# Patient Record
Sex: MALE
[De-identification: names, ages, dates, MRNs, and addresses within clinical notes are randomized per-mention and may not be internally consistent; named-entity substitution may affect disease eponyms.]

---

## 2018-10-17 ENCOUNTER — HOSPITAL ENCOUNTER (INPATIENT)
Dept: HOSPITAL 31 - C.ER | Age: 63
LOS: 13 days | Discharge: HOME | DRG: 314 | End: 2018-10-30
Attending: FAMILY MEDICINE | Admitting: FAMILY MEDICINE
Payer: MEDICARE

## 2018-10-17 VITALS — BODY MASS INDEX: 32.5 KG/M2

## 2018-10-17 VITALS — HEART RATE: 112 BPM

## 2018-10-17 DIAGNOSIS — I48.91: ICD-10-CM

## 2018-10-17 DIAGNOSIS — L97.929: ICD-10-CM

## 2018-10-17 DIAGNOSIS — Z85.72: ICD-10-CM

## 2018-10-17 DIAGNOSIS — I95.9: ICD-10-CM

## 2018-10-17 DIAGNOSIS — E11.622: ICD-10-CM

## 2018-10-17 DIAGNOSIS — I27.20: ICD-10-CM

## 2018-10-17 DIAGNOSIS — Z92.21: ICD-10-CM

## 2018-10-17 DIAGNOSIS — F17.290: ICD-10-CM

## 2018-10-17 DIAGNOSIS — C95.91: ICD-10-CM

## 2018-10-17 DIAGNOSIS — E11.51: ICD-10-CM

## 2018-10-17 DIAGNOSIS — L97.919: ICD-10-CM

## 2018-10-17 DIAGNOSIS — I42.8: ICD-10-CM

## 2018-10-17 DIAGNOSIS — E11.22: ICD-10-CM

## 2018-10-17 DIAGNOSIS — Z79.4: ICD-10-CM

## 2018-10-17 DIAGNOSIS — I13.2: ICD-10-CM

## 2018-10-17 DIAGNOSIS — Z79.01: ICD-10-CM

## 2018-10-17 DIAGNOSIS — T80.212A: Primary | ICD-10-CM

## 2018-10-17 DIAGNOSIS — D64.9: ICD-10-CM

## 2018-10-17 DIAGNOSIS — I50.22: ICD-10-CM

## 2018-10-17 DIAGNOSIS — N18.6: ICD-10-CM

## 2018-10-17 DIAGNOSIS — E11.42: ICD-10-CM

## 2018-10-17 DIAGNOSIS — Z95.810: ICD-10-CM

## 2018-10-17 DIAGNOSIS — L02.213: ICD-10-CM

## 2018-10-17 DIAGNOSIS — G47.33: ICD-10-CM

## 2018-10-17 DIAGNOSIS — M81.0: ICD-10-CM

## 2018-10-17 DIAGNOSIS — Z82.49: ICD-10-CM

## 2018-10-17 DIAGNOSIS — B95.61: ICD-10-CM

## 2018-10-17 DIAGNOSIS — J44.9: ICD-10-CM

## 2018-10-17 DIAGNOSIS — I48.2: ICD-10-CM

## 2018-10-17 DIAGNOSIS — Z99.2: ICD-10-CM

## 2018-10-17 DIAGNOSIS — Z16.11: ICD-10-CM
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
## 2018-10-17 DIAGNOSIS — Z53.29: ICD-10-CM

## 2018-10-17 DIAGNOSIS — Z83.3: ICD-10-CM

## 2018-10-17 DIAGNOSIS — I25.2: ICD-10-CM

## 2018-10-17 LAB
ALBUMIN SERPL-MCNC: 3.6 [, G/DL] (ref 3.5–5)
ALBUMIN/GLOB SERPL: 1 [,] (ref 1–2.1)
ALT SERPL-CCNC: 17 [, U/L] (ref 21–72)
AST SERPL-CCNC: 16 [, U/L] (ref 17–59)
BASOPHILS # BLD AUTO: 0 [, K/UL] (ref 0–0.2)
BASOPHILS NFR BLD: 0.5 [, %] (ref 0–2)
BUN SERPL-MCNC: 45 [, MG/DL] (ref 9–20)
CALCIUM SERPL-MCNC: 8.5 [, MG/DL] (ref 8.6–10.4)
EOSINOPHIL # BLD AUTO: 0.1 [, K/UL] (ref 0–0.7)
EOSINOPHIL NFR BLD: 0.5 [, %] (ref 0–4)
ERYTHROCYTE [DISTWIDTH] IN BLOOD BY AUTOMATED COUNT: 19.6 [, %] (ref 11.5–14.5)
GFR NON-AFRICAN AMERICAN: 17 [,]
HGB BLD-MCNC: 10.9 [, G/DL] (ref 12–18)
HYPOCHROMIC: SLIGHT [,]
LYMPHOCYTE: 5 [, %] (ref 20–40)
LYMPHOCYTES # BLD AUTO: 0.4 [, K/UL] (ref 1–4.3)
LYMPHOCYTES NFR BLD AUTO: 4.2 [, %] (ref 20–40)
MCH RBC QN AUTO: 29.1 [, PG] (ref 27–31)
MCHC RBC AUTO-ENTMCNC: 33 [, G/DL] (ref 33–37)
MCV RBC AUTO: 88.4 [, FL] (ref 80–94)
MICROCYTES BLD QL SMEAR: SLIGHT [,]
MONOCYTE: 4 [, %] (ref 0–10)
MONOCYTES # BLD: 0.8 [, K/UL] (ref 0–0.8)
MONOCYTES NFR BLD: 8.1 [, %] (ref 0–10)
NEUTROPHILS # BLD: 8.3 [, K/UL] (ref 1.8–7)
NEUTROPHILS NFR BLD AUTO: 86.7 [, %] (ref 50–75)
NEUTROPHILS NFR BLD AUTO: 88 [, %] (ref 50–75)
NEUTS BAND NFR BLD: 3 [, %] (ref 0–2)
NRBC BLD AUTO-RTO: 0.1 [, %] (ref 0–2)
OVALOCYTES BLD QL SMEAR: SLIGHT [,]
PLATELET # BLD EST: NORMAL [,]
PLATELET # BLD: 190 [, K/UL] (ref 130–400)
PMV BLD AUTO: 7.4 [, FL] (ref 7.2–11.7)
RBC # BLD AUTO: 3.73 [, MIL/UL] (ref 4.4–5.9)
TOTAL CELLS COUNTED BLD: 100 [,]
WBC # BLD AUTO: 9.5 [, K/UL] (ref 4.8–10.8)

## 2018-10-17 NOTE — C.PDOC
History Of Present Illness


63 year old male with a Hx of renal failure on dialysis presents to the ER for 

an infected port-a-cath. Patient has had multiple hospitalization in the past 

where they had a hard time getting blood drawn from him so he had a permanent 

port-a-cath put into his right upper chest wall; however, the port has not been 

used since 08/2018. Tonight his wife noticed he had purulent drainage from a 

pinpoint area on the port which prompted visit, on arrival patient was found 

with a temperature of 102. Denies cough, abdominal pain, or URI symptoms.


Time Seen by Provider: 10/17/18 22:12


Chief Complaint (Nursing): Abnormal Skin Integrity


History Per: Patient


History/Exam Limitations: no limitations


Onset/Duration Of Symptoms: Hrs


Current Symptoms Are (Timing): Still Present


Location Of Injury: Right: Chest


Quality Of Symptoms: Draining


Recent travel outside of the [place]: No





Past Medical History


Reviewed: Historical Data, Nursing Documentation, Vital Signs


Vital Signs: 





                                Last Vital Signs











Temp  102 F H  10/17/18 21:43


 


Pulse  121 H  10/17/18 21:24


 


Resp  17   10/17/18 21:24


 


BP      


 


Pulse Ox  100   10/17/18 21:24














- Medical History


PMH: Anxiety, Arthritis, Asthma, Atrial Fibrillation, Cardia Arrhythmia (A FIB),

CHF, COPD (uses CPAP for sleep apnea), Depression, Diabetes, Fractures, HTN, 

Malignancy (AML LEUKEMIA), Osteoporosis, Peripheral Edema, End Stage Renal 

Disease, Chronic Kidney Disease, Sleep Apnea (C-PAP)


Surgical History: Back Surgery, Pacemaker





- [facility] Procedures











 (08/16/18)


BONE GRAFT NEC (06/10/14)


BONE MARROW OPS NEC (06/10/14)


CENTRAL VENOUS CATHETER PLACEMENT WITH GUIDANCE (06/10/14)


CHEST CAGE BONE BIOPSY (06/10/14)


DIALYSIS ARTERIOVENOSTOM (09/25/14)


DORSAL & DORSOLUMBAR FUSION OF POSTERIOR COL/TECHNIQUE (06/10/14)


DRAINAGE OF RIGHT LOWER LEG SKIN, EXTERNAL APPROACH, DIAGN (04/17/18)


DX ULTRASOUND-HEART (06/10/14)


EXCISE BONE FOR GFT NEC (06/10/14)


EXCISION OF L FOOT SUBCU/FASCIA, OPEN APPROACH (07/03/17)


EXCISION OF LEFT FOOT SKIN, EXTERNAL APPROACH (04/17/18)


EXCISION OF LEFT TARSAL, OPEN APPROACH, DIAGNOSTIC (10/13/17)


EXCISION OF RIGHT FOOT SKIN, EXTERNAL APPROACH (10/13/17)


EXCISION OF RIGHT LOWER LEG SKIN, EXTERNAL APPROACH (04/17/18)


EXTRACTION OF LEFT FOOT SKIN, EXTERNAL APPROACH (07/03/17)


FUSION/REFUS OF 2-3 VERTEBRAE (06/10/14)


HEAD SOFT TISS X-RAY NEC (09/25/14)


HEMODIALYSIS (08/30/14)


INSERTION OF INFUSION DEV INTO SUP VENA CAVA, PERC APPROACH (10/13/17)


NON-INVASIVE MECHANICAL VENTILATION (08/30/14)


OCCUPATIONAL THERAPY (07/15/14)


PACKED CELL TRANSFUSION (06/10/14)


PERFORMANCE OF URINARY FILTRATION, MULTIPLE (04/17/17)


PHYSICAL THERAPY NEC (07/15/14)


RECREATIONAL THERAPY (07/15/14)


REPOSITION RIGHT BASILIC VEIN, OPEN APPROACH (04/17/17)


SPINAL CANAL EXPLOR NEC (06/10/14)


SUPPLEMENT LEFT FOOT WITH NONAUT SUB, OPEN APPROACH (04/17/18)


THORACENTESIS (08/30/14)


TRANSFUSE NONAUT RED BLOOD CELLS IN PERIPH VEIN, PERC (08/16/18)


VACCINATION NEC (08/30/14)


VENOUS CATHETERIZATION FOR RENAL DIALYSIS (09/25/14)








Family History: States: Unknown Family Hx





- Social History


Hx Tobacco Use: No


Hx Alcohol Use: No


Hx Substance Use: No





- Immunization History


Hx Tetanus Toxoid Vaccination: Yes


Hx Influenza Vaccination: Yes


Hx Pneumococcal Vaccination: Yes





Review Of Systems


Constitutional: Positive for: Fever


Cardiovascular: Negative for: Chest Pain, Palpitations


Respiratory: Negative for: Cough, Shortness of Breath


Gastrointestinal: Negative for: Nausea, Vomiting


Skin: Positive for: Other (Drainage from right chest port)





Physical Exam





- Physical Exam


Appears: Non-toxic


Skin: Warm, Dry


Head: Atraumatic, Normacephalic


Eye(s): bilateral: Normal Inspection


Oral Mucosa: Moist


Neck: Normal, Supple


Chest: Other (Right port-a-cath with tenderness, erythema, swelling, and 

purulent drainage)


Cardiovascular: Rhythm Regular


Respiratory: Normal Breath Sounds, No Rales, No Rhonchi, No Wheezing


Gastrointestinal/Abdominal: Soft, No Tenderness


Neurological/Psych: Oriented x3, Normal Speech





ED Course And Treatment





- Laboratory Results


Result Diagrams: 


                                 10/17/18 22:57





                                 10/17/18 22:57


Lab Interpretation: Abnormal (BUN 45, Cr 3.7,)


O2 Sat by Pulse Oximetry: 100 (Room air)


Pulse Ox Interpretation: Normal


Progress Note: Blood work ordered. Tylenol administered.





- Physician Consult Information


Time Consulting Physician Contacted: 23:59


Physician Contacted: Cora Trinh


Outcome Of Conversation: Patient to be admitted for infected port. Dr Lopez 

to be consulted for removal.





Disposition





- Disposition


Disposition: HOSPITALIZED


Disposition Time: 00:00


Condition: STABLE





- POA


Present On Arrival: None





- Clinical Impression


Clinical Impression: 


 Fever, Infection due to Port-A-Cath








- Scribe Statement


The provider has reviewed the documentation as recorded by the Scribe





Noah Dudley





All medical record entries made by the Scribe were at my direction and 

personally dictated by me. I have reviewed the chart and agree that the record 

accurately reflects my personal performance of the history, physical exam, 

medical decision making, and the department course for this patient. I have also

personally directed, reviewed, and agree with the discharge instructions and 

disposition.

## 2018-10-18 LAB
ALBUMIN SERPL-MCNC: 3.3 [, G/DL] (ref 3.5–5)
ALBUMIN/GLOB SERPL: 1.1 [,] (ref 1–2.1)
ALT SERPL-CCNC: 20 [, U/L] (ref 21–72)
ANISOCYTOSIS BLD QL SMEAR: SLIGHT [,]
APTT BLD: 39 [, SECONDS] (ref 21–34)
AST SERPL-CCNC: 15 [, U/L] (ref 17–59)
BASOPHILS # BLD AUTO: 0 [, K/UL] (ref 0–0.2)
BASOPHILS NFR BLD: 0.6 [, %] (ref 0–2)
BUN SERPL-MCNC: 50 [, MG/DL] (ref 9–20)
CALCIUM SERPL-MCNC: 8 [, MG/DL] (ref 8.6–10.4)
EOSINOPHIL # BLD AUTO: 0.1 [, K/UL] (ref 0–0.7)
EOSINOPHIL NFR BLD: 0.9 [, %] (ref 0–4)
ERYTHROCYTE [DISTWIDTH] IN BLOOD BY AUTOMATED COUNT: 19.5 [, %] (ref 11.5–14.5)
GFR NON-AFRICAN AMERICAN: 15 [,]
HGB BLD-MCNC: 10 [, G/DL] (ref 12–18)
HYPOCHROMIC: SLIGHT [,]
INR PPP: 2 [,]
LYMPHOCYTE: 3 [, %] (ref 20–40)
LYMPHOCYTES # BLD AUTO: 0.6 [, K/UL] (ref 1–4.3)
LYMPHOCYTES NFR BLD AUTO: 7 [, %] (ref 20–40)
MCH RBC QN AUTO: 28.5 [, PG] (ref 27–31)
MCHC RBC AUTO-ENTMCNC: 32.3 [, G/DL] (ref 33–37)
MCV RBC AUTO: 88.4 [, FL] (ref 80–94)
MONOCYTE: 3 [, %] (ref 0–10)
MONOCYTES # BLD: 0.7 [, K/UL] (ref 0–0.8)
MONOCYTES NFR BLD: 8.9 [, %] (ref 0–10)
NEUTROPHILS # BLD: 6.9 [, K/UL] (ref 1.8–7)
NEUTROPHILS NFR BLD AUTO: 82.6 [, %] (ref 50–75)
NEUTROPHILS NFR BLD AUTO: 94 [, %] (ref 50–75)
NRBC BLD AUTO-RTO: 0 [, %] (ref 0–2)
OVALOCYTES BLD QL SMEAR: SLIGHT [,]
PLATELET # BLD EST: NORMAL [,]
PLATELET # BLD: 182 [, K/UL] (ref 130–400)
PMV BLD AUTO: 6.9 [, FL] (ref 7.2–11.7)
POLYCHROMIC: SLIGHT [,]
PROTHROMBIN TIME: 22.1 [, SECONDS] (ref 9.7–12.2)
RBC # BLD AUTO: 3.52 [, MIL/UL] (ref 4.4–5.9)
TOTAL CELLS COUNTED BLD: 100 [,]
WBC # BLD AUTO: 8.3 [, K/UL] (ref 4.8–10.8)

## 2018-10-18 PROCEDURE — 0JPT0WZ REMOVAL OF TOTALLY IMPLANTABLE VASCULAR ACCESS DEVICE FROM TRUNK SUBCUTANEOUS TISSUE AND FASCIA, OPEN APPROACH: ICD-10-PCS | Performed by: SURGERY

## 2018-10-18 RX ADMIN — HUMAN INSULIN SCH UNIT: 100 INJECTION, SOLUTION SUBCUTANEOUS at 18:01

## 2018-10-18 RX ADMIN — HUMAN INSULIN SCH: 100 INJECTION, SOLUTION SUBCUTANEOUS at 12:24

## 2018-10-18 RX ADMIN — HUMAN INSULIN SCH: 100 INJECTION, SOLUTION SUBCUTANEOUS at 22:09

## 2018-10-18 RX ADMIN — HUMAN INSULIN SCH: 100 INJECTION, SOLUTION SUBCUTANEOUS at 07:30

## 2018-10-18 NOTE — OP
PROCEDURE DATE:  10/18/2018



PREOPERATIVE DIAGNOSIS:  Possible port infection.



PROCEDURE CARRIED OUT:  Removal of Port-A-Cath, right chest wall.



SURGEON:  Alex Lopez MD



ASSISTANTS:  None.



ANESTHESIOLOGIST:  _____.



TYPE OF ANESTHESIA:  Local with sedation.



INDICATIONS:  The patient is a male with multiple medical problems, coming

in with a port that has an opening in it and the port is exposed beneath.



OPERATIVE FINDINGS:  There was a small amount of pus around it, this was

cultured, sent for bacteriology and the port inside was removed. 

Hemostasis was obtained after blood loss of 20 to 30 mL.  The wound was

partially closed, partially left opened.



OPERATION CARRIED OUT:  Removal of Port-A-Cath, right chest wall.







__________________________________________

Alex Lopez Jr., MD



DD:  10/18/2018 13:24:15

DT:  10/18/2018 14:51:32

Job # [id number]

## 2018-10-18 NOTE — RAD
Date of service: 



10/18/2018



HISTORY:

 preop 



COMPARISON:

Portable chest 04/17/2018. 



FINDINGS:



LUNGS:

No active pulmonary disease.  Diminished pulmonary volumes identified.



PLEURA:

No significant pleural effusion identified, no pneumothorax apparent.



CARDIOVASCULAR:

Cardiomegaly is reiterated with borderline pulmonary vascular 

congestion.  Vascular markings may be accentuated by limited 

pulmonary volume related overlap with bronchial markings however.  

Clinically correlate. 



Permanent pacemaker/AICD reiterated as well as right-sided MediPort 

placed by right internal jugular approach. 



OSSEOUS STRUCTURES:

Posterior spinal fusion hardware is again seen in the thoracolumbar 

spine.



VISUALIZED UPPER ABDOMEN:

Normal.



OTHER FINDINGS:

None.



IMPRESSION:

There is pulmonary volumes however no infiltrate is identified 

bilaterally.  Borderline pulmonary vascular congestion appears stable 

cardiomegaly.

## 2018-10-18 NOTE — CP.PCM.HP
<Domi Littlejohn - Last Filed: 10/18/18 01:12>





History of Present Illness





- History of Present Illness


History of Present Illness: 





Domi Littlejohn PG1 H&P for Dr. Dennis





Pt is a 63M with PMH ESRD, DM, CHF, A FIB W/RVR, AML, HTN, COPD, sleep apnea 

presents to ED with port a cath infection s/p HD. He reports feeling a burning 

sensation two days ago and that today after HD his wife noticed some pus coming 

from the site. Pt reports nausea and dry heaving. He denies any fever, abdominal

pain, shortness of breath, chest pain, diarrhea, constipation. 





In the ED, pt was given tylenol for Tmax 102F and BCx and wound Cx were 

obtained.





SxH: back surgery, pacemaker


FamH: mom heart disease, DM, dad CAD


SocH: denies tobacco, etoh, recreational drug use


Allergies: NKDA


Meds: coumadin 1 daily, starlix 60 TID, procrit, metoprolol succinate 25 daily





PMD: Dr. Jackson








Present on Admission





- Present on Admission


Any Indicators Present on Admission: No





Review of Systems





- Review of Systems


Review of Systems: 





as per HPI








Past Patient History





- Infectious Disease


Hx of Infectious Diseases: None





- Tetanus Immunizations


Tetanus Immunization: Unknown





- Past Medical History & Family History


Past Medical History?: Yes





- Past Social History


Smoking Status: Never Smoked





- CARDIAC


Hx Atrial Fibrillation: Yes


Hx Cardia Arrhythmia: Yes (A FIB)


Hx Congestive Heart Failure: Yes


Hx Hypertension: Yes


Hx Pacemaker: Yes


Hx Peripheral Edema: Yes





- PULMONARY


Hx Asthma: Yes


Hx Chronic Obstructive Pulmonary Disease (COPD): Yes (uses CPAP for sleep apnea)


Hx Sleep Apnea: Yes (C-PAP)





- NEUROLOGICAL


Hx Neurological Disorder: Yes (PERIPHERAL NEUROPATHY)


Hx Dizziness: Yes





- HEENT


Hx HEENT Problems: No





- RENAL


Hx Chronic Kidney Disease: Yes





- ENDOCRINE/METABOLIC


Hx Diabetes Mellitus Type 2: Yes





- HEMATOLOGICAL/ONCOLOGICAL


Hx Blood Disorders: Yes


Hx Blood Transfusions: Yes


Hx Cancer: Yes


Hx Chemotherapy: Yes


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





- INTEGUMENTARY


Hx Dermatological Problems: Yes


Hx Cellulitis: Yes





- MUSCULOSKELETAL/RHEUMATOLOGICAL


Hx Arthritis: Yes


Hx Fractures: Yes


Hx Osteoporosis: Yes





- GASTROINTESTINAL


Hx Gastrointestinal Disorders: Yes


Hx Constipation: Yes





- GENITOURINARY/GYNECOLOGICAL


Hx Genitourinary Disorders: Yes


Other/Comment: dialysis  TTS





- PSYCHIATRIC


Hx Anxiety: Yes


Hx Depression: Yes


Hx Substance Use: No





- SURGICAL HISTORY


Hx Surgeries: Yes


Hx Angiogram: Yes


Hx Arteriovenous Shunt: Yes


Hx Cardiac Catheterization: Yes


Hx Orthopedic Surgery: Yes (KNEE)


Hx Vascular Access Device: Yes (RIGHT EMERSON CATH)


Other/Comment: hx back surgery T5.  defibrillator/pacemaker placement





- ANESTHESIA


Hx Anesthesia: Yes


Hx Anesthesia Reactions: No


Hx Malignant Hyperthermia: No





Meds


Allergies/Adverse Reactions: 


                                    Allergies











Allergy/AdvReac Type Severity Reaction Status Date / Time


 


No Known Allergies Allergy   Verified 08/16/18 04:19














Physical Exam





- Constitutional


Appears: Well, No Acute Distress





- Head Exam


Head Exam: ATRAUMATIC, NORMOCEPHALIC





- Eye Exam


Eye Exam: EOMI, PERRL


Pupil Exam: NORMAL ACCOMODATION





- ENT Exam


ENT Exam: Mucous Membranes Moist





- Neck Exam


Neck exam: Positive for: Normal Inspection





- Respiratory Exam


Respiratory Exam: Clear to Auscultation Bilateral, NORMAL BREATHING PATTERN.  

absent: Rales, Rhonchi, Wheezes





- Cardiovascular Exam


Cardiovascular Exam: +S1, +S2.  absent: Systolic Murmur





- GI/Abdominal Exam


GI & Abdominal Exam: Distended, Normal Bowel Sounds.  absent: Soft, Tenderness





- Extremities Exam


Additional comments: 





R chest: 2x2cm open wound, draining milky white fluid. surrounding erythema. 

indurated


lower extremities: xerosis, hyperpigmentation, stasis derm changes





- Neurological Exam


Neurological exam: Alert, Oriented x3





- Psychiatric Exam


Psychiatric exam: Normal Affect, Normal Mood





- Skin


Skin Exam: Dry





Results





- Vital Signs


Recent Vital Signs: 





                                Last Vital Signs











Temp  102 F H  10/17/18 21:43


 


Pulse  121 H  10/17/18 21:24


 


Resp  17   10/17/18 21:24


 


BP      


 


Pulse Ox  100   10/18/18 00:01














- Labs


Result Diagrams: 


                                 10/17/18 22:57





                                 10/17/18 22:57


Labs: 





                         Laboratory Results - last 24 hr











  10/17/18 10/17/18





  22:57 22:57


 


WBC  9.5  D 


 


RBC  3.73 L 


 


Hgb  10.9 L D 


 


Hct  33.0 L 


 


MCV  88.4 


 


MCH  29.1 


 


MCHC  33.0 


 


RDW  19.6 H 


 


Plt Count  190 


 


MPV  7.4 


 


Neut % (Auto)  86.7 H 


 


Lymph % (Auto)  4.2 L 


 


Mono % (Auto)  8.1 


 


Eos % (Auto)  0.5 


 


Baso % (Auto)  0.5 


 


Neut # (Auto)  8.3 H 


 


Lymph # (Auto)  0.4 L 


 


Mono # (Auto)  0.8 


 


Eos # (Auto)  0.1 


 


Baso # (Auto)  0.0 


 


Neutrophils % (Manual)  88 H 


 


Band Neutrophils %  3 H 


 


Lymphocytes % (Manual)  5 L 


 


Monocytes % (Manual)  4 


 


Platelet Estimate  Normal 


 


Hypochromasia (manual)  Slight 


 


Microcytosis (manual)  Slight 


 


Ovalocytes  Slight 


 


Sodium   137


 


Potassium   3.6


 


Chloride   93 L


 


Carbon Dioxide   29


 


Anion Gap   19


 


BUN   45 H


 


Creatinine   3.7 H


 


Est GFR ( Amer)   20


 


Est GFR (Non-Af Amer)   17


 


Random Glucose   126 H


 


Calcium   8.5 L


 


Total Bilirubin   1.6 H


 


AST   16 L D


 


ALT   17 L


 


Alkaline Phosphatase   342 H D


 


Total Protein   7.2


 


Albumin   3.6


 


Globulin   3.5


 


Albumin/Globulin Ratio   1.0














Assessment & Plan





- Assessment and Plan (Free Text)


Assessment: 





Pt is a 63M with PMH ESRD, DM, CHF, A FIB W/RVR, AML, HTN, COPD, sleep apnea 

presents to ED with port a cath infection s/p HD. Pt admitted for further 

treatment of abscess. 





Plan: 





Abscess


- pt with port a cath, draining white purulent fluid on R chest


- Tmax 1002F


- WBC 9.5


- start vanco 1.5 IV daily


- start rocephin 1g IV daily


- f/u BCx, Wound Cx


- hold coumadin


- f/u coags


- vascular surgery consulted, Dr. Lopez - f/u recs


- ID consulted, Dr. Hernández - f/u recs





ESRD


- s/p HD


- BUN 45


- Cr 3.7


- Nephro consulted, Dr. Cortes - f/u recs





DM


- accuchecks q6h


- low dose sliding scale


- hypoglycemia protocol





HTN


- BP 97/52


- hold home metoprolol





JEB


- pt uses CPAP at home


- not ammenable to using bipap


- wife to bring home CPAP








PPX


heparin 5000 SC q8h


NPO except meds





Pt seen with and case discussed with Dr. Dennis








<Ulisses Dennis P - Last Filed: 10/18/18 06:53>





Results





- Vital Signs


Recent Vital Signs: 





                                Last Vital Signs











Temp  98.6 F   10/18/18 02:22


 


Pulse  104 H  10/18/18 02:22


 


Resp  20   10/18/18 02:22


 


BP  98/65 L  10/18/18 02:22


 


Pulse Ox  99   10/18/18 02:22














- Labs


Result Diagrams: 


                                 10/17/18 22:57





                                 10/17/18 22:57


Labs: 





                         Laboratory Results - last 24 hr











  10/17/18 10/17/18 10/18/18





  22:57 22:57 06:07


 


WBC  9.5  D  


 


RBC  3.73 L  


 


Hgb  10.9 L D  


 


Hct  33.0 L  


 


MCV  88.4  


 


MCH  29.1  


 


MCHC  33.0  


 


RDW  19.6 H  


 


Plt Count  190  


 


MPV  7.4  


 


Neut % (Auto)  86.7 H  


 


Lymph % (Auto)  4.2 L  


 


Mono % (Auto)  8.1  


 


Eos % (Auto)  0.5  


 


Baso % (Auto)  0.5  


 


Neut # (Auto)  8.3 H  


 


Lymph # (Auto)  0.4 L  


 


Mono # (Auto)  0.8  


 


Eos # (Auto)  0.1  


 


Baso # (Auto)  0.0  


 


Neutrophils % (Manual)  88 H  


 


Band Neutrophils %  3 H  


 


Lymphocytes % (Manual)  5 L  


 


Monocytes % (Manual)  4  


 


Platelet Estimate  Normal  


 


Hypochromasia (manual)  Slight  


 


Microcytosis (manual)  Slight  


 


Ovalocytes  Slight  


 


Sodium   137 


 


Potassium   3.6 


 


Chloride   93 L 


 


Carbon Dioxide   29 


 


Anion Gap   19 


 


BUN   45 H 


 


Creatinine   3.7 H 


 


Est GFR ( Amer)   20 


 


Est GFR (Non-Af Amer)   17 


 


POC Glucose (mg/dL)    96


 


Random Glucose   126 H 


 


Calcium   8.5 L 


 


Total Bilirubin   1.6 H 


 


AST   16 L D 


 


ALT   17 L 


 


Alkaline Phosphatase   342 H D 


 


Total Protein   7.2 


 


Albumin   3.6 


 


Globulin   3.5 


 


Albumin/Globulin Ratio   1.0 














Attending/Attestation





- Attestation


I have personally seen and examined this patient.: Yes


I have fully participated in the care of the patient.: Yes


I have reviewed all pertinent clinical information: Yes


Notes (Text): 





10/18/18 06:47


All 12 system reviewed and were negative except as mentioned in HPI.


Pt is a 63M with PMH ESRD left arm av graft, DM, CHF, A FIB W/RVR s/p AICD and 

warfarin, HTN, COPD, sleep apnea on CPAP presents to ED with port a cath 

infection, 


About 15 ml of puss expressed out from the site of the port, with surrounding 

erythema and warmth, 


Patient is mostly bed bound due to his b/l healing ulcers in the leg,


DM Neuropathy in hands


Chronic anasarca


Abd distension suspect ascitis





Plan


Puss sent for culture


Vancomycin and rocephin


HD in hospital


Surgery consult for port removal


Hold on warfarin


DVT prophylaxis mean while


Will check prior record if need w/u for ascitis


Patient is of Dr. Jackson, who will resume care in about 2 days.


See orders for detail

## 2018-10-18 NOTE — CP.PCM.CON
History of Present Illness





- History of Present Illness


History of Present Illness: 





INFECTIOUS DISEASE CONSULTATION


RAHEEM CANADA MD, FACP


5T 551-A


10/18/2018





CHART REVIEWED


PT EXAMINED


CASE DISCUSSED





Pt is a 63M with PMH ESRD, DM, CHF, A FIB W/RVR, AML, HTN, COPD, sleep apnea 

presents to ED for evaluation of portacath site. He reports feeling a burning 

sensation two days ago and that today after HD his wife noticed some pus coming 

from the site. Pt reports nausea and dry heaving. He denies any fever, abdominal

pain, shortness of breath, chest pain, diarrhea, constipation. 


In the ED, pt was given tylenol for Tmax 102F and BCx and wound Cx were 

obtained.


Last HD yesterday. 


On anticoagulation for a fib


Pt had an indwelling portacath for iv antibiotics and lab draws, difficult stick





SxH: back surgery, pacemaker


FamH: mom heart disease, DM, dad CAD


SocH: denies tobacco, etoh, recreational drug use


Allergies: NKDA


Meds: coumadin 1 daily, starlix 60 TID, procrit, metoprolol succinate 25 daily





PLEASE SEE EXTENSIVE ID HX FROM PREVIOUS CHARTS


LEUKEMIA


NEUROPATHY FROM CHEMIO THERAPY


RENAL INSUFFICIENCY-FAILURE


HX OF MULTIPLE WOUND INFECTIONS


CARDIAC ISSUES





PUS REPORTED FROM PORTACATH SITE


TEMPS 102


R/O LINE SEPSIS


COVER FOR MRSA AND GRAM NEGATIVES


RENAL DOSES FOR VANCOMYCIN AND CEFEPIME DAILY








RAHEEM CANADA MD. FACP

















Past Patient History





- Infectious Disease


Hx of Infectious Diseases: None





- Tetanus Immunizations


Tetanus Immunization: Unknown





- Past Medical History & Family History


Past Medical History?: Yes





- Past Social History


Smoking Status: Never Smoked





- CARDIAC


Hx Cardiac Disorders: Yes


Hx Atrial Fibrillation: Yes


Hx Cardia Arrhythmia: Yes (A FIB)


Hx Congestive Heart Failure: Yes


Hx Hypertension: Yes


Hx Pacemaker: Yes


Hx Peripheral Edema: Yes





- PULMONARY


Hx Respiratory Disorders: Yes


Hx Asthma: Yes


Hx Chronic Obstructive Pulmonary Disease (COPD): Yes (uses CPAP for sleep apnea)


Hx Sleep Apnea: Yes (C-PAP)





- NEUROLOGICAL


Hx Neurological Disorder: Yes (PERIPHERAL NEUROPATHY)


Hx Dizziness: Yes





- HEENT


Hx HEENT Problems: No





- RENAL


Hx Chronic Kidney Disease: Yes





- ENDOCRINE/METABOLIC


Hx Endocrine Disorders: Yes


Hx Diabetes Mellitus Type 2: Yes





- HEMATOLOGICAL/ONCOLOGICAL


Hx Blood Disorders: Yes


Hx Blood Transfusions: Yes


Hx Cancer: Yes


Hx Chemotherapy: Yes


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





- INTEGUMENTARY


Hx Dermatological Problems: Yes


Hx Cellulitis: Yes





- MUSCULOSKELETAL/RHEUMATOLOGICAL


Hx Musculoskeletal Disorders: Yes


Hx Arthritis: Yes


Hx Falls: Yes


Hx Fractures: Yes


Hx Osteoporosis: Yes





- GASTROINTESTINAL


Hx Gastrointestinal Disorders: Yes


Hx Constipation: Yes





- GENITOURINARY/GYNECOLOGICAL


Hx Genitourinary Disorders: Yes


Other/Comment: dialysis  TTS





- PSYCHIATRIC


Hx Psychophysiologic Disorder: Yes


Hx Anxiety: Yes


Hx Depression: Yes


Hx Substance Use: No





- SURGICAL HISTORY


Hx Surgeries: Yes


Hx Angiogram: Yes


Hx Arteriovenous Shunt: Yes


Hx Cardiac Catheterization: Yes


Hx Orthopedic Surgery: Yes (KNEE)


Hx Vascular Access Device: Yes (RIGHT EMERSON CATH)


Other/Comment: hx back surgery T5.  defibrillator/pacemaker placement





- ANESTHESIA


Hx Anesthesia: Yes


Hx Anesthesia Reactions: No


Hx Malignant Hyperthermia: No





Meds


Allergies/Adverse Reactions: 


                                    Allergies











Allergy/AdvReac Type Severity Reaction Status Date / Time


 


No Known Allergies Allergy   Verified 08/16/18 04:19














- Medications


Medications: 


                               Current Medications





Acetaminophen (Tylenol 325mg Tab)  650 mg PO Q6 PRN


   PRN Reason: Fever >100.4 F


Calcium Acetate (Phoslo)  667 mg PO TIDCC [facility]


Dextrose (Dextrose 50% Inj)  0 ml IV STAT PRN; Protocol


   PRN Reason: Hypoglycemia Protocol


Dextrose (Glutose 15)  0 gm PO ONCE PRN; Protocol


   PRN Reason: Hypoglycemia Protocol


Glucagon (Glucagen Diagnostic Kit)  0 mg IM STAT PRN; Protocol


   PRN Reason: Hypoglycemia Protocol


Dextrose (Dextrose 5% In Water 1000 Ml)  1,000 mls @ 0 mls/hr IV .Q0M PRN; 

Protocol


   PRN Reason: Hypoglycemia Protocol


Cefepime HCl 0.5 gm/ Dextrose  50 mls @ 100 mls/hr IVPB DAILY [facility]; Protocol


Insulin Human Regular (Novolin R)  0 unit SC ACHS [facility]; Protocol


   Last Admin: 10/18/18 18:01 Dose:  1 unit


Ondansetron HCl (Zofran Inj)  4 mg IVP Q6 PRN


   PRN Reason: Nausea/Vomiting











Results





- Vital Signs


Recent Vital Signs: 


                                Last Vital Signs











Temp  97.8 F   10/18/18 15:00


 


Pulse  96 H  10/18/18 15:00


 


Resp  20   10/18/18 15:00


 


BP  111/77   10/18/18 15:00


 


Pulse Ox  99   10/18/18 15:00














- Labs


Result Diagrams: 


                                 10/20/18 07:42





                                 10/20/18 07:42


Labs: 


                         Laboratory Results - last 24 hr











  10/17/18 10/17/18 10/18/18





  22:57 22:57 06:07


 


WBC  9.5  D  


 


RBC  3.73 L  


 


Hgb  10.9 L D  


 


Hct  33.0 L  


 


MCV  88.4  


 


MCH  29.1  


 


MCHC  33.0  


 


RDW  19.6 H  


 


Plt Count  190  


 


MPV  7.4  


 


Neut % (Auto)  86.7 H  


 


Lymph % (Auto)  4.2 L  


 


Mono % (Auto)  8.1  


 


Eos % (Auto)  0.5  


 


Baso % (Auto)  0.5  


 


Neut # (Auto)  8.3 H  


 


Lymph # (Auto)  0.4 L  


 


Mono # (Auto)  0.8  


 


Eos # (Auto)  0.1  


 


Baso # (Auto)  0.0  


 


Neutrophils % (Manual)  88 H  


 


Band Neutrophils %  3 H  


 


Lymphocytes % (Manual)  5 L  


 


Monocytes % (Manual)  4  


 


Platelet Estimate  Normal  


 


Polychromasia   


 


Hypochromasia (manual)  Slight  


 


Anisocytosis (manual)   


 


Microcytosis (manual)  Slight  


 


Ovalocytes  Slight  


 


PT   


 


INR   


 


APTT   


 


Sodium   137 


 


Potassium   3.6 


 


Chloride   93 L 


 


Carbon Dioxide   29 


 


Anion Gap   19 


 


BUN   45 H 


 


Creatinine   3.7 H 


 


Est GFR ( Amer)   20 


 


Est GFR (Non-Af Amer)   17 


 


POC Glucose (mg/dL)    96


 


Random Glucose   126 H 


 


Lactic Acid   


 


Calcium   8.5 L 


 


Total Bilirubin   1.6 H 


 


AST   16 L D 


 


ALT   17 L 


 


Alkaline Phosphatase   342 H D 


 


Total Protein   7.2 


 


Albumin   3.6 


 


Globulin   3.5 


 


Albumin/Globulin Ratio   1.0 














  10/18/18 10/18/18 10/18/18





  08:23 08:23 08:23


 


WBC  8.3  


 


RBC  3.52 L  


 


Hgb  10.0 L  


 


Hct  31.1 L  


 


MCV  88.4  


 


MCH  28.5  


 


MCHC  32.3 L  


 


RDW  19.5 H  


 


Plt Count  182  


 


MPV  6.9 L  


 


Neut % (Auto)  82.6 H  


 


Lymph % (Auto)  7.0 L  


 


Mono % (Auto)  8.9  


 


Eos % (Auto)  0.9  


 


Baso % (Auto)  0.6  


 


Neut # (Auto)  6.9  


 


Lymph # (Auto)  0.6 L  


 


Mono # (Auto)  0.7  


 


Eos # (Auto)  0.1  


 


Baso # (Auto)  0.0  


 


Neutrophils % (Manual)  94 H  


 


Band Neutrophils %   


 


Lymphocytes % (Manual)  3 L  


 


Monocytes % (Manual)  3  


 


Platelet Estimate  Normal  


 


Polychromasia  Slight  


 


Hypochromasia (manual)  Slight  


 


Anisocytosis (manual)  Slight  


 


Microcytosis (manual)   


 


Ovalocytes  Slight  


 


PT   22.1 H 


 


INR   2.0 


 


APTT   39 H 


 


Sodium    135


 


Potassium    3.5 L


 


Chloride    95 L


 


Carbon Dioxide    27


 


Anion Gap    16


 


BUN    50 H


 


Creatinine    4.1 H


 


Est GFR ( Amer)    18


 


Est GFR (Non-Af Amer)    15


 


POC Glucose (mg/dL)   


 


Random Glucose    88


 


Lactic Acid   


 


Calcium    8.0 L


 


Total Bilirubin    1.6 H


 


AST    15 L


 


ALT    20 L


 


Alkaline Phosphatase    288 H


 


Total Protein    6.5


 


Albumin    3.3 L


 


Globulin    3.1


 


Albumin/Globulin Ratio    1.1














  10/18/18 10/18/18





  11:18 17:38


 


WBC  


 


RBC  


 


Hgb  


 


Hct  


 


MCV  


 


MCH  


 


MCHC  


 


RDW  


 


Plt Count  


 


MPV  


 


Neut % (Auto)  


 


Lymph % (Auto)  


 


Mono % (Auto)  


 


Eos % (Auto)  


 


Baso % (Auto)  


 


Neut # (Auto)  


 


Lymph # (Auto)  


 


Mono # (Auto)  


 


Eos # (Auto)  


 


Baso # (Auto)  


 


Neutrophils % (Manual)  


 


Band Neutrophils %  


 


Lymphocytes % (Manual)  


 


Monocytes % (Manual)  


 


Platelet Estimate  


 


Polychromasia  


 


Hypochromasia (manual)  


 


Anisocytosis (manual)  


 


Microcytosis (manual)  


 


Ovalocytes  


 


PT  


 


INR  


 


APTT  


 


Sodium  


 


Potassium  


 


Chloride  


 


Carbon Dioxide  


 


Anion Gap  


 


BUN  


 


Creatinine  


 


Est GFR ( Amer)  


 


Est GFR (Non-Af Amer)  


 


POC Glucose (mg/dL)   174 H


 


Random Glucose  


 


Lactic Acid  0.9 


 


Calcium  


 


Total Bilirubin  


 


AST  


 


ALT  


 


Alkaline Phosphatase  


 


Total Protein  


 


Albumin  


 


Globulin  


 


Albumin/Globulin Ratio

## 2018-10-18 NOTE — CP.PCM.CON
History of Present Illness





- History of Present Illness


History of Present Illness: 


General surgery consult for Dr. Jessica WONG. Mr. Giron is a 63M with a pmh of ESRD, COPD, DM, pacemaker/defibrillator,

leukemia, CHF, MI and afib who presented to the ED yesterday because of purulent

discharge arising fro his port-a-cath located on the right side of his neck. He 

reports mild pain at infection site and subjective fevers at home. It all began 

a few days ago when he felt a pinching/burning sensation to the area. Then 

yesterday afternoon he notice purulent fluid oozing from the site. Denies 

trauma, bug bites, dizziness, chest pain, SOB, palpations, or N/V/D/C. At ED, 

[id number]. The port-a-cath was inserted several months ago for osteomyelitis abx 

treatment but is currently not is use. Patients last dialysis was yesterday 

where they removed 3.5L. He is non-ambulatory and currently has at home PT. 





Pmh: CKD (on dialysis), DM, Pacemaker/defibrillator, MI (11y ago), Afib, AML 

(remission since )


Surg: Back surgery (metal plates), ulcer debridment in feet b/l, port-a-cath 

placement, AV fistula


Family hx: Stated mother "has all his issues and not sure", father  from an 

MI at elderly age. 


Allergies: none


Social hx: smokes a cigar occasionally, no illicit drug use or alcohol. 


 





Review of Systems





- Review of Systems


Review of Systems: 





As per HPI





Past Patient History





- Infectious Disease


Hx of Infectious Diseases: None





- Tetanus Immunizations


Tetanus Immunization: Unknown





- Past Medical History & Family History


Past Medical History?: Yes





- Past Social History


Smoking Status: Never Smoked





- CARDIAC


Hx Cardiac Disorders: Yes


Hx Atrial Fibrillation: Yes


Hx Cardia Arrhythmia: Yes (A FIB)


Hx Congestive Heart Failure: Yes


Hx Hypertension: Yes


Hx Pacemaker: Yes


Hx Peripheral Edema: Yes





- PULMONARY


Hx Respiratory Disorders: Yes


Hx Asthma: Yes


Hx Chronic Obstructive Pulmonary Disease (COPD): Yes (uses CPAP for sleep apnea)


Hx Sleep Apnea: Yes (C-PAP)





- NEUROLOGICAL


Hx Neurological Disorder: Yes (PERIPHERAL NEUROPATHY)


Hx Dizziness: Yes





- HEENT


Hx HEENT Problems: No





- RENAL


Hx Chronic Kidney Disease: Yes





- ENDOCRINE/METABOLIC


Hx Endocrine Disorders: Yes


Hx Diabetes Mellitus Type 2: Yes





- HEMATOLOGICAL/ONCOLOGICAL


Hx Blood Disorders: Yes


Hx Blood Transfusions: Yes


Hx Cancer: Yes


Hx Chemotherapy: Yes


Hx Leukemia: Yes (ACUTE MYELO LEUKEMIA 2008)





- INTEGUMENTARY


Hx Dermatological Problems: Yes


Hx Cellulitis: Yes





- MUSCULOSKELETAL/RHEUMATOLOGICAL


Hx Musculoskeletal Disorders: Yes


Hx Arthritis: Yes


Hx Falls: Yes


Hx Fractures: Yes


Hx Osteoporosis: Yes





- GASTROINTESTINAL


Hx Gastrointestinal Disorders: Yes


Hx Constipation: Yes





- GENITOURINARY/GYNECOLOGICAL


Hx Genitourinary Disorders: Yes


Other/Comment: dialysis  TTS





- PSYCHIATRIC


Hx Psychophysiologic Disorder: Yes


Hx Anxiety: Yes


Hx Depression: Yes


Hx Substance Use: No





- SURGICAL HISTORY


Hx Surgeries: Yes


Hx Angiogram: Yes


Hx Arteriovenous Shunt: Yes


Hx Cardiac Catheterization: Yes


Hx Orthopedic Surgery: Yes (KNEE)


Hx Vascular Access Device: Yes (RIGHT EMERSON CATH)


Other/Comment: hx back surgery T5.  defibrillator/pacemaker placement





- ANESTHESIA


Hx Anesthesia: Yes


Hx Anesthesia Reactions: No


Hx Malignant Hyperthermia: No





Meds


Allergies/Adverse Reactions: 


                                    Allergies











Allergy/AdvReac Type Severity Reaction Status Date / Time


 


No Known Allergies Allergy   Verified 18 04:19














- Medications


Medications: 


                               Current Medications





Acetaminophen (Tylenol 325mg Tab)  650 mg PO Q6 PRN


   PRN Reason: Fever >100.4 F


Dextrose (Dextrose 50% Inj)  0 ml IV STAT PRN; Protocol


   PRN Reason: Hypoglycemia Protocol


Dextrose (Glutose 15)  0 gm PO ONCE PRN; Protocol


   PRN Reason: Hypoglycemia Protocol


Glucagon (Glucagen Diagnostic Kit)  0 mg IM STAT PRN; Protocol


   PRN Reason: Hypoglycemia Protocol


Heparin Sodium (Porcine) (Heparin)  5,000 units SC Q8 [facility]O


   Last Admin: 10/18/18 05:49 Dose:  5,000 units


Ceftriaxone Sodium 1 gm/ (Sodium Chloride)  100 mls @ 100 mls/hr IVPB DAILY [facility]ARO;

Protocol


Vancomycin HCl 1.5 gm/ Sodium (Chloride)  250 mls @ 166.7 mls/hr IVPB Q24H LA[facility]ARO; 

Protocol


   Last Admin: 10/18/18 02:56 Dose:  166.7 mls/hr


Dextrose (Dextrose 5% In Water 1000 Ml)  1,000 mls @ 0 mls/hr IV .Q0M PRN; 

Protocol


   PRN Reason: Hypoglycemia Protocol


Insulin Human Regular (Novolin R)  0 unit SC ACHS LAZARO; Protocol


Ondansetron HCl (Zofran Inj)  4 mg IVP Q6 PRN


   PRN Reason: Nausea/Vomiting











Physical Exam





- Constitutional


Appears: Non-toxic, Chronically Ill





- Head Exam


Head Exam: ATRAUMATIC, NORMOCEPHALIC





- Eye Exam


Eye Exam: Normal appearance.  absent: Conjunctival injection





- Neck Exam


Neck exam: Positive for: Full Rom


Additional comments: 





Tenderness to the infections site on the r mid clavicular line. 





- Respiratory Exam


Respiratory Exam: Clear to Auscultation Bilateral, NORMAL BREATHING PATTERN.  

absent: Rales, Rhonchi, Wheezes





- Cardiovascular Exam


Cardiovascular Exam: REGULAR RHYTHM





- GI/Abdominal Exam


GI & Abdominal Exam: absent: Tenderness





- Extremities Exam


Additional comments: 





AV fistula located on the left upper arm. 





- Skin


Additional comments: 





Area of infection at mid clavicular line at site of port-a-cath placement. Has 

mild swelling and erythema but without marked boarders. Expressed purulent fluid

, non-bloody. Non-maloderous, no crepitus.   





Results





- Vital Signs


Recent Vital Signs: 


                                Last Vital Signs











Temp  98.6 F   10/18/18 02:22


 


Pulse  104 H  10/18/18 02:22


 


Resp  20   10/18/18 02:22


 


BP  98/65 L  10/18/18 02:22


 


Pulse Ox  99   10/18/18 02:22














- Labs


Result Diagrams: 


                                 10/17/18 22:57





                                 10/17/18 22:57


Labs: 


                         Laboratory Results - last 24 hr











  10/17/18 10/17/18 10/18/18





  22:57 22:57 06:07


 


WBC  9.5  D  


 


RBC  3.73 L  


 


Hgb  10.9 L D  


 


Hct  33.0 L  


 


MCV  88.4  


 


MCH  29.1  


 


MCHC  33.0  


 


RDW  19.6 H  


 


Plt Count  190  


 


MPV  7.4  


 


Neut % (Auto)  86.7 H  


 


Lymph % (Auto)  4.2 L  


 


Mono % (Auto)  8.1  


 


Eos % (Auto)  0.5  


 


Baso % (Auto)  0.5  


 


Neut # (Auto)  8.3 H  


 


Lymph # (Auto)  0.4 L  


 


Mono # (Auto)  0.8  


 


Eos # (Auto)  0.1  


 


Baso # (Auto)  0.0  


 


Neutrophils % (Manual)  88 H  


 


Band Neutrophils %  3 H  


 


Lymphocytes % (Manual)  5 L  


 


Monocytes % (Manual)  4  


 


Platelet Estimate  Normal  


 


Hypochromasia (manual)  Slight  


 


Microcytosis (manual)  Slight  


 


Ovalocytes  Slight  


 


Sodium   137 


 


Potassium   3.6 


 


Chloride   93 L 


 


Carbon Dioxide   29 


 


Anion Gap   19 


 


BUN   45 H 


 


Creatinine   3.7 H 


 


Est GFR ( Amer)   20 


 


Est GFR (Non-Af Amer)   17 


 


POC Glucose (mg/dL)    96


 


Random Glucose   126 H 


 


Calcium   8.5 L 


 


Total Bilirubin   1.6 H 


 


AST   16 L D 


 


ALT   17 L 


 


Alkaline Phosphatase   342 H D 


 


Total Protein   7.2 


 


Albumin   3.6 


 


Globulin   3.5 


 


Albumin/Globulin Ratio   1.0 














Assessment & Plan





- Assessment and Plan (Free Text)


Assessment: 





Mr Giron is a 63M with an infected port-a-cath. 


Plan: 





- Will schedule OR today for removal of the port-a-cath and debridment of any 

appropriate tissues to the infected site. Consent obtained. 


- NPO


- f/u blood cultures


- f/u INR


- Pain and nausea control PRN


- Continue abx treatment per primary 


- Continue medical treatment per various specialities.  





- Date & Time


Date: 10/18/18


Time: 07:00

## 2018-10-18 NOTE — CP.PCM.PN
<OumouAlexandr - Last Filed: 10/18/18 17:24>





Subjective





- Date & Time of Evaluation


Date of Evaluation: 10/18/18


Time of Evaluation: 09:45





- Subjective


Subjective: 


Medicine Progress Note for Hospitalist Service





Pt seen and examined at bedside this am. Denies any acute complaints. NPO, to go

to OR today for removal of portacath. Denies fever, chills, pain at infection 

site, active bleeding, headache, dizziness, chest pain, sob, n/v/d/c, abd pain, 

urinary complaints, leg edema, or other symptoms. 





Objective





- Vital Signs/Intake and Output


Vital Signs (last 24 hours): 


                                        











Temp Pulse Resp BP Pulse Ox


 


 98.3 F   105 H  20   102/61   97 


 


 10/18/18 08:00  10/18/18 10:39  10/18/18 08:00  10/18/18 10:39  10/18/18 08:00











- Medications


Medications: 


                               Current Medications





Acetaminophen (Tylenol 325mg Tab)  650 mg PO Q6 PRN


   PRN Reason: Fever >100.4 F


Dextrose (Dextrose 50% Inj)  0 ml IV STAT PRN; Protocol


   PRN Reason: Hypoglycemia Protocol


Dextrose (Glutose 15)  0 gm PO ONCE PRN; Protocol


   PRN Reason: Hypoglycemia Protocol


Glucagon (Glucagen Diagnostic Kit)  0 mg IM STAT PRN; Protocol


   PRN Reason: Hypoglycemia Protocol


Heparin Sodium (Porcine) (Heparin)  5,000 units SC Q8 LAZARO


   Last Admin: 10/18/18 05:49 Dose:  5,000 units


Dextrose (Dextrose 5% In Water 1000 Ml)  1,000 mls @ 0 mls/hr IV .Q0M PRN; 

Protocol


   PRN Reason: Hypoglycemia Protocol


Cefepime HCl (Maxipime Iv 1 Gm Premix)  1 gm in 50 mls @ 100 mls/hr IVPB ONCE 

ONE; Protocol


   Stop: 10/18/18 11:59


Insulin Human Regular (Novolin R)  0 unit SC ACHS LAZARO; Protocol


   Last Admin: 10/18/18 07:30 Dose:  Not Given


Ondansetron HCl (Zofran Inj)  4 mg IVP Q6 PRN


   PRN Reason: Nausea/Vomiting











- Labs


Labs: 


                                        





                                 10/18/18 08:23 





                                 10/18/18 08:23 





                                        











PT  22.1 SECONDS (9.7-12.2)  H  10/18/18  08:23    


 


INR  2.0   10/18/18  08:23    


 


APTT  39 SECONDS (21-34)  H  10/18/18  08:23    














- Constitutional


Appears: Non-toxic, No Acute Distress





- Head Exam


Head Exam: ATRAUMATIC, NORMOCEPHALIC





- Eye Exam


Eye Exam: EOMI, Normal appearance, PERRL





- ENT Exam


ENT Exam: Mucous Membranes Moist





- Respiratory Exam


Respiratory Exam: Clear to Ausculation Bilateral, NORMAL BREATHING PATTERN.  

absent: Rales, Rhonchi, Wheezes





- Cardiovascular Exam


Cardiovascular Exam: REGULAR RHYTHM, +S1, +S2.  absent: Gallop, Rubs, Murmur





- GI/Abdominal Exam


GI & Abdominal Exam: Soft, Normal Bowel Sounds.  absent: Distended, Firm, 

Guarding, Rigid, Tenderness, Organomegaly, Rebound





- Extremities Exam


Extremities Exam: Full ROM, Normal Capillary Refill, Normal Inspection.  absent:

Calf Tenderness, Pedal Edema





- Neurological Exam


Neurological Exam: Alert, Awake, CN II-XII Intact, Oriented x3





- Psychiatric Exam


Psychiatric exam: Normal Affect, Normal Mood





- Skin


Skin Exam: Dry, Intact, Warm


Additional comments: 


Site of infected portacath covered with dressing, c/d/i, no active bleeding 

noted at site





Assessment and Plan





- Assessment and Plan (Free Text)


Assessment: 


63M with PMH ESRD, DM, CHF, A FIB W/RVR, AML, HTN, COPD, sleep apnea presents to

ED with port a cath infection s/p HD. Pt admitted for further treatment of 

abscess. 


Plan: 


Abscess


- Pt with port a cath, draining white purulent fluid on R chest on admission; 

s/p removal by Surgery today in OR, monitor post-op


- Tmax 1002F on admission, cont to monitor temps today, currently afebrile


- WBC 9.5 on admission; today 8.5


- C/w vanco 1.5 IV daily


- C/w rocephin 1g IV daily


- F/u BCx, Wound Cx


- Ok to restart coumadin as per surgery


- F/u coags


- Vascular surgery consulted, Dr. Lopez - f/u recs


- ID consulted, Dr. Hernández - f/u recs


- Lactate and procal ordered, f/u





ESRD


- S/p HD


- BUN/Cr today 50/4.1


- Nephro consulted, Dr. Cortes - f/u recs





DM


- accuchecks q6h


- low dose sliding scale


- hypoglycemia protocol





HTN


- BP 97/52 on admission, BP stable today, pt states his normal bp at home is 

90s/50s, currently asymptomatic


- Hold home metoprolol





JEB


- pt uses CPAP at home


- not amenable to using bipap


- wife to bring home CPAP today





B/l LE Ulcers


- On exam today noted in L heel superior aspect, R achilles tendon area


- Podiatry consulted (Dr. Thornton) for wound care, recs appreciated





Hx Pacemaker, CHF


- Pt's wife noted today that pt has not followed with cardiology outpatient in a

while


- Cardiology (Dr. Kaplan) consulted, recs appreciated


- Metoprolol held for low bps, continue to monitor





PPX


Heparin d/c'd; restart Warfarin 3 mg PO @1800 tonight


Renal diet





Pt seen, examined with, and plan d/w Dr. Fabian, attending physician.





Alexandr Coello DO PGY1, Family Medicine Resident


Pager #[phone]





<Weston Fabian - Last Filed: 10/20/18 18:58>





Objective





- Vital Signs/Intake and Output


Vital Signs (last 24 hours): 


                                        











Temp Pulse Resp BP Pulse Ox


 


 97.6 F   102 H  20   106/70   97 


 


 10/20/18 15:05  10/20/18 15:05  10/20/18 15:05  10/20/18 18:37  10/20/18 15:05








Intake and Output: 


                                        











 10/20/18 10/20/18





 06:59 18:59


 


Intake Total  650


 


Balance  650














- Medications


Medications: 


                               Current Medications





Acetaminophen (Tylenol 325mg Tab)  650 mg PO Q6 PRN


   PRN Reason: Fever >100.4 F


Calcium Acetate (Phoslo)  667 mg PO TIDCC [facility]


   Last Admin: 10/20/18 17:01 Dose:  667 mg


Dextrose (Dextrose 50% Inj)  0 ml IV STAT PRN; Protocol


   PRN Reason: Hypoglycemia Protocol


Dextrose (Glutose 15)  0 gm PO ONCE PRN; Protocol


   PRN Reason: Hypoglycemia Protocol


Glucagon (Glucagen Diagnostic Kit)  0 mg IM STAT PRN; Protocol


   PRN Reason: Hypoglycemia Protocol


Dextrose (Dextrose 5% In Water 1000 Ml)  1,000 mls @ 0 mls/hr IV .Q0M PRN; 

Protocol


   PRN Reason: Hypoglycemia Protocol


Cefepime HCl 0.5 gm/ Dextrose  50 mls @ 100 mls/hr IVPB DAILY [facility]; Protocol


   Last Admin: 10/20/18 12:24 Dose:  100 mls/hr


Insulin Human Regular (Novolin R)  0 unit SC ACHS [facility]; Protocol


   Last Admin: 10/20/18 16:30 Dose:  Not Given


Ondansetron HCl (Zofran Inj)  4 mg IVP Q6 PRN


   PRN Reason: Nausea/Vomiting











- Labs


Labs: 


                                        





                                 10/20/18 07:42 





                                 10/20/18 07:42 





                                        











PT  24.5 SECONDS (9.7-12.2)  H  10/20/18  07:42    


 


INR  2.2   10/20/18  07:42    


 


APTT  39 SECONDS (21-34)  H  10/20/18  07:42    














Attending/Attestation





- Attestation


I have personally seen and examined this patient.: Yes


I have fully participated in the care of the patient.: Yes


I have reviewed all pertinent clinical information, including history, physical 

exam and plan: Yes


Notes (Text): 





10/20/18 18:57


This is a late entry.





Care of this patient was gone over in detail with resident Dr. Oumou Fabian D.O.

## 2018-10-18 NOTE — CP.PCM.CON
History of Present Illness





- History of Present Illness


History of Present Illness: 





Pt is a 63M with PMH ESRD, DM, CHF, A FIB W/RVR, AML, HTN, COPD, sleep apnea 

presents to ED for evaluation of portacath site. He reports feeling a burning 

sensation two days ago and that today after HD his wife noticed some pus coming 

from the site. Pt reports nausea and dry heaving. He denies any fever, abdominal

pain, shortness of breath, chest pain, diarrhea, constipation. 


In the ED, pt was given tylenol for Tmax 102F and BCx and wound Cx were 

obtained.


Last HD yesterday. 


On anticoagulation for a fib


Pt had an indwelling portacath for iv antibiotics and lab draws, difficult stick





SxH: back surgery, pacemaker


FamH: mom heart disease, DM, dad CAD


SocH: denies tobacco, etoh, recreational drug use


Allergies: NKDA


Meds: coumadin 1 daily, starlix 60 TID, procrit, metoprolol succinate 25 daily





Review of Systems





- Review of Systems


All systems: reviewed and no additional remarkable complaints except (as per 

HPI)





Past Patient History





- Infectious Disease


Hx of Infectious Diseases: None





- Tetanus Immunizations


Tetanus Immunization: Unknown





- Past Medical History & Family History


Past Medical History?: Yes





- Past Social History


Smoking Status: Never Smoked





- CARDIAC


Hx Cardiac Disorders: Yes


Hx Atrial Fibrillation: Yes


Hx Cardia Arrhythmia: Yes (A FIB)


Hx Congestive Heart Failure: Yes


Hx Hypertension: Yes


Hx Pacemaker: Yes


Hx Peripheral Edema: Yes





- PULMONARY


Hx Respiratory Disorders: Yes


Hx Asthma: Yes


Hx Chronic Obstructive Pulmonary Disease (COPD): Yes (uses CPAP for sleep apnea)


Hx Sleep Apnea: Yes (C-PAP)





- NEUROLOGICAL


Hx Neurological Disorder: Yes (PERIPHERAL NEUROPATHY)


Hx Dizziness: Yes





- HEENT


Hx HEENT Problems: No





- RENAL


Hx Chronic Kidney Disease: Yes





- ENDOCRINE/METABOLIC


Hx Endocrine Disorders: Yes


Hx Diabetes Mellitus Type 2: Yes





- HEMATOLOGICAL/ONCOLOGICAL


Hx Blood Disorders: Yes


Hx Blood Transfusions: Yes


Hx Cancer: Yes


Hx Chemotherapy: Yes


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





- INTEGUMENTARY


Hx Dermatological Problems: Yes


Hx Cellulitis: Yes





- MUSCULOSKELETAL/RHEUMATOLOGICAL


Hx Musculoskeletal Disorders: Yes


Hx Arthritis: Yes


Hx Falls: Yes


Hx Fractures: Yes


Hx Osteoporosis: Yes





- GASTROINTESTINAL


Hx Gastrointestinal Disorders: Yes


Hx Constipation: Yes





- GENITOURINARY/GYNECOLOGICAL


Hx Genitourinary Disorders: Yes


Other/Comment: dialysis  TTS





- PSYCHIATRIC


Hx Psychophysiologic Disorder: Yes


Hx Anxiety: Yes


Hx Depression: Yes


Hx Substance Use: No





- SURGICAL HISTORY


Hx Surgeries: Yes


Hx Angiogram: Yes


Hx Arteriovenous Shunt: Yes


Hx Cardiac Catheterization: Yes


Hx Orthopedic Surgery: Yes (KNEE)


Hx Vascular Access Device: Yes (RIGHT EMERSON CATH)


Other/Comment: hx back surgery T5.  defibrillator/pacemaker placement





- ANESTHESIA


Hx Anesthesia: Yes


Hx Anesthesia Reactions: No


Hx Malignant Hyperthermia: No





Meds


Allergies/Adverse Reactions: 


                                    Allergies











Allergy/AdvReac Type Severity Reaction Status Date / Time


 


No Known Allergies Allergy   Verified 08/16/18 04:19














- Medications


Medications: 


                               Current Medications





Acetaminophen (Tylenol 325mg Tab)  650 mg PO Q6 PRN


   PRN Reason: Fever >100.4 F


Dextrose (Dextrose 50% Inj)  0 ml IV STAT PRN; Protocol


   PRN Reason: Hypoglycemia Protocol


Dextrose (Glutose 15)  0 gm PO ONCE PRN; Protocol


   PRN Reason: Hypoglycemia Protocol


Glucagon (Glucagen Diagnostic Kit)  0 mg IM STAT PRN; Protocol


   PRN Reason: Hypoglycemia Protocol


Heparin Sodium (Porcine) (Heparin)  5,000 units SC Q8 [facility]


   Last Admin: 10/18/18 05:49 Dose:  5,000 units


Dextrose (Dextrose 5% In Water 1000 Ml)  1,000 mls @ 0 mls/hr IV .Q0M PRN; 

Protocol


   PRN Reason: Hypoglycemia Protocol


Cefepime HCl (Maxipime Iv 1 Gm Premix)  1 gm in 50 mls @ 100 mls/hr IVPB ONCE 

ONE; Protocol


   Stop: 10/18/18 11:59


Insulin Human Regular (Novolin R)  0 unit SC ACHS [facility]; Protocol


   Last Admin: 10/18/18 07:30 Dose:  Not Given


Ondansetron HCl (Zofran Inj)  4 mg IVP Q6 PRN


   PRN Reason: Nausea/Vomiting











Physical Exam





- Constitutional


Appears: No Acute Distress, Chronically Ill (obese)





- Head Exam


Head Exam: NORMAL INSPECTION, NORMOCEPHALIC





- Eye Exam


Eye Exam: Normal appearance, PERRL





- ENT Exam


ENT Exam: Mucous Membranes Moist, Normal Exam





- Neck Exam


Neck exam: Positive for: Normal Inspection





- Respiratory Exam


Respiratory Exam: Clear to Auscultation Bilateral (decreased breath sounds 

bases. rt chest portacath), NORMAL BREATHING PATTERN





- Cardiovascular Exam


Cardiovascular Exam: Irregular Rhythm





- GI/Abdominal Exam


GI & Abdominal Exam: Distended, Normal Bowel Sounds, Soft





- Extremities Exam


Extremities exam: Positive for: pedal edema (b/l chronic venous stasis. )





- Neurological Exam


Neurological exam: Alert, Oriented x3





- Psychiatric Exam


Psychiatric exam: Normal Affect, Normal Mood





- Skin


Skin Exam: Dry, Intact





Results





- Vital Signs


Recent Vital Signs: 


                                Last Vital Signs











Temp  98.3 F   10/18/18 08:00


 


Pulse  105 H  10/18/18 10:39


 


Resp  20   10/18/18 08:00


 


BP  102/61   10/18/18 10:39


 


Pulse Ox  97   10/18/18 08:00














- Labs


Result Diagrams: 


                                 10/18/18 08:23





                                 10/18/18 08:23


Labs: 


                         Laboratory Results - last 24 hr











  10/17/18 10/17/18 10/18/18





  22:57 22:57 06:07


 


WBC  9.5  D  


 


RBC  3.73 L  


 


Hgb  10.9 L D  


 


Hct  33.0 L  


 


MCV  88.4  


 


MCH  29.1  


 


MCHC  33.0  


 


RDW  19.6 H  


 


Plt Count  190  


 


MPV  7.4  


 


Neut % (Auto)  86.7 H  


 


Lymph % (Auto)  4.2 L  


 


Mono % (Auto)  8.1  


 


Eos % (Auto)  0.5  


 


Baso % (Auto)  0.5  


 


Neut # (Auto)  8.3 H  


 


Lymph # (Auto)  0.4 L  


 


Mono # (Auto)  0.8  


 


Eos # (Auto)  0.1  


 


Baso # (Auto)  0.0  


 


Neutrophils % (Manual)  88 H  


 


Band Neutrophils %  3 H  


 


Lymphocytes % (Manual)  5 L  


 


Monocytes % (Manual)  4  


 


Platelet Estimate  Normal  


 


Polychromasia   


 


Hypochromasia (manual)  Slight  


 


Anisocytosis (manual)   


 


Microcytosis (manual)  Slight  


 


Ovalocytes  Slight  


 


PT   


 


INR   


 


APTT   


 


Sodium   137 


 


Potassium   3.6 


 


Chloride   93 L 


 


Carbon Dioxide   29 


 


Anion Gap   19 


 


BUN   45 H 


 


Creatinine   3.7 H 


 


Est GFR ( Amer)   20 


 


Est GFR (Non-Af Amer)   17 


 


POC Glucose (mg/dL)    96


 


Random Glucose   126 H 


 


Calcium   8.5 L 


 


Total Bilirubin   1.6 H 


 


AST   16 L D 


 


ALT   17 L 


 


Alkaline Phosphatase   342 H D 


 


Total Protein   7.2 


 


Albumin   3.6 


 


Globulin   3.5 


 


Albumin/Globulin Ratio   1.0 














  10/18/18 10/18/18 10/18/18





  08:23 08:23 08:23


 


WBC  8.3  


 


RBC  3.52 L  


 


Hgb  10.0 L  


 


Hct  31.1 L  


 


MCV  88.4  


 


MCH  28.5  


 


MCHC  32.3 L  


 


RDW  19.5 H  


 


Plt Count  182  


 


MPV  6.9 L  


 


Neut % (Auto)  82.6 H  


 


Lymph % (Auto)  7.0 L  


 


Mono % (Auto)  8.9  


 


Eos % (Auto)  0.9  


 


Baso % (Auto)  0.6  


 


Neut # (Auto)  6.9  


 


Lymph # (Auto)  0.6 L  


 


Mono # (Auto)  0.7  


 


Eos # (Auto)  0.1  


 


Baso # (Auto)  0.0  


 


Neutrophils % (Manual)  94 H  


 


Band Neutrophils %   


 


Lymphocytes % (Manual)  3 L  


 


Monocytes % (Manual)  3  


 


Platelet Estimate  Normal  


 


Polychromasia  Slight  


 


Hypochromasia (manual)  Slight  


 


Anisocytosis (manual)  Slight  


 


Microcytosis (manual)   


 


Ovalocytes  Slight  


 


PT   22.1 H 


 


INR   2.0 


 


APTT   39 H 


 


Sodium    135


 


Potassium    3.5 L


 


Chloride    95 L


 


Carbon Dioxide    27


 


Anion Gap    16


 


BUN    50 H


 


Creatinine    4.1 H


 


Est GFR ( Amer)    18


 


Est GFR (Non-Af Amer)    15


 


POC Glucose (mg/dL)   


 


Random Glucose    88


 


Calcium    8.0 L


 


Total Bilirubin    1.6 H


 


AST    15 L


 


ALT    20 L


 


Alkaline Phosphatase    288 H


 


Total Protein    6.5


 


Albumin    3.3 L


 


Globulin    3.1


 


Albumin/Globulin Ratio    1.1














Assessment & Plan


(1) Fever


Status: Acute   





(2) Infection due to Port-A-Cath


Status: Acute   





(3) ESRD (end stage renal disease)


Status: Acute   





(4) A-fib


Status: Chronic   





(5) Anemia


Status: Chronic   





- Assessment and Plan (Free Text)


Assessment: 





maintain hd mwf


follow blood cultures. ID evaluation. 


portacath removal


gianluca w/ hd

## 2018-10-18 NOTE — PCM.SURG1
Surgeon's Initial Post Op Note





- Surgeon's Notes


Surgeon: Dr. Remy Lopez


Assistant: Tiffany Caban, PGY2; Magali Joshi OMS3


Type of Anesthesia: Moderate Sedation{RN}, Local


Pre-Operative Diagnosis: Infected right portacath


Operative Findings: port and catheter removed intact, gray/yellow purulent 

drainage in the pocket of the port, adequate hemostasis obtained by the end of 

the case with suture ligature


Post-Operative Diagnosis: same


Operation Performed: removal of infected portacath


Specimen/Specimens Removed: portacath, fluid culture


Estimated Blood Loss: EBL {In ML}: 35


Date of Surgery/Procedure: 10/18/18


Time of Surgery/Procedure: 12:30

## 2018-10-19 LAB
ALBUMIN SERPL-MCNC: 3.1 [, G/DL] (ref 3.5–5)
ALBUMIN/GLOB SERPL: 1 [,] (ref 1–2.1)
ALT SERPL-CCNC: 20 [, U/L] (ref 21–72)
ANISOCYTOSIS BLD QL SMEAR: SLIGHT [,]
APTT BLD: 40 [, SECONDS] (ref 21–34)
AST SERPL-CCNC: 18 [, U/L] (ref 17–59)
BASOPHILS # BLD AUTO: 0.1 [, K/UL] (ref 0–0.2)
BASOPHILS NFR BLD: 1.1 [, %] (ref 0–2)
BUN SERPL-MCNC: 59 [, MG/DL] (ref 9–20)
CALCIUM SERPL-MCNC: 7.8 [, MG/DL] (ref 8.6–10.4)
EOSINOPHIL # BLD AUTO: 0.1 [, K/UL] (ref 0–0.7)
EOSINOPHIL NFR BLD: 1.8 [, %] (ref 0–4)
EOSINOPHIL NFR BLD: 2 [, %] (ref 0–4)
ERYTHROCYTE [DISTWIDTH] IN BLOOD BY AUTOMATED COUNT: 20.1 [, %] (ref 11.5–14.5)
GFR NON-AFRICAN AMERICAN: 11 [,]
HGB BLD-MCNC: 9.9 [, G/DL] (ref 12–18)
HYPOCHROMIC: SLIGHT [,]
INR PPP: 2.5 [,]
LYMPHOCYTE: 10 [, %] (ref 20–40)
LYMPHOCYTES # BLD AUTO: 0.6 [, K/UL] (ref 1–4.3)
LYMPHOCYTES NFR BLD AUTO: 9.4 [, %] (ref 20–40)
MCH RBC QN AUTO: 29.9 [, PG] (ref 27–31)
MCHC RBC AUTO-ENTMCNC: 33.9 [, G/DL] (ref 33–37)
MCV RBC AUTO: 88.3 [, FL] (ref 80–94)
MONOCYTE: 3 [, %] (ref 0–10)
MONOCYTES # BLD: 0.6 [, K/UL] (ref 0–0.8)
MONOCYTES NFR BLD: 9.5 [, %] (ref 0–10)
NEUTROPHILS # BLD: 5.1 [, K/UL] (ref 1.8–7)
NEUTROPHILS NFR BLD AUTO: 78.2 [, %] (ref 50–75)
NEUTROPHILS NFR BLD AUTO: 84 [, %] (ref 50–75)
NEUTS BAND NFR BLD: 1 [, %] (ref 0–2)
NRBC BLD AUTO-RTO: 0 [, %] (ref 0–2)
OVALOCYTES BLD QL SMEAR: SLIGHT [,]
PLATELET # BLD EST: NORMAL [,]
PLATELET # BLD: 186 [, K/UL] (ref 130–400)
PMV BLD AUTO: 7.1 [, FL] (ref 7.2–11.7)
POLYCHROMIC: SLIGHT [,]
PROTHROMBIN TIME: 27.5 [, SECONDS] (ref 9.7–12.2)
RBC # BLD AUTO: 3.31 [, MIL/UL] (ref 4.4–5.9)
TOTAL CELLS COUNTED BLD: 100 [,]
WBC # BLD AUTO: 6.5 [, K/UL] (ref 4.8–10.8)

## 2018-10-19 PROCEDURE — 5A1D70Z PERFORMANCE OF URINARY FILTRATION, INTERMITTENT, LESS THAN 6 HOURS PER DAY: ICD-10-PCS

## 2018-10-19 RX ADMIN — HUMAN INSULIN SCH: 100 INJECTION, SOLUTION SUBCUTANEOUS at 11:30

## 2018-10-19 RX ADMIN — HUMAN INSULIN SCH: 100 INJECTION, SOLUTION SUBCUTANEOUS at 21:35

## 2018-10-19 RX ADMIN — HUMAN INSULIN SCH: 100 INJECTION, SOLUTION SUBCUTANEOUS at 07:30

## 2018-10-19 RX ADMIN — HUMAN INSULIN SCH: 100 INJECTION, SOLUTION SUBCUTANEOUS at 17:53

## 2018-10-19 NOTE — CP.PCM.PN
<Alexandr Coello - Last Filed: 10/19/18 17:45>





Subjective





- Date & Time of Evaluation


Date of Evaluation: 10/19/18


Time of Evaluation: 09:15





- Subjective


Subjective: 


Medicine Progress Note for Hospitalist Service





Pt seen and examined at bedside this am. Denies any acute complaints, resting 

comfortably at bedside. No acute events reported overnight. S/p removal of 

portacath by Dr. Lopez POD#1. Pt denies any pain or fevers currently. 12-

point ROS obtained, otherwise negative as per pt. 





Objective





- Vital Signs/Intake and Output


Vital Signs (last 24 hours): 


                                        











Temp Pulse Resp BP Pulse Ox


 


 98.7 F   119 H  16   110/51 L  100 


 


 10/19/18 09:47  10/19/18 10:18  10/19/18 10:18  10/19/18 10:20  10/19/18 09:35








Intake and Output: 


                                        











 10/19/18 10/19/18





 06:59 18:59


 


Intake Total 100 


 


Output Total 0 


 


Balance 100 














- Medications


Medications: 


                               Current Medications





Acetaminophen (Tylenol 325mg Tab)  650 mg PO Q6 PRN


   PRN Reason: Fever >100.4 F


Calcium Acetate (Phoslo)  667 mg PO [facility]


   Last Admin: 10/19/18 08:07 Dose:  Not Given


Dextrose (Dextrose 50% Inj)  0 ml IV STAT PRN; Protocol


   PRN Reason: Hypoglycemia Protocol


Dextrose (Glutose 15)  0 gm PO ONCE PRN; Protocol


   PRN Reason: Hypoglycemia Protocol


Glucagon (Glucagen Diagnostic Kit)  0 mg IM STAT PRN; Protocol


   PRN Reason: Hypoglycemia Protocol


Dextrose (Dextrose 5% In Water 1000 Ml)  1,000 mls @ 0 mls/hr IV .Q0M PRN; 

Protocol


   PRN Reason: Hypoglycemia Protocol


Cefepime HCl 0.5 gm/ Dextrose  50 mls @ 100 mls/hr IVPB DAILY [facility]; Protocol


   Last Admin: 10/19/18 10:06 Dose:  Not Given


Insulin Human Regular (Novolin R)  0 unit SC ACHS [facility]; Protocol


   Last Admin: 10/19/18 07:30 Dose:  Not Given


Ondansetron HCl (Zofran Inj)  4 mg IVP Q6 PRN


   PRN Reason: Nausea/Vomiting











- Labs


Labs: 


                                        





                                 10/19/18 09:39 





                                 10/19/18 09:39 





                                        











PT  27.5 SECONDS (9.7-12.2)  H D 10/19/18  09:39    


 


INR  2.5  D 10/19/18  09:39    


 


APTT  40 SECONDS (21-34)  H  10/19/18  09:39    














- Constitutional


Appears: Non-toxic, No Acute Distress





- Head Exam


Head Exam: ATRAUMATIC, NORMOCEPHALIC





- Eye Exam


Eye Exam: EOMI, Normal appearance, PERRL





- ENT Exam


ENT Exam: Mucous Membranes Moist





- Respiratory Exam


Respiratory Exam: Clear to Ausculation Bilateral, NORMAL BREATHING PATTERN.  

absent: Rales, Rhonchi, Wheezes





- Cardiovascular Exam


Cardiovascular Exam: REGULAR RHYTHM, +S1, +S2.  absent: Gallop, Rubs, Murmur





- GI/Abdominal Exam


GI & Abdominal Exam: Soft, Normal Bowel Sounds.  absent: Distended, Firm, 

Guarding, Rigid, Tenderness, Organomegaly, Rebound





- Extremities Exam


Extremities Exam: Full ROM, Normal Capillary Refill.  absent: Calf Tenderness, 

Pedal Edema





- Neurological Exam


Neurological Exam: Alert, Awake, CN II-XII Intact, Oriented x3





- Psychiatric Exam


Psychiatric exam: Normal Affect, Normal Mood





- Skin


Skin Exam: Dry, Intact, Normal Color, Warm


Additional comments: 


Dressing c/d/i in area of former portacath placement





Assessment and Plan





- Assessment and Plan (Free Text)


Assessment: 


63M with PMH ESRD, DM, CHF, A FIB W/RVR, AML, HTN, COPD, sleep apnea presents to

ED with port a cath infection s/p HD. Pt admitted for further treatment of 

abscess.


Plan: 


Abscess


- Pt with port a cath, draining white purulent fluid on R chest on admission; 

s/p removal by Surgery POD#1, pt doing well post-op


- Tmax 1002F on admission, cont to monitor temps today, currently afebrile


- WBC 9.5 on admission; today 6.5


- Cefepime 0.5 g daily started today as per ID recs


- F/u BCx, Wound Cx growing S. aureus (f/u final sensitivities)


- INR 2.5 today, will hold coumadin today


- F/u coags


- Vascular surgery consulted, Dr. Lopez - f/u recs; no further acute surgical

management needed at this time


- ID consulted, Dr. Fink - f/u recs


- Lactate wnl; procal ordered, f/u


- 2D echo ordered to r/o endocarditis, f/u results





ESRD


- HD MWF


- BUN/Cr today 59/5.2


- Nephro consulted, Dr. Cortes - f/u recs





DM


- accuchecks q6h


- low dose sliding scale


- hypoglycemia protocol





HTN


- BP 97/52 on admission, BP stable today, pt states his normal bp at home is 

90s/50s, currently asymptomatic


- Hold home metoprolol





JEB


- pt uses CPAP at home


- not amenable to using bipap


- wife to bring home CPAP for use inpatient





B/l LE Ulcers


- On exam noted in L heel superior aspect, R achilles tendon area


- Podiatry consulted (Dr. Thornton) for wound care, recs appreciated





Hx Pacemaker, CHF


- Per Pt's wife pt has not followed with cardiology outpatient in a while


- Cardiology (Dr. Kaplan) consulted, recs appreciated


- Metoprolol held for low bps, continue to monitor





PPX


Warfarin held today due to INR 2.5


Renal diet





Pt seen, examined with, and plan d/w Dr. Fabian, attending physician.





Alexandr Coello DO PGY1, Family Medicine Resident


Pager #[phone]








<Weston Fabian - Last Filed: 10/20/18 18:57>





Objective





- Vital Signs/Intake and Output


Vital Signs (last 24 hours): 


                                        











Temp Pulse Resp BP Pulse Ox


 


 97.6 F   102 H  20   106/70   97 


 


 10/20/18 15:05  10/20/18 15:05  10/20/18 15:05  10/20/18 18:37  10/20/18 15:05








Intake and Output: 


                                        











 10/20/18 10/20/18





 06:59 18:59


 


Intake Total  650


 


Balance  650














- Medications


Medications: 


                               Current Medications





Acetaminophen (Tylenol 325mg Tab)  650 mg PO Q6 PRN


   PRN Reason: Fever >100.4 F


Calcium Acetate (Phoslo)  667 mg PO [facility]


   Last Admin: 10/20/18 17:01 Dose:  667 mg


Dextrose (Dextrose 50% Inj)  0 ml IV STAT PRN; Protocol


   PRN Reason: Hypoglycemia Protocol


Dextrose (Glutose 15)  0 gm PO ONCE PRN; Protocol


   PRN Reason: Hypoglycemia Protocol


Glucagon (Glucagen Diagnostic Kit)  0 mg IM STAT PRN; Protocol


   PRN Reason: Hypoglycemia Protocol


Dextrose (Dextrose 5% In Water 1000 Ml)  1,000 mls @ 0 mls/hr IV .Q0M PRN; 

Protocol


   PRN Reason: Hypoglycemia Protocol


Cefepime HCl 0.5 gm/ Dextrose  50 mls @ 100 mls/hr IVPB DAILY [facility]; Protocol


   Last Admin: 10/20/18 12:24 Dose:  100 mls/hr


Insulin Human Regular (Novolin R)  0 unit SC ACHS [facility]; Protocol


   Last Admin: 10/20/18 16:30 Dose:  Not Given


Ondansetron HCl (Zofran Inj)  4 mg IVP Q6 PRN


   PRN Reason: Nausea/Vomiting











- Labs


Labs: 


                                        





                                 10/20/18 07:42 





                                 10/20/18 07:42 





                                        











PT  24.5 SECONDS (9.7-12.2)  H  10/20/18  07:42    


 


INR  2.2   10/20/18  07:42    


 


APTT  39 SECONDS (21-34)  H  10/20/18  07:42    














Attending/Attestation





- Attestation


I have personally seen and examined this patient.: Yes


I have fully participated in the care of the patient.: Yes


I have reviewed all pertinent clinical information, including history, physical 

exam and plan: Yes


Notes (Text): 





10/20/18 18:56


This is a late entry.





Care of this patient was gone over in detail with resident Dr. Oumou Faiban D.O.

## 2018-10-19 NOTE — CP.PCM.PN
Subjective





- Date & Time of Evaluation


Date of Evaluation: 10/19/18


Time of Evaluation: 06:50





- Subjective


Subjective: 


Surgery progress note for Dr. Lopez





Pt seen and examined at bedside this AM. No acute events overnight. patient 

denies any pain, fevers, SOB, chest pain





Objective





- Vital Signs/Intake and Output


Vital Signs (last 24 hours): 


                                        











Temp Pulse Resp BP Pulse Ox


 


 100.3 F H  99 H  20   103/52 L  97 


 


 10/19/18 07:26  10/19/18 07:26  10/19/18 07:26  10/19/18 07:26  10/19/18 07:26








Intake and Output: 


                                        











 10/19/18 10/19/18





 06:59 18:59


 


Intake Total 100 


 


Output Total 0 


 


Balance 100 














- Medications


Medications: 


                               Current Medications





Acetaminophen (Tylenol 325mg Tab)  650 mg PO Q6 PRN


   PRN Reason: Fever >100.4 F


Calcium Acetate (Phoslo)  667 [facility] PO [facility]


Dextrose (Dextrose 50% Inj)  0 ml IV STAT PRN; Protocol


   PRN Reason: Hypoglycemia Protocol


Dextrose (Glutose 15)  0 gm PO ONCE PRN; Protocol


   PRN Reason: Hypoglycemia Protocol


Glucagon (Glucagen Diagnostic Kit)  0 mg IM STAT PRN; Protocol


   PRN Reason: Hypoglycemia Protocol


Dextrose (Dextrose 5% In Water 1000 Ml)  1,000 mls @ 0 mls/hr IV .Q0M PRN; 

Protocol


   PRN Reason: Hypoglycemia Protocol


Cefepime HCl 0.5 gm/ Dextrose  50 mls @ 100 mls/hr IVPB DAILY [facility]; Protocol


Insulin Human Regular (Novolin R)  0 unit SC ACHS [facility]; Protocol


   Last Admin: 10/18/18 22:09 Dose:  Not Given


Ondansetron HCl (Zofran Inj)  4 mg IVP Q6 PRN


   PRN Reason: Nausea/Vomiting











- Labs


Labs: 


                                        





                                 10/18/18 08:23 





                                 10/18/18 08:23 





                                        











PT  22.1 SECONDS (9.7-12.2)  H  10/18/18  08:23    


 


INR  2.0   10/18/18  08:23    


 


APTT  39 SECONDS (21-34)  H  10/18/18  08:23    














- Constitutional


Appears: Well, Non-toxic, No Acute Distress





- Head Exam


Head Exam: ATRAUMATIC, NORMOCEPHALIC





- Eye Exam


Eye Exam: Normal appearance.  absent: Conjunctival injection, Scleral icterus





- ENT Exam


ENT Exam: Mucous Membranes Moist, Normal Oropharynx





- Respiratory Exam


Respiratory Exam: NORMAL BREATHING PATTERN.  absent: Accessory Muscle Use, 

Respiratory Distress





- Cardiovascular Exam


Cardiovascular Exam: RRR





- GI/Abdominal Exam


GI & Abdominal Exam: Soft.  absent: Distended, Tenderness





- Extremities Exam


Extremities Exam: absent: Calf Tenderness, Pedal Edema, Tenderness





- Neurological Exam


Neurological Exam: Alert, Awake, Oriented x3





- Psychiatric Exam


Psychiatric exam: Normal Affect, Normal Mood





- Skin


Skin Exam: Dry, Normal Color, Warm


Additional comments: 





dressing over site of port removal with mild amount of sanguinous saturation





Assessment and Plan





- Assessment and Plan (Free Text)


Assessment: 





63M POD#1 s/p infected portacath removal 


Plan: 


Continue antibiotics per ID


F/U cultures


F/U AM labs


Medical management per primary


No further surgical intervention necessary at this time





Discussed with Dr. Jessica Caban, PGY2

## 2018-10-19 NOTE — CARD
--------------- APPROVED REPORT --------------





Date of service: 10/19/2018



EXAM: Two-dimensional and M-mode echocardiogram with Doppler and 

color Doppler.



Other Information 

Quality : TDSRhythm : 



INDICATION

Infection:Rule out subacute bacterial endocarditis ESRD



2D DIMENSIONS 

IVSd1.1   (0.7-1.1cm)LVDd4.6   (3.9-5.9cm)

PWd1.1   (0.7-1.1cm)LVDs3.4   (2.5-4.0cm)

FS (%) 26.5   %LVEF (%)51.9   (>50%)

LVEF (Ruiz's)50 %



M-Mode DIMENSIONS 

Left Atrium (MM)5.38   (2.5-4.0cm)IVSd1.18   (0.7-1.1cm)

Aortic Root3.51   (2.2-3.7cm)LVDd4.48   (4.0-5.6cm)

Aortic Cusp Exc.1.46   (1.5-2.0cm)PWd1.18   (0.7-1.1cm)

FS (%) 27   %LVDs3.28   (2.0-3.8cm)

LVEF (%)53   (>50%)



Mitral Valve

MV E Zdxitpix16.5cm/sMV A Xvahdtia31.0cm/sE/A ratio3.1



TDI

Lateral E' Peak V11.34cm/sMedial E' Peak V6.42cm/sE/Lateral E'7.4

E/Medial E'13.0



Tricuspid Valve

TR Peak [id number]/sTR Peak [id number]



 LEFT VENTRICLE 

The left ventricle is normal size.

There is mildly increasedleft ventricular wall thickness. 

The left ventricular function is mils to modderately reduced, with 

anteroseptal hypokinesis, and septal flattening c/w elevated RV 

pressure.

The left ventricular ejection fraction is about 40-45%

The left ventricular diastolic function is indeterminate

No left ventricle thrombus noted on this study.

There is no ventricular septal defect visualized.

There is no left ventricular aneurysm. 

There is no mass noted in the left ventricle.



 RIGHT VENTRICLE 

The right ventricle is severely dilated.

Systolic function is severely reduced.

A ppm wire is noted. 



 ATRIA 

The left atrium size is normal.

The right atrium size is normal.

The interatrial septum is intact with no evidence for an atrial 

septal defect.



 AORTIC VALVE 

The aortic valve is calcified, and demonstrates mildly to moderately 

reduced opening.  Peak gradient was 12 mm Hg,  valve area was not 

performed.

No aortic regurgitation is present. 

There is no aortic valvular vegetation.



 MITRAL VALVE 

The mitral valve is normal in structure and function.

There is no evidence of mitral valve prolapse.

There is no mitral valve stenosis. 

There is no mitral valve regurgitation noted.



 TRICUSPID VALVE 

The tricuspid valve is normal in structure and function.

There is mild  tricuspid valve regurgitation noted. Gradient is 30 mm 

H. 

There is no tricuspid valve prolapse or vegetation.

There is no tricuspid valve stenosis. 



 PULMONIC VALVE 

The pulmonary valve is normal in structure and function.

There is no pulmonic valvular regurgitation. 

There is no pulmonic valvular stenosis.



 GREAT VESSELS 

The aortic root is normal in size.

The ascending aorta is normal in size.

The pulmonary artery is normal.

The IVC is dilated n size and collapses <50% with inspiration.



 PERICARDIAL EFFUSION 

The pericardium appears normal.

There is no pleural effusion.



<Conclusion>

The left ventricular function is mild to moderately reduced, with 

anteroseptal hypokinesis, and septal flattening c/w elevated RV 

pressure.

The aortic valve is calcified, and demonstrates mildly to moderately 

reduced opening.  Peak gradient was 12 mm Hg,  valve area was not 

performed.

There is mildly increasedleft ventricular wall thickness. 

The right ventricle is severely dilated.

Systolic function is severely reduced.

There is likely severe pulmonary HTN, underestimated by coppler.

## 2018-10-19 NOTE — CP.PCM.CON
History of Present Illness





- History of Present Illness


History of Present Illness: 





This is a 63 years old male who is hospitalized for a fever, and appearance of 

pus at the site of the emerson-cath for the past 3 days. He is known to have a 

chronic atrial fibrillation, a cardiomyopathy, a COPD, an JEB, a leukemia in re

mission, a ventricular with ICD insertion. Blood culture revealed Gram positive 

bacteria. An echocardiogram done today shows a slightly hypertrophic LV with a 

hypokinetic septum, and a slightly reduced overall LVEF, a severe pulmonary 

hypertension, with dilated RV,RA, IVC. The aortic valve is sclerotic with mild 

stenosis, no obvious vegetation,  no insufficiency. There is also mild MR, TR 

and LA dilatation. PT: INR: 2.5





Review of Systems





- Constitutional


Constitutional: Fatigue, Fever





- Musculoskeletal


Additional comments: 





Edema of both lower legs.





Past Patient History





- Infectious Disease


Hx of Infectious Diseases: None





- Tetanus Immunizations


Tetanus Immunization: Unknown





- Past Medical History & Family History


Past Medical History?: Yes





- Past Social History


Smoking Status: Never Smoked


Alcohol: None


Drugs: Denies


Home Situation {Lives}: With Family


Domestic Violence: Negative





- CARDIAC


Hx Cardiac Disorders: Yes


Hx Atrial Fibrillation: Yes


Hx Cardia Arrhythmia: Yes (A FIB)


Hx Congestive Heart Failure: Yes


Hx Hypertension: Yes


Hx Internal Defibrillator: Yes


Hx Pacemaker: Yes


Hx Peripheral Edema: Yes





- PULMONARY


Hx Respiratory Disorders: Yes


Hx Asthma: Yes


Hx Chronic Obstructive Pulmonary Disease (COPD): Yes (uses CPAP for sleep apnea)


Hx Sleep Apnea: Yes (C-PAP)





- NEUROLOGICAL


Hx Neurological Disorder: Yes (PERIPHERAL NEUROPATHY)


Hx Dizziness: Yes





- HEENT


Hx HEENT Problems: No





- RENAL


Hx Chronic Kidney Disease: Yes





- ENDOCRINE/METABOLIC


Hx Endocrine Disorders: Yes


Hx Diabetes Mellitus Type 2: Yes





- HEMATOLOGICAL/ONCOLOGICAL


Hx Blood Disorders: Yes


Hx Blood Transfusions: Yes


Hx Cancer: Yes


Hx Chemotherapy: Yes


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





- INTEGUMENTARY


Hx Dermatological Problems: Yes


Hx Cellulitis: Yes





- MUSCULOSKELETAL/RHEUMATOLOGICAL


Hx Musculoskeletal Disorders: Yes


Hx Arthritis: Yes


Hx Falls: Yes


Hx Fractures: Yes


Hx Osteoporosis: Yes





- GASTROINTESTINAL


Hx Gastrointestinal Disorders: Yes


Hx Constipation: Yes





- GENITOURINARY/GYNECOLOGICAL


Hx Genitourinary Disorders: Yes


Other/Comment: dialysis  TTS





- PSYCHIATRIC


Hx Psychophysiologic Disorder: Yes


Hx Anxiety: Yes


Hx Depression: Yes


Hx Substance Use: No





- SURGICAL HISTORY


Hx Surgeries: Yes


Hx Angiogram: Yes


Hx Arteriovenous Shunt: Yes


Hx Cardiac Catheterization: Yes


Hx Musculoskeletal Surgery: Yes (spinal surgery for an abscess of the yhoracic 

spines.)


Hx Orthopedic Surgery: Yes (KNEE)


Hx Vascular Access Device: Yes (RIGHT EMERSON CATH)


Other/Comment: hx back surgery T5.  defibrillator/pacemaker placement





- ANESTHESIA


Hx Anesthesia: Yes


Hx Anesthesia Reactions: No


Hx Malignant Hyperthermia: No





Meds


Allergies/Adverse Reactions: 


                                    Allergies











Allergy/AdvReac Type Severity Reaction Status Date / Time


 


No Known Allergies Allergy   Verified 08/16/18 04:19














- Medications


Medications: 


                               Current Medications





Acetaminophen (Tylenol 325mg Tab)  650 mg PO Q6 PRN


   PRN Reason: Fever >100.4 F


Calcium Acetate (Phoslo)  667 mg PO TIDCC [facility]


   Last Admin: 10/19/18 17:53 Dose:  667 mg


Dextrose (Dextrose 50% Inj)  0 ml IV STAT PRN; Protocol


   PRN Reason: Hypoglycemia Protocol


Dextrose (Glutose 15)  0 gm PO ONCE PRN; Protocol


   PRN Reason: Hypoglycemia Protocol


Glucagon (Glucagen Diagnostic Kit)  0 mg IM STAT PRN; Protocol


   PRN Reason: Hypoglycemia Protocol


Dextrose (Dextrose 5% In Water 1000 Ml)  1,000 mls @ 0 mls/hr IV .Q0M PRN; 

Protocol


   PRN Reason: Hypoglycemia Protocol


Cefepime HCl 0.5 gm/ Dextrose  50 mls @ 100 mls/hr IVPB DAILY [facility]; Protocol


   Last Admin: 10/19/18 14:05 Dose:  100 mls/hr


Insulin Human Regular (Novolin R)  0 unit SC ACHS [facility]; Protocol


   Last Admin: 10/19/18 21:35 Dose:  Not Given


Ondansetron HCl (Zofran Inj)  4 mg IVP Q6 PRN


   PRN Reason: Nausea/Vomiting











Physical Exam





- Constitutional


Appears: Toxic, No Acute Distress, Chronically Ill





- Head Exam


Head Exam: NORMAL INSPECTION





- Eye Exam


Eye Exam: Normal appearance


Pupil Exam: NORMAL ACCOMODATION





- Neck Exam


Neck exam: Positive for: Normal Inspection





- Respiratory Exam


Respiratory Exam: Clear to Auscultation Bilateral





- Cardiovascular Exam


Cardiovascular Exam: Tachycardia, Irregular Rhythm, Systolic Murmur





- GI/Abdominal Exam


GI & Abdominal Exam: Normal Bowel Sounds





- Rectal Exam


Rectal Exam: Deferred





- Extremities Exam


Extremities exam: Positive for: pedal edema





- Back Exam


Back exam: NORMAL INSPECTION





- Neurological Exam


Neurological exam: Alert, Oriented x3





- Psychiatric Exam


Psychiatric exam: Anxious





- Skin


Skin Exam: Dry, Intact, Warm





Results





- Vital Signs


Recent Vital Signs: 


                                Last Vital Signs











Temp  98.1 F   10/19/18 15:00


 


Pulse  119 H  10/19/18 15:00


 


Resp  20   10/19/18 15:00


 


BP  106/74   10/19/18 15:00


 


Pulse Ox  98   10/19/18 15:00














- Labs


Result Diagrams: 


                                 10/19/18 09:39





                                 10/19/18 09:39


Labs: 


                         Laboratory Results - last 24 hr











  10/19/18 10/19/18 10/19/18





  06:14 09:39 09:39


 


WBC    6.5


 


RBC    3.31 L


 


Hgb    9.9 L


 


Hct    29.3 L


 


MCV    88.3


 


MCH    29.9


 


MCHC    33.9


 


RDW    20.1 H


 


Plt Count    186


 


MPV    7.1 L


 


Neut % (Auto)    78.2 H


 


Lymph % (Auto)    9.4 L


 


Mono % (Auto)    9.5


 


Eos % (Auto)    1.8


 


Baso % (Auto)    1.1


 


Neut # (Auto)    5.1


 


Lymph # (Auto)    0.6 L


 


Mono # (Auto)    0.6


 


Eos # (Auto)    0.1


 


Baso # (Auto)    0.1


 


Neutrophils % (Manual)    84 H


 


Band Neutrophils %    1


 


Lymphocytes % (Manual)    10 L


 


Monocytes % (Manual)    3


 


Eosinophils % (Manual)    2


 


Platelet Estimate    Normal


 


Polychromasia    Slight


 


Hypochromasia (manual)    Slight


 


Anisocytosis (manual)    Slight


 


Ovalocytes    Slight


 


PT   


 


INR   


 


APTT   


 


Sodium   


 


Potassium   


 


Chloride   


 


Carbon Dioxide   


 


Anion Gap   


 


BUN   


 


Creatinine   


 


Est GFR ( Amer)   


 


Est GFR (Non-Af Amer)   


 


POC Glucose (mg/dL)  100  


 


Random Glucose   


 


Calcium   


 


Total Bilirubin   


 


AST   


 


ALT   


 


Alkaline Phosphatase   


 


Total Protein   


 


Albumin   


 


Globulin   


 


Albumin/Globulin Ratio   


 


Procalcitonin   4.31 H 


 


Random Vancomycin   














  10/19/18 10/19/18 10/19/18





  09:39 09:39 09:39


 


WBC   


 


RBC   


 


Hgb   


 


Hct   


 


MCV   


 


MCH   


 


MCHC   


 


RDW   


 


Plt Count   


 


MPV   


 


Neut % (Auto)   


 


Lymph % (Auto)   


 


Mono % (Auto)   


 


Eos % (Auto)   


 


Baso % (Auto)   


 


Neut # (Auto)   


 


Lymph # (Auto)   


 


Mono # (Auto)   


 


Eos # (Auto)   


 


Baso # (Auto)   


 


Neutrophils % (Manual)   


 


Band Neutrophils %   


 


Lymphocytes % (Manual)   


 


Monocytes % (Manual)   


 


Eosinophils % (Manual)   


 


Platelet Estimate   


 


Polychromasia   


 


Hypochromasia (manual)   


 


Anisocytosis (manual)   


 


Ovalocytes   


 


PT  27.5 H D  


 


INR  2.5  D  


 


APTT  40 H  


 


Sodium   132 


 


Potassium   3.5 L 


 


Chloride   93 L 


 


Carbon Dioxide   24 


 


Anion Gap   19 


 


BUN   59 H 


 


Creatinine   5.2 H 


 


Est GFR ( Amer)   14 


 


Est GFR (Non-Af Amer)   11 


 


POC Glucose (mg/dL)   


 


Random Glucose   167 H 


 


Calcium   7.8 L 


 


Total Bilirubin   1.1 


 


AST   18 


 


ALT   20 L 


 


Alkaline Phosphatase   307 H 


 


Total Protein   6.3 


 


Albumin   3.1 L 


 


Globulin   3.1 


 


Albumin/Globulin Ratio   1.0 


 


Procalcitonin   


 


Random Vancomycin    7.4














  10/19/18 10/19/18 10/19/18





  11:10 16:17 21:07


 


WBC   


 


RBC   


 


Hgb   


 


Hct   


 


MCV   


 


MCH   


 


MCHC   


 


RDW   


 


Plt Count   


 


MPV   


 


Neut % (Auto)   


 


Lymph % (Auto)   


 


Mono % (Auto)   


 


Eos % (Auto)   


 


Baso % (Auto)   


 


Neut # (Auto)   


 


Lymph # (Auto)   


 


Mono # (Auto)   


 


Eos # (Auto)   


 


Baso # (Auto)   


 


Neutrophils % (Manual)   


 


Band Neutrophils %   


 


Lymphocytes % (Manual)   


 


Monocytes % (Manual)   


 


Eosinophils % (Manual)   


 


Platelet Estimate   


 


Polychromasia   


 


Hypochromasia (manual)   


 


Anisocytosis (manual)   


 


Ovalocytes   


 


PT   


 


INR   


 


APTT   


 


Sodium   


 


Potassium   


 


Chloride   


 


Carbon Dioxide   


 


Anion Gap   


 


BUN   


 


Creatinine   


 


Est GFR ( Amer)   


 


Est GFR (Non-Af Amer)   


 


POC Glucose (mg/dL)  132 H  223 H  160 H


 


Random Glucose   


 


Calcium   


 


Total Bilirubin   


 


AST   


 


ALT   


 


Alkaline Phosphatase   


 


Total Protein   


 


Albumin   


 


Globulin   


 


Albumin/Globulin Ratio   


 


Procalcitonin   


 


Random Vancomycin   














Assessment & Plan


(1) Sepsis associated with vascular access catheter


Assessment and Plan: 


Patient is on Cefepime by Dr Fink.


Status: Acute   





(2) Fever


Status: Acute   





(3) ESRD needing dialysis


Status: Acute   





(4) Atrial fibrillation


Assessment and Plan: 


Coumadin is on hold for possible emerson-cath insertion after sepsis is eradicate

d. To start IV heparin instead, if PT/INR start to go down.


Status: Acute

## 2018-10-19 NOTE — CP.PCM.CON
Past Patient History





- Infectious Disease


Hx of Infectious Diseases: None





- Tetanus Immunizations


Tetanus Immunization: Unknown





- Past Medical History & Family History


Past Medical History?: Yes





- Past Social History


Smoking Status: Never Smoked





- CARDIAC


Hx Cardiac Disorders: Yes


Hx Atrial Fibrillation: Yes


Hx Cardia Arrhythmia: Yes (A FIB)


Hx Congestive Heart Failure: Yes


Hx Hypertension: Yes


Hx Pacemaker: Yes


Hx Peripheral Edema: Yes





- PULMONARY


Hx Respiratory Disorders: Yes


Hx Asthma: Yes


Hx Chronic Obstructive Pulmonary Disease (COPD): Yes (uses CPAP for sleep apnea)


Hx Sleep Apnea: Yes (C-PAP)





- NEUROLOGICAL


Hx Neurological Disorder: Yes (PERIPHERAL NEUROPATHY)


Hx Dizziness: Yes





- HEENT


Hx HEENT Problems: No





- RENAL


Hx Chronic Kidney Disease: Yes





- ENDOCRINE/METABOLIC


Hx Endocrine Disorders: Yes


Hx Diabetes Mellitus Type 2: Yes





- HEMATOLOGICAL/ONCOLOGICAL


Hx Blood Disorders: Yes


Hx Blood Transfusions: Yes


Hx Cancer: Yes


Hx Chemotherapy: Yes


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





- INTEGUMENTARY


Hx Dermatological Problems: Yes


Hx Cellulitis: Yes





- MUSCULOSKELETAL/RHEUMATOLOGICAL


Hx Musculoskeletal Disorders: Yes


Hx Arthritis: Yes


Hx Falls: Yes


Hx Fractures: Yes


Hx Osteoporosis: Yes





- GASTROINTESTINAL


Hx Gastrointestinal Disorders: Yes


Hx Constipation: Yes





- GENITOURINARY/GYNECOLOGICAL


Hx Genitourinary Disorders: Yes


Other/Comment: dialysis  TTS





- PSYCHIATRIC


Hx Psychophysiologic Disorder: Yes


Hx Anxiety: Yes


Hx Depression: Yes


Hx Substance Use: No





- SURGICAL HISTORY


Hx Surgeries: Yes


Hx Angiogram: Yes


Hx Arteriovenous Shunt: Yes


Hx Cardiac Catheterization: Yes


Hx Orthopedic Surgery: Yes (KNEE)


Hx Vascular Access Device: Yes (RIGHT EMERSON CATH)


Other/Comment: hx back surgery T5.  defibrillator/pacemaker placement





- ANESTHESIA


Hx Anesthesia: Yes


Hx Anesthesia Reactions: No


Hx Malignant Hyperthermia: No





Meds


Allergies/Adverse Reactions: 


                                    Allergies











Allergy/AdvReac Type Severity Reaction Status Date / Time


 


No Known Allergies Allergy   Verified 08/16/18 04:19














- Medications


Medications: 


                               Current Medications





Acetaminophen (Tylenol 325mg Tab)  650 mg PO Q6 PRN


   PRN Reason: Fever >100.4 F


Calcium Acetate (Phoslo)  667 mg PO TIDCC [facility]


Dextrose (Dextrose 50% Inj)  0 ml IV STAT PRN; Protocol


   PRN Reason: Hypoglycemia Protocol


Dextrose (Glutose 15)  0 gm PO ONCE PRN; Protocol


   PRN Reason: Hypoglycemia Protocol


Glucagon (Glucagen Diagnostic Kit)  0 mg IM STAT PRN; Protocol


   PRN Reason: Hypoglycemia Protocol


Dextrose (Dextrose 5% In Water 1000 Ml)  1,000 mls @ 0 mls/hr IV .Q0M PRN; 

Protocol


   PRN Reason: Hypoglycemia Protocol


Cefepime HCl 0.5 gm/ Dextrose  50 mls @ 100 mls/hr IVPB DAILY [facility]; Protocol


Insulin Human Regular (Novolin R)  0 unit SC ACHS [facility]; Protocol


   Last Admin: 10/18/18 22:09 Dose:  Not Given


Ondansetron HCl (Zofran Inj)  4 mg IVP Q6 PRN


   PRN Reason: Nausea/Vomiting











Results





- Vital Signs


Recent Vital Signs: 


                                Last Vital Signs











Temp  99.3 F   10/19/18 08:20


 


Pulse  99 H  10/19/18 07:26


 


Resp  20   10/19/18 07:26


 


BP  103/52 L  10/19/18 07:26


 


Pulse Ox  97   10/19/18 07:26














- Labs


Result Diagrams: 


                                 10/18/18 08:23





                                 10/18/18 08:23


Labs: 


                         Laboratory Results - last 24 hr











  10/18/18 10/18/18 10/18/18





  08:23 11:18 17:38


 


Neutrophils % (Manual)  94 H  


 


Lymphocytes % (Manual)  3 L  


 


Monocytes % (Manual)  3  


 


Platelet Estimate  Normal  


 


Polychromasia  Slight  


 


Hypochromasia (manual)  Slight  


 


Anisocytosis (manual)  Slight  


 


Ovalocytes  Slight  


 


POC Glucose (mg/dL)    174 H


 


Lactic Acid   0.9 














  10/18/18 10/19/18





  21:17 06:14


 


Neutrophils % (Manual)  


 


Lymphocytes % (Manual)  


 


Monocytes % (Manual)  


 


Platelet Estimate  


 


Polychromasia  


 


Hypochromasia (manual)  


 


Anisocytosis (manual)  


 


Ovalocytes  


 


POC Glucose (mg/dL)  135 H  100


 


Lactic Acid

## 2018-10-19 NOTE — CP.PCM.PN
Subjective





- Date & Time of Evaluation


Date of Evaluation: 10/19/18


Time of Evaluation: 13:09





- Subjective


Subjective: 





seen and examiendin hd


s/p portacath removal


afebrile, bp chronically low





denies any pain sob cp dizziness palpitations f/c/n/v/d





Objective





- Vital Signs/Intake and Output


Vital Signs (last 24 hours): 


                                        











Temp Pulse Resp BP Pulse Ox


 


 98.7 F   119 H  16   86/32 L  100 


 


 10/19/18 09:47  10/19/18 10:18  10/19/18 10:18  10/19/18 11:35  10/19/18 09:35








Intake and Output: 


                                        











 10/19/18 10/19/18





 06:59 18:59


 


Intake Total 100 


 


Output Total 0 


 


Balance 100 














- Medications


Medications: 


                               Current Medications





Acetaminophen (Tylenol 325mg Tab)  650 mg PO Q6 PRN


   PRN Reason: Fever >100.4 F


Calcium Acetate (Phoslo)  667 mg PO TIDCC [facility]


   Last Admin: 10/19/18 08:07 Dose:  Not Given


Dextrose (Dextrose 50% Inj)  0 ml IV STAT PRN; Protocol


   PRN Reason: Hypoglycemia Protocol


Dextrose (Glutose 15)  0 gm PO ONCE PRN; Protocol


   PRN Reason: Hypoglycemia Protocol


Glucagon (Glucagen Diagnostic Kit)  0 mg IM STAT PRN; Protocol


   PRN Reason: Hypoglycemia Protocol


Dextrose (Dextrose 5% In Water 1000 Ml)  1,000 mls @ 0 mls/hr IV .Q0M PRN; 

Protocol


   PRN Reason: Hypoglycemia Protocol


Cefepime HCl 0.5 gm/ Dextrose  50 mls @ 100 mls/hr IVPB DAILY [facility]; Protocol


   Last Admin: 10/19/18 10:06 Dose:  Not Given


Insulin Human Regular (Novolin R)  0 unit SC ACHS [facility]; Protocol


   Last Admin: 10/19/18 07:30 Dose:  Not Given


Ondansetron HCl (Zofran Inj)  4 mg IVP Q6 PRN


   PRN Reason: Nausea/Vomiting











- Labs


Labs: 


                                        





                                 10/19/18 09:39 





                                 10/19/18 09:39 





                                        











PT  27.5 SECONDS (9.7-12.2)  H D 10/19/18  09:39    


 


INR  2.5  D 10/19/18  09:39    


 


APTT  40 SECONDS (21-34)  H  10/19/18  09:39    














- Constitutional


Appears: Non-toxic, No Acute Distress, Older Than Stated Age, Chronically Ill





- Head Exam


Head Exam: NORMAL INSPECTION, NORMOCEPHALIC





- Eye Exam


Eye Exam: Normal appearance, PERRL





- ENT Exam


ENT Exam: Mucous Membranes Moist, Normal Exam





- Neck Exam


Neck Exam: Full ROM, Normal Inspection





- Respiratory Exam


Respiratory Exam: Decreased Breath Sounds, NORMAL BREATHING PATTERN





- Cardiovascular Exam


Cardiovascular Exam: Irregular Rhythm





- GI/Abdominal Exam


GI & Abdominal Exam: Distended, Soft





- Extremities Exam


Extremities Exam: Normal Inspection, Pedal Edema (chronic stasis skin changes)





- Neurological Exam


Neurological Exam: Alert, Awake





- Psychiatric Exam


Psychiatric exam: Normal Affect, Normal Mood





- Skin


Skin Exam: Intact





Assessment and Plan


(1) Fever


Status: Acute   





(2) Infection due to Port-A-Cath


Status: Acute   





(3) ESRD (end stage renal disease)


Status: Acute   





(4) A-fib


Status: Chronic   





(5) Anemia


Status: Chronic   





- Assessment and Plan (Free Text)


Assessment: 





staph bacteremia, catheter associated


esrd


dm


hypotension





plan:


recommend echo - r/o endocarditis, antibiotics


hd mwf


gianluca w/ hd

## 2018-10-19 NOTE — CP.PCM.PN
Subjective





- Date & Time of Evaluation


Date of Evaluation: 10/19/18


Time of Evaluation: 19:40





- Subjective


Subjective: 


INFECTIOUS DISEASE PROGRESS NOTES


RAHEEM CANADA MD, FACP


10/19/2018


CHART REVIEWED


PT EXAMINED


CASE DISCUSSED


ADJUSTING VANCOMYCIN DOSING, KEEP TROUGH LEVELS BETWEEN 15-20.


W/U AS DISCUSSED, C/S STAPH AURUS-SENSITIVITES PENDING.








s/p portacath removal


afebrile, bp chronically low





denies any pain sob cp dizziness palpitations f/c/n/v/d





Objective





- Vital Signs/Intake and Output


Vital Signs (last 24 hours): 


                                        











Temp Pulse Resp BP Pulse Ox


 


 98.7 F   119 H  16   86/32 L  100 


 


 10/19/18 09:47  10/19/18 10:18  10/19/18 10:18  10/19/18 11:35  10/19/18 09:35








Intake and Output: 


                                        











 10/19/18 10/19/18





 06:59 18:59


 


Intake Total 100 


 


Output Total 0 


 


Balance 100 














- Medications


Medications: 


                               Current Medications





Acetaminophen (Tylenol 325mg Tab)  650 mg PO Q6 PRN


   PRN Reason: Fever >100.4 F


Calcium Acetate (Phoslo)  667 mg PO TIDCC [facility]


   Last Admin: 10/19/18 08:07 Dose:  Not Given


Dextrose (Dextrose 50% Inj)  0 ml IV STAT PRN; Protocol


   PRN Reason: Hypoglycemia Protocol


Dextrose (Glutose 15)  0 gm PO ONCE PRN; Protocol


   PRN Reason: Hypoglycemia Protocol


Glucagon (Glucagen Diagnostic Kit)  0 mg IM STAT PRN; Protocol


   PRN Reason: Hypoglycemia Protocol


Dextrose (Dextrose 5% In Water 1000 Ml)  1,000 mls @ 0 mls/hr IV .Q0M PRN; 

Protocol


   PRN Reason: Hypoglycemia Protocol


Cefepime HCl 0.5 gm/ Dextrose  50 mls @ 100 mls/hr IVPB DAILY [facility]; Protocol


   Last Admin: 10/19/18 10:06 Dose:  Not Given


Insulin Human Regular (Novolin R)  0 unit SC ACHS [facility]; Protocol


   Last Admin: 10/19/18 07:30 Dose:  Not Given


Ondansetron HCl (Zofran Inj)  4 mg IVP Q6 PRN


   PRN Reason: Nausea/Vomiting











- Labs


Labs: 


                                        





                                 10/19/18 09:39 





                                 10/19/18 09:39 





                                        











PT  27.5 SECONDS (9.7-12.2)  H D 10/19/18  09:39    


 


INR  2.5  D 10/19/18  09:39    


 


APTT  40 SECONDS (21-34)  H  10/19/18  09:39    














- Constitutional


Appears: Non-toxic, No Acute Distress, Older Than Stated Age, Chronically Ill





- Head Exam


Head Exam: NORMAL INSPECTION, NORMOCEPHALIC





- Eye Exam


Eye Exam: Normal appearance, PERRL





- ENT Exam


ENT Exam: Mucous Membranes Moist, Normal Exam





- Neck Exam


Neck Exam: Full ROM, Normal Inspection





- Respiratory Exam


Respiratory Exam: Decreased Breath Sounds, NORMAL BREATHING PATTERN





- Cardiovascular Exam


Cardiovascular Exam: Irregular Rhythm





- GI/Abdominal Exam


GI & Abdominal Exam: Distended, Soft





- Extremities Exam


Extremities Exam: Normal Inspection, Pedal Edema (chronic stasis skin changes)





- Neurological Exam


Neurological Exam: Alert, Awake





- Psychiatric Exam


Psychiatric exam: Normal Affect, Normal Mood





- Skin


Skin Exam: Intact





Assessment and Plan


(1) Fever


Status: Acute   





(2) Infection due to Port-A-Cath


Status: Acute   





(3) ESRD (end stage renal disease)


Status: Acute   





(4) A-fib


Status: Chronic   





(5) Anemia


Status: Chronic   





- Assessment and Plan (Free Text)


Assessment: 





staph bacteremia, catheter associated


esrd


dm


hypotension





plan:


recommend echo - r/o endocarditis, antibiotics


hd mwf


gianluca w/ hd

















Objective





- Vital Signs/Intake and Output


Vital Signs (last 24 hours): 


                                        











Temp Pulse Resp BP Pulse Ox


 


 98.1 F   119 H  20   106/74   98 


 


 10/19/18 15:00  10/19/18 15:00  10/19/18 15:00  10/19/18 15:00  10/19/18 15:00











- Medications


Medications: 


                               Current Medications





Acetaminophen (Tylenol 325mg Tab)  650 mg PO Q6 PRN


   PRN Reason: Fever >100.4 F


Calcium Acetate (Phoslo)  667 mg PO TIDCC [facility]


   Last Admin: 10/19/18 17:53 Dose:  667 mg


Dextrose (Dextrose 50% Inj)  0 ml IV STAT PRN; Protocol


   PRN Reason: Hypoglycemia Protocol


Dextrose (Glutose 15)  0 gm PO ONCE PRN; Protocol


   PRN Reason: Hypoglycemia Protocol


Glucagon (Glucagen Diagnostic Kit)  0 mg IM STAT PRN; Protocol


   PRN Reason: Hypoglycemia Protocol


Dextrose (Dextrose 5% In Water 1000 Ml)  1,000 mls @ 0 mls/hr IV .Q0M PRN; 

Protocol


   PRN Reason: Hypoglycemia Protocol


Cefepime HCl 0.5 gm/ Dextrose  50 mls @ 100 mls/hr IVPB DAILY [facility]; Protocol


   Last Admin: 10/19/18 14:05 Dose:  100 mls/hr


Insulin Human Regular (Novolin R)  0 unit SC ACHS [facility]; Protocol


   Last Admin: 10/19/18 17:53 Dose:  Not Given


Ondansetron HCl (Zofran Inj)  4 mg IVP Q6 PRN


   PRN Reason: Nausea/Vomiting











- Labs


Labs: 


                                        





                                 10/19/18 09:39 





                                 10/19/18 09:39 





                                        











PT  27.5 SECONDS (9.7-12.2)  H D 10/19/18  09:39    


 


INR  2.5  D 10/19/18  09:39    


 


APTT  40 SECONDS (21-34)  H  10/19/18  09:39

## 2018-10-20 LAB
ALBUMIN SERPL-MCNC: 3 [, G/DL] (ref 3.5–5)
ALBUMIN/GLOB SERPL: 1 [,] (ref 1–2.1)
ALT SERPL-CCNC: 21 [, U/L] (ref 21–72)
APTT BLD: 39 [, SECONDS] (ref 21–34)
AST SERPL-CCNC: 21 [, U/L] (ref 17–59)
BASOPHILS # BLD AUTO: 0 [, K/UL] (ref 0–0.2)
BASOPHILS NFR BLD: 0.7 [, %] (ref 0–2)
BUN SERPL-MCNC: 47 [, MG/DL] (ref 9–20)
CALCIUM SERPL-MCNC: 7.9 [, MG/DL] (ref 8.6–10.4)
EOSINOPHIL # BLD AUTO: 0.3 [, K/UL] (ref 0–0.7)
EOSINOPHIL NFR BLD: 4.4 [, %] (ref 0–4)
ERYTHROCYTE [DISTWIDTH] IN BLOOD BY AUTOMATED COUNT: 19.4 [, %] (ref 11.5–14.5)
GFR NON-AFRICAN AMERICAN: 15 [,]
HGB BLD-MCNC: 9.8 [, G/DL] (ref 12–18)
INR PPP: 2.2 [,]
LYMPHOCYTES # BLD AUTO: 0.8 [, K/UL] (ref 1–4.3)
LYMPHOCYTES NFR BLD AUTO: 12.9 [, %] (ref 20–40)
MCH RBC QN AUTO: 28.2 [, PG] (ref 27–31)
MCHC RBC AUTO-ENTMCNC: 31.8 [, G/DL] (ref 33–37)
MCV RBC AUTO: 88.8 [, FL] (ref 80–94)
MONOCYTES # BLD: 0.7 [, K/UL] (ref 0–0.8)
MONOCYTES NFR BLD: 12 [, %] (ref 0–10)
NEUTROPHILS # BLD: 4.1 [, K/UL] (ref 1.8–7)
NEUTROPHILS NFR BLD AUTO: 70 [, %] (ref 50–75)
NRBC BLD AUTO-RTO: 0.1 [, %] (ref 0–2)
PLATELET # BLD: 195 [, K/UL] (ref 130–400)
PMV BLD AUTO: 7.2 [, FL] (ref 7.2–11.7)
PROTHROMBIN TIME: 24.5 [, SECONDS] (ref 9.7–12.2)
RBC # BLD AUTO: 3.49 [, MIL/UL] (ref 4.4–5.9)
WBC # BLD AUTO: 5.9 [, K/UL] (ref 4.8–10.8)

## 2018-10-20 RX ADMIN — HUMAN INSULIN SCH: 100 INJECTION, SOLUTION SUBCUTANEOUS at 21:33

## 2018-10-20 RX ADMIN — HUMAN INSULIN SCH: 100 INJECTION, SOLUTION SUBCUTANEOUS at 12:04

## 2018-10-20 RX ADMIN — HUMAN INSULIN SCH: 100 INJECTION, SOLUTION SUBCUTANEOUS at 16:30

## 2018-10-20 RX ADMIN — HUMAN INSULIN SCH: 100 INJECTION, SOLUTION SUBCUTANEOUS at 07:37

## 2018-10-20 NOTE — CP.PCM.PN
Subjective





- Date & Time of Evaluation


Date of Evaluation: 10/20/18


Time of Evaluation: 07:10





- Subjective


Subjective: 


surgery progress note for Dr. Lopez





Pt seen and examined this AM. No adverse events overnight. Pt denies any chest 

pain, SOB, fevers or chills.





Objective





- Vital Signs/Intake and Output


Vital Signs (last 24 hours): 


                                        











Temp Pulse Resp BP Pulse Ox


 


 98.5 F   118 H  20   104/70   97 


 


 10/20/18 08:10  10/20/18 08:10  10/20/18 08:10  10/20/18 08:10  10/20/18 08:10











- Medications


Medications: 


                               Current Medications





Acetaminophen (Tylenol 325mg Tab)  650 mg PO Q6 PRN


   PRN Reason: Fever >100.4 F


Calcium Acetate (Phoslo)  667 mg PO TIDCC [facility]


   Last Admin: 10/19/18 17:53 Dose:  667 mg


Dextrose (Dextrose 50% Inj)  0 ml IV STAT PRN; Protocol


   PRN Reason: Hypoglycemia Protocol


Dextrose (Glutose 15)  0 gm PO ONCE PRN; Protocol


   PRN Reason: Hypoglycemia Protocol


Glucagon (Glucagen Diagnostic Kit)  0 mg IM STAT PRN; Protocol


   PRN Reason: Hypoglycemia Protocol


Dextrose (Dextrose 5% In Water 1000 Ml)  1,000 mls @ 0 mls/hr IV .Q0M PRN; 

Protocol


   PRN Reason: Hypoglycemia Protocol


Cefepime HCl 0.5 gm/ Dextrose  50 mls @ 100 mls/hr IVPB DAILY [facility]; Protocol


   Last Admin: 10/19/18 14:05 Dose:  100 mls/hr


Insulin Human Regular (Novolin R)  0 unit SC ACHS [facility]; Protocol


   Last Admin: 10/20/18 07:37 Dose:  Not Given


Ondansetron HCl (Zofran Inj)  4 mg IVP Q6 PRN


   PRN Reason: Nausea/Vomiting











- Labs


Labs: 


                                        





                                 10/20/18 07:42 





                                 10/20/18 07:42 





                                        











PT  24.5 SECONDS (9.7-12.2)  H  10/20/18  07:42    


 


INR  2.2   10/20/18  07:42    


 


APTT  39 SECONDS (21-34)  H  10/20/18  07:42    














- Constitutional


Appears: Well, Non-toxic, No Acute Distress





- Head Exam


Head Exam: ATRAUMATIC, NORMOCEPHALIC





- Eye Exam


Eye Exam: Normal appearance.  absent: Conjunctival injection, Scleral icterus





- ENT Exam


ENT Exam: Mucous Membranes Moist, Normal Oropharynx





- Respiratory Exam


Respiratory Exam: NORMAL BREATHING PATTERN.  absent: Accessory Muscle Use, 

Respiratory Distress





- Cardiovascular Exam


Cardiovascular Exam: Tachycardia, REGULAR RHYTHM





- GI/Abdominal Exam


GI & Abdominal Exam: Soft.  absent: Distended





- Neurological Exam


Neurological Exam: Alert, Awake, Oriented x3





- Psychiatric Exam


Psychiatric exam: Anxious, Normal Affect





- Skin


Skin Exam: Dry, Warm


Additional comments: 





incision site in the right upper chest with minimal erythema, no purulent 

drainage expressed, no bleeding





Assessment and Plan





- Assessment and Plan (Free Text)


Assessment: 





63M with infected portacath POD#2 s/p removal of portacath


Plan: 





Continue antibiotics per ID


F/U wound cultures from OR


PRN pain medication


Daily dressing changes


Medical management per primary





Discussed with Dr. Jessica Caban, PGY2

## 2018-10-20 NOTE — CP.PCM.CON
<Faraz Thornton - Last Filed: 10/20/18 09:41>





Past Patient History





- Infectious Disease


Hx of Infectious Diseases: None





- Tetanus Immunizations


Tetanus Immunization: Unknown





- Past Medical History & Family History


Past Medical History?: Yes





- Past Social History


Smoking Status: Never Smoked


Alcohol: None


Drugs: Denies


Home Situation {Lives}: With Family


Domestic Violence: Negative





- CARDIAC


Hx Cardiac Disorders: Yes


Hx Atrial Fibrillation: Yes


Hx Cardia Arrhythmia: Yes (A FIB)


Hx Congestive Heart Failure: Yes


Hx Hypertension: Yes


Hx Internal Defibrillator: Yes


Hx Pacemaker: Yes


Hx Peripheral Edema: Yes





- PULMONARY


Hx Respiratory Disorders: Yes


Hx Asthma: Yes


Hx Chronic Obstructive Pulmonary Disease (COPD): Yes (uses CPAP for sleep apnea)


Hx Sleep Apnea: Yes (C-PAP)





- NEUROLOGICAL


Hx Neurological Disorder: Yes (PERIPHERAL NEUROPATHY)


Hx Dizziness: Yes





- HEENT


Hx HEENT Problems: No





- RENAL


Hx Chronic Kidney Disease: Yes





- ENDOCRINE/METABOLIC


Hx Endocrine Disorders: Yes


Hx Diabetes Mellitus Type 2: Yes





- HEMATOLOGICAL/ONCOLOGICAL


Hx Blood Disorders: Yes


Hx Blood Transfusions: Yes


Hx Cancer: Yes


Hx Chemotherapy: Yes


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





- INTEGUMENTARY


Hx Dermatological Problems: Yes


Hx Cellulitis: Yes





- MUSCULOSKELETAL/RHEUMATOLOGICAL


Hx Musculoskeletal Disorders: Yes


Hx Arthritis: Yes


Hx Falls: Yes


Hx Fractures: Yes


Hx Osteoporosis: Yes





- GASTROINTESTINAL


Hx Gastrointestinal Disorders: Yes


Hx Constipation: Yes





- GENITOURINARY/GYNECOLOGICAL


Hx Genitourinary Disorders: Yes


Other/Comment: dialysis  TTS





- PSYCHIATRIC


Hx Psychophysiologic Disorder: Yes


Hx Anxiety: Yes


Hx Depression: Yes


Hx Substance Use: No





- SURGICAL HISTORY


Hx Surgeries: Yes


Hx Angiogram: Yes


Hx Arteriovenous Shunt: Yes


Hx Cardiac Catheterization: Yes


Hx Musculoskeletal Surgery: Yes (spinal surgery for an abscess of the yhoracic 

spines.)


Hx Orthopedic Surgery: Yes (KNEE)


Hx Vascular Access Device: Yes (RIGHT EMERSON CATH)


Other/Comment: hx back surgery T5.  defibrillator/pacemaker placement





- ANESTHESIA


Hx Anesthesia: Yes


Hx Anesthesia Reactions: No


Hx Malignant Hyperthermia: No





Meds


Allergies/Adverse Reactions: 


                                    Allergies











Allergy/AdvReac Type Severity Reaction Status Date / Time


 


No Known Allergies Allergy   Verified 08/16/18 04:19














- Medications


Medications: 


                               Current Medications





Acetaminophen (Tylenol 325mg Tab)  650 mg PO Q6 PRN


   PRN Reason: Fever >100.4 F


Calcium Acetate (Phoslo)  667 mg PO TIDCC [facility]


   Last Admin: 10/20/18 09:12 Dose:  Not Given


Dextrose (Dextrose 50% Inj)  0 ml IV STAT PRN; Protocol


   PRN Reason: Hypoglycemia Protocol


Dextrose (Glutose 15)  0 gm PO ONCE PRN; Protocol


   PRN Reason: Hypoglycemia Protocol


Glucagon (Glucagen Diagnostic Kit)  0 mg IM STAT PRN; Protocol


   PRN Reason: Hypoglycemia Protocol


Dextrose (Dextrose 5% In Water 1000 Ml)  1,000 mls @ 0 mls/hr IV .Q0M PRN; 

Protocol


   PRN Reason: Hypoglycemia Protocol


Cefepime HCl 0.5 gm/ Dextrose  50 mls @ 100 mls/hr IVPB DAILY [facility]; Protocol


   Last Admin: 10/19/18 14:05 Dose:  100 mls/hr


Insulin Human Regular (Novolin R)  0 unit SC ACHS [facility]; Protocol


   Last Admin: 10/20/18 07:37 Dose:  Not Given


Ondansetron HCl (Zofran Inj)  4 mg IVP Q6 PRN


   PRN Reason: Nausea/Vomiting











Results





- Vital Signs


Recent Vital Signs: 


                                Last Vital Signs











Temp  98.5 F   10/20/18 08:10


 


Pulse  118 H  10/20/18 08:10


 


Resp  20   10/20/18 08:10


 


BP  104/70   10/20/18 08:10


 


Pulse Ox  97   10/20/18 08:10














- Labs


Result Diagrams: 


                                 10/20/18 07:42





                                 10/20/18 07:42


Labs: 


                         Laboratory Results - last 24 hr











  10/19/18 10/19/18 10/19/18





  09:39 09:39 09:39


 


WBC   6.5 


 


RBC   3.31 L 


 


Hgb   9.9 L 


 


Hct   29.3 L 


 


MCV   88.3 


 


MCH   29.9 


 


MCHC   33.9 


 


RDW   20.1 H 


 


Plt Count   186 


 


MPV   7.1 L 


 


Neut % (Auto)   78.2 H 


 


Lymph % (Auto)   9.4 L 


 


Mono % (Auto)   9.5 


 


Eos % (Auto)   1.8 


 


Baso % (Auto)   1.1 


 


Neut # (Auto)   5.1 


 


Lymph # (Auto)   0.6 L 


 


Mono # (Auto)   0.6 


 


Eos # (Auto)   0.1 


 


Baso # (Auto)   0.1 


 


Neutrophils % (Manual)   84 H 


 


Band Neutrophils %   1 


 


Lymphocytes % (Manual)   10 L 


 


Monocytes % (Manual)   3 


 


Eosinophils % (Manual)   2 


 


Platelet Estimate   Normal 


 


Polychromasia   Slight 


 


Hypochromasia (manual)   Slight 


 


Anisocytosis (manual)   Slight 


 


Ovalocytes   Slight 


 


PT    27.5 H D


 


INR    2.5  D


 


APTT    40 H


 


Sodium   


 


Potassium   


 


Chloride   


 


Carbon Dioxide   


 


Anion Gap   


 


BUN   


 


Creatinine   


 


Est GFR ( Amer)   


 


Est GFR (Non-Af Amer)   


 


POC Glucose (mg/dL)   


 


Random Glucose   


 


Calcium   


 


Total Bilirubin   


 


AST   


 


ALT   


 


Alkaline Phosphatase   


 


Total Protein   


 


Albumin   


 


Globulin   


 


Albumin/Globulin Ratio   


 


Procalcitonin  4.31 H  


 


Random Vancomycin   














  10/19/18 10/19/18 10/19/18





  09:39 09:39 11:10


 


WBC   


 


RBC   


 


Hgb   


 


Hct   


 


MCV   


 


MCH   


 


MCHC   


 


RDW   


 


Plt Count   


 


MPV   


 


Neut % (Auto)   


 


Lymph % (Auto)   


 


Mono % (Auto)   


 


Eos % (Auto)   


 


Baso % (Auto)   


 


Neut # (Auto)   


 


Lymph # (Auto)   


 


Mono # (Auto)   


 


Eos # (Auto)   


 


Baso # (Auto)   


 


Neutrophils % (Manual)   


 


Band Neutrophils %   


 


Lymphocytes % (Manual)   


 


Monocytes % (Manual)   


 


Eosinophils % (Manual)   


 


Platelet Estimate   


 


Polychromasia   


 


Hypochromasia (manual)   


 


Anisocytosis (manual)   


 


Ovalocytes   


 


PT   


 


INR   


 


APTT   


 


Sodium  132  


 


Potassium  3.5 L  


 


Chloride  93 L  


 


Carbon Dioxide  24  


 


Anion Gap  19  


 


BUN  59 H  


 


Creatinine  5.2 H  


 


Est GFR ( Amer)  14  


 


Est GFR (Non-Af Amer)  11  


 


POC Glucose (mg/dL)    132 H


 


Random Glucose  167 H  


 


Calcium  7.8 L  


 


Total Bilirubin  1.1  


 


AST  18  


 


ALT  20 L  


 


Alkaline Phosphatase  307 H  


 


Total Protein  6.3  


 


Albumin  3.1 L  


 


Globulin  3.1  


 


Albumin/Globulin Ratio  1.0  


 


Procalcitonin   


 


Random Vancomycin   7.4 














  10/19/18 10/19/18 10/20/18





  16:17 21:07 06:24


 


WBC   


 


RBC   


 


Hgb   


 


Hct   


 


MCV   


 


MCH   


 


MCHC   


 


RDW   


 


Plt Count   


 


MPV   


 


Neut % (Auto)   


 


Lymph % (Auto)   


 


Mono % (Auto)   


 


Eos % (Auto)   


 


Baso % (Auto)   


 


Neut # (Auto)   


 


Lymph # (Auto)   


 


Mono # (Auto)   


 


Eos # (Auto)   


 


Baso # (Auto)   


 


Neutrophils % (Manual)   


 


Band Neutrophils %   


 


Lymphocytes % (Manual)   


 


Monocytes % (Manual)   


 


Eosinophils % (Manual)   


 


Platelet Estimate   


 


Polychromasia   


 


Hypochromasia (manual)   


 


Anisocytosis (manual)   


 


Ovalocytes   


 


PT   


 


INR   


 


APTT   


 


Sodium   


 


Potassium   


 


Chloride   


 


Carbon Dioxide   


 


Anion Gap   


 


BUN   


 


Creatinine   


 


Est GFR ( Amer)   


 


Est GFR (Non-Af Amer)   


 


POC Glucose (mg/dL)  223 H  160 H  137 H


 


Random Glucose   


 


Calcium   


 


Total Bilirubin   


 


AST   


 


ALT   


 


Alkaline Phosphatase   


 


Total Protein   


 


Albumin   


 


Globulin   


 


Albumin/Globulin Ratio   


 


Procalcitonin   


 


Random Vancomycin   














  10/20/18 10/20/18 10/20/18





  07:42 07:42 07:42


 


WBC  5.9  


 


RBC  3.49 L  


 


Hgb  9.8 L  


 


Hct  31.0 L  


 


MCV  88.8  


 


MCH  28.2  


 


MCHC  31.8 L  


 


RDW  19.4 H  


 


Plt Count  195  


 


MPV  7.2  


 


Neut % (Auto)  70.0  


 


Lymph % (Auto)  12.9 L  


 


Mono % (Auto)  12.0 H  


 


Eos % (Auto)  4.4 H  


 


Baso % (Auto)  0.7  


 


Neut # (Auto)  4.1  


 


Lymph # (Auto)  0.8 L  


 


Mono # (Auto)  0.7  


 


Eos # (Auto)  0.3  


 


Baso # (Auto)  0.0  


 


Neutrophils % (Manual)   


 


Band Neutrophils %   


 


Lymphocytes % (Manual)   


 


Monocytes % (Manual)   


 


Eosinophils % (Manual)   


 


Platelet Estimate   


 


Polychromasia   


 


Hypochromasia (manual)   


 


Anisocytosis (manual)   


 


Ovalocytes   


 


PT    24.5 H


 


INR    2.2


 


APTT    39 H


 


Sodium   135 


 


Potassium   3.9 


 


Chloride   96 L 


 


Carbon Dioxide   24 


 


Anion Gap   19 


 


BUN   47 H 


 


Creatinine   4.0 H 


 


Est GFR ( Amer)   19 


 


Est GFR (Non-Af Amer)   15 


 


POC Glucose (mg/dL)   


 


Random Glucose   119 H 


 


Calcium   7.9 L 


 


Total Bilirubin   0.9 


 


AST   21 


 


ALT   21 


 


Alkaline Phosphatase   281 H 


 


Total Protein   6.2 L 


 


Albumin   3.0 L 


 


Globulin   3.2 


 


Albumin/Globulin Ratio   1.0 


 


Procalcitonin   


 


Random Vancomycin   














<Yanira Mendiola - Last Filed: 10/20/18 12:38>





History of Present Illness





- History of Present Illness


History of Present Illness: 


Podiatry Consult Note for Dr. Thornton





63 yo male patient, seen and evaluated at bedside for B/L lower extremity 

ulcerations. Patient present to ED with port a cath infection s/p HD; patient 

admitted for further treatment of abscess.  Patient is AAOx3, in NAD, and well 

known to Dr. Thornton.  He reports getting his dressing changed daily by his wife 

at home. Denies any pain to b/l lower extremities. Denies N/V/F. 








Review of Systems





- Review of Systems


Review of Systems: 





As per HPI 





Meds





- Medications


Medications: 


                               Current Medications





Acetaminophen (Tylenol 325mg Tab)  650 mg PO Q6 PRN


   PRN Reason: Fever >100.4 F


Calcium Acetate (Phoslo)  667 mg PO TIDCC [facility]


   Last Admin: 10/20/18 12:24 Dose:  667 mg


Dextrose (Dextrose 50% Inj)  0 ml IV STAT PRN; Protocol


   PRN Reason: Hypoglycemia Protocol


Dextrose (Glutose 15)  0 gm PO ONCE PRN; Protocol


   PRN Reason: Hypoglycemia Protocol


Glucagon (Glucagen Diagnostic Kit)  0 mg IM STAT PRN; Protocol


   PRN Reason: Hypoglycemia Protocol


Dextrose (Dextrose 5% In Water 1000 Ml)  1,000 mls @ 0 mls/hr IV .Q0M PRN; 

Protocol


   PRN Reason: Hypoglycemia Protocol


Cefepime HCl 0.5 gm/ Dextrose  50 mls @ 100 mls/hr IVPB DAILY [facility]; Protocol


   Last Admin: 10/20/18 12:24 Dose:  100 mls/hr


Insulin Human Regular (Novolin R)  0 unit SC ACHS [facility]; Protocol


   Last Admin: 10/20/18 12:04 Dose:  Not Given


Ondansetron HCl (Zofran Inj)  4 mg IVP Q6 PRN


   PRN Reason: Nausea/Vomiting











Physical Exam





- Constitutional


Appears: Well, Non-toxic, No Acute Distress





- Head Exam


Head Exam: ATRAUMATIC, NORMOCEPHALIC





- Extremities Exam


Additional comments: 


Vasc: Nonpalpable pulses to the DP and PT, delayed CFT to the digits, 

temperature gradient WNL,  +1 pitting pedal edema


Derm: chronic skin changes with hyperpigmented skin to entire lower extremity, 

skin thickening and flaking bilateral lower legs.


Left: Left posterior heel ulceration with mixture granular and fibrotic wound 

base to the posterior distal lower extremity.


Right: Full thickness ulceration measuring approximately 3x2x.3, with mixture 

granular and fibrotic wound base. Serous drainage noted to the ulceration. No 

fluctuance noted. Minor marcos-wound erythem, non- streaking appreciated.


Neuro: Gross sensation absent bilaterally, protective sensation absent


Ortho: no pain appreciated with palpation to surrounding ulceration sites. No 

pain with calf palpation bilaterally.














Results





- Vital Signs


Recent Vital Signs: 


                                Last Vital Signs











Temp  98.5 F   10/20/18 08:10


 


Pulse  118 H  10/20/18 08:10


 


Resp  20   10/20/18 08:10


 


BP  104/70   10/20/18 08:10


 


Pulse Ox  97   10/20/18 08:10














- Labs


Result Diagrams: 


                                 10/20/18 07:42





                                 10/20/18 07:42


Labs: 


                         Laboratory Results - last 24 hr











  10/19/18 10/19/18 10/19/18





  09:39 11:10 16:17


 


WBC   


 


RBC   


 


Hgb   


 


Hct   


 


MCV   


 


MCH   


 


MCHC   


 


RDW   


 


Plt Count   


 


MPV   


 


Neut % (Auto)   


 


Lymph % (Auto)   


 


Mono % (Auto)   


 


Eos % (Auto)   


 


Baso % (Auto)   


 


Neut # (Auto)   


 


Lymph # (Auto)   


 


Mono # (Auto)   


 


Eos # (Auto)   


 


Baso # (Auto)   


 


PT   


 


INR   


 


APTT   


 


Sodium   


 


Potassium   


 


Chloride   


 


Carbon Dioxide   


 


Anion Gap   


 


BUN   


 


Creatinine   


 


Est GFR ( Amer)   


 


Est GFR (Non-Af Amer)   


 


POC Glucose (mg/dL)   132 H  223 H


 


Random Glucose   


 


Calcium   


 


Total Bilirubin   


 


AST   


 


ALT   


 


Alkaline Phosphatase   


 


Total Protein   


 


Albumin   


 


Globulin   


 


Albumin/Globulin Ratio   


 


Procalcitonin  4.31 H  














  10/19/18 10/20/18 10/20/18





  21:07 06:24 07:42


 


WBC    5.9


 


RBC    3.49 L


 


Hgb    9.8 L


 


Hct    31.0 L


 


MCV    88.8


 


MCH    28.2


 


MCHC    31.8 L


 


RDW    19.4 H


 


Plt Count    195


 


MPV    7.2


 


Neut % (Auto)    70.0


 


Lymph % (Auto)    12.9 L


 


Mono % (Auto)    12.0 H


 


Eos % (Auto)    4.4 H


 


Baso % (Auto)    0.7


 


Neut # (Auto)    4.1


 


Lymph # (Auto)    0.8 L


 


Mono # (Auto)    0.7


 


Eos # (Auto)    0.3


 


Baso # (Auto)    0.0


 


PT   


 


INR   


 


APTT   


 


Sodium   


 


Potassium   


 


Chloride   


 


Carbon Dioxide   


 


Anion Gap   


 


BUN   


 


Creatinine   


 


Est GFR ( Amer)   


 


Est GFR (Non-Af Amer)   


 


POC Glucose (mg/dL)  160 H  137 H 


 


Random Glucose   


 


Calcium   


 


Total Bilirubin   


 


AST   


 


ALT   


 


Alkaline Phosphatase   


 


Total Protein   


 


Albumin   


 


Globulin   


 


Albumin/Globulin Ratio   


 


Procalcitonin   














  10/20/18 10/20/18 10/20/18





  07:42 07:42 11:07


 


WBC   


 


RBC   


 


Hgb   


 


Hct   


 


MCV   


 


MCH   


 


MCHC   


 


RDW   


 


Plt Count   


 


MPV   


 


Neut % (Auto)   


 


Lymph % (Auto)   


 


Mono % (Auto)   


 


Eos % (Auto)   


 


Baso % (Auto)   


 


Neut # (Auto)   


 


Lymph # (Auto)   


 


Mono # (Auto)   


 


Eos # (Auto)   


 


Baso # (Auto)   


 


PT   24.5 H 


 


INR   2.2 


 


APTT   39 H 


 


Sodium  135  


 


Potassium  3.9  


 


Chloride  96 L  


 


Carbon Dioxide  24  


 


Anion Gap  19  


 


BUN  47 H  


 


Creatinine  4.0 H  


 


Est GFR ( Amer)  19  


 


Est GFR (Non-Af Amer)  15  


 


POC Glucose (mg/dL)    97


 


Random Glucose  119 H  


 


Calcium  7.9 L  


 


Total Bilirubin  0.9  


 


AST  21  


 


ALT  21  


 


Alkaline Phosphatase  281 H  


 


Total Protein  6.2 L  


 


Albumin  3.0 L  


 


Globulin  3.2  


 


Albumin/Globulin Ratio  1.0  


 


Procalcitonin   














Assessment & Plan





- Assessment and Plan (Free Text)


Assessment: 











63 yo male patient, seen and evaluated at bedside for B/L lower extremity 

ulcerations.





Plan: 


Patient seen and evaluated with Dr. Thornton


Patient afebrile, absent leukocytosis 


Evaluation of b/l lower exremitiy ulcerations and local wound care: wounds 

cleansed with saline and dressed with DSD 


- Wounds stable, no current signs of infection


No surgical intervention at this time


Patient to wear multipodus boots at all times while in bed 


Will continue to follow


Thank you for the consult 





- Date & Time


Date: 10/20/18


Time: 12:36

## 2018-10-20 NOTE — CP.PCM.PN
Subjective





- Date & Time of Evaluation


Date of Evaluation: 10/20/18


Time of Evaluation: 21:00





- Subjective


Subjective: 





INFECTIOUS DISEASE PROGRESS NOTE


RAHEEM CANADA MD, FACP


10/20/2018


5T 551-A


CHART REVIEWED


EXAMINE NOTED











Awakes on command


Sleepy, does not want to speak much


No distress


Tolerating diallysis treatments


10 point ROS negative when questioned





RECEIVING VANCOMYCIN WITH DIALYSIS AND CEFEPIME DAILY


CLINICALLY STABLE


OFFERS NO COMPLAINTS BUT APPEARS DEPRESSED


WILL DISCUSSED FURTHER RX ID OPTIONS


RECHECK VANCO RANDOM LEVEL MONDAY AFTER DISLYSIS





OBSERVE RESPONSE











Objective





- Vital Signs/Intake and Output


Vital Signs (last 24 hours): 


                                        











Temp Pulse Resp BP Pulse Ox


 


 97.6 F   102 H  20   106/70   97 


 


 10/20/18 15:05  10/20/18 15:05  10/20/18 15:05  10/20/18 18:37  10/20/18 15:05








Intake and Output: 


                                        











 10/20/18 10/21/18





 18:59 06:59


 


Intake Total 650 


 


Balance 650 














- Medications


Medications: 


                               Current Medications





Acetaminophen (Tylenol 325mg Tab)  650 mg PO Q6 PRN


   PRN Reason: Fever >100.4 F


Calcium Acetate (Phoslo)  667 mg PO TIDCC [facility]


   Last Admin: 10/20/18 17:01 Dose:  667 mg


Dextrose (Dextrose 50% Inj)  0 ml IV STAT PRN; Protocol


   PRN Reason: Hypoglycemia Protocol


Dextrose (Glutose 15)  0 gm PO ONCE PRN; Protocol


   PRN Reason: Hypoglycemia Protocol


Glucagon (Glucagen Diagnostic Kit)  0 mg IM STAT PRN; Protocol


   PRN Reason: Hypoglycemia Protocol


Dextrose (Dextrose 5% In Water 1000 Ml)  1,000 mls @ 0 mls/hr IV .Q0M PRN; 

Protocol


   PRN Reason: Hypoglycemia Protocol


Cefepime HCl 0.5 gm/ Dextrose  50 mls @ 100 mls/hr IVPB DAILY [facility]; Protocol


   Last Admin: 10/20/18 12:24 Dose:  100 mls/hr


Insulin Human Regular (Novolin R)  0 unit SC ACHS [facility]; Protocol


   Last Admin: 10/20/18 21:33 Dose:  Not Given


Ondansetron HCl (Zofran Inj)  4 mg IVP Q6 PRN


   PRN Reason: Nausea/Vomiting











- Labs


Labs: 


                                        





                                 10/20/18 07:42 





                                 10/20/18 07:42 





                                        











PT  24.5 SECONDS (9.7-12.2)  H  10/20/18  07:42    


 


INR  2.2   10/20/18  07:42    


 


APTT  39 SECONDS (21-34)  H  10/20/18  07:42

## 2018-10-20 NOTE — CP.PCM.PN
<Jamee Mcknight P - Last Filed: 10/20/18 17:20>





Subjective





- Date & Time of Evaluation


Date of Evaluation: 10/20/18


Time of Evaluation: 15:19





- Subjective


Subjective: 


PGY-1 progress note fro Dr. ANIBAL Fabian. 





Patient seen and examined at bedside. Patient is resting comfortably in bed in 

no acute distress. Denies fever, chills, chest pain, shortness of breath, 

nausea, vomiting, abdominal pain and extremity pain. 








Objective





- Vital Signs/Intake and Output


Vital Signs (last 24 hours): 


                                        











Temp Pulse Resp BP Pulse Ox


 


 98.5 F   118 H  20   104/70   97 


 


 10/20/18 08:10  10/20/18 08:10  10/20/18 08:10  10/20/18 08:10  10/20/18 08:10








Intake and Output: 


                                        











 10/20/18 10/20/18





 06:59 18:59


 


Intake Total  530


 


Balance  530














- Medications


Medications: 


                               Current Medications





Acetaminophen (Tylenol 325mg Tab)  650 mg PO Q6 PRN


   PRN Reason: Fever >100.4 F


Calcium Acetate (Phoslo)  667 mg PO TIDCC [facility]


   Last Admin: 10/20/18 12:24 Dose:  667 mg


Dextrose (Dextrose 50% Inj)  0 ml IV STAT PRN; Protocol


   PRN Reason: Hypoglycemia Protocol


Dextrose (Glutose 15)  0 gm PO ONCE PRN; Protocol


   PRN Reason: Hypoglycemia Protocol


Glucagon (Glucagen Diagnostic Kit)  0 mg IM STAT PRN; Protocol


   PRN Reason: Hypoglycemia Protocol


Dextrose (Dextrose 5% In Water 1000 Ml)  1,000 mls @ 0 mls/hr IV .Q0M PRN; 

Protocol


   PRN Reason: Hypoglycemia Protocol


Cefepime HCl 0.5 gm/ Dextrose  50 mls @ 100 mls/hr IVPB DAILY [facility]; Protocol


   Last Admin: 10/20/18 12:24 Dose:  100 mls/hr


Insulin Human Regular (Novolin R)  0 unit SC ACHS [facility]; Protocol


   Last Admin: 10/20/18 12:04 Dose:  Not Given


Ondansetron HCl (Zofran Inj)  4 mg IVP Q6 PRN


   PRN Reason: Nausea/Vomiting











- Labs


Labs: 


                                        





                                 10/20/18 07:42 





                                 10/20/18 07:42 





                                        











PT  24.5 SECONDS (9.7-12.2)  H  10/20/18  07:42    


 


INR  2.2   10/20/18  07:42    


 


APTT  39 SECONDS (21-34)  H  10/20/18  07:42    














- Constitutional


Appears: Non-toxic, No Acute Distress





- Head Exam


Head Exam: ATRAUMATIC, NORMOCEPHALIC





- Eye Exam


Eye Exam: EOMI, Normal appearance





- ENT Exam


ENT Exam: Mucous Membranes Moist





- Neck Exam


Neck Exam: Normal Inspection





- Respiratory Exam


Respiratory Exam: Clear to Ausculation Bilateral.  absent: Rales, Rhonchi, 

Wheezes





- Cardiovascular Exam


Cardiovascular Exam: Irregular Rhythm, +S1, +S2





- GI/Abdominal Exam


GI & Abdominal Exam: Soft, Normal Bowel Sounds.  absent: Guarding, Tenderness, 

Rebound





- Extremities Exam


Extremities Exam: Pedal Edema (1+ bilaterally).  absent: Normal Inspection 

(chronic venous stasis changes bilaterally), Tenderness


Additional comments: 


heel protecting boots in place








- Neurological Exam


Neurological Exam: Alert, Awake, Oriented x3





- Psychiatric Exam


Psychiatric exam: Normal Affect, Normal Mood





- Skin


Skin Exam: Dry, Normal Color (except for stated in extremities exam), Warm


Additional comments: 


dressing to previous adolfo cath site is clean, dry and intact.








Assessment and Plan





- Assessment and Plan (Free Text)


Plan: 


63M with PMH ESRD, DM, CHF, A FIB W/RVR, AML, HTN, COPD, sleep apnea presents to

ED with port a cath infection s/p HD. Pt admitted for further treatment of 

abscess.





Abscess


- Pt with port a cath, draining white purulent fluid on R chest on admission; 

s/p removal by Surgery POD#2, pt doing well post-op


- Tmax 100.2F on admission, cont to monitor temps today, currently afebrile


- WBC 9.5 on admission; today 5.9


- Cefepime 0.5 g daily started today as per ID recs


- BCx: NGTD 


- Wound Cx growing S. aureus 


- INR 2.5 10/19-> 2.2 10/20, will hold coumadin


- F/u repeat INR


- Vascular surgery consulted, Dr. Lopez - f/u recs; no further acute surgical

management needed at this time


- ID consulted, Dr. Fink - f/u recs


- Lactate wnl; procal: 4.31 elevated


- 2D echo ordered to r/o endocarditis: LVEF 53%, LV function is mild to 

moderately reduced with anteroseptal hypokinesis, and septal flattening c/w 

elevated RV pressure. The aortic root is calcified and demonstrates mildly to 

moderately reduced opening. Peak gradient was 12mmHg, valve area was not 

performed. There is mildly increased left ventricular wall thickness. The right 

ventricle is severely dilated. Systolic function is severely reduced. There is 

likely severe pulmonary HTN, underestimated by doppler. 





ESRD


- HD MWF


- BUN/Cr today 47/4.0


- Nephro consulted, Dr. Cortes - f/u recs





DM


- accuchecks q6h


- low dose sliding scale


- hypoglycemia protocol





HTN


- BP 97/52 on admission, BP stable today, pt states his normal bp at home is 

90s/50s, currently asymptomatic


- Hold home metoprolol





JEB


- pt uses CPAP at home


- not amenable to using bipap


- wife to bring home CPAP for use inpatient





B/l LE Ulcers


- On exam noted in L heel superior aspect, R achilles tendon area


- Podiatry consulted (Dr. Thornton) for wound care, recs appreciated





Hx Pacemaker, CHF


- Per Pt's wife pt has not followed with cardiology outpatient in a while


- Cardiology (Dr. Kaplan) consulted, recs appreciated


   Coumadin on hold for possible portacath insertion


- Metoprolol held for low bps, continue to monitor





PPX


Warfarin held today due to INR 2.2


Renal diet








<Weston Fabian - Last Filed: 10/20/18 18:56>





Objective





- Vital Signs/Intake and Output


Vital Signs (last 24 hours): 


                                        











Temp Pulse Resp BP Pulse Ox


 


 97.6 F   102 H  20   106/70   97 


 


 10/20/18 15:05  10/20/18 15:05  10/20/18 15:05  10/20/18 18:37  10/20/18 15:05








Intake and Output: 


                                        











 10/20/18 10/20/18





 06:59 18:59


 


Intake Total  650


 


Balance  650














- Medications


Medications: 


                               Current Medications





Acetaminophen (Tylenol 325mg Tab)  650 mg PO Q6 PRN


   PRN Reason: Fever >100.4 F


Calcium Acetate (Phoslo)  667 mg PO TIDCC [facility]


   Last Admin: 10/20/18 17:01 Dose:  667 mg


Dextrose (Dextrose 50% Inj)  0 ml IV STAT PRN; Protocol


   PRN Reason: Hypoglycemia Protocol


Dextrose (Glutose 15)  0 gm PO ONCE PRN; Protocol


   PRN Reason: Hypoglycemia Protocol


Glucagon (Glucagen Diagnostic Kit)  0 mg IM STAT PRN; Protocol


   PRN Reason: Hypoglycemia Protocol


Dextrose (Dextrose 5% In Water 1000 Ml)  1,000 mls @ 0 mls/hr IV .Q0M PRN; 

Protocol


   PRN Reason: Hypoglycemia Protocol


Cefepime HCl 0.5 gm/ Dextrose  50 mls @ 100 mls/hr IVPB DAILY [facility]; Protocol


   Last Admin: 10/20/18 12:24 Dose:  100 mls/hr


Insulin Human Regular (Novolin R)  0 unit SC ACHS [facility]; Protocol


   Last Admin: 10/20/18 16:30 Dose:  Not Given


Ondansetron HCl (Zofran Inj)  4 mg IVP Q6 PRN


   PRN Reason: Nausea/Vomiting











- Labs


Labs: 


                                        





                                 10/20/18 07:42 





                                 10/20/18 07:42 





                                        











PT  24.5 SECONDS (9.7-12.2)  H  10/20/18  07:42    


 


INR  2.2   10/20/18  07:42    


 


APTT  39 SECONDS (21-34)  H  10/20/18  07:42    














Attending/Attestation





- Attestation


I have personally seen and examined this patient.: Yes


I have fully participated in the care of the patient.: Yes


I have reviewed all pertinent clinical information, including history, physical 

exam and plan: Yes


Notes (Text): 





10/20/18 18:45


Patient was seen and examined at 4:30 PM 10/20/18 551 A





Care of this patient was gone over in detail with resident Dr. Mcknight.





Also on Exam:


At time of my exam, patient asked if I could bypass examination of his feet as 

the bandages were already changed by Podiatry Team earlier today, so they were 

not examined.


Cardio: irregularly irregular


Resp: CTA B/L, NO R/R/W


GI: Central Obesity, Soft, NT, Liver and Spleen could not be palpated, NO 

guarding/rebound tenderness


Ext: Right Arm AVF with (+) Thrill





[facility] and patient is on Cefepime as per 

Dr. Esteves. 


Blood Culture is negative to date.


Will need to follow up with Vascular Surgery Dr. Lopez as to when they plan 

to reinsert catheter after finalization of blood culture





Spoke with Cardiologist Dr. Kaplan on 10/19/18 and he recommended patient be 

Eliquis. I also spoke with patient's wife on 10/19/18 and she would prefer the 

patient not be on Eliquis as patient has been on Coumadin for some time and she 

does not want to change regimen.


Currently Coumadin on hold due to potential PortaCath reinsertion.


INR is 2.2 and this should be followed daily





Home Medications as confirmed with wife:


Metoprolol XR 25 mg PO 1x/day is being held due to low blood pressure


Sensipar 30 mg PO 1x/day is being held due low calcium


Netiglinide 60 mg PO 3x/day is being held due to not being carried by our 

pharmacy


Coumadin 3 mg PO 1x/day is being held due to reasons mentioned in previous 

paragraph


Calcium Acetate 667 mg PO 3x/day with meals is currently on board





Weston Fabian D.O.

## 2018-10-20 NOTE — CP.PCM.PN
Subjective





- Date & Time of Evaluation


Date of Evaluation: 10/20/18


Time of Evaluation: 11:03





- Subjective


Subjective: 





Notes reviewed


Awakes on command


Sleepy, does not want to speak much


No distress


Tolerating diallysis treatments


10 point ROS negative when questioned





Objective





- Vital Signs/Intake and Output


Vital Signs (last 24 hours): 


                                        











Temp Pulse Resp BP Pulse Ox


 


 98.5 F   118 H  20   104/70   97 


 


 10/20/18 08:10  10/20/18 08:10  10/20/18 08:10  10/20/18 08:10  10/20/18 08:10











- Medications


Medications: 


                               Current Medications





Acetaminophen (Tylenol 325mg Tab)  650 mg PO Q6 PRN


   PRN Reason: Fever >100.4 F


Calcium Acetate (Phoslo)  667 mg PO TIDCC [facility]


   Last Admin: 10/20/18 09:12 Dose:  Not Given


Dextrose (Dextrose 50% Inj)  0 ml IV STAT PRN; Protocol


   PRN Reason: Hypoglycemia Protocol


Dextrose (Glutose 15)  0 gm PO ONCE PRN; Protocol


   PRN Reason: Hypoglycemia Protocol


Glucagon (Glucagen Diagnostic Kit)  0 mg IM STAT PRN; Protocol


   PRN Reason: Hypoglycemia Protocol


Dextrose (Dextrose 5% In Water 1000 Ml)  1,000 mls @ 0 mls/hr IV .Q0M PRN; 

Protocol


   PRN Reason: Hypoglycemia Protocol


Cefepime HCl 0.5 gm/ Dextrose  50 mls @ 100 mls/hr IVPB DAILY [facility]; Protocol


   Last Admin: 10/19/18 14:05 Dose:  100 mls/hr


Insulin Human Regular (Novolin R)  0 unit SC ACHS [facility]; Protocol


   Last Admin: 10/20/18 07:37 Dose:  Not Given


Ondansetron HCl (Zofran Inj)  4 mg IVP Q6 PRN


   PRN Reason: Nausea/Vomiting











- Labs


Labs: 


                                        





                                 10/20/18 07:42 





                                 10/20/18 07:42 





                                        











PT  24.5 SECONDS (9.7-12.2)  H  10/20/18  07:42    


 


INR  2.2   10/20/18  07:42    


 


APTT  39 SECONDS (21-34)  H  10/20/18  07:42    














- Constitutional


Appears: Non-toxic, Chronically Ill





- Head Exam


Head Exam: ATRAUMATIC, NORMAL INSPECTION





- Eye Exam


Eye Exam: EOMI, Normal appearance





- ENT Exam


ENT Exam: Mucous Membranes Moist, Normal Oropharynx





- Neck Exam


Neck Exam: absent: Lymphadenopathy, Thyromegaly





- Respiratory Exam


Respiratory Exam: absent: Rales, Rhonchi





- Cardiovascular Exam


Cardiovascular Exam: +S1, +S2.  absent: Rubs





- GI/Abdominal Exam


GI & Abdominal Exam: Soft, Normal Bowel Sounds





- Extremities Exam


Extremities Exam: Pedal Edema.  absent: Joint Swelling





- Neurological Exam


Neurological Exam: Awake





- Psychiatric Exam


Psychiatric exam: Flat Affect





- Skin


Skin Exam: Dry, Intact





Assessment and Plan


(1) Atrial fibrillation


Status: Acute   





(2) Fever


Status: Acute   





(3) Infection due to Port-A-Cath


Status: Acute   





(4) Sepsis associated with vascular access catheter


Status: Acute   





(5) ESRD (end stage renal disease)


Status: Acute   





(6) ESRD needing dialysis


Status: Acute   





- Assessment and Plan (Free Text)


Assessment: 





Maintain dialysis schedule


Abx as ordered


Monitor bp


Labs with dialysis


PATRICIA with dialysis


Continue supportive  care

## 2018-10-20 NOTE — CP.PCM.PN
Subjective





- Date & Time of Evaluation


Date of Evaluation: 10/20/18


Time of Evaluation: 17:00





- Subjective


Subjective: 





Patient has no complaint, on IV Cefepime, receiving HD. Warfarin on hold. 

PT/INR: 2.2. Will start IV Heparin if New portacath is contemplated soon.





Objective





- Vital Signs/Intake and Output


Vital Signs (last 24 hours): 


                                        











Temp Pulse Resp BP Pulse Ox


 


 97.6 F   102 H  20   106/70   97 


 


 10/20/18 15:05  10/20/18 15:05  10/20/18 15:05  10/20/18 18:37  10/20/18 15:05








Intake and Output: 


                                        











 10/20/18 10/21/18





 18:59 06:59


 


Intake Total 650 


 


Balance 650 














- Medications


Medications: 


                               Current Medications





Acetaminophen (Tylenol 325mg Tab)  650 mg PO Q6 PRN


   PRN Reason: Fever >100.4 F


Calcium Acetate (Phoslo)  667 mg PO TIDCC [facility]


   Last Admin: 10/20/18 17:01 Dose:  667 mg


Dextrose (Dextrose 50% Inj)  0 ml IV STAT PRN; Protocol


   PRN Reason: Hypoglycemia Protocol


Dextrose (Glutose 15)  0 gm PO ONCE PRN; Protocol


   PRN Reason: Hypoglycemia Protocol


Glucagon (Glucagen Diagnostic Kit)  0 mg IM STAT PRN; Protocol


   PRN Reason: Hypoglycemia Protocol


Dextrose (Dextrose 5% In Water 1000 Ml)  1,000 mls @ 0 mls/hr IV .Q0M PRN; 

Protocol


   PRN Reason: Hypoglycemia Protocol


Cefepime HCl 0.5 gm/ Dextrose  50 mls @ 100 mls/hr IVPB DAILY [facility]; Protocol


   Last Admin: 10/20/18 12:24 Dose:  100 mls/hr


Insulin Human Regular ([facility]olin R)  [facility]


   Last Admin: 10/20/18 21:33 Dose:  Not Given


Ondansetron HCl (Zofran Inj)  4 mg IVP Q6 PRN


   PRN Reason: Nausea/Vomiting











- Labs


Labs: 


                                        





                                 10/20/18 07:42 





                                 10/20/18 07:42 





                                        











PT  24.5 SECONDS (9.7-12.2)  H  10/20/18  07:42    


 


INR  2.2   10/20/18  07:42    


 


APTT  39 SECONDS (21-34)  H  10/20/18  07:42    














- Constitutional


Appears: No Acute Distress, Chronically Ill





- Head Exam


Head Exam: NORMAL INSPECTION





- Eye Exam


Eye Exam: Normal appearance





- ENT Exam


ENT Exam: Normal Exam





- Neck Exam


Neck Exam: Normal Inspection





- Respiratory Exam


Respiratory Exam: Clear to Ausculation Bilateral





- Cardiovascular Exam


Cardiovascular Exam: Irregular Rhythm, Murmur





- GI/Abdominal Exam


GI & Abdominal Exam: Soft, Normal Bowel Sounds





- Rectal Exam


Rectal Exam: Deferred





- Extremities Exam


Additional comments: 





2-3 + pedal edema.





- Back Exam


Back Exam: NORMAL INSPECTION





- Neurological Exam


Neurological Exam: Awake, Oriented x3





- Psychiatric Exam


Psychiatric exam: Anxious





- Skin


Skin Exam: Dry, Intact, Warm





Assessment and Plan


(1) Sepsis associated with vascular access catheter


Status: Acute   





(2) Fever


Status: Acute   





(3) ESRD needing dialysis


Status: Acute   





(4) Atrial fibrillation


Status: Acute

## 2018-10-21 LAB
ALBUMIN SERPL-MCNC: 3.2 [, G/DL] (ref 3.5–5)
ALBUMIN/GLOB SERPL: 1 [,] (ref 1–2.1)
ALT SERPL-CCNC: 21 [, U/L] (ref 21–72)
APTT BLD: 40 [, SECONDS] (ref 21–34)
AST SERPL-CCNC: 19 [, U/L] (ref 17–59)
BASOPHILS # BLD AUTO: 0.1 [, K/UL] (ref 0–0.2)
BASOPHILS NFR BLD: 1.2 [, %] (ref 0–2)
BUN SERPL-MCNC: 59 [, MG/DL] (ref 9–20)
CALCIUM SERPL-MCNC: 8.3 [, MG/DL] (ref 8.6–10.4)
EOSINOPHIL # BLD AUTO: 0.3 [, K/UL] (ref 0–0.7)
EOSINOPHIL NFR BLD: 6.4 [, %] (ref 0–4)
ERYTHROCYTE [DISTWIDTH] IN BLOOD BY AUTOMATED COUNT: 19.1 [, %] (ref 11.5–14.5)
GFR NON-AFRICAN AMERICAN: 11 [,]
HGB BLD-MCNC: 10.3 [, G/DL] (ref 12–18)
INR PPP: 1.8 [,]
LYMPHOCYTES # BLD AUTO: 0.7 [, K/UL] (ref 1–4.3)
LYMPHOCYTES NFR BLD AUTO: 14.4 [, %] (ref 20–40)
MCH RBC QN AUTO: 28.7 [, PG] (ref 27–31)
MCHC RBC AUTO-ENTMCNC: 32.5 [, G/DL] (ref 33–37)
MCV RBC AUTO: 88.3 [, FL] (ref 80–94)
MONOCYTES # BLD: 0.6 [, K/UL] (ref 0–0.8)
MONOCYTES NFR BLD: 11 [, %] (ref 0–10)
NEUTROPHILS # BLD: 3.5 [, K/UL] (ref 1.8–7)
NEUTROPHILS NFR BLD AUTO: 67 [, %] (ref 50–75)
NRBC BLD AUTO-RTO: 0.1 [, %] (ref 0–2)
PLATELET # BLD: 197 [, K/UL] (ref 130–400)
PMV BLD AUTO: 7.1 [, FL] (ref 7.2–11.7)
PROTHROMBIN TIME: 19.2 [, SECONDS] (ref 9.7–12.2)
RBC # BLD AUTO: 3.59 [, MIL/UL] (ref 4.4–5.9)
WBC # BLD AUTO: 5.2 [, K/UL] (ref 4.8–10.8)

## 2018-10-21 RX ADMIN — HUMAN INSULIN SCH: 100 INJECTION, SOLUTION SUBCUTANEOUS at 16:56

## 2018-10-21 RX ADMIN — HUMAN INSULIN SCH: 100 INJECTION, SOLUTION SUBCUTANEOUS at 07:00

## 2018-10-21 RX ADMIN — HUMAN INSULIN SCH: 100 INJECTION, SOLUTION SUBCUTANEOUS at 12:23

## 2018-10-21 RX ADMIN — HUMAN INSULIN SCH: 100 INJECTION, SOLUTION SUBCUTANEOUS at 21:24

## 2018-10-21 NOTE — CP.PCM.PN
Subjective





- Date & Time of Evaluation


Date of Evaluation: 10/21/18


Time of Evaluation: 06:40





- Subjective


Subjective: 





Surgery progress note for Dr. Lopez





Pt seen and examined at bedside this AM. Denies any pain, SOB, chest pain, 

fevers





Objective





- Vital Signs/Intake and Output


Vital Signs (last 24 hours): 


                                        











Temp Pulse Resp BP Pulse Ox


 


 97.4 F L  92 H  20   99/49 L  98 


 


 10/21/18 07:36  10/21/18 07:36  10/21/18 07:36  10/21/18 07:36  10/21/18 07:36











- Medications


Medications: 


                               Current Medications





Acetaminophen (Tylenol 325mg Tab)  650 mg PO Q6 PRN


   PRN Reason: Fever >100.4 F


Calcium Acetate (Phoslo)  667 mg PO TIDCC [facility]


   Last Admin: 10/20/18 17:01 Dose:  667 mg


Dextrose (Dextrose 50% Inj)  0 ml IV STAT PRN; Protocol


   PRN Reason: Hypoglycemia Protocol


Dextrose (Glutose 15)  0 gm PO ONCE PRN; Protocol


   PRN Reason: Hypoglycemia Protocol


Glucagon (Glucagen Diagnostic Kit)  0 mg IM STAT PRN; Protocol


   PRN Reason: Hypoglycemia Protocol


Dextrose (Dextrose 5% In Water 1000 Ml)  1,000 mls @ 0 mls/hr IV .Q0M PRN; 

Protocol


   PRN Reason: Hypoglycemia Protocol


Cefepime HCl 0.5 gm/ Dextrose  50 mls @ 100 mls/hr IVPB DAILY [facility]; Protocol


   Last Admin: 10/20/18 12:24 Dose:  100 mls/hr


Insulin Human Regular (Novolin R)  0 unit SC ACHS [facility]; Protocol


   Last Admin: 10/21/18 07:00 Dose:  Not Given


Ondansetron HCl (Zofran Inj)  4 mg IVP Q6 PRN


   PRN Reason: Nausea/Vomiting











- Labs


Labs: 


                                        





                                 10/21/18 08:24 





                                 10/20/18 07:42 





                                        











PT  19.2 SECONDS (9.7-12.2)  H D 10/21/18  08:24    


 


INR  1.8   10/21/18  08:24    


 


APTT  40 SECONDS (21-34)  H  10/21/18  08:24    














- Constitutional


Appears: Non-toxic, No Acute Distress, Chronically Ill





- Head Exam


Head Exam: ATRAUMATIC, NORMOCEPHALIC





- Eye Exam


Eye Exam: Normal appearance.  absent: Conjunctival injection, Scleral icterus





- ENT Exam


ENT Exam: Mucous Membranes Moist, Normal Oropharynx





- Respiratory Exam


Respiratory Exam: NORMAL BREATHING PATTERN.  absent: Accessory Muscle Use, 

Respiratory Distress





- Cardiovascular Exam


Cardiovascular Exam: RRR





- GI/Abdominal Exam


GI & Abdominal Exam: Soft.  absent: Distended





- Neurological Exam


Neurological Exam: Alert, Awake, Oriented x3





- Psychiatric Exam


Psychiatric exam: Normal Affect, Normal Mood





- Skin


Skin Exam: Dry, Warm


Additional comments: 





surgical site with minimal erythema, no active drainage, no fluctuance or edema





Assessment and Plan





- Assessment and Plan (Free Text)


Assessment: 





63M POD#3 S/P removal of infected portacath


Plan: 





Patient improving, no sign of active infection and surgical site


Continue medical management per primary and ID


PRN pain medications


No further surgical intervention indicated at this time. Please re-contact the 

surgery team for any further questions or concerns





Discussed with Dr. Jessica Caban, PGY2

## 2018-10-21 NOTE — CP.PCM.PN
Subjective





- Date & Time of Evaluation


Date of Evaluation: 10/21/18


Time of Evaluation: 12:42





- Subjective


Subjective: 


Podiatry Progress Note for Dr. Thornton





61 yo male patient, seen and evaluated at bedside for B/L lower extremity 

ulcerations. Patient present to ED with port a cath infection s/p HD; patient 

admitted for further treatment of abscess.  Patient is AAOx3, in NAD, and well 

known to Dr. Thornton.  He reports getting his dressing changed daily by his wife 

at home. Denies any pain to b/l lower extremities. Denies N/V/F. 








Vasc: Nonpalpable pulses to the DP and PT, delayed CFT to the digits, 

temperature gradient WNL,  +1 pitting pedal edema


Derm: chronic skin changes with hyperpigmented skin to entire lower extremity, 

skin thickening and flaking bilateral lower legs.


Left: Left posterior heel ulceration with mixture granular and fibrotic wound 

base to the posterior distal lower extremity.


Right: Full thickness ulceration measuring approximately 3x2x.3, with mixture 

granular and fibrotic wound base. Serous drainage noted to the ulceration. No 

fluctuance noted. Minor marcos-wound erythem, non- streaking appreciated.


Neuro: Gross sensation absent bilaterally, protective sensation absent


Ortho: no pain appreciated with palpation to surrounding ulceration sites. No 

pain with calf palpation bilaterally.

















61 yo male patient, seen and evaluated at bedside for B/L lower extremity 

ulcerations.








Patient seen and evaluated with Dr. Thornton


Patient afebrile, absent leukocytosis 


Evaluation of b/l lower exremitiy ulcerations and local wound care: wounds 

cleansed with saline and dressed with DSD 


- Wounds stable, no current signs of infection


No surgical intervention at this time


Patient to wear multipodus boots at all times while in bed 


Will continue to follow


Thank you for the consult 











Objective





- Vital Signs/Intake and Output


Vital Signs (last 24 hours): 


                                        











Temp Pulse Resp BP Pulse Ox


 


 97.4 F L  92 H  20   99/49 L  98 


 


 10/21/18 07:36  10/21/18 07:36  10/21/18 07:36  10/21/18 07:36  10/21/18 07:36











- Medications


Medications: 


                               Current Medications





Acetaminophen (Tylenol 325mg Tab)  650 mg PO Q6 PRN


   PRN Reason: Fever >100.4 F


Calcium Acetate (Phoslo)  667 mg PO TIDCC [facility]


   Last Admin: 10/21/18 08:53 Dose:  667 mg


Dextrose (Dextrose 50% Inj)  0 ml IV STAT PRN; Protocol


   PRN Reason: Hypoglycemia Protocol


Dextrose (Glutose 15)  0 gm PO ONCE PRN; Protocol


   PRN Reason: Hypoglycemia Protocol


Glucagon (Glucagen Diagnostic Kit)  0 mg IM STAT PRN; Protocol


   PRN Reason: Hypoglycemia Protocol


Dextrose (Dextrose 5% In Water 1000 Ml)  1,000 mls @ 0 mls/hr IV .Q0M PRN; 

Protocol


   PRN Reason: Hypoglycemia Protocol


Cefepime HCl 0.5 gm/ Dextrose  50 mls @ 100 mls/hr IVPB DAILY [facility]; Protocol


   Last Admin: 10/21/18 11:32 Dose:  100 mls/hr


Vancomycin HCl 1 gm/ Sodium (Chloride)  250 mls @ 166.7 mls/hr IVPB MWF [facility]; 

Protocol


Insulin Human Regular (Novolin R)  0 unit SC ACHS [facility]; Protocol


   Last Admin: 10/21/18 12:23 Dose:  Not Given


Ondansetron HCl (Zofran Inj)  4 mg IVP Q6 PRN


   PRN Reason: Nausea/Vomiting


Warfarin Sodium (Coumadin)  3 mg PO 1800 [facility]


   Stop: 10/21/18 18:01











- Labs


Labs: 


                                        





                                 10/21/18 08:24 





                                 10/21/18 08:24 





                                        











PT  19.2 SECONDS (9.7-12.2)  H D 10/21/18  08:24    


 


INR  1.8   10/21/18  08:24    


 


APTT  40 SECONDS (21-34)  H  10/21/18  08:24

## 2018-10-21 NOTE — CP.PCM.PN
<Jamee Mcknight P - Last Filed: 10/21/18 13:40>





Subjective





- Date & Time of Evaluation


Date of Evaluation: 10/21/18


Time of Evaluation: 09:00





- Subjective


Subjective: 


PGY-1 progress note for Dr. Fabian.





Patient seen with Dr. Fabian. Patient laying in bed, in no acute distress. He 

states he does not want to have the portacath replaced. He denies chest pain, 

shortness of breath, dizziness, lightheadedness, weakness, nausea, vomiting, 

abdominal pain, change in vision, numbness, tingling, fevers and chills. Patient

complaining of having multiple providers examining him daily, it was explained 

to him that daily exams are a necessity, however he declined a full examination.










Objective





- Vital Signs/Intake and Output


Vital Signs (last 24 hours): 


                                        











Temp Pulse Resp BP Pulse Ox


 


 97.4 F L  92 H  20   99/49 L  98 


 


 10/21/18 07:36  10/21/18 07:36  10/21/18 07:36  10/21/18 07:36  10/21/18 07:36











- Medications


Medications: 


                               Current Medications





Acetaminophen (Tylenol 325mg Tab)  650 mg PO Q6 PRN


   PRN Reason: Fever >100.4 F


Calcium Acetate (Phoslo)  667 mg PO TIDCC [facility]


   Last Admin: 10/21/18 08:53 Dose:  667 mg


Dextrose (Dextrose 50% Inj)  0 ml IV STAT PRN; Protocol


   PRN Reason: Hypoglycemia Protocol


Dextrose (Glutose 15)  0 gm PO ONCE PRN; Protocol


   PRN Reason: Hypoglycemia Protocol


Glucagon (Glucagen Diagnostic Kit)  0 mg IM STAT PRN; Protocol


   PRN Reason: Hypoglycemia Protocol


Dextrose (Dextrose 5% In Water 1000 Ml)  1,000 mls @ 0 mls/hr IV .Q0M PRN; 

Protocol


   PRN Reason: Hypoglycemia Protocol


Cefepime HCl 0.5 gm/ Dextrose  50 mls @ 100 mls/hr IVPB DAILY [facility]; Protocol


   Last Admin: 10/21/18 11:32 Dose:  100 mls/hr


Vancomycin HCl 1 gm/ Sodium (Chloride)  250 mls @ 166.7 mls/hr IVPB MWF [facility]; 

Protocol


Insulin Human Regular (Novolin R)  0 unit SC ACHS [facility]; Protocol


   Last Admin: 10/21/18 07:00 Dose:  Not Given


Ondansetron HCl (Zofran Inj)  4 mg IVP Q6 PRN


   PRN Reason: Nausea/Vomiting


Warfarin Sodium (Coumadin)  3 mg PO 1800 [facility]


   Stop: 10/21/18 18:01











- Labs


Labs: 


                                        





                                 10/21/18 08:24 





                                 10/21/18 08:24 





                                        











PT  19.2 SECONDS (9.7-12.2)  H D 10/21/18  08:24    


 


INR  1.8   10/21/18  08:24    


 


APTT  40 SECONDS (21-34)  H  10/21/18  08:24    














- Constitutional


Appears: No Acute Distress





- Head Exam


Head Exam: ATRAUMATIC, NORMOCEPHALIC





- Respiratory Exam


Respiratory Exam: Clear to Ausculation Bilateral, NORMAL BREATHING PATTERN.  

absent: Rales, Rhonchi, Wheezes





- Cardiovascular Exam


Cardiovascular Exam: Irregular Rhythm, +S1, +S2


Additional comments: 


dressing to previous R chest adolfo cath site is clean, dry and intact.








- GI/Abdominal Exam


GI & Abdominal Exam: Soft.  absent: Guarding, Tenderness, Rebound





- Psychiatric Exam


Additional comments: 


uncooperative





- Additional Findings


Additional findings: 


Other than listed above, unable to fully assess as patient declined remainder of

exam.








Assessment and Plan





- Assessment and Plan (Free Text)


Plan: 


63M with PMH ESRD, DM, CHF, A FIB W/RVR, AML, HTN, COPD, sleep apnea presents to

ED with port a cath infection s/p HD. Pt admitted for further treatment of 

abscess.





Abscess


- Pt with port a cath, draining white purulent fluid on R chest on admission; 

s/p removal by Surgery POD#3, pt doing well post-op. No further surgical 

interventions needed at this time.


- Tmax 100.2F on admission, cont to monitor temps today, currently afebrile


- WBC 9.5 on admission; today 5.2


- Cefepime 0.5 g daily started today as per ID recs


- Added vanco 1gm MWF with HD as per ID recs


   f/u random vanco levels 18:00 on 10/22


- BCx: NG x3 days


- Wound Cx: S. aureus 


- INR 2.5 10/19-> 1.8 10/21, Will restart coumadin as no further surgical 

interventions planned.


- F/u repeat INR 


- Vascular surgery consulted, Dr. Lopez - f/u recs; no further acute surgical

management needed at this time


- ID consulted, Dr. Fink - f/u recs


- Lactate wnl; procal: 4.31 elevated


- 2D echo ordered to r/o endocarditis: LVEF 53%, LV function is mild to 

moderately reduced with anteroseptal hypokinesis, and septal flattening c/w 

elevated RV pressure. The aortic root is calcified and demonstrates mildly to 

moderately reduced opening. Peak gradient was 12mmHg, valve area was not 

performed. There is mildly increased left ventricular wall thickness. The right 

ventricle is severely dilated. Systolic function is severely reduced. There is 

likely severe pulmonary HTN, underestimated by doppler. 





ESRD


- HD MWF


- BUN/Cr today 59/5.3


- Nephro consulted, Dr. Cortes - f/u recs





DM


- accuchecks q6h


- low dose sliding scale


- hypoglycemia protocol





HTN


- BP 97/52 on admission, BP stable today, pt states his normal bp at home is 

90s/50s, currently asymptomatic


- Hold home metoprolol





JEB


- pt uses CPAP at home


- not amenable to using bipap


- wife to bring home CPAP for use inpatient





B/l LE Ulcers


- On exam noted in L heel superior aspect, R achilles tendon area


- Podiatry consulted (Dr. Thornton) for wound care, recs appreciated





Hx Pacemaker, CHF


- Per Pt's wife pt has not followed with cardiology outpatient in a while


- Cardiology (Dr. Kaplan) consulted, recs appreciated


- Metoprolol held for low bps, continue to monitor





PPX


Warfarin 3mg PO daily restarted today, as no further surgical interventions are 

planned.


Renal diet





Dispo: Blood cultures negative x3. Vancomycin 1g IV MWF added. Possible 

discharge after negative blood cultures x 5 days and clearance from ID.





Case discussed with attending, Dr. ANIBAL Fabian. 








<Weston Fabian - Last Filed: 10/21/18 18:44>





Objective





- Vital Signs/Intake and Output


Vital Signs (last 24 hours): 


                                        











Temp Pulse Resp BP Pulse Ox


 


 97.8 F   98 H  20   104/63   99 


 


 10/21/18 15:00  10/21/18 15:00  10/21/18 15:00  10/21/18 15:00  10/21/18 15:00








Intake and Output: 


                                        











 10/21/18 10/21/18





 06:59 18:59


 


Intake Total  730


 


Balance  730














- Medications


Medications: 


                               Current Medications





Acetaminophen (Tylenol 325mg Tab)  650 mg PO Q6 PRN


   PRN Reason: Fever >100.4 F


Calcium Acetate (Phoslo)  667 mg PO TIDCC [facility]


   Last Admin: 10/21/18 17:27 Dose:  667 mg


Dextrose (Dextrose 50% Inj)  0 ml IV STAT PRN; Protocol


   PRN Reason: Hypoglycemia Protocol


Dextrose (Glutose 15)  0 gm PO ONCE PRN; Protocol


   PRN Reason: Hypoglycemia Protocol


Glucagon (Glucagen Diagnostic Kit)  0 mg IM STAT PRN; Protocol


   PRN Reason: Hypoglycemia Protocol


Dextrose (Dextrose 5% In Water 1000 Ml)  1,000 mls @ 0 mls/hr IV .Q0M PRN; 

Protocol


   PRN Reason: Hypoglycemia Protocol


Cefepime HCl 0.5 gm/ Dextrose  50 mls @ 100 mls/hr IVPB DAILY [facility]; Protocol


   Last Admin: 10/21/18 11:32 Dose:  100 mls/hr


Vancomycin HCl 1 gm/ Sodium (Chloride)  250 mls @ 166.7 [facility]ls[facility]; 

Protocol


Insulin Human Regular (Novolin R)  0 unit SC ACHS [facility]; Protocol


   Last Admin: 10/21/18 16:56 Dose:  Not Given


Ondansetron HCl (Zofran Inj)  4 mg IVP Q6 PRN


   PRN Reason: Nausea/Vomiting











- Labs


Labs: 


                                        





                                 10/21/18 08:24 





                                 10/21/18 08:24 





                                        











PT  19.2 SECONDS (9.7-12.2)  H D 10/21/18  08:24    


 


INR  1.8   10/21/18  08:24    


 


APTT  40 SECONDS (21-34)  H  10/21/18  08:24    














Attending/Attestation





- Attestation


I have personally seen and examined this patient.: Yes


I have fully participated in the care of the patient.: Yes


I have reviewed all pertinent clinical information, including history, physical 

exam and plan: Yes


Notes (Text): 





10/21/18 18:37


Patient was seen and examined with resident Dr. Mcknight





Care of this patient was gone over in detail with resident Dr. Mcknight.





Please see note above for details





Wound Culture from [facility] shows S. aureus and patient is on Cefepime as per 

Dr. Esteves and Vancomycin has been added to be given at the time of HD


Blood Culture is negative to date.





Patient has expressed that he does NOT want PortACath reinserted. Spoke with his

Wife via phone after exam today and informed her and updated her.





Therefore we have restarted the Warfarin 3 mg PO 1x/day evening starting tonight

10/21/18





Spoke with Cardiologist Dr. Kaplan on 10/19/18 and he recommended patient be 

Eliquis. I also spoke with patient's wife on 10/19/18 and she would prefer the 

patient not be on Eliquis as patient has been on Coumadin for some time and she 

does not want to change regimen.





When wife is here, she performs the daily wound care to his chronic bilateral 

lower leg ulcers. Currently being performed by his outpatient Podiatrist Dr. Thornton.


Please be aware that the patient has not allowed examination of these areas by 

me.





Plan would be to make sure that the blood culture is finalized as negative, 

update ID Dr. Fink and get his recommendations for IV to PO switch of 

appropriate antibiotic (consider fluoroquinole with renal dosing?)





Please make sure that upon discharge, patient is instructed on proper use of 

over the counter probiotic.








Home Medications as confirmed with wife at the time of admission:


Metoprolol XR 25 mg PO 1x/day is being held due to low blood pressure


Sensipar 30 mg PO 1x/day is being held due low calcium


Netiglinide 60 mg PO 3x/day is being held due to not being carried by our 

pharmacy


Coumadin 3 mg PO 1x/day is being held due to reasons mentioned in previous 

paragraph


Calcium Acetate 667 mg PO 3x/day with meals is currently on board





Weston Fabian D.O.

## 2018-10-22 LAB
ALBUMIN SERPL-MCNC: 3.3 [, G/DL] (ref 3.5–5)
ALBUMIN/GLOB SERPL: 1.1 [,] (ref 1–2.1)
ALT SERPL-CCNC: 17 [, U/L] (ref 21–72)
APTT BLD: 38 [, SECONDS] (ref 21–34)
AST SERPL-CCNC: 17 [, U/L] (ref 17–59)
BASOPHILS # BLD AUTO: 0.1 [, K/UL] (ref 0–0.2)
BASOPHILS NFR BLD: 1 [, %] (ref 0–2)
BUN SERPL-MCNC: 71 [, MG/DL] (ref 9–20)
CALCIUM SERPL-MCNC: 8 [, MG/DL] (ref 8.6–10.4)
EOSINOPHIL # BLD AUTO: 0.3 [, K/UL] (ref 0–0.7)
EOSINOPHIL NFR BLD: 5.6 [, %] (ref 0–4)
ERYTHROCYTE [DISTWIDTH] IN BLOOD BY AUTOMATED COUNT: 19.2 [, %] (ref 11.5–14.5)
GFR NON-AFRICAN AMERICAN: 10 [,]
HGB BLD-MCNC: 9.5 [, G/DL] (ref 12–18)
INR PPP: 1.7 [,]
LYMPHOCYTES # BLD AUTO: 0.6 [, K/UL] (ref 1–4.3)
LYMPHOCYTES NFR BLD AUTO: 11.7 [, %] (ref 20–40)
MCH RBC QN AUTO: 28.9 [, PG] (ref 27–31)
MCHC RBC AUTO-ENTMCNC: 33.2 [, G/DL] (ref 33–37)
MCV RBC AUTO: 87.1 [, FL] (ref 80–94)
MONOCYTES # BLD: 0.6 [, K/UL] (ref 0–0.8)
MONOCYTES NFR BLD: 10.5 [, %] (ref 0–10)
NEUTROPHILS # BLD: 3.8 [, K/UL] (ref 1.8–7)
NEUTROPHILS NFR BLD AUTO: 71.2 [, %] (ref 50–75)
NRBC BLD AUTO-RTO: 0 [, %] (ref 0–2)
PLATELET # BLD: 196 [, K/UL] (ref 130–400)
PMV BLD AUTO: 7.4 [, FL] (ref 7.2–11.7)
PROTHROMBIN TIME: 18.4 [, SECONDS] (ref 9.7–12.2)
RBC # BLD AUTO: 3.29 [, MIL/UL] (ref 4.4–5.9)
WBC # BLD AUTO: 5.4 [, K/UL] (ref 4.8–10.8)

## 2018-10-22 PROCEDURE — 5A1D70Z PERFORMANCE OF URINARY FILTRATION, INTERMITTENT, LESS THAN 6 HOURS PER DAY: ICD-10-PCS

## 2018-10-22 RX ADMIN — HUMAN INSULIN SCH: 100 INJECTION, SOLUTION SUBCUTANEOUS at 22:50

## 2018-10-22 RX ADMIN — HUMAN INSULIN SCH: 100 INJECTION, SOLUTION SUBCUTANEOUS at 17:33

## 2018-10-22 RX ADMIN — HUMAN INSULIN SCH: 100 INJECTION, SOLUTION SUBCUTANEOUS at 11:30

## 2018-10-22 RX ADMIN — HUMAN INSULIN SCH: 100 INJECTION, SOLUTION SUBCUTANEOUS at 07:33

## 2018-10-22 NOTE — CP.PCM.PN
<Emanuel Dumas - Last Filed: 10/22/18 16:48>





Subjective





- Date & Time of Evaluation


Date of Evaluation: 10/22/18


Time of Evaluation: 11:03





- Subjective


Subjective: 


PGY-1 Progress note for Dr. Manning





Patient seen and examined in dialysis this morning.  Patient has no medical 

complaints at this time.  No acute events overnight.  Patient states he is tired

of being questioned and examined by health care providers.  Patient stating that

he does not want to have portacath replaced.  Patient denies any fevers, chills,

shortness of breath, headaches, nausea, vomiting, chest pain, back pain.





Objective





- Vital Signs/Intake and Output


Vital Signs (last 24 hours): 


                                        











Temp Pulse Resp BP Pulse Ox


 


 97.4 F L  90   20   98/49 L  100 


 


 10/21/18 23:00  10/21/18 23:00  10/21/18 23:00  10/21/18 23:00  10/21/18 23:00








Intake and Output: 


                                        











 10/22/18 10/22/18





 06:59 18:59


 


Output Total 0 


 


Balance 0 














- Medications


Medications: 


                               Current Medications





Acetaminophen (Tylenol 325mg Tab)  650 mg PO Q6 PRN


   PRN Reason: Fever >100.4 F


Calcium Acetate (Phoslo)  667 mg PO TIDCC [facility]


   Last Admin: 10/21/18 17:27 Dose:  667 mg


Dextrose (Dextrose 50% Inj)  0 ml IV STAT PRN; Protocol


   PRN Reason: Hypoglycemia Protocol


Dextrose (Glutose 15)  0 gm PO ONCE PRN; Protocol


   PRN Reason: Hypoglycemia Protocol


Glucagon (Glucagen Diagnostic Kit)  0 mg IM STAT PRN; Protocol


   PRN Reason: Hypoglycemia Protocol


Dextrose (Dextrose 5% In Water 1000 Ml)  1,000 mls @ 0 mls/hr IV .Q0M PRN; 

Protocol


   PRN Reason: Hypoglycemia Protocol


Cefepime HCl 0.5 gm/ Dextrose  50 mls @ 100 mls/hr IVPB DAILY [facility]; Protocol


   Last Admin: 10/21/18 11:32 Dose:  100 mls/hr


Vancomycin HCl 1 gm/ Sodium (Chloride)  250 mls @ 166.7 mls/hr IVPB MWF [facility]; 

Protocol


Insulin Human Regular (Novolin R)  0 unit SC ACHS [facility]; Protocol


   Last Admin: 10/21/18 21:24 Dose:  Not Given


Ondansetron HCl (Zofran Inj)  4 mg IVP Q6 PRN


   PRN Reason: Nausea/Vomiting











- Labs


Labs: 


                                        





                                 10/21/18 08:24 





                                 10/21/18 08:24 





                                        











PT  19.2 SECONDS (9.7-12.2)  H D 10/21/18  08:24    


 


INR  1.8   10/21/18  08:24    


 


APTT  40 SECONDS (21-34)  H  10/21/18  08:24    














- Constitutional


Appears: Well, Non-toxic





- Head Exam


Head Exam: ATRAUMATIC, NORMAL INSPECTION





- Eye Exam


Eye Exam: EOMI, Normal appearance





- ENT Exam


ENT Exam: Mucous Membranes Moist





- Neck Exam


Neck Exam: Full ROM, Normal Inspection.  absent: Tenderness





- Respiratory Exam


Additional comments: 





Patient declined respiratory exam





- Cardiovascular Exam


Cardiovascular Exam: REGULAR RHYTHM, +S1, +S2.  absent: Murmur


Additional comments: 





Dressing to previous R chest adolfo cath site is clean, dry and intact.  No 

erythema or exudates at former portacath site.





- GI/Abdominal Exam


GI & Abdominal Exam: Soft, Normal Bowel Sounds.  absent: Tenderness


Additional comments: 





Obese





- Extremities Exam


Extremities Exam: Pedal Edema (1+ Pedal edema b/l).  absent: Tenderness


Additional comments: 





chronic venous stasis changes b/l LE





- Neurological Exam


Neurological Exam: Alert, Awake, CN II-XII Intact, Oriented x3





- Psychiatric Exam


Psychiatric exam: Anxious, Normal Affect





- Skin


Skin Exam: Dry, Intact, Warm


Additional comments: 





Chronic venous stasis changes b/l LE





Assessment and Plan





- Assessment and Plan (Free Text)


Assessment: 





Plan: 


63M with PMH ESRD, DM, CHF, A FIB W/RVR, AML, HTN, COPD, sleep apnea presents to

ED with port a cath infection s/p HD. Pt admitted for further treatment of 

abscess.





Abscess/Infection of Portacath


- Pt with port a cath, draining white purulent fluid on R chest on admission; 

s/p removal by Surgery POD#4, pt doing well post-op. No further surgical int

erventions needed at this time.


- Tmax 100.2F on admission, cont to monitor temps today, currently afebrile


- WBC 9.5 on admission; today 5.4


- Continue Cefepime 0.5 g per ID recs


- Continue Vanco 1gm MWF with HD as per ID recs


   f/u random vanco levels 18:00 


- BCx: NG x4 days


- Wound Cx: S. aureus 


- INR 2.5 10/19-> 1.8 10/21, Will restart coumadin as no further surgical 

interventions planned.


- F/u repeat INR 


- Vascular surgery consulted, Dr. Lopez - f/u recs; no further acute surgical

management needed at this time


- ID consulted, Dr. Fink - f/u recs


- Lactate wnl; procal: 4.31 elevated


- 2D echo ordered to r/o endocarditis: LVEF 53%, LV function is mild to 

moderately reduced with anteroseptal hypokinesis, and septal flattening c/w 

elevated RV pressure. The aortic root is calcified and demonstrates mildly to 

moderately reduced opening. Peak gradient was 12mmHg, valve area was not 

performed. There is mildly increased left ventricular wall thickness. The right 

ventricle is severely dilated. Systolic function is severely reduced. There is 

likely severe pulmonary HTN, underestimated by doppler. 





ESRD


- HD MWF


- BUN/Cr today 59/5.3


- Nephro consulted, Dr. Cortes - f/u recs





DM


- accuchecks q6h


- low dose sliding scale


- hypoglycemia protocol





HTN


- BP 97/52 on admission, BP stable today, pt states his normal bp at home is 

90s/50s, currently asymptomatic


- Hold home metoprolol


- Avoid IV fluid boluses if possible





JEB


- pt uses CPAP at home


- not amenable to using bipap


- wife to bring home CPAP for use inpatient





B/l LE Ulcers


- On exam noted in L heel superior aspect, R achilles tendon area


- Podiatry consulted (Dr. Thornton) for wound care, recs appreciated





Hx Pacemaker, CHF


- Per Pt's wife pt has not followed with cardiology outpatient in a while


- Cardiology (Dr. Kaplan) consulted, recs appreciated


- Metoprolol held for low bps, continue to monitor





PPX


Warfarin 3mg PO daily restarted today, as no further surgical interventions are 

planned.


Renal diet





Dispo: Blood cultures negative x4. Vancomycin 1g IV MWF added. Possible 

discharge after negative blood cultures x 5 days and clearance from ID.





Case discussed with attending, Dr. Manning








<Arin Manning - Last Filed: 10/22/18 22:32>





Objective





- Vital Signs/Intake and Output


Vital Signs (last 24 hours): 


                                        











Temp Pulse Resp BP Pulse Ox


 


 97.6 F   86   20   105/53 L  97 


 


 10/22/18 16:25  10/22/18 16:25  10/22/18 16:25  10/22/18 18:16  10/22/18 16:25











- Medications


Medications: 


                               Current Medications





Acetaminophen (Tylenol 325mg Tab)  650 mg PO Q6 PRN


   PRN Reason: Fever >100.4 F


Calcium Acetate (Phoslo)  667 mg PO TIDCC [facility]


   Last Admin: 10/22/18 18:10 Dose:  667 mg


Dextrose (Dextrose 50% Inj)  0 ml IV STAT PRN; Protocol


   PRN Reason: Hypoglycemia Protocol


Dextrose (Glutose 15)  0 gm PO ONCE PRN; Protocol


   PRN Reason: Hypoglycemia Protocol


Glucagon (Glucagen Diagnostic Kit)  0 mg IM STAT PRN; Protocol


   PRN Reason: Hypoglycemia Protocol


Dextrose (Dextrose 5% In Water 1000 Ml)  1,000 mls @ 0 mls/hr IV .Q0M PRN; 

Protocol


   PRN Reason: Hypoglycemia Protocol


Cefepime HCl 0.5 gm/ Dextrose  50 mls @ 100 mls/hr IVPB DAILY [facility]; Protocol


   Last Admin: 10/22/18 13:49 Dose:  100 mls/hr


Vancomycin HCl 1 gm/ Sodium (Chloride)  250 mls @ 166.7 mls/hr IVPB MWF [facility]; 

Protocol


   Last Admin: 10/22/18 11:15 Dose:  166.7 mls/hr


Insulin Human Regular (Novolin R)  0 unit SC ACHS [facility]; Protocol


   Last Admin: 10/22/18 17:33 Dose:  Not Given


Ondansetron HCl (Zofran Inj)  4 mg IVP Q6 PRN


   PRN Reason: Nausea/Vomiting











- Labs


Labs: 


                                        





                                 10/22/18 09:39 





                                 10/22/18 09:39 





                                        











PT  18.4 SECONDS (9.7-12.2)  H  10/22/18  09:39    


 


INR  1.7   10/22/18  09:39    


 


APTT  38 SECONDS (21-34)  H  10/22/18  09:39    














Attending/Attestation





- Attestation


I have personally seen and examined this patient.: Yes


I have fully participated in the care of the patient.: Yes


I have reviewed all pertinent clinical information, including history, physical 

exam and plan: Yes


Notes (Text): 


Patient seen and case discussed with day-time resident.


Patient refused physical examination from me. He is quite anxious and reports he

is seeing too many doctors. I did indicate to him I work with Dr. Fabian who is 

my partner and we alternate weekly. Patient had finished dialysis this morning 

and had a bowel movement which he was eager to shoo us away to give him time to 

recuperate. Patient had received coumadin last night; INR: 1.7; we have 

reordered for Coumadin 3mg PO once for tonight. We have ordered for vancomycin 

level. I have spoken with Dr. Trinh given he does see the patient but there 

was an issue regarding the blue list which prevent from admitting the patient to

his service.


Surgery and podiatry have both signed off.


Patient seen by his cardiologist who is aware family refused Eliquis; only 

Coumadin.


Patient was seen by infectious disease


Wound Culture from [facility] shows S. aureus and patient is on Cefepime as per 

Dr. Esteves and Vancomycin has been added to be given at the time of HD


Blood Culture is negative to date. Infectious disease noting: MAY CONSIDER 

SWITCHING TO ANCEF 2GM DAILY WITH 1 GM GIVEN POST DIALYSIS, TO CHECK WITH 

PHARMACY. and on IV vanco for MSSA in wound, due to cath infection.


patient is very eager to go home as soon as possible.

## 2018-10-22 NOTE — CP.PCM.PN
Subjective





- Date & Time of Evaluation


Date of Evaluation: 10/22/18


Time of Evaluation: 12:05





- Subjective


Subjective: 





Seen at HD


Still very weak


On IV vanco for MSSA in wound, due to cath infection


UF 3400ml with HD





Objective





- Vital Signs/Intake and Output


Vital Signs (last 24 hours): 


                                        











Temp Pulse Resp BP Pulse Ox


 


 97.3 F L  99 H  16   100/45 L  100 


 


 10/22/18 09:10  10/22/18 09:10  10/22/18 09:10  10/22/18 10:10  10/22/18 09:10








Intake and Output: 


                                        











 10/22/18 10/22/18





 06:59 18:59


 


Output Total 0 


 


Balance 0 














- Medications


Medications: 


                               Current Medications





Acetaminophen (Tylenol 325mg Tab)  650 mg PO Q6 PRN


   PRN Reason: Fever >100.4 F


Calcium Acetate (Phoslo)  667 mg PO TIDCC [facility]


   Last Admin: 10/22/18 08:44 Dose:  Not Given


Dextrose (Dextrose 50% Inj)  0 ml IV STAT PRN; Protocol


   PRN Reason: Hypoglycemia Protocol


Dextrose (Glutose 15)  0 gm PO ONCE PRN; Protocol


   PRN Reason: Hypoglycemia Protocol


Glucagon (Glucagen Diagnostic Kit)  0 mg IM STAT PRN; Protocol


   PRN Reason: Hypoglycemia Protocol


Dextrose (Dextrose 5% In Water 1000 Ml)  1,000 mls @ 0 mls/hr IV .Q0M PRN; 

Protocol


   PRN Reason: Hypoglycemia Protocol


Cefepime HCl 0.5 gm/ Dextrose  50 mls @ 100 mls/hr IVPB DAILY [facility]; Protocol


   Last Admin: 10/21/18 11:32 Dose:  100 mls/hr


Vancomycin HCl 1 gm/ Sodium (Chloride)  250 mls @ 166.7 mls/hr IVPB MWF [facility]; 

Protocol


   Last Admin: 10/22/18 11:15 Dose:  166.7 mls/hr


Insulin Human Regular (Novolin R)  0 unit SC ACHS [facility]; Protocol


   Last Admin: 10/22/18 07:33 Dose:  Not Given


Ondansetron HCl (Zofran Inj)  4 mg IVP Q6 PRN


   PRN Reason: Nausea/Vomiting











- Labs


Labs: 


                                        





                                 10/22/18 09:39 





                                 10/22/18 09:39 





                                        











PT  18.4 SECONDS (9.7-12.2)  H  10/22/18  09:39    


 


INR  1.7   10/22/18  09:39    


 


APTT  38 SECONDS (21-34)  H  10/22/18  09:39    














- Constitutional


Appears: No Acute Distress, Chronically Ill





- Head Exam


Head Exam: ATRAUMATIC, NORMAL INSPECTION





- Eye Exam


Eye Exam: EOMI, Normal appearance





- Neck Exam


Neck Exam: Normal Inspection.  absent: Tenderness





- Respiratory Exam


Respiratory Exam: Clear to Ausculation Bilateral, NORMAL BREATHING PATTERN





- Cardiovascular Exam


Cardiovascular Exam: REGULAR RHYTHM, +S1





- GI/Abdominal Exam


GI & Abdominal Exam: Soft.  absent: Tenderness





- Extremities Exam


Extremities Exam: Normal Inspection.  absent: Tenderness





- Neurological Exam


Neurological Exam: Alert, CN II-XII Intact





- Skin


Skin Exam: Dry, Warm





Assessment and Plan


(1) Infection due to Port-A-Cath


Status: Acute   





(2) ESRD (end stage renal disease)


Status: Acute   





(3) Fluid overload


Status: Acute   





(4) History of non-Hodgkin's lymphoma


Status: Acute   





- Assessment and Plan (Free Text)


Plan: 





Same dialysis MWF


Adequate UF with HD


IV ABs as per ID

## 2018-10-22 NOTE — CP.PCM.PN
Subjective





- Date & Time of Evaluation


Date of Evaluation: 10/22/18


Time of Evaluation: 12:48





- Subjective


Subjective: 





INFECTIOUS DISEASE PROGRESS NOTES


RAHEEM CANADA MD, FACP


10/22/2108


CHART REVIEWED


PT EXAMINED


CASE DISCUSSED WITH HD AND DR COPPER,








Seen at HD, MAY CONSIDER SWITCHING TO ANCEF 2GM DAILY WITH 1 GM GIVEN POST 

DIALYSIS, TO CHECK WITH PHARMACY.


Still very weak


On IV vanco for MSSA in wound, due to cath infection


UF 3400ml with HD





Objective





- Vital Signs/Intake and Output


Vital Signs (last 24 hours): 


                                        











Temp Pulse Resp BP Pulse Ox


 


 97.3 F L  99 H  16   100/45 L  100 


 


 10/22/18 09:10  10/22/18 09:10  10/22/18 09:10  10/22/18 10:10  10/22/18 09:10








Intake and Output: 


                                        











 10/22/18 10/22/18





 06:59 18:59


 


Output Total 0 


 


Balance 0 














- Medications


Medications: 


                               Current Medications





Acetaminophen (Tylenol 325mg Tab)  650 mg PO Q6 PRN


   PRN Reason: Fever >100.4 F


Calcium Acetate (Phoslo)  667 mg PO [facility]


   Last Admin: 10/22/18 08:44 Dose:  Not Given


Dextrose (Dextrose 50% Inj)  0 ml IV STAT PRN; Protocol


   PRN Reason: Hypoglycemia Protocol


Dextrose (Glutose 15)  0 gm PO ONCE PRN; Protocol


   PRN Reason: Hypoglycemia Protocol


Glucagon (Glucagen Diagnostic Kit)  0 mg IM STAT PRN; Protocol


   PRN Reason: Hypoglycemia Protocol


Dextrose (Dextrose 5% In Water 1000 Ml)  1,000 mls @ 0 mls/hr IV .Q0M PRN; 

Protocol


   PRN Reason: Hypoglycemia Protocol


Cefepime HCl 0.5 gm/ Dextrose  50 mls @ 100 mls/hr IVPB DAILY [facility]; Protocol


   Last Admin: 10/21/18 11:32 Dose:  100 mls/hr


Vancomycin HCl 1 gm/ Sodium (Chloride)  250 mls @ 166.7 mls/hr IVPB MWF [facility]; 

Protocol


   Last Admin: 10/22/18 11:15 Dose:  166.7 mls/hr


Insulin Human Regular (Novolin R)  0 unit SC ACHS [facility]; Protocol


   Last Admin: 10/22/18 07:33 Dose:  Not Given


Ondansetron HCl (Zofran Inj)  4 mg IVP Q6 PRN


   PRN Reason: Nausea/Vomiting











- Labs


Labs: 


                                        





                                 10/22/18 09:39 





                                 10/22/18 09:39 





                                        











PT  18.4 SECONDS (9.7-12.2)  H  10/22/18  09:39    


 


INR  1.7   10/22/18  09:39    


 


APTT  38 SECONDS (21-34)  H  10/22/18  09:39    














- Constitutional


Appears: No Acute Distress, Chronically Ill





- Head Exam


Head Exam: ATRAUMATIC, NORMAL INSPECTION





- Eye Exam


Eye Exam: EOMI, Normal appearance





- Neck Exam


Neck Exam: Normal Inspection.  absent: Tenderness





- Respiratory Exam


Respiratory Exam: Clear to Ausculation Bilateral, NORMAL BREATHING PATTERN





- Cardiovascular Exam


Cardiovascular Exam: REGULAR RHYTHM, +S1





- GI/Abdominal Exam


GI & Abdominal Exam: Soft.  absent: Tenderness





- Extremities Exam


Extremities Exam: Normal Inspection.  absent: Tenderness





- Neurological Exam


Neurological Exam: Alert, CN II-XII Intact





- Skin


Skin Exam: Dry, Warm





Assessment and Plan


(1) Infection due to Port-A-Cath


Status: Acute   





(2) ESRD (end stage renal disease)


Status: Acute   





(3) Fluid overload


Status: Acute   





(4) History of non-Hodgkin's lymphoma


Status: Acute   





- Assessment and Plan (Free Text)


Plan: 





Same dialysis MWF


Adequate UF with HD


IV ABs as per ID








Objective





- Vital Signs/Intake and Output


Vital Signs (last 24 hours): 


                                        











Temp Pulse Resp BP Pulse Ox


 


 97.3 F L  99 H  16   83/75 L  100 


 


 10/22/18 09:10  10/22/18 09:10  10/22/18 09:10  10/22/18 11:40  10/22/18 09:10








Intake and Output: 


                                        











 10/22/18 10/22/18





 06:59 18:59


 


Output Total 0 


 


Balance 0 














- Medications


Medications: 


                               Current Medications





Acetaminophen (Tylenol 325mg Tab)  650 mg PO Q6 PRN


   PRN Reason: Fever >100.4 F


Calcium Acetate (Phoslo)  667 mg PO [facility]


   Last Admin: 10/22/18 08:44 Dose:  Not Given


Dextrose (Dextrose 50% Inj)  0 ml IV STAT PRN; Protocol


   PRN Reason: Hypoglycemia Protocol


Dextrose (Glutose 15)  0 gm PO ONCE PRN; Protocol


   PRN Reason: Hypoglycemia Protocol


Glucagon (Glucagen Diagnostic Kit)  0 mg IM STAT PRN; Protocol


   PRN Reason: Hypoglycemia Protocol


Dextrose (Dextrose 5% In Water 1000 Ml)  1,000 mls @ 0 mls/hr IV .Q0M PRN; 

Protocol


   PRN Reason: Hypoglycemia Protocol


Cefepime HCl 0.5 gm/ Dextrose  50 mls @ 100 mls/hr IVPB [facility]ILY [facility]; Protocol


   Last Admin: 10/21/18 11:32 Dose:  100 mls/hr


Vancomycin HCl 1 gm/ Sodium (Chloride)  250 mls @ 166.7 mls/hr IVPB [facility]; 

Protocol


   Last Admin: 10/22/18 11:15 Dose:  166.7 mls/hr


Insulin Human Regular (Novolin R)  0 unit SC [facility]; Protocol


   Last Admin: 10/22/18 07:33 Dose:  Not Given


Ondansetron HCl (Zofran Inj)  4 mg IVP Q6 PRN


   PRN Reason: Nausea/Vomiting











- Labs


Labs: 


                                        





                                 10/22/18 09:39 





                                 10/22/18 09:39 





                                        











PT  18.4 SECONDS (9.7-12.2)  H  10/22/18  09:39    


 


INR  1.7   10/22/18  09:39    


 


APTT  38 SECONDS (21-34)  H  10/22/18  09:39

## 2018-10-22 NOTE — CP.PCM.PN
Subjective





- Date & Time of Evaluation


Date of Evaluation: 10/22/18


Time of Evaluation: 13:30





- Subjective


Subjective: 





Patient still weak but afebrile, on Vancomycin IV, refuses a new Nadira-cath. 

Patient was started on Warfarin ( he refuses Eliquis ),





Objective





- Vital Signs/Intake and Output


Vital Signs (last 24 hours): 


                                        











Temp Pulse Resp BP Pulse Ox


 


 97.6 F   86   20   105/53 L  97 


 


 10/22/18 16:25  10/22/18 16:25  10/22/18 16:25  10/22/18 18:16  10/22/18 16:25











- Medications


Medications: 


                               Current Medications





Acetaminophen (Tylenol 325mg Tab)  650 mg PO Q6 PRN


   PRN Reason: Fever >100.4 F


Calcium Acetate (Phoslo)  667 mg PO [facility]


   Last Admin: 10/22/18 18:10 Dose:  667 mg


Dextrose (Dextrose 50% Inj)  0 ml IV STAT PRN; Protocol


   PRN Reason: Hypoglycemia Protocol


Dextrose (Glutose 15)  0 gm PO ONCE PRN; Protocol


   PRN Reason: Hypoglycemia Protocol


Glucagon (Glucagen Diagnostic Kit)  0 mg IM STAT PRN; Protocol


   PRN Reason: Hypoglycemia Protocol


Dextrose (Dextrose 5% In Water 1000 Ml)  1,000 mls @ 0 mls/hr IV .Q0M PRN; 

Protocol


   PRN Reason: Hypoglycemia Protocol


Cefepime HCl 0.5 gm/ Dextrose  50 mls @ 100 mls/hr IVPB DAILY [facility]; Protocol


   Last Admin: 10/22/18 13:49 Dose:  100 mls/hr


Vancomycin HCl 1 gm/ Sodium (Chloride)  250 mls @ 166.7 mls/hr IVPB MWF [facility]; 

Protocol


   Last Admin: 10/22/18 11:15 Dose:  166.7 mls/hr


Insulin Human Regular (Novolin R)  0 unit SC ACHS [facility]; Protocol


   Last Admin: 10/22/18 17:33 Dose:  Not Given


Ondansetron HCl (Zofran Inj)  4 mg IVP Q6 PRN


   PRN Reason: Nausea/Vomiting











- Labs


Labs: 


                                        





                                 10/22/18 09:39 





                                 10/22/18 09:39 





                                        











PT  18.4 SECONDS (9.7-12.2)  H  10/22/18  09:39    


 


INR  1.7   10/22/18  09:39    


 


APTT  38 SECONDS (21-34)  H  10/22/18  09:39    














- Constitutional


Appears: No Acute Distress, Chronically Ill





- Head Exam


Head Exam: NORMAL INSPECTION





- Eye Exam


Eye Exam: Normal appearance





- ENT Exam


ENT Exam: Normal Exam





- Neck Exam


Neck Exam: Normal Inspection





- Respiratory Exam


Respiratory Exam: Clear to Ausculation Bilateral, NORMAL BREATHING PATTERN





- Cardiovascular Exam


Cardiovascular Exam: Irregular Rhythm





- GI/Abdominal Exam


GI & Abdominal Exam: Soft, Normal Bowel Sounds





- Rectal Exam


Rectal Exam: Deferred





- Extremities Exam


Extremities Exam: Pedal Edema





- Back Exam


Back Exam: NORMAL INSPECTION





- Neurological Exam


Neurological Exam: Alert, Awake, Oriented x3





- Psychiatric Exam


Psychiatric exam: Anxious





- Skin


Skin Exam: Dry, Intact, Warm





Assessment and Plan


(1) Sepsis associated with vascular access catheter


Assessment & Plan: 


On IV Vancomycin.


Status: Resolved   





(2) Fever


Status: Resolved   





(3) ESRD needing dialysis


Status: Acute   





(4) Atrial fibrillation


Assessment & Plan: 


Back on Warfarin.


Status: Acute

## 2018-10-23 LAB
ALBUMIN SERPL-MCNC: 3.2 [, G/DL] (ref 3.5–5)
ALBUMIN/GLOB SERPL: 1 [,] (ref 1–2.1)
ALT SERPL-CCNC: 19 [, U/L] (ref 21–72)
APTT BLD: 37 [, SECONDS] (ref 21–34)
AST SERPL-CCNC: 26 [, U/L] (ref 17–59)
BASOPHILS # BLD AUTO: 0.1 [, K/UL] (ref 0–0.2)
BASOPHILS NFR BLD: 0.9 [, %] (ref 0–2)
BUN SERPL-MCNC: 59 [, MG/DL] (ref 9–20)
CALCIUM SERPL-MCNC: 8.1 [, MG/DL] (ref 8.6–10.4)
EOSINOPHIL # BLD AUTO: 0.2 [, K/UL] (ref 0–0.7)
EOSINOPHIL NFR BLD: 4.4 [, %] (ref 0–4)
ERYTHROCYTE [DISTWIDTH] IN BLOOD BY AUTOMATED COUNT: 19.2 [, %] (ref 11.5–14.5)
GFR NON-AFRICAN AMERICAN: 14 [,]
HGB BLD-MCNC: 9.9 [, G/DL] (ref 12–18)
INR PPP: 1.6 [,]
LYMPHOCYTES # BLD AUTO: 0.7 [, K/UL] (ref 1–4.3)
LYMPHOCYTES NFR BLD AUTO: 11.6 [, %] (ref 20–40)
MCH RBC QN AUTO: 28.9 [, PG] (ref 27–31)
MCHC RBC AUTO-ENTMCNC: 33.1 [, G/DL] (ref 33–37)
MCV RBC AUTO: 87.6 [, FL] (ref 80–94)
MONOCYTES # BLD: 0.5 [, K/UL] (ref 0–0.8)
MONOCYTES NFR BLD: 8.9 [, %] (ref 0–10)
NEUTROPHILS # BLD: 4.2 [, K/UL] (ref 1.8–7)
NEUTROPHILS NFR BLD AUTO: 74.2 [, %] (ref 50–75)
NRBC BLD AUTO-RTO: 0 [, %] (ref 0–2)
PLATELET # BLD: 207 [, K/UL] (ref 130–400)
PMV BLD AUTO: 7.3 [, FL] (ref 7.2–11.7)
PROTHROMBIN TIME: 17.9 [, SECONDS] (ref 9.7–12.2)
RBC # BLD AUTO: 3.4 [, MIL/UL] (ref 4.4–5.9)
WBC # BLD AUTO: 5.7 [, K/UL] (ref 4.8–10.8)

## 2018-10-23 RX ADMIN — HUMAN INSULIN SCH: 100 INJECTION, SOLUTION SUBCUTANEOUS at 12:00

## 2018-10-23 RX ADMIN — HUMAN INSULIN SCH: 100 INJECTION, SOLUTION SUBCUTANEOUS at 07:41

## 2018-10-23 RX ADMIN — HUMAN INSULIN SCH: 100 INJECTION, SOLUTION SUBCUTANEOUS at 16:51

## 2018-10-23 RX ADMIN — HUMAN INSULIN SCH: 100 INJECTION, SOLUTION SUBCUTANEOUS at 22:16

## 2018-10-23 NOTE — CP.PCM.PN
Subjective





- Date & Time of Evaluation


Date of Evaluation: 10/23/18


Time of Evaluation: 11:00





- Subjective


Subjective: 


seen and examined


afebrile


denies any pain n/v/d/f/c/dizziness/headache





Objective





- Vital Signs/Intake and Output


Vital Signs (last 24 hours): 


                                        











Temp Pulse Resp BP Pulse Ox


 


 98.3 F   112 H  20   97/61 L  98 


 


 10/23/18 07:00  10/23/18 07:00  10/23/18 07:00  10/23/18 07:00  10/23/18 07:00








Intake and Output: 


                                        











 10/23/18 10/23/18





 06:59 18:59


 


Intake Total 480 


 


Balance 480 














- Medications


Medications: 


                               Current Medications





Acetaminophen (Tylenol 325mg Tab)  650 mg PO Q6 PRN


   PRN Reason: Fever >100.4 F


Calcium Acetate (Phoslo)  667 mg PO [facility]


   Last Admin: 10/23/18 08:37 Dose:  667 mg


Dextrose (Dextrose 50% Inj)  0 ml IV STAT PRN; Protocol


   PRN Reason: Hypoglycemia Protocol


Dextrose (Glutose 15)  0 gm PO ONCE PRN; Protocol


   PRN Reason: Hypoglycemia Protocol


Glucagon (Glucagen Diagnostic Kit)  0 mg IM STAT PRN; Protocol


   PRN Reason: Hypoglycemia Protocol


Dextrose (Dextrose 5% In Water 1000 Ml)  1,000 mls @ 0 mls/hr IV .Q0M PRN; 

Protocol


   PRN Reason: Hypoglycemia Protocol


Cefepime HCl 0.5 gm/ Dextrose  50 mls @ 100 mls/hr IVPB DAILY [facility]; Protocol


   Last Admin: 10/22/18 13:49 Dose:  100 mls/hr


Vancomycin HCl 1 gm/ Sodium (Chloride)  250 mls @ 166.7 mls/hr IVPB MWF [facility]; Pro

tocol


   Last Admin: 10/22/18 11:15 Dose:  166.7 mls/hr


Insulin Human Regular (Novolin R)  0 unit SC ACHS [facility]; Protocol


   Last Admin: 10/23/18 07:41 Dose:  Not Given


Ondansetron HCl (Zofran Inj)  4 mg IVP Q6 PRN


   PRN Reason: Nausea/Vomiting











- Labs


Labs: 


                                        





                                 10/23/18 07:17 





                                 10/23/18 07:17 





                                        











PT  17.9 SECONDS (9.7-12.2)  H  10/23/18  07:17    


 


INR  1.6   10/23/18  07:17    


 


APTT  37 SECONDS (21-34)  H  10/23/18  07:17    














- Constitutional


Appears: Non-toxic, No Acute Distress, Chronically Ill





- Head Exam


Head Exam: NORMAL INSPECTION





- Eye Exam


Eye Exam: Normal appearance, PERRL





- ENT Exam


ENT Exam: Mucous Membranes Moist, Normal Exam





- Neck Exam


Neck Exam: Normal Inspection





- Respiratory Exam


Respiratory Exam: Clear to Ausculation Bilateral, NORMAL BREATHING PATTERN





- Cardiovascular Exam


Cardiovascular Exam: Irregular Rhythm





- GI/Abdominal Exam


GI & Abdominal Exam: Distended, Soft





- Extremities Exam


Extremities Exam: Pedal Edema (b/l LE edema stasis)





- Neurological Exam


Neurological Exam: Alert, Awake, Oriented x3





- Psychiatric Exam


Psychiatric exam: Normal Affect, Normal Mood





- Skin


Skin Exam: Intact, Warm





Assessment and Plan


(1) Fever


Status: Resolved   





(2) Infection due to Port-A-Cath


Status: Acute   





(3) ESRD (end stage renal disease)


Status: Acute   





(4) A-fib


Status: Chronic   





(5) Anemia


Status: Chronic   





- Assessment and Plan (Free Text)


Assessment: 





hd mwf


antibiotics per ID, blood cultures negative

## 2018-10-23 NOTE — CP.PCM.PN
Subjective





- Date & Time of Evaluation


Date of Evaluation: 10/23/18


Time of Evaluation: 11:00





- Subjective


Subjective: 


Podiatry Progress Note- Dr. Thornton





62 y/o male seen at bedside this morning for bilateral lower extremity 

ulcerations. Pt has multipodus boots and dressings in place. He states he feels 

fine and has no discomfort or pain in the lower extremities. Denies 

F/C/N/V/CP/SOB





Objective





- Vital Signs/Intake and Output


Vital Signs (last 24 hours): 


                                        











Temp Pulse Resp BP Pulse Ox


 


 98.3 F   112 H  20   97/61 L  98 


 


 10/23/18 07:00  10/23/18 07:00  10/23/18 07:00  10/23/18 07:00  10/23/18 07:00








Intake and Output: 


                                        











 10/23/18 10/23/18





 06:59 18:59


 


Intake Total 480 


 


Balance 480 














- Medications


Medications: 


                               Current Medications





Acetaminophen (Tylenol 325mg Tab)  650 mg PO Q6 PRN


   PRN Reason: Fever >100.4 F


Calcium Acetate (Phoslo)  667 mg PO TIDCC [facility]


   Last Admin: 10/23/18 08:37 Dose:  667 mg


Dextrose (Dextrose 50% Inj)  0 ml IV STAT PRN; Protocol


   PRN Reason: Hypoglycemia Protocol


Dextrose (Glutose 15)  0 gm PO ONCE PRN; Protocol


   PRN Reason: Hypoglycemia Protocol


Glucagon (Glucagen Diagnostic Kit)  0 mg IM STAT PRN; Protocol


   PRN Reason: Hypoglycemia Protocol


Dextrose (Dextrose 5% In Water 1000 Ml)  1,000 mls @ 0 mls/hr IV .Q0M PRN; 

Protocol


   PRN Reason: Hypoglycemia Protocol


Cefepime HCl 0.5 gm/ Dextrose  50 mls @ 100 mls/hr IVPB DAILY [facility]; Protocol


   Last Admin: 10/22/18 13:49 Dose:  100 mls/hr


Vancomycin HCl 1 gm/ Sodium (Chloride)  250 mls @ 166.7 mls/hr IVPB MWF [facility]; 

Protocol


   Last Admin: 10/22/18 11:15 Dose:  166.7 mls/hr


Insulin Human Regular (Novolin R)  0 unit SC ACHS [facility]; Protocol


   Last Admin: 10/23/18 07:41 Dose:  Not Given


Ondansetron HCl (Zofran Inj)  4 mg IVP Q6 PRN


   PRN Reason: Nausea/Vomiting











- Labs


Labs: 


                                        





                                 10/23/18 07:17 





                                 10/23/18 07:17 





                                        











PT  17.9 SECONDS (9.7-12.2)  H  10/23/18  07:17    


 


INR  1.6   10/23/18  07:17    


 


APTT  37 SECONDS (21-34)  H  10/23/18  07:17    














- Constitutional


Appears: Well, Non-toxic, No Acute Distress





- Extremities Exam


Additional comments: 


Lower extremity focused exam:


Vasc: Nonpalpable pulses to the DP and PT, delayed CFT to the digits, 

temperature gradient WNL,  +1 pitting pedal edema


Derm: chronic skin changes with hyperpigmented skin to entire lower extremity, 

skin thickening and flaking bilateral lower legs.


Left: Left posterior heel ulceration now fully healed with overlying thin 

fragile skin. Mild dark discoloration noted to skin area at site of prior 

ulceration.


Right: Full thickness ulceration noted to posterior ankle at level of Achilles 

insertion measuring approximately 3 cm x 2 cm x 0.3cm, with mixture granular and

fibrotic wound base. No active drainage noted. No fluctuance noted. Minor marcos-

wound erythema, no streaking appreciated.


Neuro: Gross sensation absent B/L. protective sensation absent


Ortho: no pain appreciated with palpation to surrounding ulceration sites. No 

pain with calf palpation bilaterally.








- Neurological Exam


Neurological Exam: Alert, Awake, Oriented x3





- Psychiatric Exam


Psychiatric exam: Normal Affect, Normal Mood





Assessment and Plan





- Assessment and Plan (Free Text)


Assessment: 


63 y/o male patient, seen and evaluated at bedside for B/L lower extremity 

ulcerations





Plan: 


Patient seen and evaluated at bedside


Discussed plan with Dr. Thornton


Patient afebrile, absent leukocytosis 


Will continue with local wound care: wounds cleansed with saline and dressed 

with ABD, DSD; betadine applied to right posterior heel wound


Wounds stable, no current signs of infection


No surgical intervention at this time


Patient to wear multipodus boots at all times while in bed 


Will continue to follow

## 2018-10-23 NOTE — CARD
--------------- APPROVED REPORT --------------





Date of service: 10/18/2018



EKG Measurement

Heart [id number]

[id number]

[id number]

[id number]



<Conclusion>

Atrial fibrillation with premature ventricular or aberrantly 

conducted complexes

Left axis deviation

Low voltage QRS

Inferior infarct, age undetermined

Possible Anterolateral infarct, age undetermined

Abnormal ECG

## 2018-10-23 NOTE — CP.PCM.PN
<Emanuel Dumas - Last Filed: 10/23/18 14:14>





Subjective





- Date & Time of Evaluation


Date of Evaluation: 10/23/18


Time of Evaluation: 14:28





- Subjective


Subjective: 





PGY-1 Progress Note for Dr. Manning





Patient seen and examined at bedside.  Patient appears comfortable and much less

anxious than yesterday.  He is tolerating meals.  Patient is amenable to staying

in hospital until he is medically optimized.  Patient has no other complaints.  

Denies chest pain, fevers, headaches, fatigue, nausea, vomiting, dizziness.





Objective





- Vital Signs/Intake and Output


Vital Signs (last 24 hours): 


                                        











Temp Pulse Resp BP Pulse Ox


 


 98.3 F   112 H  20   97/61 L  98 


 


 10/23/18 07:00  10/23/18 07:00  10/23/18 07:00  10/23/18 07:00  10/23/18 07:00








Intake and Output: 


                                        











 10/23/18 10/23/18





 06:59 18:59


 


Intake Total 480 


 


Balance 480 














- Medications


Medications: 


                               Current Medications





Acetaminophen (Tylenol 325mg Tab)  650 mg PO Q6 PRN


   PRN Reason: Fever >100.4 F


Calcium Acetate (Phoslo)  667 mg PO TIDCC [facility]


   Last Admin: 10/23/18 08:37 Dose:  667 mg


Dextrose (Dextrose 50% Inj)  0 ml IV STAT PRN; Protocol


   PRN Reason: Hypoglycemia Protocol


Dextrose (Glutose 15)  0 gm PO ONCE PRN; Protocol


   PRN Reason: Hypoglycemia Protocol


Glucagon (Glucagen Diagnostic Kit)  0 mg IM STAT PRN; Protocol


   PRN Reason: Hypoglycemia Protocol


Dextrose (Dextrose 5% In Water 1000 Ml)  1,000 mls @ 0 mls/hr IV .Q0M PRN; 

Protocol


   PRN Reason: Hypoglycemia Protocol


Cefepime HCl 0.5 gm/ Dextrose  50 mls @ 100 mls/hr IVPB DAILY [facility]; Protocol


   Last Admin: 10/22/18 13:49 Dose:  100 mls/hr


Vancomycin HCl 1 gm/ Sodium (Chloride)  250 mls @ 166.7 mls/hr IVPB MWF [facility]; 

Protocol


   Last Admin: 10/22/18 11:15 Dose:  166.7 mls/hr


Insulin Human Regular (Novolin R)  0 unit SC ACHS [facility]; Protocol


   Last Admin: 10/23/18 07:41 Dose:  Not Given


Ondansetron HCl (Zofran Inj)  4 mg IVP Q6 PRN


   PRN Reason: Nausea/Vomiting











- Labs


Labs: 


                                        





                                 10/23/18 07:17 





                                 10/23/18 07:17 





                                        











PT  17.9 SECONDS (9.7-12.2)  H  10/23/18  07:17    


 


INR  1.6   10/23/18  07:17    


 


APTT  37 SECONDS (21-34)  H  10/23/18  07:17    














- Constitutional


Appears: Non-toxic, No Acute Distress, Other (Obese)





- Head Exam


Head Exam: ATRAUMATIC, NORMOCEPHALIC





- Eye Exam


Eye Exam: EOMI, Normal appearance


Pupil Exam: PERRL





- ENT Exam


ENT Exam: Mucous Membranes Moist





- Neck Exam


Neck Exam: Full ROM.  absent: Tenderness





- Respiratory Exam


Respiratory Exam: Clear to Ausculation Bilateral, NORMAL BREATHING PATTERN





- Cardiovascular Exam


Cardiovascular Exam: +S1, +S2.  absent: Murmur


Additional comments: 





Dressing to previous R chest adolfo cath site is clean, dry and intact.  No 

erythema or exudates at former portacath site.





- GI/Abdominal Exam


GI & Abdominal Exam: Soft, Normal Bowel Sounds.  absent: Tenderness





- Extremities Exam


Extremities Exam: Full ROM


Additional comments: 





chronic venous stasis changes b/l LE





- Neurological Exam


Neurological Exam: Alert, Awake, CN II-XII Intact, Oriented x3





- Psychiatric Exam


Psychiatric exam: Normal Affect, Normal Mood





- Skin


Skin Exam: Dry, Intact


Additional comments: 





chronic venous stasis changes b/l LE





Assessment and Plan





- Assessment and Plan (Free Text)


Assessment: 





63M with PMH ESRD, DM, CHF, A FIB W/RVR, AML, HTN, COPD, sleep apnea presents to

ED with port a cath infection s/p HD. Pt admitted for further treatment of 

abscess.





Abscess/Infection of Portacath


- Pt with port a cath, draining white purulent fluid on R chest on admission; 

s/p removal by Surgery POD#5, pt doing well post-op. No further surgical 

interventions needed at this time.


- Tmax 100.3F on admission, cont to monitor temps today, currently afebrile


- Leukocytosis resolved


- ID consulted, Dr. Fink


- Continue Cefepime 0.5 g per ID recs


- Continue Vanco 1gm MWF with HD as per ID recs


   random vanc level 10/22 18:00 11.9


- Per ID, will consider switching to Ancef 2mg IV daily with 1mg additional dose

given after dialysis.


- BCx: NG x4 days


- Wound Cx: S. aureus 


- INR 2.5 10/19-> 1.8 10/21 --> 1.6 10/23.  Patient has been receiving daily co

umadin 3mg PO with INR continuing to decrease.  5mg Coumadin to be given 

tonight, will recheck INR tomorrow and continue to monitor.


- Vascular surgery consulted, Dr. Lopez - f/u recs; no further acute surgical

management needed at this time


- Lactate wnl; procal: 4.31 elevated


- 2D echo ordered to r/o endocarditis: LVEF 53%, LV function is mild to 

moderately reduced with anteroseptal hypokinesis, and septal flattening c/w 

elevated RV pressure. The aortic root is calcified and demonstrates mildly to 

moderately reduced opening. Peak gradient was 12mmHg, valve area was not 

performed. There is mildly increased left ventricular wall thickness. The right 

ventricle is severely dilated. Systolic function is severely reduced. There is 

likely severe pulmonary HTN, underestimated by doppler. 





ESRD


- HD MWF


- BUN/Cr today 59/5.4


- Nephro consulted, Dr. Cortes - f/u recs





DM


- accuchecks q6h


- low dose sliding scale


- hypoglycemia protocol





HTN


- BP 97/52 on admission, BP stable today, pt states his normal bp at home is 

90s/50s, currently asymptomatic


- Home metoprolol held


- Avoid IV fluid boluses if possible





JEB


- pt uses CPAP at home


- not amenable to using bipap


- wife to bring home CPAP for use inpatient





B/l LE Ulcers


- On exam noted in L heel superior aspect, R achilles tendon area


- Podiatry consulted (Dr. Thornton) for wound care, recs appreciated





Hx Pacemaker, CHF


- Per Pt's wife pt has not followed with cardiology outpatient in a while


- Cardiology (Dr. Kaplan) consulted, recs appreciated


- Metoprolol held for low bps, continue to monitor





PPX


Warfarin 5mg PO daily


Renal diet





Dispo: Blood cultures negative x5.  INR has not been optimized on Coumadin 3mg 

daily.  To receive 5mg PO Coumadin dose tonight and reassess tomorrow.





Case discussed with attending, Dr. Nigel Dumas, PGY-1





<Arin Manning V - Last Filed: 10/24/18 16:40>





Objective





- Vital Signs/Intake and Output


Vital Signs (last 24 hours): 


                                        











Temp Pulse Resp BP Pulse Ox


 


 97.8 F   112 H  20   95/54 L  99 


 


 10/24/18 16:00  10/24/18 16:00  10/24/18 16:00  10/24/18 16:00  10/24/18 16:00








Intake and Output: 


                                        











 10/24/18 10/24/18





 06:59 18:59


 


Intake Total 100 


 


Output Total 0 


 


Balance 100 














- Medications


Medications: 


                               Current Medications





Acetaminophen (Tylenol 325mg Tab)  650 mg PO Q6 PRN


   PRN Reason: Fever >100.4 F


Calcium Acetate (Phoslo)  667 mg PO TIDCC [facility]


   Last Admin: 10/24/18 12:00 Dose:  Not Given


Dextrose (Dextrose 50% Inj)  0 ml IV STAT PRN; Protocol


   PRN Reason: Hypoglycemia Protocol


Dextrose (Glutose 15)  0 gm PO ONCE PRN; Protocol


   PRN Reason: Hypoglycemia Protocol


Glucagon (Glucagen Diagnostic Kit)  0 mg IM STAT PRN; Protocol


   PRN Reason: Hypoglycemia Protocol


Cefepime HCl 0.5 gm/ Dextrose  50 mls @ 100 mls/hr IVPB DAILY [facility]; Protocol


   Last Admin: 10/24/18 12:57 Dose:  100 mls/hr


Vancomycin HCl 1 gm/ Sodium (Chloride)  250 mls @ 166.7 mls/hr IVPB MWF [facility]; 

Protocol


   Last Admin: 10/24/18 10:48 Dose:  166.7 mls/hr


Insulin Human Regular (Novolin R)  0 unit SC ACHS [facility]; Protocol


   Last Admin: 10/24/18 11:30 Dose:  Not Given


Ondansetron HCl (Zofran Inj)  4 mg IVP Q6 PRN


   PRN Reason: Nausea/Vomiting


Warfarin Sodium (Coumadin)  5 mg PO 1800 [facility]


   Stop: 10/24/18 18:01











- Labs


Labs: 


                                        





                                 10/24/18 09:15 





                                 10/24/18 09:15 





                                        











PT  15.9 SECONDS (9.7-12.2)  H  10/24/18  09:15    


 


INR  1.5   10/24/18  09:15    


 


APTT  36 SECONDS (21-34)  H  10/24/18  09:15    














Attending/Attestation





- Attestation


I have personally seen and examined this patient.: Yes


I have fully participated in the care of the patient.: Yes


I have reviewed all pertinent clinical information, including history, physical 

exam and plan: Yes


Notes (Text): 


This is late computer entry for 10/23/18.


Patient seen and case discussed with day-time resident.


Patient seen this morning. Patient very eager to go home. I saw patient again in

the afternoon. Patient upset over heparin drip. He is very concerned about 

bleeding. I did indicate to him that his INR is not at a therapuetic level to 

send him home and it would be temporary as we bridge him back to Coumadin. I 

spoke with his wife as well last night to explain that the heparin drip and 

again echoed concerns regarding bleeding given prior hospitalization and reque

sting heme onc to be involved. I spoke with Dr. Archer who will come see 

the patient. I did indicate to her that in spite of the daily coumadin dose, he 

is not therapuetic but he refusing the heparin bridge. We agreed to increase to 

5mg and will comes and see the patient. Patient to continue Iv abx until cleared

by infectious disease.

## 2018-10-24 LAB
ALBUMIN SERPL-MCNC: 3.3 [, G/DL] (ref 3.5–5)
ALBUMIN/GLOB SERPL: 1.1 [,] (ref 1–2.1)
ALT SERPL-CCNC: 20 [, U/L] (ref 21–72)
APTT BLD: 36 [, SECONDS] (ref 21–34)
AST SERPL-CCNC: 21 [, U/L] (ref 17–59)
BASOPHILS # BLD AUTO: 0.1 [, K/UL] (ref 0–0.2)
BASOPHILS NFR BLD: 1.5 [, %] (ref 0–2)
BUN SERPL-MCNC: 70 [, MG/DL] (ref 9–20)
CALCIUM SERPL-MCNC: 8.3 [, MG/DL] (ref 8.6–10.4)
EOSINOPHIL # BLD AUTO: 0.3 [, K/UL] (ref 0–0.7)
EOSINOPHIL NFR BLD: 4.8 [, %] (ref 0–4)
ERYTHROCYTE [DISTWIDTH] IN BLOOD BY AUTOMATED COUNT: 19 [, %] (ref 11.5–14.5)
GFR NON-AFRICAN AMERICAN: 10 [,]
HGB BLD-MCNC: 9.6 [, G/DL] (ref 12–18)
INR PPP: 1.5 [,]
LYMPHOCYTES # BLD AUTO: 0.7 [, K/UL] (ref 1–4.3)
LYMPHOCYTES NFR BLD AUTO: 11.3 [, %] (ref 20–40)
MCH RBC QN AUTO: 28.4 [, PG] (ref 27–31)
MCHC RBC AUTO-ENTMCNC: 32.3 [, G/DL] (ref 33–37)
MCV RBC AUTO: 87.9 [, FL] (ref 80–94)
MONOCYTES # BLD: 0.5 [, K/UL] (ref 0–0.8)
MONOCYTES NFR BLD: 8.7 [, %] (ref 0–10)
NEUTROPHILS # BLD: 4.3 [, K/UL] (ref 1.8–7)
NEUTROPHILS NFR BLD AUTO: 73.7 [, %] (ref 50–75)
NRBC BLD AUTO-RTO: 0.1 [, %] (ref 0–2)
PLATELET # BLD: 213 [, K/UL] (ref 130–400)
PMV BLD AUTO: 7.1 [, FL] (ref 7.2–11.7)
PROTHROMBIN TIME: 15.9 [, SECONDS] (ref 9.7–12.2)
RBC # BLD AUTO: 3.39 [, MIL/UL] (ref 4.4–5.9)
WBC # BLD AUTO: 5.9 [, K/UL] (ref 4.8–10.8)

## 2018-10-24 RX ADMIN — HUMAN INSULIN SCH: 100 INJECTION, SOLUTION SUBCUTANEOUS at 19:23

## 2018-10-24 RX ADMIN — HUMAN INSULIN SCH: 100 INJECTION, SOLUTION SUBCUTANEOUS at 21:53

## 2018-10-24 RX ADMIN — HUMAN INSULIN SCH: 100 INJECTION, SOLUTION SUBCUTANEOUS at 11:30

## 2018-10-24 RX ADMIN — HUMAN INSULIN SCH: 100 INJECTION, SOLUTION SUBCUTANEOUS at 08:26

## 2018-10-24 NOTE — CP.PCM.PN
<Emanuel Dumas - Last Filed: 10/24/18 15:40>





Subjective





- Date & Time of Evaluation


Date of Evaluation: 10/24/18


Time of Evaluation: 15:40





- Subjective


Subjective: 





PGY-1 Progress Note for Dr. Manning





Patient seen and examined at bedside.  Patient has no medical complaints at this

time, in no acute distress.  No acute events overnight.  He is tolerating meals.

 Heparin bridge was ordered yesterday as INR is sub-therapeutic in lieu of 

history of A Fib, however patient refused heparin.  I Spoke on the phone with 

Dr. Archer, who has been consulted and her help is much appreciated.  She 

will see the patient and speak with the patient's family in hopes that he will 

agree to heparin bridge.  Otherwise patient will have to remain hospitalized 

until coumadin levels become therapeutic without bridge.  Patient denies chest 

pain, palpitations, headache, dizziness, nausea, vomiting.





Objective





- Vital Signs/Intake and Output


Vital Signs (last 24 hours): 


                                        











Temp Pulse Resp BP Pulse Ox


 


 97.9 F   100 H  20   99/43 L  99 


 


 10/24/18 07:00  10/24/18 07:00  10/24/18 07:00  10/24/18 07:00  10/24/18 07:00








Intake and Output: 


                                        











 10/24/18 10/24/18





 06:59 18:59


 


Intake Total 100 


 


Output Total 0 


 


Balance 100 














- Medications


Medications: 


                               Current Medications





Acetaminophen (Tylenol 325mg Tab)  650 mg PO Q6 PRN


   PRN Reason: Fever >100.4 F


Calcium Acetate (Phoslo)  667 mg PO TIDCC [facility]


   Last Admin: 10/24/18 08:26 Dose:  Not Given


Dextrose (Dextrose 50% Inj)  0 ml IV STAT PRN; Protocol


   PRN Reason: Hypoglycemia Protocol


Dextrose (Glutose 15)  0 gm PO ONCE PRN; Protocol


   PRN Reason: Hypoglycemia Protocol


Glucagon (Glucagen Diagnostic Kit)  0 mg IM STAT PRN; Protocol


   PRN Reason: Hypoglycemia Protocol


Cefepime HCl 0.5 gm/ Dextrose  50 mls @ 100 mls/hr IVPB DAILY [facility]; Protocol


   Last Admin: 10/23/18 10:39 Dose:  100 mls/hr


Vancomycin HCl 1 gm/ Sodium (Chloride)  250 mls @ 166.7 mls/hr IVPB MWF [facility]; 

Protocol


   Last Admin: 10/22/18 11:15 Dose:  166.7 mls/hr


Insulin Human Regular (Novolin R)  0 unit SC ACHS [facility]; Protocol


   Last Admin: 10/24/18 08:26 Dose:  Not Given


Ondansetron HCl (Zofran Inj)  4 mg IVP Q6 PRN


   PRN Reason: Nausea/Vomiting











- Labs


Labs: 


                                        





                                 10/23/18 07:17 





                                 10/23/18 07:17 





                                        











PT  17.9 SECONDS (9.7-12.2)  H  10/23/18  07:17    


 


INR  1.6   10/23/18  07:17    


 


APTT  37 SECONDS (21-34)  H  10/23/18  07:17    














- Constitutional


Appears: No Acute Distress, Other (Obese)





- Head Exam


Head Exam: ATRAUMATIC, NORMAL INSPECTION





- Eye Exam


Eye Exam: EOMI, Normal appearance





- ENT Exam


ENT Exam: Mucous Membranes Moist





- Respiratory Exam


Respiratory Exam: Clear to Ausculation Bilateral, NORMAL BREATHING PATTERN





- Cardiovascular Exam


Cardiovascular Exam: RRR, +S1, +S2.  absent: Murmur





- GI/Abdominal Exam


GI & Abdominal Exam: Soft, Normal Bowel Sounds.  absent: Tenderness





- Extremities Exam


Additional comments: 





chronic venous stasis changes b/l LE





- Neurological Exam


Neurological Exam: Alert, Awake, Oriented x3





- Psychiatric Exam


Psychiatric exam: Normal Affect, Normal Mood





- Skin


Skin Exam: Dry, Intact





Assessment and Plan





- Assessment and Plan (Free Text)


Assessment: 


63M with PMH ESRD, DM, CHF, A FIB W/RVR, AML, HTN, COPD, sleep apnea who 

presented with permacath ifection, now s/p removal of port-a-cath. INR is sub-

therapeutic in setting of A Fib with RVR.





Abscess/Infection of Portacath


- Pt with portacath, draining white purulent fluid on R chest on admission; s/p 

removal by Surgery POD#6, pt doing well post-op. No further surgical 

interventions needed at this time.


- Tmax 100.3F on admission. Now afebrile x5 days


- Leukocytosis resolved


- ID consulted, Dr. Fink


- Continue Cefepime 0.5 g per ID recs


- Continue Vanco 1gm MWF with HD as per ID recs


   random vanc level 10/22 18:00 11.9


- Spoke with Dr. Hernández will recheck random vanc Friday before dialysis.  If 

vancomycin is at safe blood levels, then patient may be treated as an outpatient

with vancomycin MWF, thus not requiring daily Abx.


- BCx: NG x5 days


- Wound Cx: S. aureus 


- INR 2.5 10/19-> 1.8 10/21 --> 1.6 10/23 --> 1.5 10/24.  Patient has been 

receiving daily coumadin 3mg PO with INR continuing to decrease.  5mg Coumadin 

given yesterday as patient refused heparin bridge.  INR continues to decrease.


   -I Spoke with Dr. Archer, who has been consulted and her help is much 

appreciated.  She will see the patient and speak with the patient's family in 

hopes that he will agree to heparin bridge, otherwise patient will have to rem

ain hospitalized until coumadin levels become therapeutic without bridge.


- Vascular surgery consulted, Dr. Lopez - no further acute surgical 

management needed at this time


- 2D echo ordered to r/o endocarditis: LVEF 53%, LV function is mild to 

moderately reduced with anteroseptal hypokinesis, and septal flattening c/w 

elevated RV pressure. The aortic root is calcified and demonstrates mildly to 

moderately reduced opening. Peak gradient was 12mmHg, valve area was not 

performed. There is mildly increased left ventricular wall thickness. The right 

ventricle is severely dilated. Systolic function is severely reduced. There is 

likely severe pulmonary HTN, underestimated by doppler. 





ESRD


- Nephro consulted, Dr. Cortes, help appreciated. Pt to continue HD MWF.


- BUN/Cr today 70/5.6





DM


- accuchecks q6h


- low dose sliding scale


- hypoglycemia protocol





HTN


- BP 97/52 on admission, BP stable today, pt states his normal bp at home is 

90s/50s, currently asymptomatic


- Home metoprolol held


- Avoid IV fluid boluses if possible





JEB


- pt uses CPAP at home


- not amenable to using bipap


- wife to bring home CPAP for use inpatient





B/l LE Ulcers


- On exam noted in L heel superior aspect, R achilles tendon area


- Podiatry consulted (Dr. Thornton) for wound care, recs appreciated


- Podiatry c/w wound care.  Wounds stable, no signs of infection, no surgical 

intervention indicated.





Hx Pacemaker, CHF


- Per Pt's wife pt has not followed with cardiology outpatient in a while


- Cardiology (Dr. Kaplan) consulted, recs appreciated


- Metoprolol held for low bps, continue to monitor





PPX


Warfarin 5mg PO daily


Renal diet





Dispo: Blood cultures negative x5.  INR had not been optimized on Coumadin 3mg 

daily.  5mg Coumadin given yesterday as patient refused heparin.  If patient is 

amenable to heparin, will initiate bridge, otherwise continue coumadin only 

until therapeutic levels are reached.





Case discussed with attending, Dr. Nigel Dumas, PGY-1








<Arin Manning - Last Filed: 10/24/18 16:55>





Objective





- Vital Signs/Intake and Output


Vital Signs (last 24 hours): 


                                        











Temp Pulse Resp BP Pulse Ox


 


 97.8 F   112 H  20   95/54 L  99 


 


 10/24/18 16:00  10/24/18 16:00  10/24/18 16:00  10/24/18 16:00  10/24/18 16:00








Intake and Output: 


                                        











 10/24/18 10/24/18





 06:59 18:59


 


Intake Total 100 


 


Output Total 0 


 


Balance 100 














- Medications


Medications: 


                               Current Medications





Acetaminophen (Tylenol 325mg Tab)  650 mg PO Q6 PRN


   PRN Reason: Fever >100.4 F


Calcium Acetate (Phoslo)  667 mg PO TIDCC [facility]


   Last Admin: 10/24/18 12:00 Dose:  Not Given


Dextrose (Dextrose 50% Inj)  0 ml IV STAT PRN; Protocol


   PRN Reason: Hypoglycemia Protocol


Dextrose (Glutose 15)  0 gm PO ONCE PRN; Protocol


   PRN Reason: Hypoglycemia Protocol


Glucagon (Glucagen Diagnostic Kit)  0 mg IM STAT PRN; Protocol


   PRN Reason: Hypoglycemia Protocol


Cefepime HCl 0.5 gm/ Dextrose  50 mls @ 100 mls/hr IVPB DAILY [facility]; Protocol


   Last Admin: 10/24/18 12:57 Dose:  100 mls/hr


Vancomycin HCl 1 gm/ Sodium (Chloride)  250 mls @ 166.7 mls/hr IVPB MWF [facility]; 

Protocol


   Last Admin: 10/24/18 10:48 Dose:  166.7 mls/hr


Insulin Human Regular (Novolin R)  0 unit SC ACHS [facility]; Protocol


   Last Admin: 10/24/18 11:30 Dose:  Not Given


Ondansetron HCl (Zofran Inj)  4 mg IVP Q6 PRN


   PRN Reason: Nausea/Vomiting


Warfarin Sodium (Coumadin)  5 mg PO 1800 [facility]


   Stop: 10/24/18 18:01











- Labs


Labs: 


                                        





                                 10/24/18 09:15 





                                 10/24/18 09:15 





                                        











PT  15.9 SECONDS (9.7-12.2)  H  10/24/18  09:15    


 


INR  1.5   10/24/18  09:15    


 


APTT  36 SECONDS (21-34)  H  10/24/18  09:15    














Attending/Attestation





- Attestation


I have personally seen and examined this patient.: Yes


I have fully participated in the care of the patient.: Yes


I have reviewed all pertinent clinical information, including history, physical 

exam and plan: Yes


Notes (Text): 


Patient seen, examined and case discussed with medical resident.


Patient seen this morning during dialysis. Patient is very calm and sleeping at 

dialysis. Vitals are stable. 


Patient's INR subtherapeutic at 1.5 in spite of increase in Coumadin level; 

resident has followed-up with heme-onc to see if he will agree to bridge since 

patient is subtherapuetic while on Coumadin for atrial fibrillation.


Resident has also reached out to infectious disease, will have random vancomycin

level post dialysis on Friday to determine antibiotics with dialysis only for 

discharge planning.





Assessment/Plan


1) Abscess and nfection of Portacath


Assessment/Plan


* Pt with portacath, draining white purulent fluid on R chest on admission; s/p 

  removal by Surgery on 10/18/18 pt doing well post-op. No further surgical 

  interventions needed at this time.


* Remains afebrile since initial fever at 102F


* Infectious Disease (Dr. Fink) on board--> help appreciated


   * Cefepime 0.5gm IVPB qdaily (active since 10/19/18)


   * Vancomycin 1gm IVPB MWF (active since 10/22/18)


   * Resident spoke with Dr. Hernández will recheck random vanc Friday before 

     dialysis.  If vancomycin is at safe blood levels, then patient may be 

     treated as an outpatient with vancomycin MWF, thus not requiring daily Abx.


* Blood culture (10/17/18): no growth after 5 days X2


* Skin/Chest (10/17/18): Staph Aureus 


* Chest (10/18/18): Staph Aureus


* Echocardiogram:  LVEF 53%, LV function is mild to moderately reduced with 

  anteroseptal hypokinesis, and septal flattening c/w elevated RV pressure. The 

  aortic root is calcified and demonstrates mildly to moderately reduced 

  opening. Peak gradient was 12mmHg, valve area was not performed. There is 

  mildly increased left ventricular wall thickness. The right ventricle is 

  severely dilated. Systolic function is severely reduced. There is likely 

  severe pulmonary HTN, underestimated by doppler. 








2) Atrial Fibrillation 


     Subtherapuetic INR


Assessment/Plan


* Dr. Archer (Heme-oncology) on board-->help appreciated


   * I explained to her patient and wife had refused Eliquis because of bleeding

     risk and  had refused heparin drip because of bleeding risk


* Dr. Kaplan (Cardiology) on board-->help appreciated


   * Recommended for Eliquis; patient and wife refused


* Echo: LVEF 53%, LV function is mild to moderately reduced with anteroseptal 

  hypokinesis, and septal flattening c/w elevated RV pressure. The aortic root 

  is calcified and demonstrates mildly to moderately reduced opening. Peak 

  gradient was 12mmHg, valve area was not performed. There is mildly increased 

  left ventricular wall thickness. The right ventricle is severely dilated. 

  Systolic function is severely reduced. There is likely severe pulmonary HTN, 

  underestimated by doppler. 


* Patient refusing heparin bridge secondary to bleeding risk; though he is not 

  therapuetic on Coumadin


* Patient ordered for Coumadin 5mg tonight; INR: 1.5


* off rate control agent secondary to borderline low BP





3) ESRD


Assessment/Plan


* Nephro consulted, Dr. Cortes, help appreciated. Pt to continue HD MWF.


* BUN/Cr today 70/5.6


* Phoslo 667mg PO TIDCC





4) Diabetes


Assessment/Plan


* accuchecks q6h


* low dose sliding scale


* hypoglycemia protocol


* check a1c





5) HTN


Assessment/Plan


* BP 97/52 on admission, BP stable today, pt states his normal bp at home is 

  90s/50s, currently asymptomatic


* Home metoprolol held





6. History of JEB


Assessment/Plan


* pt uses CPAP at home


* not amenable to using bipap


* wife to bring home CPAP for use inpatient





7. B/l LE Ulcers


Assessment/Plan


* On exam noted in L heel superior aspect, R achilles tendon area


* Podiatry consulted (Dr. Thornton) for wound care, recs appreciated


* Podiatry c/w wound care.  Wounds stable, no signs of infection, no surgical 

  intervention indicated.





8. Hx Pacemaker, Systolic CHF


Assessment/Plan


* Per Pt's wife pt has not followed with cardiology outpatient in a while


* Cardiology (Dr. Kaplan) consulted, recs appreciated


* Metoprolol held for low bps, continue to monitor


* Echo: LVEF 53%, LV function is mild to moderately reduced with anteroseptal 

  hypokinesis, and septal flattening c/w elevated RV pressure. The aortic root 

  is calcified and demonstrates mildly to moderately reduced opening. Peak 

  gradient was 12mmHg, valve area was not performed. There is mildly increased 

  left ventricular wall thickness. The right ventricle is severely dilated. 

  Systolic function is severely reduced. There is likely severe pulmonary HTN, 

  underestimated by doppler. 





9. PPX


* Warfarin 5mg PO daily; patient is refusing heparin drip; heme-oncology to 

  speak with him


* Renal diet





Dispo: Blood cultures negative x5.  INR had not been optimized on Coumadin 3mg 

daily.  5mg Coumadin given yesterday as patient refused heparin drip.  If 

patient is amenable to heparin drip, will initiate bridge, otherwise continue 

coumadin only until therapeutic levels are reached. hematology to speak with 

patient and wife. Patient is awaiting ID clearance regarding determine 

antibiotics with dialysis.

## 2018-10-24 NOTE — CP.PCM.PN
Subjective





- Date & Time of Evaluation


Date of Evaluation: 10/24/18


Time of Evaluation: 13:41





- Subjective


Subjective: 





seen on HD


tolerated treatment


3 kg UF


not toxic


cleared for discharge


no f/c


no n/v/d


no headache


no rash


no sinus tenderness





Objective





- Vital Signs/Intake and Output


Vital Signs (last 24 hours): 


                                        











Temp Pulse Resp BP Pulse Ox


 


 97.9 F   110 H  20   92/57 L  97 


 


 10/24/18 13:00  10/24/18 13:00  10/24/18 13:00  10/24/18 13:00  10/24/18 13:00








Intake and Output: 


                                        











 10/24/18 10/24/18





 06:59 18:59


 


Intake Total 100 


 


Output Total 0 


 


Balance 100 














- Medications


Medications: 


                               Current Medications





Acetaminophen (Tylenol 325mg Tab)  650 mg PO Q6 PRN


   PRN Reason: Fever >100.4 F


Calcium Acetate (Phoslo)  667 mg PO TIDCC [facility]


   Last Admin: 10/24/18 12:00 Dose:  Not Given


Dextrose (Dextrose 50% Inj)  0 ml IV STAT PRN; Protocol


   PRN Reason: Hypoglycemia Protocol


Dextrose (Glutose 15)  0 gm PO ONCE PRN; Protocol


   PRN Reason: Hypoglycemia Protocol


Glucagon (Glucagen Diagnostic Kit)  0 mg IM STAT PRN; Protocol


   PRN Reason: Hypoglycemia Protocol


Cefepime HCl 0.5 gm/ Dextrose  50 mls @ 100 mls/hr IVPB DAILY [facility]; Protocol


   Last Admin: 10/24/18 12:57 Dose:  100 mls/hr


Vancomycin HCl 1 gm/ Sodium (Chloride)  250 mls @ 166.7 mls/hr IVPB MWF [facility]; 

Protocol


   Last Admin: 10/24/18 10:48 Dose:  166.7 mls/hr


Insulin Human Regular (Novolin R)  0 unit SC ACHS [facility]; Protocol


   Last Admin: 10/24/18 11:30 Dose:  Not Given


Ondansetron HCl (Zofran Inj)  4 mg IVP Q6 PRN


   PRN Reason: Nausea/Vomiting











- Labs


Labs: 


                                        





                                 10/24/18 09:15 





                                 10/24/18 09:15 





                                        











PT  15.9 SECONDS (9.7-12.2)  H  10/24/18  09:15    


 


INR  1.5   10/24/18  09:15    


 


APTT  36 SECONDS (21-34)  H  10/24/18  09:15    














- Constitutional


Appears: No Acute Distress, Chronically Ill





- Head Exam


Head Exam: ATRAUMATIC, NORMAL INSPECTION





- Eye Exam


Eye Exam: EOMI





- Neck Exam


Neck Exam: Full ROM.  absent: Lymphadenopathy





- Cardiovascular Exam


Cardiovascular Exam: REGULAR RHYTHM.  absent: Rubs





- GI/Abdominal Exam


GI & Abdominal Exam: Distended.  absent: Tenderness





- Extremities Exam


Extremities Exam: Pedal Edema





- Neurological Exam


Neurological Exam: Alert, Awake, Oriented x3





Assessment and Plan





- Assessment and Plan (Free Text)


Assessment: 





esrd, maint therapy


s/p AB for portacath related bacteremia


chronic leg cellullitis

## 2018-10-24 NOTE — CP.PCM.DIS
Provider





- Provider


Date of Admission: 


10/18/18 00:01





Attending physician: 


Arin Manning DO





Time Spent in preparation of Discharge (in minutes): 45





Diagnosis





- Discharge Diagnosis


(1) Infection due to Port-A-Cath


Status: Acute   





Hospital Course





- Lab Results


Lab Results: 


                                  Micro Results





10/17/18 09:33   Blood   Blood Culture - Final


                            NO GROWTH AFTER 5 DAYS


10/17/18 09:33   Blood   Gram Stain - Final


                            TEST NOT PERFORMED


10/17/18 09:33   Blood   Blood Culture - Final


                            NO GROWTH AFTER 5 DAYS


10/17/18 09:33   Blood   Gram Stain - Final


                            TEST NOT PERFORMED


10/18/18 Unknown   Chest   Gram Stain - Final


10/18/18 Unknown   Chest   Wound Culture - Final


                                Staphylococcus Aureus


10/17/18 Unknown   Skin - Chest   Gram Stain - Final


10/17/18 Unknown   Skin - Chest   Wound Culture - Final


                                Staphylococcus Aureus





                             Most Recent Lab Values











WBC  5.7 K/uL (4.8-10.8)   10/23/18  07:17    


 


RBC  3.40 Mil/uL (4.40-5.90)  L  10/23/18  07:17    


 


Hgb  9.9 g/dL (12.0-18.0)  L  10/23/18  07:17    


 


Hct  29.8 % (35.0-51.0)  L  10/23/18  07:17    


 


MCV  87.6 fL (80.0-94.0)   10/23/18  07:17    


 


MCH  28.9 pg (27.0-31.0)   10/23/18  07:17    


 


MCHC  33.1 g/dL (33.0-37.0)   10/23/18  07:17    


 


RDW  19.2 % (11.5-14.5)  H  10/23/18  07:17    


 


Plt Count  207 K/uL (130-400)   10/23/18  07:17    


 


MPV  7.3 fL (7.2-11.7)   10/23/18  07:17    


 


Neut % (Auto)  74.2 % (50.0-75.0)   10/23/18  07:17    


 


Lymph % (Auto)  11.6 % (20.0-40.0)  L  10/23/18  07:17    


 


Mono % (Auto)  8.9 % (0.0-10.0)   10/23/18  07:17    


 


Eos % (Auto)  4.4 % (0.0-4.0)  H  10/23/18  07:17    


 


Baso % (Auto)  0.9 % (0.0-2.0)   10/23/18  07:17    


 


Neut # (Auto)  4.2 K/uL (1.8-7.0)   10/23/18  07:17    


 


Lymph # (Auto)  0.7 K/uL (1.0-4.3)  L  10/23/18  07:17    


 


Mono # (Auto)  0.5 K/uL (0.0-0.8)   10/23/18  07:17    


 


Eos # (Auto)  0.2 K/uL (0.0-0.7)   10/23/18  07:17    


 


Baso # (Auto)  0.1 K/uL (0.0-0.2)   10/23/18  07:17    


 


Neutrophils % (Manual)  84 % (50-75)  H  10/19/18  09:39    


 


Band Neutrophils %  1 % (0-2)   10/19/18  09:39    


 


Lymphocytes % (Manual)  10 % (20-40)  L  10/19/18  09:39    


 


Monocytes % (Manual)  3 % (0-10)   10/19/18  09:39    


 


Eosinophils % (Manual)  2 % (0-4)   10/19/18  09:39    


 


Platelet Estimate  Normal  (NORMAL)   10/19/18  09:39    


 


Polychromasia  Slight   10/19/18  09:39    


 


Hypochromasia (manual)  Slight   10/19/18  09:39    


 


Anisocytosis (manual)  Slight   10/19/18  09:39    


 


Microcytosis (manual)  Slight   10/17/18  22:57    


 


Ovalocytes  Slight   10/19/18  09:39    


 


PT  17.9 SECONDS (9.7-12.2)  H  10/23/18  07:17    


 


INR  1.6   10/23/18  07:17    


 


APTT  37 SECONDS (21-34)  H  10/23/18  07:17    


 


Sodium  137 mmol/L (132-148)   10/23/18  07:17    


 


Potassium  4.2 mmol/L (3.6-5.2)   10/23/18  07:17    


 


Chloride  97 mmol/L ()  L  10/23/18  07:17    


 


Carbon Dioxide  25 mmol/L (22-30)   10/23/18  07:17    


 


Anion Gap  19  (10-20)   10/23/18  07:17    


 


BUN  59 mg/dL (9-20)  H  10/23/18  07:17    


 


Creatinine  4.4 mg/dL (0.8-1.5)  H  10/23/18  07:17    


 


Est GFR ( Amer)  17   10/23/18  07:17    


 


Est GFR (Non-Af Amer)  14   10/23/18  07:17    


 


POC Glucose (mg/dL)  160 mg/dL ()  H  10/23/18  16:27    


 


Random Glucose  115 mg/dL ()  H  10/23/18  07:17    


 


Lactic Acid  0.9 mmol/L (0.7-2.1)   10/18/18  11:18    


 


Calcium  8.1 mg/dl (8.6-10.4)  L  10/23/18  07:17    


 


Total Bilirubin  1.0 mg/dL (0.2-1.3)   10/23/18  07:17    


 


AST  26 U/L (17-59)   10/23/18  07:17    


 


ALT  19 U/L (21-72)  L  10/23/18  07:17    


 


Alkaline Phosphatase  314 U/L ()  H  10/23/18  07:17    


 


Total Protein  6.5 g/dL (6.3-8.3)   10/23/18  07:17    


 


Albumin  3.2 g/dL (3.5-5.0)  L  10/23/18  07:17    


 


Globulin  3.3 gm/dL (2.2-3.9)   10/23/18  07:17    


 


Albumin/Globulin Ratio  1.0  (1.0-2.1)   10/23/18  07:17    


 


Procalcitonin  4.31 NG/ML (0.19-0.49)  H  10/19/18  09:39    


 


Random Vancomycin  11.9 ug/mL  10/22/18  16:49    














- Hospital Course


Hospital Course: 





Initial HPI:


Pt is a 63M with PMH ESRD, DM, CHF, A FIB W/RVR, AML, HTN, COPD, sleep apnea 

presents to ED with port a cath infection s/p HD. He reports feeling a burning 

sensation two days ago and that today after HD his wife noticed some pus coming 

from the site. Pt reports nausea and dry heaving. He denies any fever, abdominal

pain, shortness of breath, chest pain, diarrhea, constipation. 





In the ED, pt was given tylenol for Tmax 102F and BCx and wound Cx were 

obtained.





SxH: back surgery, pacemaker


FamH: mom heart disease, DM, dad CAD


SocH: denies tobacco, etoh, recreational drug use


Allergies: NKDA


Meds: coumadin 1 daily, starlix 60 TID, procrit, metoprolol succinate 25 daily


PMD: Dr. Jackson





This patient had initially presented to [facility] for port-a-cath 

infection, was treated with antibiotics, and then patient signed out AMA as he 

felt that the infection was improving.  When the infection began to worsen 

again, patient came to [facility] ER on 10/17 and was admitted for 

infection/abscess of port-a-cath site with fever of 102.0.  Patient had 

port-a-cath placed due to difficulty obtaining vascular access during patient's 

multiple prior hospitalizations.  Of note, patient has a history of multiple 

hospitalizations for infected wounds





Surgery was consulted (Dr. Lopez) for possible I&D/port-a-cath removal.  

Port-a-cath was removed by the surgical team  on 10/18, and a large amount of 

purulent drainage was pulled out within the pocket of the port.  It was 

recommended to the patient that he have a new port-a-cath placed following 

removal, however patient refused.





ID was consulted (Dr. Fink), and per his recommendations, patient was treated

throughout hospital course with IV Cefepime daily and IV Vancomycin MWF.  Aside 

from initial temperature of 102.0, patient remained afebrile for the remainder 

of his hospital course.  2D echo was ordered to rule out endocarditis, and was 

negative for any valvular lesions or calcifications.





Patient's has a history of hypertension, however was persistently hypotensive 

during hospital course with lower readings of about 90s/50-60s.  Patient stated 

that these were normal pressures for him, and he remained assymptomatic.  Home 

metoprolol was held.  





Podiatry was consulted for b/l LE ulcerations and wound care.  Wounds were 

cleaned and dressed, requiring no further intervention.





Patient is on chronic anticoagulation for Afib. ------------





Discharge Exam





- Head Exam


Head Exam: ATRAUMATIC, NORMOCEPHALIC





Discharge Plan





- Follow Up Plan


Condition: STABLE


Disposition: HOME/ ROUTINE


Instructions:  Heart Failure, Adult (DC), Central Line Infections (DC)


Referrals: 


Peri Fink MD [Staff Provider] - 


Liam Cortes MD [Staff Provider] - 


Faraz Thornton DPM [Staff Provider] -

## 2018-10-24 NOTE — CP.PCM.CON
History of Present Illness





- History of Present Illness


History of Present Illness: 


This is a a 63M with PMH ESRD, DM, CHF, A FIB W/RVR, AML, HTN, COPD, sleep 

apnea, h/o AML s/p chemotherapy around 10 years ago, came to the ED for 

evaluation of portacath site. He reports feeling a burning sensation two days 

ago and that today after HD his wife noticed some pus coming from the site. Pt 

reports nausea and dry heaving. He denies any fever, abdominal pain, shortness 

of breath, chest pain, diarrhea, constipation. 


In the ED, pt was given tylenol for Tmax 102F and BCx and wound Cx were 

obtained.


Last HD yesterday. 


On anticoagulation for a fib


Pt had an indwelling portacath for iv antibiotics and lab draws, difficult 

stick.


Heme called because of the difficulty in getting the INR to therapeutic levels 

and because of the patient's reluctance in getting the Heparin and concern about

bleeding.





SxH: back surgery, pacemaker


FamH: mom heart disease, DM, dad CAD


SocH: denies tobacco, etoh, recreational drug use


Allergies: NKDA


Meds: coumadin 1 daily, starlix 60 TID, procrit, metoprolol succinate 25 daily





Past Patient History





- Infectious Disease


Hx of Infectious Diseases: None





- Tetanus Immunizations


Tetanus Immunization: Unknown





- Past Medical History & Family History


Past Medical History?: Yes





- Past Social History


Smoking Status: Never Smoked





- CARDIAC


Hx Cardiac Disorders: Yes


Hx Atrial Fibrillation: Yes


Hx Cardia Arrhythmia: Yes (A FIB)


Hx Congestive Heart Failure: Yes


Hx Hypertension: Yes


Hx Pacemaker: Yes


Hx Peripheral Edema: Yes





- PULMONARY


Hx Respiratory Disorders: Yes


Hx Asthma: Yes


Hx Chronic Obstructive Pulmonary Disease (COPD): Yes (uses CPAP for sleep apnea)


Hx Sleep Apnea: Yes (C-PAP)





- NEUROLOGICAL


Hx Neurological Disorder: Yes (PERIPHERAL NEUROPATHY)


Hx Dizziness: Yes





- HEENT


Hx HEENT Problems: No





- RENAL


Hx Chronic Kidney Disease: Yes





- ENDOCRINE/METABOLIC


Hx Endocrine Disorders: Yes


Hx Diabetes Mellitus Type 2: Yes





- HEMATOLOGICAL/ONCOLOGICAL


Hx Blood Disorders: Yes


Hx Blood Transfusions: Yes


Hx Cancer: Yes


Hx Chemotherapy: Yes


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





- INTEGUMENTARY


Hx Dermatological Problems: Yes


Hx Cellulitis: Yes





- MUSCULOSKELETAL/RHEUMATOLOGICAL


Hx Musculoskeletal Disorders: Yes


Hx Arthritis: Yes


Hx Falls: Yes


Hx Fractures: Yes


Hx Osteoporosis: Yes





- GASTROINTESTINAL


Hx Gastrointestinal Disorders: Yes


Hx Constipation: Yes





- GENITOURINARY/GYNECOLOGICAL


Hx Genitourinary Disorders: Yes


Other/Comment: dialysis  TTS





- PSYCHIATRIC


Hx Psychophysiologic Disorder: Yes


Hx Anxiety: Yes


Hx Depression: Yes


Hx Substance Use: No





- SURGICAL HISTORY


Hx Surgeries: Yes


Hx Angiogram: Yes


Hx Arteriovenous Shunt: Yes


Hx Cardiac Catheterization: Yes


Hx Orthopedic Surgery: Yes (KNEE)


Hx Vascular Access Device: Yes (RIGHT EMERSON CATH)


Other/Comment: hx back surgery T5.  defibrillator/pacemaker placement





- ANESTHESIA


Hx Anesthesia: Yes


Hx Anesthesia Reactions: No


Hx Malignant Hyperthermia: No





Meds


Allergies/Adverse Reactions: 


                                    Allergies











Allergy/AdvReac Type Severity Reaction Status Date / Time


 


No Known Allergies Allergy   Verified 08/16/18 04:19














- Medications


Medications: 


                               Current Medications





Acetaminophen (Tylenol 325mg Tab)  650 mg PO Q6 PRN


   PRN Reason: Fever >100.4 F


Calcium Acetate (Phoslo)  667 mg PO TIDCC [facility]


   Last Admin: 10/24/18 17:00 Dose:  667 mg


Dextrose (Dextrose 50% Inj)  0 ml IV STAT PRN; Protocol


   PRN Reason: Hypoglycemia Protocol


Dextrose (Glutose 15)  0 gm PO ONCE PRN; Protocol


   PRN Reason: Hypoglycemia Protocol


Glucagon (Glucagen Diagnostic Kit)  0 mg IM STAT PRN; Protocol


   PRN Reason: Hypoglycemia Protocol


Cefepime HCl 0.5 gm/ Dextrose  50 mls @ 100 mls/hr IVPB DAILY [facility]; Protocol


   Last Admin: 10/24/18 12:57 Dose:  100 mls/hr


Vancomycin HCl 1 gm/ Sodium (Chloride)  250 mls @ 166.7 mls/hr IVPB MWF [facility]; 

Protocol


   Last Admin: 10/24/18 10:48 Dose:  166.7 mls/hr


Insulin Human Regular (Novolin R)  0 unit SC ACHS [facility]; Protocol


   Last Admin: 10/24/18 19:23 Dose:  Not Given


Ondansetron HCl (Zofran Inj)  4 mg IVP Q6 PRN


   PRN Reason: Nausea/Vomiting


Warfarin Sodium (Coumadin)  5 mg PO 1800 [facility]


   Stop: 10/25/18 19:46











Results





- Vital Signs


Recent Vital Signs: 


                                Last Vital Signs











Temp  97.8 F   10/24/18 16:00


 


Pulse  112 H  10/24/18 16:00


 


Resp  20   10/24/18 16:00


 


BP  95/54 L  10/24/18 16:00


 


Pulse Ox  99   10/24/18 16:00














- Labs


Result Diagrams: 


                                 10/24/18 09:15





                                 10/24/18 09:15


Labs: 


                         Laboratory Results - last 24 hr











  10/23/18 10/24/18 10/24/18





  21:10 06:03 09:15


 


WBC    5.9


 


RBC    3.39 L


 


Hgb    9.6 L


 


Hct    29.8 L


 


MCV    87.9


 


MCH    28.4


 


MCHC    32.3 L


 


RDW    19.0 H


 


Plt Count    213


 


MPV    7.1 L


 


Neut % (Auto)    73.7


 


Lymph % (Auto)    11.3 L


 


Mono % (Auto)    8.7


 


Eos % (Auto)    4.8 H


 


Baso % (Auto)    1.5


 


Neut # (Auto)    4.3


 


Lymph # (Auto)    0.7 L


 


Mono # (Auto)    0.5


 


Eos # (Auto)    0.3


 


Baso # (Auto)    0.1


 


PT   


 


INR   


 


APTT   


 


Sodium   


 


Potassium   


 


Chloride   


 


Carbon Dioxide   


 


Anion Gap   


 


BUN   


 


Creatinine   


 


Est GFR ( Amer)   


 


Est GFR (Non-Af Amer)   


 


POC Glucose (mg/dL)  141 H  97 


 


Random Glucose   


 


Calcium   


 


Magnesium   


 


Total Bilirubin   


 


AST   


 


ALT   


 


Alkaline Phosphatase   


 


Total Protein   


 


Albumin   


 


Globulin   


 


Albumin/Globulin Ratio   














  10/24/18 10/24/18 10/24/18





  09:15 09:15 11:09


 


WBC   


 


RBC   


 


Hgb   


 


Hct   


 


MCV   


 


MCH   


 


MCHC   


 


RDW   


 


Plt Count   


 


MPV   


 


Neut % (Auto)   


 


Lymph % (Auto)   


 


Mono % (Auto)   


 


Eos % (Auto)   


 


Baso % (Auto)   


 


Neut # (Auto)   


 


Lymph # (Auto)   


 


Mono # (Auto)   


 


Eos # (Auto)   


 


Baso # (Auto)   


 


PT   15.9 H 


 


INR   1.5 


 


APTT   36 H 


 


Sodium  135  


 


Potassium  4.1  


 


Chloride  98  


 


Carbon Dioxide  21 L  


 


Anion Gap  21 H  


 


BUN  70 H  


 


Creatinine  5.6 H  


 


Est GFR ( Amer)  13  


 


Est GFR (Non-Af Amer)  10  


 


POC Glucose (mg/dL)    114 H


 


Random Glucose  121 H  


 


Calcium  8.3 L  


 


Magnesium  2.1  


 


Total Bilirubin  1.0  


 


AST  21  


 


ALT  20 L  


 


Alkaline Phosphatase  311 H  


 


Total Protein  6.5  


 


Albumin  3.3 L  


 


Globulin  3.2  


 


Albumin/Globulin Ratio  1.1  














  10/24/18





  16:40


 


WBC 


 


RBC 


 


Hgb 


 


Hct 


 


MCV 


 


MCH 


 


MCHC 


 


RDW 


 


Plt Count 


 


MPV 


 


Neut % (Auto) 


 


Lymph % (Auto) 


 


Mono % (Auto) 


 


Eos % (Auto) 


 


Baso % (Auto) 


 


Neut # (Auto) 


 


Lymph # (Auto) 


 


Mono # (Auto) 


 


Eos # (Auto) 


 


Baso # (Auto) 


 


PT 


 


INR 


 


APTT 


 


Sodium 


 


Potassium 


 


Chloride 


 


Carbon Dioxide 


 


Anion Gap 


 


BUN 


 


Creatinine 


 


Est GFR ( Amer) 


 


Est GFR (Non-Af Amer) 


 


POC Glucose (mg/dL)  223 H


 


Random Glucose 


 


Calcium 


 


Magnesium 


 


Total Bilirubin 


 


AST 


 


ALT 


 


Alkaline Phosphatase 


 


Total Protein 


 


Albumin 


 


Globulin 


 


Albumin/Globulin Ratio 














Assessment & Plan


(1) On Coumadin for atrial fibrillation


Assessment and Plan: 


64 yo man with atrial fibrillation, restarted on Coumadin after recent port 

removal, with difficulty in getting the INR to therapeutic levels, had 

discussion with the patient who is concerned about he risks of heparin and the 

need for phlebotomy several times a day.


Have informed the patient that the Coumadin alone may take several days to get 

to therapeutic level and that the bridging with Heparin will decrease the time, 

also that he has the outpatient option of low dose Eliquis , so he can avoid 

weekly phlebotomy, but the Factor Xa level monitoring may be necessary.


Also spoke to his wife over the phone, he is still undecided and said he will 

discuss with his wife and let us know by tomorrow


Status: Acute

## 2018-10-25 LAB
ALBUMIN SERPL-MCNC: 3.4 [, G/DL] (ref 3.5–5)
ALBUMIN/GLOB SERPL: 1.1 [,] (ref 1–2.1)
ALT SERPL-CCNC: 25 [, U/L] (ref 21–72)
APTT BLD: 38 [, SECONDS] (ref 21–34)
AST SERPL-CCNC: 19 [, U/L] (ref 17–59)
BASOPHILS # BLD AUTO: 0.1 [, K/UL] (ref 0–0.2)
BASOPHILS NFR BLD: 1 [, %] (ref 0–2)
BUN SERPL-MCNC: 54 [, MG/DL] (ref 9–20)
CALCIUM SERPL-MCNC: 8.5 [, MG/DL] (ref 8.6–10.4)
EOSINOPHIL # BLD AUTO: 0.3 [, K/UL] (ref 0–0.7)
EOSINOPHIL NFR BLD: 4.4 [, %] (ref 0–4)
ERYTHROCYTE [DISTWIDTH] IN BLOOD BY AUTOMATED COUNT: 19.3 [, %] (ref 11.5–14.5)
GFR NON-AFRICAN AMERICAN: 13 [,]
HGB BLD-MCNC: 9.6 [, G/DL] (ref 12–18)
INR PPP: 1.6 [,]
LYMPHOCYTES # BLD AUTO: 0.7 [, K/UL] (ref 1–4.3)
LYMPHOCYTES NFR BLD AUTO: 11.3 [, %] (ref 20–40)
MCH RBC QN AUTO: 29 [, PG] (ref 27–31)
MCHC RBC AUTO-ENTMCNC: 33.1 [, G/DL] (ref 33–37)
MCV RBC AUTO: 87.7 [, FL] (ref 80–94)
MONOCYTES # BLD: 0.6 [, K/UL] (ref 0–0.8)
MONOCYTES NFR BLD: 8.9 [, %] (ref 0–10)
NEUTROPHILS # BLD: 4.7 [, K/UL] (ref 1.8–7)
NEUTROPHILS NFR BLD AUTO: 74.4 [, %] (ref 50–75)
NRBC BLD AUTO-RTO: 0 [, %] (ref 0–2)
PLATELET # BLD: 221 [, K/UL] (ref 130–400)
PMV BLD AUTO: 7.2 [, FL] (ref 7.2–11.7)
PROTHROMBIN TIME: 17.1 [, SECONDS] (ref 9.7–12.2)
RBC # BLD AUTO: 3.31 [, MIL/UL] (ref 4.4–5.9)
WBC # BLD AUTO: 6.4 [, K/UL] (ref 4.8–10.8)

## 2018-10-25 RX ADMIN — HUMAN INSULIN SCH: 100 INJECTION, SOLUTION SUBCUTANEOUS at 11:30

## 2018-10-25 RX ADMIN — HUMAN INSULIN SCH: 100 INJECTION, SOLUTION SUBCUTANEOUS at 21:23

## 2018-10-25 RX ADMIN — HUMAN INSULIN SCH: 100 INJECTION, SOLUTION SUBCUTANEOUS at 17:30

## 2018-10-25 RX ADMIN — HUMAN INSULIN SCH: 100 INJECTION, SOLUTION SUBCUTANEOUS at 07:39

## 2018-10-25 NOTE — CP.PCM.PN
Subjective





- Date & Time of Evaluation


Date of Evaluation: 10/25/18


Time of Evaluation: 14:57





- Subjective


Subjective: 





PGY-1 Progress Note for Dr. Manning





Patient seen and examined at bedside.  Patient appears slightly anxious and 

states he has not been able to sleep.  We discussed with patient and his wife 

and explained that the anticoagulation options are Coumadin alone vs. Heparin 

bride vs. low-dose eliquis (renal dosing).  Patient and patient's wife were 

explained the relevant risks and benefits of all options and patient is still 

deciding which option he prefers.  Patient is apprehensive because of past 

experience where he was discharged and subsequently had a major bleed.  Will 

continue to assess.  Will continue to follow up regarding patient choice of 

anticoagulation.  Per nursing, patient has been experiencing some watery non-

bloody diarrhea, but is  not complaining of any other symptoms including 

abdominal pain, nausea, vomiting, or fevers.  Patient denies any dizziness or 

lightheadedness, chest pain, palpitations.








Objective





- Vital Signs/Intake and Output


Vital Signs (last 24 hours): 


                                        











Temp Pulse Resp BP Pulse Ox


 


 98.3 F   105 H  20   86/56 L  100 


 


 10/25/18 08:28  10/25/18 08:28  10/25/18 08:28  10/25/18 09:20  10/25/18 08:28








Intake and Output: 


                                        











 10/25/18 10/25/18





 06:59 18:59


 


Intake Total 240 


 


Balance 240 














- Medications


Medications: 


                               Current Medications





Acetaminophen (Tylenol 325mg Tab)  650 mg PO Q6 PRN


   PRN Reason: Fever >100.4 F


Calcium Acetate (Phoslo)  667 mg PO TIDCC [facility]


   Last Admin: 10/25/18 12:34 Dose:  Not Given


Dextrose (Dextrose 50% Inj)  0 ml IV STAT PRN; Protocol


   PRN Reason: Hypoglycemia Protocol


Dextrose (Glutose 15)  0 gm PO ONCE PRN; Protocol


   PRN Reason: Hypoglycemia Protocol


Glucagon (Glucagen Diagnostic Kit)  0 mg IM STAT PRN; Protocol


   PRN Reason: Hypoglycemia Protocol


Cefepime HCl 0.5 gm/ Dextrose  50 mls @ 100 mls/hr IVPB DAILY [facility]; Protocol


   Last Admin: 10/25/18 10:23 Dose:  100 mls/hr


Vancomycin HCl 1 gm/ Sodium (Chloride)  250 mls @ 166.7 mls/hr IVPB MWF [facility]; 

Protocol


   Last Admin: 10/24/18 10:48 Dose:  166.7 mls/hr


Insulin Human Regular (Novolin R)  0 unit SC ACHS [facility]; Protocol


   Last Admin: 10/25/18 11:30 Dose:  Not Given


Ondansetron HCl (Zofran Inj)  4 mg IVP Q6 PRN


   PRN Reason: Nausea/Vomiting











- Labs


Labs: 


                                        





                                 10/25/18 07:20 





                                 10/25/18 07:20 





                                        











PT  17.1 SECONDS (9.7-12.2)  H  10/25/18  07:20    


 


INR  1.6   10/25/18  07:20    


 


APTT  38 SECONDS (21-34)  H  10/25/18  07:20    














- Constitutional


Appears: Non-toxic, Other (Obese)





- Head Exam


Head Exam: ATRAUMATIC, NORMAL INSPECTION





- Eye Exam


Eye Exam: EOMI, Normal appearance


Pupil Exam: NORMAL ACCOMODATION, PERRL





- ENT Exam


ENT Exam: Mucous Membranes Moist





- Respiratory Exam


Respiratory Exam: Clear to Ausculation Bilateral, NORMAL BREATHING PATTERN.  

absent: Rales, Wheezes





- Cardiovascular Exam


Cardiovascular Exam: RRR, +S1, +S2.  absent: Murmur





- GI/Abdominal Exam


GI & Abdominal Exam: Soft, Normal Bowel Sounds.  absent: Tenderness





- Extremities Exam


Additional comments: 





Chronic venous stasis changes bilaterally





- Neurological Exam


Neurological Exam: Alert, Awake, Oriented x3





- Psychiatric Exam


Psychiatric exam: Normal Affect, Normal Mood





- Skin


Skin Exam: Dry, Intact, Normal Color, Warm





Assessment and Plan





- Assessment and Plan (Free Text)


Assessment: 





Assessment: 


63M with PMH ESRD, DM, CHF, A FIB W/RVR, AML, HTN, COPD, sleep apnea who 

presented with portacath infection, now s/p removal of portacath. INR is sub-

therapeutic in setting of A Fib with RVR.





Abscess/Infection of Portacath


- Pt with portacath, draining white purulent fluid on R chest on admission; s/p 

removal by Surgery POD#7, pt doing well post-op. No further surgical 

interventions needed at this time.


- Tmax 100.3F on admission. Now afebrile x6 days


- Leukocytosis resolved


- ID consulted, Dr. Fink


- Continue Cefepime 0.5 g per ID recs


- Continue Vanco 1gm MWF with HD as per ID recs


   random vanc level 10/22 18:00 11.9


- Spoke with Dr. Hernnádez will recheck random vanc Friday before dialysis.  If 

vancomycin is at safe blood levels, then patient may be treated as an outpatient

with vancomycin MWF, thus not requiring daily Abx.


- BCx: NG - FINAL


- Wound Cx: S. aureus 


- 2D echo ordered to r/o endocarditis: LVEF 53%, LV function is mild to 

moderately reduced with anteroseptal hypokinesis, and septal flattening c/w 

elevated RV pressure. The aortic root is calcified and demonstrates mildly to 

moderately reduced opening. Peak gradient was 12mmHg, valve area was not 

performed. There is mildly increased left ventricular wall thickness. The right 

ventricle is severely dilated. Systolic function is severely reduced. There is 

likely severe pulmonary HTN, underestimated by doppler. 


- INR 2.5 10/19-> 1.8 10/21 --> 1.6 10/23 --> 1.5 10/24. --> 1.6 10/25 


--Patient had been receiving daily coumadin 3mg PO with INR continuing to 

decrease.  5mg Coumadin given yesterday.


   -I Spoke with Dr. Archer, who has been consulted and her help is much 

appreciated.  She will see the patient and speak with the patient's family in 

         hopes that he will agree to heparin bridge or low-dose Eliquis 

otherwise patient will have to remain hospitalized until Coumadin levels become 

therapeutic without bridge.


   -Anticoagulation options are Coumadin alone vs. Heparin bride vs. low-dose 

eliquis (renal dosing).  Patient and patient's wife were explained the relevant 

risks and benefits of all options and patient is still deciding which option he 

prefers.  Patient is apprehensive because of past experience where he was 

discharged and subsequently had a major bleed.  Will continue to follow up 

regarding patient choice of anticoagulation.





ESRD


- Nephro consulted, Dr. Cortes, help appreciated. Pt to continue HD MWF.


- BUN/Cr today 54/4.6





DM


- accuchecks q6h


- low dose sliding scale


- hypoglycemia protocol





HTN


- BP 97/52 on admission, BP 86/56 this morning today, but patient remains 

asymptomatic.  Patient states his normal bp at home is 90s/50s.


- Home metoprolol held


- Avoid IV fluid boluses if possible





JEB


- Patient uses CPAP at home


- not amenable to using bipap


- wife to bring home CPAP for use inpatient





B/l LE Ulcers


- On exam noted in L heel superior aspect, R achilles tendon area


- Podiatry consulted (Dr. Thornton) for wound care, recs appreciated


- Podiatry c/w wound care.  Wounds stable, no signs of infection, no surgical 

intervention indicated.





Hx Pacemaker, CHF


- Per Pt's wife pt has not followed with cardiology outpatient in a while


- Cardiology (Dr. Kaplan) consulted, recs appreciated


- Metoprolol held for low bps, continue to monitor





PPX


Warfarin 5mg PO daily


Renal diet





Dispo: Blood cultures negative x5.  INR had not been optimized on Coumadin 3mg 

daily.  5mg Coumadin given yesterday as patient refused heparin.  If patient is 

amenable to heparin or low-dose eliquis, we will adjust anticoagulation. 

Otherwise continue Coumadin only until therapeutic levels are reached.





Case discussed with attending, Dr. Nigel Dumas, PGY-1

## 2018-10-25 NOTE — CP.PCM.PN
Subjective





- Date & Time of Evaluation


Date of Evaluation: 10/25/18


Time of Evaluation: 14:30





- Subjective


Subjective: 


Podiatry Progress Note- Dr. Thornton





62 y/o male seen at bedside this morning for bilateral lower extremity 

ulcerations. Pt has multipodus boots and dressings in place. Pt asleep at time 

of visit in NAD. Denies pain to his lower extremities. Denies F/C/N/V/CP/SOB














Objective





- Vital Signs/Intake and Output


Vital Signs (last 24 hours): 


                                        











Temp Pulse Resp BP Pulse Ox


 


 98.3 F   105 H  20   86/56 L  100 


 


 10/25/18 08:28  10/25/18 08:28  10/25/18 08:28  10/25/18 09:20  10/25/18 08:28








Intake and Output: 


                                        











 10/25/18 10/25/18





 06:59 18:59


 


Intake Total 240 


 


Balance 240 














- Medications


Medications: 


                               Current Medications





Acetaminophen (Tylenol 325mg Tab)  650 mg PO Q6 PRN


   PRN Reason: Fever >100.4 F


Calcium Acetate (Phoslo)  667 mg PO TIDCC [facility]


   Last Admin: 10/25/18 12:34 Dose:  Not Given


Dextrose (Dextrose 50% Inj)  0 ml IV STAT PRN; Protocol


   PRN Reason: Hypoglycemia Protocol


Dextrose (Glutose 15)  0 gm PO ONCE PRN; Protocol


   PRN Reason: Hypoglycemia Protocol


Glucagon (Glucagen Diagnostic Kit)  0 mg IM STAT PRN; Protocol


   PRN Reason: Hypoglycemia Protocol


Cefepime HCl 0.5 gm/ Dextrose  50 mls @ 100 mls/hr IVPB DAILY [facility]; Protocol


   Last Admin: 10/25/18 10:23 Dose:  100 mls/hr


Vancomycin HCl 1 gm/ Sodium (Chloride)  250 mls @ 166.7 mls/hr IVPB MWF [facility]; 

Protocol


   Last Admin: 10/24/18 10:48 Dose:  166.7 mls/hr


Insulin Human Regular (Novolin R)  0 unit SC ACHS [facility]; Protocol


   Last Admin: 10/25/18 11:30 Dose:  Not Given


Ondansetron HCl (Zofran Inj)  4 mg IVP Q6 PRN


   PRN Reason: Nausea/Vomiting











- Labs


Labs: 


                                        





                                 10/25/18 07:20 





                                 10/25/18 07:20 





                                        











PT  17.1 SECONDS (9.7-12.2)  H  10/25/18  07:20    


 


INR  1.6   10/25/18  07:20    


 


APTT  38 SECONDS (21-34)  H  10/25/18  07:20    














- Constitutional


Appears: Well, Non-toxic, No Acute Distress





- Extremities Exam


Additional comments: 


Lower extremity focused exam:


Vasc: Nonpalpable pulses to the DP and PT, delayed CFT to the digits, temperatur

e gradient WNL,  +1 pitting pedal edema


Derm: chronic skin changes with hyperpigmented skin to entire lower extremity, 

skin thickening and flaking bilateral lower legs.


Left: Left posterior heel ulceration now fully healed with overlying thin 

fragile skin. Mild dark discoloration noted to skin area at site of prior 

ulceration.


Right: Full thickness ulceration noted to posterior ankle at level of Achilles 

insertion measuring approximately 3 cm x 2 cm x 0.3cm, with mixture granular and

fibrotic wound base. No active drainage noted. No fluctuance noted. Minor marcos-

wound erythema, no streaking appreciated.


Neuro: Gross sensation absent B/L. protective sensation absent


Ortho: no pain appreciated with palpation to surrounding ulceration sites. No 

pain with calf palpation bilaterally.

















- Neurological Exam


Neurological Exam: Alert, Awake, Oriented x3





- Psychiatric Exam


Psychiatric exam: Normal Affect, Normal Mood





Assessment and Plan





- Assessment and Plan (Free Text)


Assessment: 


63 y/o male patient, seen and evaluated at bedside for B/L lower extremity 

ulcerations





Plan: 


Patient seen and evaluated at bedside


Discussed plan with Dr. Thornton


Patient afebrile, absent leukocytosis 


Podiatry to continue with local wound care: wounds cleansed with saline and 

dressed with ABD, DSD; betadine applied to R posterior ankle wound


Wounds stable, no current signs of infection


No surgical intervention at this time


Patient to wear multipodus boots at all times while in bed 


Will continue to follow

## 2018-10-25 NOTE — CP.PCM.PN
Subjective





- Date & Time of Evaluation


Date of Evaluation: 10/25/18


Time of Evaluation: 09:25





- Subjective


Subjective: 





s/p dialysis 10/24


Seen by heme- eliquis being considered


on IV ABs for cath infection as per ID


appears same





Objective





- Vital Signs/Intake and Output


Vital Signs (last 24 hours): 


                                        











Temp Pulse Resp BP Pulse Ox


 


 98.3 F   105 H  20   86/56 L  100 


 


 10/25/18 08:28  10/25/18 08:28  10/25/18 08:28  10/25/18 09:20  10/25/18 08:28








Intake and Output: 


                                        











 10/25/18 10/25/18





 06:59 18:59


 


Intake Total 240 


 


Balance 240 














- Medications


Medications: 


                               Current Medications





Acetaminophen (Tylenol 325mg Tab)  650 mg PO Q6 PRN


   PRN Reason: Fever >100.4 F


Calcium Acetate (Phoslo)  667 mg PO TIDCC [facility]


   Last Admin: 10/25/18 08:44 Dose:  667 mg


Dextrose (Dextrose 50% Inj)  0 ml IV STAT PRN; Protocol


   PRN Reason: Hypoglycemia Protocol


Dextrose (Glutose 15)  0 gm PO ONCE PRN; Protocol


   PRN Reason: Hypoglycemia Protocol


Glucagon (Glucagen Diagnostic Kit)  0 mg IM STAT PRN; Protocol


   PRN Reason: Hypoglycemia Protocol


Cefepime HCl 0.5 gm/ Dextrose  50 mls @ 100 mls/hr IVPB DAILY [facility]; Protocol


   Last Admin: 10/24/18 12:57 Dose:  100 mls/hr


Vancomycin HCl 1 gm/ Sodium (Chloride)  250 mls @ 166.7 mls/hr IVPB MWF [facility]; 

Protocol


   Last Admin: 10/24/18 10:48 Dose:  166.7 mls/hr


Insulin Human Regular (Novolin R)  0 unit SC ACHS [facility]; Protocol


   Last Admin: 10/25/18 07:39 Dose:  Not Given


Ondansetron HCl (Zofran Inj)  4 mg IVP Q6 PRN


   PRN Reason: Nausea/Vomiting











- Labs


Labs: 


                                        





                                 10/25/18 07:20 





                                 10/25/18 07:20 





                                        











PT  17.1 SECONDS (9.7-12.2)  H  10/25/18  07:20    


 


INR  1.6   10/25/18  07:20    


 


APTT  38 SECONDS (21-34)  H  10/25/18  07:20    














- Constitutional


Appears: No Acute Distress, Chronically Ill





- Head Exam


Head Exam: ATRAUMATIC, NORMAL INSPECTION





- Eye Exam


Eye Exam: EOMI, Normal appearance





- Neck Exam


Neck Exam: Normal Inspection.  absent: Tenderness





- Respiratory Exam


Respiratory Exam: Clear to Ausculation Bilateral, NORMAL BREATHING PATTERN





- Cardiovascular Exam


Cardiovascular Exam: REGULAR RHYTHM, +S1





- GI/Abdominal Exam


GI & Abdominal Exam: Soft.  absent: Tenderness





- Extremities Exam


Extremities Exam: Normal Inspection.  absent: Tenderness





- Neurological Exam


Neurological Exam: Awake, CN II-XII Intact





- Skin


Skin Exam: Dry, Warm





Assessment and Plan


(1) Infection due to Port-A-Cath


Status: Acute   





(2) ESRD (end stage renal disease)


Status: Acute   





(3) Fluid overload


Status: Acute   





(4) History of non-Hodgkin's lymphoma


Status: Acute   





- Assessment and Plan (Free Text)


Plan: 





Dialysis MWF


IV ABs


Considering eliquis for AFib

## 2018-10-26 LAB
ALBUMIN SERPL-MCNC: 3.3 [, G/DL] (ref 3.5–5)
ALBUMIN/GLOB SERPL: 1 [,] (ref 1–2.1)
ALT SERPL-CCNC: 23 [, U/L] (ref 21–72)
APTT BLD: 38 [, SECONDS] (ref 21–34)
AST SERPL-CCNC: 19 [, U/L] (ref 17–59)
BASOPHILS # BLD AUTO: 0.1 [, K/UL] (ref 0–0.2)
BASOPHILS NFR BLD: 1.2 [, %] (ref 0–2)
BUN SERPL-MCNC: 64 [, MG/DL] (ref 9–20)
CALCIUM SERPL-MCNC: 8.4 [, MG/DL] (ref 8.6–10.4)
EOSINOPHIL # BLD AUTO: 0.2 [, K/UL] (ref 0–0.7)
EOSINOPHIL NFR BLD: 4 [, %] (ref 0–4)
ERYTHROCYTE [DISTWIDTH] IN BLOOD BY AUTOMATED COUNT: 19.2 [, %] (ref 11.5–14.5)
GFR NON-AFRICAN AMERICAN: 11 [,]
HGB BLD-MCNC: 9.6 [, G/DL] (ref 12–18)
INR PPP: 1.8 [,]
LYMPHOCYTES # BLD AUTO: 0.6 [, K/UL] (ref 1–4.3)
LYMPHOCYTES NFR BLD AUTO: 10.6 [, %] (ref 20–40)
MCH RBC QN AUTO: 29 [, PG] (ref 27–31)
MCHC RBC AUTO-ENTMCNC: 32.7 [, G/DL] (ref 33–37)
MCV RBC AUTO: 88.6 [, FL] (ref 80–94)
MONOCYTES # BLD: 0.4 [, K/UL] (ref 0–0.8)
MONOCYTES NFR BLD: 7.3 [, %] (ref 0–10)
NEUTROPHILS # BLD: 4.1 [, K/UL] (ref 1.8–7)
NEUTROPHILS NFR BLD AUTO: 76.9 [, %] (ref 50–75)
NRBC BLD AUTO-RTO: 0 [, %] (ref 0–2)
PLATELET # BLD: 222 [, K/UL] (ref 130–400)
PMV BLD AUTO: 7.1 [, FL] (ref 7.2–11.7)
PROTHROMBIN TIME: 19.9 [, SECONDS] (ref 9.7–12.2)
RBC # BLD AUTO: 3.3 [, MIL/UL] (ref 4.4–5.9)
WBC # BLD AUTO: 5.3 [, K/UL] (ref 4.8–10.8)

## 2018-10-26 PROCEDURE — 5A1D70Z PERFORMANCE OF URINARY FILTRATION, INTERMITTENT, LESS THAN 6 HOURS PER DAY: ICD-10-PCS

## 2018-10-26 RX ADMIN — HUMAN INSULIN SCH: 100 INJECTION, SOLUTION SUBCUTANEOUS at 11:36

## 2018-10-26 RX ADMIN — HUMAN INSULIN SCH: 100 INJECTION, SOLUTION SUBCUTANEOUS at 08:11

## 2018-10-26 RX ADMIN — HUMAN INSULIN SCH: 100 INJECTION, SOLUTION SUBCUTANEOUS at 22:44

## 2018-10-26 RX ADMIN — HUMAN INSULIN SCH: 100 INJECTION, SOLUTION SUBCUTANEOUS at 18:07

## 2018-10-26 NOTE — CP.PCM.PN
Addendum entered and electronically signed by Florin Fulton  10/26/18 23:20: 


Patient was questioning about Coumadin. 


Dr. Levy who is covering for Dr. Archer ordered Eliquis 5mg BID. Patient 

received Eliquis instead of Coumadin. Will continue Eliquis 5mg PO BID for 

patient and discontinue Coumadin.





Original Note:








<Florin Fulton - Last Filed: 10/26/18 18:09>





Subjective





- Date & Time of Evaluation


Date of Evaluation: 10/26/18


Time of Evaluation: 07:18





- Subjective


Subjective: 


Florin Fulton Medicine progress note for Dr. Manning





Patient seen and examined at bedside. No acute events overnight. Patient has no 

complaints at this time. He denies fevers, chills, shortness of breath, chest 

pain, abdominal pain, nausea, or vomiting.











Objective





- Vital Signs/Intake and Output


Vital Signs (last 24 hours): 


                                        











Temp Pulse Resp BP Pulse Ox


 


 98.1 F   96 H  20   118/80   98 


 


 10/25/18 15:00  10/25/18 15:00  10/25/18 15:00  10/25/18 15:00  10/25/18 15:00











- Medications


Medications: 


                               Current Medications





Acetaminophen (Tylenol 325mg Tab)  650 mg PO Q6 PRN


   PRN Reason: Fever >100.4 F


Calcium Acetate (Phoslo)  667 mg PO [facility]


   Last Admin: 10/25/18 17:39 Dose:  667 mg


Dextrose (Dextrose 50% Inj)  0 ml IV STAT PRN; Protocol


   PRN Reason: Hypoglycemia Protocol


Dextrose (Glutose 15)  0 gm PO ONCE PRN; Protocol


   PRN Reason: Hypoglycemia Protocol


Glucagon (Glucagen Diagnostic Kit)  0 mg IM STAT PRN; Protocol


   PRN Reason: Hypoglycemia Protocol


Cefepime HCl 0.5 gm/ Dextrose  50 mls @ 100 mls/hr IVPB DAILY [facility]; Protocol


   Last Admin: 10/25/18 10:23 Dose:  100 mls/hr


Vancomycin HCl 1 gm/ Sodium (Chloride)  250 mls @ 166.7 mls/hr IVPB MWF [facility]; 

Protocol


   Last Admin: 10/24/18 10:48 Dose:  166.7 mls/hr


Insulin Human Regular (Novolin R)  0 unit SC ACHS [facility] hospital; Protocol


   Last Admin: 10/25/18 21:23 Dose:  Not Given


Ondansetron HCl (Zofran Inj)  4 mg IVP Q6 PRN


   PRN Reason: Nausea/Vomiting











- Labs


Labs: 


                                        





                                 10/25/18 07:20 





                                 10/25/18 07:20 





                                        











PT  17.1 SECONDS (9.7-12.2)  H  10/25/18  07:20    


 


INR  1.6   10/25/18  07:20    


 


APTT  38 SECONDS (21-34)  H  10/25/18  07:20    














- Additional Findings


Additional findings: 


- Constitutional


Appears: Non-toxic, Other (Obese)





- Head Exam


Head Exam: ATRAUMATIC, NORMAL INSPECTION





- Eye Exam


Eye Exam: EOMI, Normal appearance


Pupil Exam: NORMAL ACCOMODATION, PERRL





- ENT Exam


ENT Exam: Mucous Membranes Moist





- Respiratory Exam


Respiratory Exam: Clear to Ausculation Bilateral, NORMAL BREATHING PATTERN.  

absent: Rales, Wheezes





- Cardiovascular Exam


Cardiovascular Exam: RRR, +S1, +S2.  absent: Murmur





- GI/Abdominal Exam


GI & Abdominal Exam: Soft, Normal Bowel Sounds.  absent: Tenderness





- Extremities Exam


Additional comments: 





Chronic venous stasis changes bilaterally





- Neurological Exam


Neurological Exam: Alert, Awake, Oriented x3





- Psychiatric Exam


Psychiatric exam: Normal Affect, Normal Mood





- Skin


Skin Exam: Dry, Intact, Normal Color, Warm








Assessment and Plan





- Assessment and Plan (Free Text)


Assessment: 


Patient is a 63 year old male with PMH of ESRD on HD, DM, CHF, A Fib, AML, HTN, 

COPD, and sleep apnea presenting with portacath infection, now s/p removal of 

portacath. INR is sub-therapeutic in setting of A Fib with RVR.





Plan: 


Abscess/Infection of Portacath:


- Patient with portacath, draining white purulent fluid on right chest on 

admission


- Surgery, Dr. Lopez consulted 


- S/p removal by surgery, no further surgical interventions needed at this time


- ID consulted, Dr. Fink


- C/w Cefepime 0.5 g IV QD


- Random Vancomycin level (10/26): 9.4


- Increase dose to Vancomycin 1.5gm IV MWF with HD as per ID


- Spoke with Dr. Hernández will recheck random vanc Friday before dialysis.  If 

vancomycin is at safe blood levels, then patient may be treated as an outpatient

with vancomycin MWF, thus not requiring daily Abx.


- Blood culture: negative


- Wound Culture: S. aureus 


- 2D echo (10/19): LVEF 53%, LV function is mild to moderately reduced with 

anteroseptal hypokinesis, and septal flattening c/w elevated RV pressure. The 

aortic root is calcified and demonstrates mildly to moderately reduced opening. 

Peak gradient was 12mmHg, valve area was not performed. There is mildly 

increased left ventricular wall thickness. The right ventricle is severely 

dilated. Systolic function is severely reduced. There is likely severe pulmonary

HTN, underestimated by doppler. 





Sub-therapeutic INR:


- INR: 1.6 (10/23) --> 1.5 (10/24) --> 1.6 (10/25) --> 1.8 (10/26)


- C/w 5mg Coumadin PO QD


- Heme/onc, Dr. Archer consulted


- Spoke with Dr. Archer, will continue 5mg of Coumadin since INR is going 

up. Patient can be discharged with 6mg of INR as long as patient follows up with

his hematologist, gets bi-weekly labs, and maintain INR levels between 2.0-2.5





ESRD:


- HD on MWF


- Nephro consulted, Dr. Cortes


- BUN/Cr today 64/5.4





DM:


- Accuchecks q6h


- ISS low


- Hypoglycemia protocol





HTN:


- Home metoprolol held due to blood pressure in the 90's/50's


- Avoid IV fluid boluses if possible





JEB:


- Patient uses CPAP at home


- Not amenable to using bipap


- Wife to bring home CPAP for use inpatient





B/l LE Ulcers:


- Podiatry, Dr. Thornton consulted 


- C/w local wound care, no signs of infection, no surgical intervention 

indicated





CHF:


- Patient has a pacemaker


- Cardiology, Dr. Kaplan consulted


- Metoprolol held for hypotension


- Continue to monitor





Prophylaxis/diet:


- Warfarin 5mg PO daily


- Renal diet





Case discussed with Dr. Nigel Fulton PGY-1











<Arin Manning - Last Filed: 10/27/18 19:43>





Objective





- Vital Signs/Intake and Output


Vital Signs (last 24 hours): 


                                        











Temp Pulse Resp BP Pulse Ox


 


 98.2 F   99 H  20   103/44 L  100 


 


 10/27/18 15:00  10/27/18 15:00  10/27/18 15:00  10/27/18 15:00  10/27/18 15:00











- Medications


Medications: 


                               Current Medications





Acetaminophen (Tylenol 325mg Tab)  650 mg PO Q6 PRN


   PRN Reason: Fever >100.4 F


Apixaban (Eliquis)  2.5 mg PO BID [facility]


   Last Admin: 10/27/18 17:47 Dose:  2.5 mg


Calcium Acetate (Phoslo)  667 mg PO [facility]


   Last Admin: 10/27/18 17:47 Dose:  667 mg


Dextrose (Dextrose 50% Inj)  0 ml IV STAT PRN; Protocol


   PRN Reason: Hypoglycemia Protocol


Dextrose (Glutose 15)  0 gm PO ONCE PRN; Protocol


   PRN Reason: Hypoglycemia Protocol


Glucagon (Glucagen Diagnostic Kit)  0 mg IM STAT PRN; Protocol


   PRN Reason: Hypoglycemia Protocol


Cefepime HCl 0.5 gm/ Dextrose  50 mls @ 100 mls/hr IVPB DAILY [facility]; Protocol


   Last Admin: 10/27/18 10:13 Dose:  100 mls/hr


Vancomycin HCl 1.5 gm/ Sodium (Chloride)  500 mls @ 166.7 mls/hr IVPB MWF [facility]; 

Protocol


Insulin Human Regular (Novolin R)  0 unit SC ACHS [facility]; Protocol


   Last Admin: 10/27/18 17:47 Dose:  Not Given


Ondansetron HCl (Zofran Inj)  4 mg IVP Q6 PRN


   PRN Reason: Nausea/Vomiting











- Labs


Labs: 


                                        





                                 10/27/18 06:29 





                                 10/27/18 06:29 





                                        











PT  28.2 SECONDS (9.7-12.2)  H D 10/27/18  06:29    


 


INR  2.6  D 10/27/18  06:29    


 


APTT  43 SECONDS (21-34)  H D 10/27/18  06:29    














Attending/Attestation





- Attestation


I have personally seen and examined this patient.: Yes


I have fully participated in the care of the patient.: Yes


I have reviewed all pertinent clinical information, including history, physical 

exam and plan: Yes


Notes (Text): 


This is late computer entry for 10/26/18.


Patient seen, examined and case discussed with medical resident.


Patient seen this morning during dialysis. Patient is very calm and sleeping at 

dialysis. Vitals are stable. 


Patient's INR subtherapeutic at 1.8 status post coumadin 5mg last night. 


Hematology-oncology has had a discussion with family; they have ultimately 

decided for Eliquis. Will cancel our coumadin dose for tonight.


I have spoken with Dr. Fink; patient is not therapuetic on Vancomycin; 

recommends increase to Vancomycin 1.5gm MWF. Patient is pending ID clearance for

discharge planning. 





Assessment/Plan


1) Abscess and nfection of Portacath


Assessment/Plan


* Pt with portacath, draining white purulent fluid on R chest on admission; s/p 

  removal by Surgery on 10/18/18 pt doing well post-op. No further surgical 

  interventions needed at this time.


* Remains afebrile since initial fever at 102F


* Infectious Disease (Dr. Fink) on board--> help appreciated


   * Cefepime 0.5gm IVPB qdaily (active since 10/19/18)


   * Increase Vancomycin 1.5gm IVPB MWF (active since 10/26/18)


   * I have spoke with Dr. Fink in light of random vanc Friday before 

     dialysis. Increase in Vancomycin to 1.5gm. 


* Blood culture (10/17/18): no growth after 5 days X2


* Skin/Chest (10/17/18): Staph Aureus 


* Chest (10/18/18): Staph Aureus


* Echocardiogram:  LVEF 53%, LV function is mild to moderately reduced with 

  anteroseptal hypokinesis, and septal flattening c/w elevated RV pressure. The 

  aortic root is calcified and demonstrates mildly to moderately reduced 

  opening. Peak gradient was 12mmHg, valve area was not performed. There is m

  ildly increased left ventricular wall thickness. The right ventricle is 

  severely dilated. Systolic function is severely reduced. There is likely 

  severe pulmonary HTN, underestimated by doppler. 








2) Atrial Fibrillation 


     Subtherapuetic INR


Assessment/Plan


* Dr. Archer (Heme-oncology) on board-->help appreciated


   * I explained to her patient and wife had refused Eliquis because of bleeding

     risk and  had refused heparin drip because of bleeding risk


   * Heme oncology has had discussion with family-->they have decided on 

     Eliquis. 


* Dr. Kaplan (Cardiology) on board-->help appreciated


   * Recommended for Eliquis; patient and wife refused


* Echo: LVEF 53%, LV function is mild to moderately reduced with anteroseptal 

  hypokinesis, and septal flattening c/w elevated RV pressure. The aortic root 

  is calcified and demonstrates mildly to moderately reduced opening. Peak 

  gradient was 12mmHg, valve area was not performed. There is mildly increased 

  left ventricular wall thickness. The right ventricle is severely dilated. 

  Systolic function is severely reduced. There is likely severe pulmonary HTN, 

  underestimated by doppler. 





3) ESRD


Assessment/Plan


* Nephro consulted, Dr. Cortes, help appreciated. Pt to continue HD MWF.


* Phoslo 667mg PO TIDCC





4) Diabetes


Assessment/Plan


* accuchecks q6h


* low dose sliding scale


* hypoglycemia protocol


* a1c: 4.8





5) HTN


Assessment/Plan


* monitor vital signs


* Home metoprolol held





6. History of JEB


Assessment/Plan


* pt uses CPAP at home


* not amenable to using bipap


* wife to bring home CPAP for use inpatient





7. B/l LE Ulcers


Assessment/Plan


* On exam noted in L heel superior aspect, R achilles tendon area


* Podiatry consulted (Dr. Thornton) for wound care, recs appreciated


* Podiatry c/w wound care.  Wounds stable, no signs of infection, no surgical 

  intervention indicated.





8. Hx Pacemaker, Systolic CHF


Assessment/Plan


* Per Pt's wife pt has not followed with cardiology outpatient in a while


* Cardiology (Dr. Kaplan) consulted, recs appreciated


* Metoprolol held for low bps, continue to monitor


* Echo: LVEF 53%, LV function is mild to moderately reduced with anteroseptal h

  ypokinesis, and septal flattening c/w elevated RV pressure. The aortic root is

  calcified and demonstrates mildly to moderately reduced opening. Peak gradient

  was 12mmHg, valve area was not performed. There is mildly increased left 

  ventricular wall thickness. The right ventricle is severely dilated. Systolic 

  function is severely reduced. There is likely severe pulmonary HTN, 

  underestimated by doppler. 





9. PPX


* upon discussion between patient and hematology, ultimately decided on Eliquis.

  


* Renal diet





Dispo: Awaiting infectious disease clearance in light of non therapuetic dosing 

of Vancomycin. Vancomycin increased per discussion with ID.

## 2018-10-26 NOTE — CP.PCM.PN
Subjective





- Date & Time of Evaluation


Date of Evaluation: 10/26/18


Time of Evaluation: 13:47





- Subjective


Subjective: 





Stable dialysis now; tolerated well


Afebrile course


can give IV ABs at outpt HD if ok with ID


pt agrees for eliquis in lieu of coumadin


no new complaint








Objective





- Vital Signs/Intake and Output


Vital Signs (last 24 hours): 


                                        











Temp Pulse Resp BP Pulse Ox


 


 96.8 F L  102 H  16   87/38 L  99 


 


 10/26/18 09:54  10/26/18 09:54  10/26/18 09:54  10/26/18 10:30  10/26/18 08:00











- Medications


Medications: 


                               Current Medications





Acetaminophen (Tylenol 325mg Tab)  650 mg PO Q6 PRN


   PRN Reason: Fever >100.4 F


Calcium Acetate (Phoslo)  667 mg PO TIDCC [facility]


   Last Admin: 10/26/18 11:36 Dose:  Not Given


Dextrose (Dextrose 50% Inj)  0 ml IV STAT PRN; Protocol


   PRN Reason: Hypoglycemia Protocol


Dextrose (Glutose 15)  0 gm PO ONCE PRN; Protocol


   PRN Reason: Hypoglycemia Protocol


Glucagon (Glucagen Diagnostic Kit)  0 mg IM STAT PRN; Protocol


   PRN Reason: Hypoglycemia Protocol


Cefepime HCl 0.5 gm/ Dextrose  50 mls @ 100 mls/hr IVPB DAILY [facility]; Protocol


   Last Admin: 10/25/18 10:23 Dose:  100 mls/hr


Vancomycin HCl 1 gm/ Sodium (Chloride)  250 mls @ 166.7 mls/hr IVPB MWF [facility]; 

Protocol


   Last Admin: 10/26/18 11:34 Dose:  166.7 mls/hr


Insulin Human Regular (Novolin R)  0 unit SC ACHS [facility]; Protocol


   Last Admin: 10/26/18 11:36 Dose:  Not Given


Ondansetron HCl (Zofran Inj)  4 mg IVP Q6 PRN


   PRN Reason: Nausea/Vomiting











- Labs


Labs: 


                                        





                                 10/26/18 09:39 





                                 10/26/18 09:39 





                                        











PT  19.9 SECONDS (9.7-12.2)  H  10/26/18  09:39    


 


INR  1.8   10/26/18  09:39    


 


APTT  38 SECONDS (21-34)  H  10/26/18  09:39    














- Constitutional


Appears: No Acute Distress, Chronically Ill





- Head Exam


Head Exam: ATRAUMATIC, NORMAL INSPECTION





- Eye Exam


Eye Exam: EOMI, Normal appearance





- Neck Exam


Neck Exam: Normal Inspection.  absent: Tenderness





- Respiratory Exam


Respiratory Exam: Clear to Ausculation Bilateral, NORMAL BREATHING PATTERN





- Cardiovascular Exam


Cardiovascular Exam: REGULAR RHYTHM, +S1





- GI/Abdominal Exam


GI & Abdominal Exam: Soft.  absent: Tenderness





- Extremities Exam


Extremities Exam: Normal Inspection.  absent: Tenderness





- Neurological Exam


Neurological Exam: Awake, CN II-XII Intact





- Skin


Skin Exam: Dry, Warm





Assessment and Plan


(1) Infection due to Port-A-Cath


Status: Acute   





(2) ESRD (end stage renal disease)


Status: Acute   





(3) Fluid overload


Status: Acute   





(4) History of non-Hodgkin's lymphoma


Status: Acute   





- Assessment and Plan (Free Text)


Plan: 





Same dialysis MWF


Consider eliquis- as per heme


ABs as per ID

## 2018-10-27 LAB
ALBUMIN SERPL-MCNC: 3.2 [, G/DL] (ref 3.5–5)
ALBUMIN/GLOB SERPL: 1 [,] (ref 1–2.1)
ALT SERPL-CCNC: 19 [, U/L] (ref 21–72)
APTT BLD: 43 [, SECONDS] (ref 21–34)
AST SERPL-CCNC: 15 [, U/L] (ref 17–59)
BASOPHILS # BLD AUTO: 0.1 [, K/UL] (ref 0–0.2)
BASOPHILS NFR BLD: 1.2 [, %] (ref 0–2)
BUN SERPL-MCNC: 48 [, MG/DL] (ref 9–20)
CALCIUM SERPL-MCNC: 8.5 [, MG/DL] (ref 8.6–10.4)
EOSINOPHIL # BLD AUTO: 0.2 [, K/UL] (ref 0–0.7)
EOSINOPHIL NFR BLD: 4 [, %] (ref 0–4)
ERYTHROCYTE [DISTWIDTH] IN BLOOD BY AUTOMATED COUNT: 19.5 [, %] (ref 11.5–14.5)
GFR NON-AFRICAN AMERICAN: 15 [,]
HGB BLD-MCNC: 9.5 [, G/DL] (ref 12–18)
INR PPP: 2.6 [,]
LYMPHOCYTES # BLD AUTO: 0.7 [, K/UL] (ref 1–4.3)
LYMPHOCYTES NFR BLD AUTO: 12.3 [, %] (ref 20–40)
MCH RBC QN AUTO: 28.5 [, PG] (ref 27–31)
MCHC RBC AUTO-ENTMCNC: 32.2 [, G/DL] (ref 33–37)
MCV RBC AUTO: 88.8 [, FL] (ref 80–94)
MONOCYTES # BLD: 0.5 [, K/UL] (ref 0–0.8)
MONOCYTES NFR BLD: 9.3 [, %] (ref 0–10)
NEUTROPHILS # BLD: 4.2 [, K/UL] (ref 1.8–7)
NEUTROPHILS NFR BLD AUTO: 73.2 [, %] (ref 50–75)
NRBC BLD AUTO-RTO: 0.1 [, %] (ref 0–2)
PLATELET # BLD: 239 [, K/UL] (ref 130–400)
PMV BLD AUTO: 7 [, FL] (ref 7.2–11.7)
PROTHROMBIN TIME: 28.2 [, SECONDS] (ref 9.7–12.2)
RBC # BLD AUTO: 3.34 [, MIL/UL] (ref 4.4–5.9)
WBC # BLD AUTO: 5.7 [, K/UL] (ref 4.8–10.8)

## 2018-10-27 RX ADMIN — HUMAN INSULIN SCH: 100 INJECTION, SOLUTION SUBCUTANEOUS at 12:40

## 2018-10-27 RX ADMIN — HUMAN INSULIN SCH: 100 INJECTION, SOLUTION SUBCUTANEOUS at 21:20

## 2018-10-27 RX ADMIN — HUMAN INSULIN SCH: 100 INJECTION, SOLUTION SUBCUTANEOUS at 07:30

## 2018-10-27 RX ADMIN — HUMAN INSULIN SCH: 100 INJECTION, SOLUTION SUBCUTANEOUS at 17:47

## 2018-10-27 NOTE — CP.PCM.PN
Subjective





- Date & Time of Evaluation


Date of Evaluation: 10/27/18


Time of Evaluation: 10:20





- Subjective


Subjective: 





afebrile


dialysis last PM


no leukocytosis


comfortable in bed





ROS


no chills fever


no chest pain or sob


no abd pain,n,v,d


anuric





Objective





- Vital Signs/Intake and Output


Vital Signs (last 24 hours): 


                                        











Temp Pulse Resp BP Pulse Ox


 


 98.2 F   102 H  20   91/42 L  96 


 


 10/27/18 07:57  10/27/18 07:57  10/27/18 07:57  10/27/18 07:57  10/27/18 07:57











- Medications


Medications: 


                               Current Medications





Acetaminophen (Tylenol 325mg Tab)  650 mg PO Q6 PRN


   PRN Reason: Fever >100.4 F


Apixaban (Eliquis)  5 mg PO BID Count includes the [facility]


   Last Admin: 10/26/18 19:42 Dose:  5 mg


Calcium Acetate (Phoslo)  667 mg PO TIDCC Count includes the [facility]


   Last Admin: 10/27/18 08:51 Dose:  667 mg


Dextrose (Dextrose 50% Inj)  0 ml IV STAT PRN; Protocol


   PRN Reason: Hypoglycemia Protocol


Dextrose (Glutose 15)  0 gm PO ONCE PRN; Protocol


   PRN Reason: Hypoglycemia Protocol


Glucagon (Glucagen Diagnostic Kit)  0 mg IM STAT PRN; Protocol


   PRN Reason: Hypoglycemia Protocol


Cefepime HCl 0.5 gm/ Dextrose  50 mls @ 100 mls/hr IVPB DAILY Count includes the [facility]; Protocol


   Last Admin: 10/26/18 13:58 Dose:  100 mls/hr


Vancomycin HCl 1.5 gm/ Sodium (Chloride)  500 mls @ 166.7 mls/hr IVPB MWF Count includes the [facility]; 

Protocol


Insulin Human Regular (Novolin R)  0 unit SC ACHS Count includes the [facility]; Protocol


   Last Admin: 10/27/18 07:30 Dose:  Not Given


Ondansetron HCl (Zofran Inj)  4 mg IVP Q6 PRN


   PRN Reason: Nausea/Vomiting











- Labs


Labs: 


                                        





                                 10/27/18 06:29 





                                 10/27/18 06:29 





                                        











PT  28.2 SECONDS (9.7-12.2)  H D 10/27/18  06:29    


 


INR  2.6  D 10/27/18  06:29    


 


APTT  43 SECONDS (21-34)  H D 10/27/18  06:29    














- Constitutional


Appears: No Acute Distress





- ENT Exam


ENT Exam: Mucous Membranes Moist





- Respiratory Exam


Respiratory Exam: Clear to Ausculation Bilateral, NORMAL BREATHING PATTERN





- Cardiovascular Exam


Cardiovascular Exam: Irregular Rhythm





- GI/Abdominal Exam


GI & Abdominal Exam: Distended, Soft.  absent: Tenderness


Additional comments: 





dull distended





- Extremities Exam


Additional comments: 





feet in boots


1-2+ leg edema





- Neurological Exam


Neurological Exam: Alert





- Psychiatric Exam


Psychiatric exam: Flat Affect





- Skin


Skin Exam: Dry





Assessment and Plan


(1) Atrial fibrillation


Status: Acute   





(2) Sepsis associated with vascular access catheter


Status: Resolved   





(3) Cellulitis


Status: Acute   





(4) ESRD needing dialysis


Status: Acute   





(5) Non-ischemic cardiomyopathy


Status: Acute   





(6) PVD (peripheral vascular disease)


Status: Acute   





- Assessment and Plan (Free Text)


Plan: 





continue supportive care


follow cardiology recommendations


next dialysis 10/29

## 2018-10-27 NOTE — CP.PCM.PN
Subjective





- Date & Time of Evaluation


Date of Evaluation: 10/27/18


Time of Evaluation: 10:56





Objective





- Vital Signs/Intake and Output


Vital Signs (last 24 hours): 


                                        











Temp Pulse Resp BP Pulse Ox


 


 98.2 F   102 H  20   91/42 L  96 


 


 10/27/18 07:57  10/27/18 07:57  10/27/18 07:57  10/27/18 07:57  10/27/18 07:57











- Medications


Medications: 


                               Current Medications





Acetaminophen (Tylenol 325mg Tab)  650 mg PO Q6 PRN


   PRN Reason: Fever >100.4 F


Apixaban (Eliquis)  5 mg PO BID [facility]


   Last Admin: 10/27/18 10:15 Dose:  5 mg


Calcium Acetate (Phoslo)  667 mg PO TIDCC [facility]


   Last Admin: 10/27/18 08:51 Dose:  667 mg


Dextrose (Dextrose 50% Inj)  0 ml IV STAT PRN; Protocol


   PRN Reason: Hypoglycemia Protocol


Dextrose (Glutose 15)  0 gm PO ONCE PRN; Protocol


   PRN Reason: Hypoglycemia Protocol


Glucagon (Glucagen Diagnostic Kit)  0 mg IM STAT PRN; Protocol


   PRN Reason: Hypoglycemia Protocol


Cefepime HCl 0.5 gm/ Dextrose  50 mls @ 100 mls/hr IVPB DAILY [facility]; Protocol


   Last Admin: 10/27/18 10:13 Dose:  100 mls/hr


Vancomycin HCl 1.5 gm/ Sodium (Chloride)  500 mls @ 166.7 mls/hr IVPB MWF [facility]; 

Protocol


Insulin Human Regular (Novolin R)  0 unit SC ACHS [facility]; Protocol


   Last Admin: 10/27/18 07:30 Dose:  Not Given


Ondansetron HCl (Zofran Inj)  4 mg IVP Q6 PRN


   PRN Reason: Nausea/Vomiting











- Labs


Labs: 


                                        





                                 10/27/18 06:29 





                                 10/27/18 06:29 





                                        











PT  28.2 SECONDS (9.7-12.2)  H D 10/27/18  06:29    


 


INR  2.6  D 10/27/18  06:29    


 


APTT  43 SECONDS (21-34)  H D 10/27/18  06:29

## 2018-10-27 NOTE — CP.PCM.PN
<Sharyn Reynoso - Last Filed: 10/27/18 17:23>





Subjective





- Date & Time of Evaluation


Date of Evaluation: 10/27/18


Time of Evaluation: 17:23





- Subjective


Subjective: 





Patient seen and examined at bedside this morning. He was resting in bed and 

wants to go to sleep. He did not want to see another physician provider at the 

time. Denies chest pain, palpitations, shortness of breath, abdominal pain.





Objective





- Vital Signs/Intake and Output


Vital Signs (last 24 hours): 


                                        











Temp Pulse Resp BP Pulse Ox


 


 98.2 F   99 H  20   103/44 L  100 


 


 10/27/18 15:00  10/27/18 15:00  10/27/18 15:00  10/27/18 15:00  10/27/18 15:00











- Medications


Medications: 


                               Current Medications





Acetaminophen (Tylenol 325mg Tab)  650 mg PO Q6 PRN


   PRN Reason: Fever >100.4 F


Apixaban (Eliquis)  2.5 mg PO BID [facility]


Calcium Acetate (Phoslo)  667 mg PO TIDCC [facility]


   Last Admin: 10/27/18 12:19 Dose:  667 mg


Dextrose (Dextrose 50% Inj)  0 ml IV STAT PRN; Protocol


   PRN Reason: Hypoglycemia Protocol


Dextrose (Glutose 15)  0 gm PO ONCE PRN; Protocol


   PRN Reason: Hypoglycemia Protocol


Glucagon (Glucagen Diagnostic Kit)  0 mg IM STAT PRN; Protocol


   PRN Reason: Hypoglycemia Protocol


Cefepime HCl 0.5 gm/ Dextrose  50 mls @ 100 mls/hr IVPB DAILY [facility]; Protocol


   Last Admin: 10/27/18 10:13 Dose:  100 mls/hr


Vancomycin HCl 1.5 gm/ Sodium (Chloride)  500 mls @ 166.7 mls/hr IVPB MWF [facility]; 

Protocol


Insulin Human Regular (Novolin R)  0 unit SC ACHS [facility]; Protocol


   Last Admin: 10/27/18 12:40 Dose:  Not Given


Ondansetron HCl (Zofran Inj)  4 mg IVP Q6 PRN


   PRN Reason: Nausea/Vomiting











- Labs


Labs: 


                                        





                                 10/27/18 06:29 





                                 10/27/18 06:29 





                                        











PT  28.2 SECONDS (9.7-12.2)  H D 10/27/18  06:29    


 


INR  2.6  D 10/27/18  06:29    


 


APTT  43 SECONDS (21-34)  H D 10/27/18  06:29    














- Constitutional


Appears: No Acute Distress





- Head Exam


Head Exam: ATRAUMATIC, NORMAL INSPECTION





- Eye Exam


Eye Exam: EOMI, Normal appearance





- Neck Exam


Neck Exam: Normal Inspection





- Respiratory Exam


Respiratory Exam: Clear to Ausculation Bilateral, NORMAL BREATHING PATTERN





- Cardiovascular Exam


Cardiovascular Exam: Irregular Rhythm





- GI/Abdominal Exam


GI & Abdominal Exam: Soft, Normal Bowel Sounds





- Extremities Exam


Additional comments: 


feet had multipodus boots on bilaterally





- Neurological Exam


Neurological Exam: Alert, Awake





- Psychiatric Exam


Psychiatric exam: Normal Affect, Normal Mood





Assessment and Plan





- Assessment and Plan (Free Text)


Assessment: 


Patient is a 63 year old male with PMH of ESRD on HD, DM, CHF, A Fib, HTN, COPD,

and JEB now s/p removal of infected portacath. 





Abscess/Infection of Portacath:


- S/p removal by surgery


- ID consulted, Dr. Fink


- Continue with Cefepime 0.5 g IV QD


- Vancomycin level 10/26: 9.4, subtherapeutic (ideal 15-20). Order trough 30 min

to 1 hour before MWF vanc schedule. Ordered for Monday 10/29.


- Increase dose to Vancomycin 1.5gm IV MWF with HD per ID recs


- Per ID patient can be discharged once random vanc is at target level





hx Afib at therapeutic INR:


- INR: 1.8 (10/26) -> 2.6 (10/27)


-goal range 2-2.5, has been on coumadin and was recommended for INR check twice 

a week as outpatient


- patient agrees to take 5 mg apixaban instead of 5mg  warfarin PO QD


- Heme/onc following





ESRD:


- HD on MWF


- Nephro consulted, Dr. Cortes seen 10/26


- BUN/Cr downtrending 64/5.4 -> 48/4.1





DM:


-BG has been 100s and well-controlled 


- ISS 


- Hypoglycemia protocol





CHF:


- Patient has a pacemaker


- Cardiology, Dr. Kaplan consulted


- Metoprolol held for hypotension


- Continue to monitor





JEB/pHTN:


- ECHO 10/19: LVEF 53%, LV function is mild to moderately reduced with 

anteroseptal hypokinesis, and septal flattening c/w elevated RV pressure. The 

aortic root is calcified and demonstrates mildly to moderately reduced opening. 

Peak gradient was 12mmHg, valve area was not performed. There is mildly 

increased left ventricular wall thickness. The right ventricle is severely 

dilated. Systolic function is severely reduced. There is likely severe pulmonary

HTN, underestimated by doppler. 


- Patient uses CPAP at home





HTN:


- Home metoprolol held due to blood pressure in the 90's/50's


- Avoid IV fluid boluses if possible





B/l LE Ulcers:


- Podiatry, Dr. Thornton consulted and was seen by podiatry today


- Continue local wound care


-has on multipodus boots bilaterally











Case discussed with Dr. Nigel Reynoso PGY-1





<Arin Manning - Last Filed: 10/27/18 19:45>





Objective





- Vital Signs/Intake and Output


Vital Signs (last 24 hours): 


                                        











Temp Pulse Resp BP Pulse Ox


 


 98.2 F   99 H  20   103/44 L  100 


 


 10/27/18 15:00  10/27/18 15:00  10/27/18 15:00  10/27/18 15:00  10/27/18 15:00











- Medications


Medications: 


                               Current Medications





Acetaminophen (Tylenol 325mg Tab)  650 mg PO Q6 PRN


   PRN Reason: Fever >100.4 F


Apixaban (Eliquis)  2.5 mg PO BID [facility]


   Last Admin: 10/27/18 17:47 Dose:  2.5 mg


Calcium Acetate (Phoslo)  667 mg PO TIDCC [facility]


   Last Admin: 10/27/18 17:47 Dose:  667 mg


Dextrose (Dextrose 50% Inj)  0 ml IV STAT PRN; Protocol


   PRN Reason: Hypoglycemia Protocol


Dextrose (Glutose 15)  0 gm PO ONCE PRN; Protocol


   PRN Reason: Hypoglycemia Protocol


Glucagon (Glucagen Diagnostic Kit)  0 mg IM STAT PRN; Protocol


   PRN Reason: Hypoglycemia Protocol


Cefepime HCl 0.5 gm/ Dextrose  50 mls @ 100 mls/hr IVPB DAILY [facility]; Protocol


   Last Admin: 10/27/18 10:13 Dose:  100 mls/hr


Vancomycin HCl 1.5 gm/ Sodium (Chloride)  500 mls @ 166.7 mls/hr IVPB MWF [facility]; 

Protocol


Insulin Human Regular (Novolin R)  0 unit SC ACHS [facility]; Protocol


   Last Admin: 10/27/18 17:47 Dose:  Not Given


Ondansetron HCl (Zofran Inj)  4 mg IVP Q6 PRN


   PRN Reason: Nausea/Vomiting











- Labs


Labs: 


                                        





                                 10/27/18 06:29 





                                 10/27/18 06:29 





                                        











PT  28.2 SECONDS (9.7-12.2)  H D 10/27/18  06:29    


 


INR  2.6  D 10/27/18  06:29    


 


APTT  43 SECONDS (21-34)  H D 10/27/18  06:29    














Attending/Attestation





- Attestation


I have personally seen and examined this patient.: Yes


I have fully participated in the care of the patient.: Yes


I have reviewed all pertinent clinical information, including history, physical 

exam and plan: Yes


Notes (Text): 


Patient seen, examined and case discussed with day-time resident.


Patient seen with resident this morning during my rounds. patient denies acute 

complaints. Patient denies bleeding episodes. I have adjusted Eliquis to 2.5mg 

PO bid in light of dialysis.


We are awaiting infectious disease clearance for discharge planning.





Assessment/Plan


1) Abscess and nfection of Portacath


Assessment/Plan


* Pt with portacath, draining white purulent fluid on R chest on admission; s/p 

  removal by Surgery on 10/18/18 pt doing well post-op. No further surgical 

  interventions needed at this time.


* Remains afebrile since initial fever at 102F


* Infectious Disease (Dr. Fink) on board--> help appreciated


   * Cefepime 0.5gm IVPB qdaily (active since 10/19/18)


   * Increase Vancomycin 1.5gm IVPB MWF (active since 10/26/18)


   * I have spoke with Dr. Fink in light of random vanc Friday before 

     dialysis. Increase in Vancomycin to 1.5gm. 


* Blood culture (10/17/18): no growth after 5 days X2


* Skin/Chest (10/17/18): Staph Aureus 


* Chest (10/18/18): Staph Aureus


* Echocardiogram:  LVEF 53%, LV function is mild to moderately reduced with 

  anteroseptal hypokinesis, and septal flattening c/w elevated RV pressure. The 

  aortic root is calcified and demonstrates mildly to moderately reduced 

  opening. Peak gradient was 12mmHg, valve area was not performed. There is m

  ildly increased left ventricular wall thickness. The right ventricle is 

  severely dilated. Systolic function is severely reduced. There is likely 

  severe pulmonary HTN, underestimated by doppler. 








2) Atrial Fibrillation-->therapuetic 


Assessment/Plan


* Dr. Archer (Heme-oncology) on board-->help appreciated


   * I explained to her patient and wife had refused Eliquis because of bleeding

     risk and  had refused heparin drip because of bleeding risk


   * Heme oncology has had discussion with family-->they have decided on 

     Eliquis. 


* Dr. Kaplan (Cardiology) on board-->help appreciated


   * Recommended for Eliquis; patient and wife refused


* Echo: LVEF 53%, LV function is mild to moderately reduced with anteroseptal 

  hypokinesis, and septal flattening c/w elevated RV pressure. The aortic root 

  is calcified and demonstrates mildly to moderately reduced opening. Peak 

  gradient was 12mmHg, valve area was not performed. There is mildly increased 

  left ventricular wall thickness. The right ventricle is severely dilated. 

  Systolic function is severely reduced. There is likely severe pulmonary HTN, 

  underestimated by doppler. 





3) ESRD


Assessment/Plan


* Nephro consulted, Dr. Cortes, help appreciated. Pt to continue HD MWF.


* Phoslo 667mg PO TIDCC





4) Diabetes


Assessment/Plan


* accuchecks q6h


* low dose sliding scale


* hypoglycemia protocol


* a1c: 4.8





5) HTN


Assessment/Plan


* monitor vital signs


* Home metoprolol held





6. History of JEB


Assessment/Plan


* pt uses CPAP at home


* not amenable to using bipap


* wife to bring home CPAP for use inpatient





7. B/l LE Ulcers


Assessment/Plan


* On exam noted in L heel superior aspect, R achilles tendon area


* Podiatry consulted (Dr. Thornton) for wound care, recs appreciated


* Podiatry c/w wound care.  Wounds stable, no signs of infection, no surgical 

  intervention indicated.





8. Hx Pacemaker, Systolic CHF


Assessment/Plan


* Per Pt's wife pt has not followed with cardiology outpatient in a while


* Cardiology (Dr. Kaplan) consulted, recs appreciated


* Metoprolol held for low bps, continue to monitor


* Echo: LVEF 53%, LV function is mild to moderately reduced with anteroseptal 

  hypokinesis, and septal flattening c/w elevated RV pressure. The aortic root 

  is calcified and demonstrates mildly to moderately reduced opening. Peak 

  gradient was 12mmHg, valve area was not performed. There is mildly increased 

  left ventricular wall thickness. The right ventricle is severely dilated. 

  Systolic function is severely reduced. There is likely severe pulmonary HTN, 

  underestimated by doppler. 





9. PPX


* upon discussion between patient and hematology, ultimately decided on Eliquis.

  


* Renal diet





Dispo: Awaiting infectious disease clearance in light of non therapuetic dosing 

of Vancomycin. Vancomycin increased per discussion with ID.

## 2018-10-27 NOTE — CP.PCM.PN
Subjective





- Date & Time of Evaluation


Date of Evaluation: 10/27/18


Time of Evaluation: 16:09





- Subjective


Subjective: 


Podiatry Progress Note- Dr. Thornton





64 y/o male seen at bedside this morning for bilateral lower extremity 

ulcerations. Dressing is clean, dry and intact. multipodus boots are on. NAD. 

Denies pain to his lower extremities. Denies F/C/N/V/CP/SOB





Objective





- Vital Signs/Intake and Output


Vital Signs (last 24 hours): 


                                        











Temp Pulse Resp BP Pulse Ox


 


 98.2 F   102 H  20   91/42 L  96 


 


 10/27/18 07:57  10/27/18 07:57  10/27/18 07:57  10/27/18 07:57  10/27/18 07:57











- Medications


Medications: 


                               Current Medications





Acetaminophen (Tylenol 325mg Tab)  650 mg PO Q6 PRN


   PRN Reason: Fever >100.4 F


Apixaban (Eliquis)  2.5 mg PO BID [facility]


Calcium Acetate (Phoslo)  667 mg PO TIDCC [facility]


   Last Admin: 10/27/18 12:19 Dose:  667 mg


Dextrose (Dextrose 50% Inj)  0 ml IV STAT PRN; Protocol


   PRN Reason: Hypoglycemia Protocol


Dextrose (Glutose 15)  0 gm PO ONCE PRN; Protocol


   PRN Reason: Hypoglycemia Protocol


Glucagon (Glucagen Diagnostic Kit)  0 mg IM STAT PRN; Protocol


   PRN Reason: Hypoglycemia Protocol


Cefepime HCl 0.5 gm/ Dextrose  50 mls @ 100 mls/hr IVPB DAILY [facility]; Protocol


   Last Admin: 10/27/18 10:13 Dose:  100 mls/hr


Vancomycin HCl 1.5 gm/ Sodium (Chloride)  500 mls @ 166.7 mls/hr IVPB MWF [facility]; 

Protocol


Insulin Human Regular (Novolin R)  0 unit SC ACHS [facility]; Protocol


   Last Admin: 10/27/18 12:40 Dose:  Not Given


Ondansetron HCl (Zofran Inj)  4 mg IVP Q6 PRN


   PRN Reason: Nausea/Vomiting











- Labs


Labs: 


                                        





                                 10/27/18 06:29 





                                 10/27/18 06:29 





                                        











PT  28.2 SECONDS (9.7-12.2)  H D 10/27/18  06:29    


 


INR  2.6  D 10/27/18  06:29    


 


APTT  43 SECONDS (21-34)  H D 10/27/18  06:29    














- Constitutional


Appears: Well, Non-toxic





- Head Exam


Head Exam: ATRAUMATIC





- Extremities Exam


Additional comments: 





Lower extremity focused exam:





Vasc: Nonpalpable pulses to the DP and PT, delayed CFT to the digits, tempe

rature gradient WNL,  +1 pitting pedal edema





Derm: chronic skin changes with hyperpigmented skin to entire lower extremity, 

skin thickening and flaking bilateral lower legs.


Left: Left posterior heel ulceration now fully healed with overlying thin 

fragile skin. Mild dark discoloration noted to skin area at site of prior 

ulceration.


Right: Full thickness ulceration noted to posterior ankle at level of Achilles 

insertion measuring approximately 3 cm x 2 cm x 0.3cm, with mixture granular and

fibrotic wound base. No active drainage noted. No fluctuance noted. Minor marcos-

wound erythema, no streaking appreciated.





Neuro: Gross sensation absent B/L. protective sensation absent





Ortho: no pain appreciated with palpation to surrounding ulceration sites. No 

pain with calf palpation bilaterally.














Assessment and Plan





- Assessment and Plan (Free Text)


Assessment: 


63 y/o male patient, seen and evaluated at bedside for B/L lower extremity 

ulcerations





Plan: 


Patient seen and evaluated at bedside


Discussed plan with Dr. Thornton


Patient afebrile, absent leukocytosis 


Podiatry to continue with local wound care: wounds cleansed with saline and 

dressed with ABD, DSD; betadine applied to R posterior ankle wound


Wounds stable, no current signs of infection


plan: No surgical intervention at this time


Patient to wear multipodus boots at all times while in bed 


Will continue to follow

## 2018-10-28 LAB
ALBUMIN SERPL-MCNC: 3.1 [, G/DL] (ref 3.5–5)
ALBUMIN/GLOB SERPL: 1 [,] (ref 1–2.1)
ALT SERPL-CCNC: 19 [, U/L] (ref 21–72)
APTT BLD: 43 [, SECONDS] (ref 21–34)
AST SERPL-CCNC: 13 [, U/L] (ref 17–59)
BASOPHILS # BLD AUTO: 0.1 [, K/UL] (ref 0–0.2)
BASOPHILS NFR BLD: 1.2 [, %] (ref 0–2)
BUN SERPL-MCNC: 60 [, MG/DL] (ref 9–20)
CALCIUM SERPL-MCNC: 8.5 [, MG/DL] (ref 8.6–10.4)
EOSINOPHIL # BLD AUTO: 0.2 [, K/UL] (ref 0–0.7)
EOSINOPHIL NFR BLD: 4.3 [, %] (ref 0–4)
ERYTHROCYTE [DISTWIDTH] IN BLOOD BY AUTOMATED COUNT: 19.1 [, %] (ref 11.5–14.5)
GFR NON-AFRICAN AMERICAN: 12 [,]
HGB BLD-MCNC: 9.6 [, G/DL] (ref 12–18)
INR PPP: 2.6 [,]
LYMPHOCYTES # BLD AUTO: 0.6 [, K/UL] (ref 1–4.3)
LYMPHOCYTES NFR BLD AUTO: 11.4 [, %] (ref 20–40)
MCH RBC QN AUTO: 28.7 [, PG] (ref 27–31)
MCHC RBC AUTO-ENTMCNC: 32.6 [, G/DL] (ref 33–37)
MCV RBC AUTO: 88.1 [, FL] (ref 80–94)
MONOCYTES # BLD: 0.5 [, K/UL] (ref 0–0.8)
MONOCYTES NFR BLD: 9 [, %] (ref 0–10)
NEUTROPHILS # BLD: 4.1 [, K/UL] (ref 1.8–7)
NEUTROPHILS NFR BLD AUTO: 74.1 [, %] (ref 50–75)
NRBC BLD AUTO-RTO: 0 [, %] (ref 0–2)
PLATELET # BLD: 217 [, K/UL] (ref 130–400)
PMV BLD AUTO: 7 [, FL] (ref 7.2–11.7)
PROTHROMBIN TIME: 28.1 [, SECONDS] (ref 9.7–12.2)
RBC # BLD AUTO: 3.34 [, MIL/UL] (ref 4.4–5.9)
WBC # BLD AUTO: 5.6 [, K/UL] (ref 4.8–10.8)

## 2018-10-28 RX ADMIN — HUMAN INSULIN SCH: 100 INJECTION, SOLUTION SUBCUTANEOUS at 17:46

## 2018-10-28 RX ADMIN — HUMAN INSULIN SCH: 100 INJECTION, SOLUTION SUBCUTANEOUS at 12:40

## 2018-10-28 RX ADMIN — HUMAN INSULIN SCH: 100 INJECTION, SOLUTION SUBCUTANEOUS at 23:37

## 2018-10-28 RX ADMIN — HUMAN INSULIN SCH: 100 INJECTION, SOLUTION SUBCUTANEOUS at 07:20

## 2018-10-28 NOTE — CP.PCM.PN
Subjective





- Date & Time of Evaluation


Date of Evaluation: 10/28/18


Time of Evaluation: 20:37





- Subjective


Subjective: 





Patient without any pain. Without any complaints.





Objective





- Vital Signs/Intake and Output


Vital Signs (last 24 hours): 


                                        











Temp Pulse Resp BP Pulse Ox


 


 98.2 F   100 H  20   108/52 L  99 


 


 10/28/18 16:00  10/28/18 16:00  10/28/18 16:00  10/28/18 16:00  10/28/18 16:00











- Medications


Medications: 


                               Current Medications





Acetaminophen (Tylenol 325mg Tab)  650 mg PO Q6 PRN


   PRN Reason: Fever >100.4 F


Apixaban (Eliquis)  2.5 mg PO BID [facility]


   Last Admin: 10/28/18 17:46 Dose:  2.5 mg


Calcium Acetate (Phoslo)  667 mg PO TIDCC [facility]


   Last Admin: 10/28/18 17:46 Dose:  667 mg


Dextrose (Dextrose 50% Inj)  0 ml IV STAT PRN; Protocol


   PRN Reason: Hypoglycemia Protocol


Dextrose (Glutose 15)  0 gm PO ONCE PRN; Protocol


   PRN Reason: Hypoglycemia Protocol


Glucagon (Glucagen Diagnostic Kit)  0 mg IM STAT PRN; Protocol


   PRN Reason: Hypoglycemia Protocol


Cefepime HCl 0.5 gm/ Dextrose  50 mls @ 100 mls/hr IVPB DAILY [facility]; Protocol


   Last Admin: 10/28/18 10:01 Dose:  100 mls/hr


Vancomycin HCl 1.5 gm/ Sodium (Chloride)  500 mls @ 166.7 mls/hr IVPB MWF [facility]; 

Protocol


Insulin Human Regular (Novolin R)  0 unit SC ACHS [facility]; Protocol


   Last Admin: 10/28/18 17:46 Dose:  Not Given


Ondansetron HCl (Zofran Inj)  4 mg IVP Q6 PRN


   PRN Reason: Nausea/Vomiting











- Labs


Labs: 


                                        





                                 10/28/18 07:19 





                                 10/28/18 07:19 





                                        











PT  28.1 SECONDS (9.7-12.2)  H  10/28/18  07:19    


 


INR  2.6   10/28/18  07:19    


 


APTT  43 SECONDS (21-34)  H  10/28/18  07:19    














- Head Exam


Head Exam: ATRAUMATIC, NORMAL INSPECTION





- Eye Exam


Eye Exam: EOMI, Normal appearance





- Respiratory Exam


Respiratory Exam: Clear to Ausculation Bilateral, NORMAL BREATHING PATTERN





- Cardiovascular Exam


Cardiovascular Exam: REGULAR RHYTHM





- GI/Abdominal Exam


GI & Abdominal Exam: Soft, Normal Bowel Sounds





- Extremities Exam


Additional comments: 





mutipodus boots on bilaterally





- Psychiatric Exam


Psychiatric exam: Normal Affect, Normal Mood





Assessment and Plan





- Assessment and Plan (Free Text)


Assessment: 





Patient is a 63 year old male with PMH of ESRD on HD, DM, CHF, A Fib, HTN, COPD,

and JEB now s/p removal of infected portacath. 





Abscess/Infection of Portacath:


- S/p removal by surgery


- ID consulted, Dr. Fink


- Continue with Cefepime 0.5 g IV QD


- Vancomycin level 10/26: 9.4. Order trough 30 min to 1 hour before MWF vanc 

schedule. Ordered for Monday 10/29. Confirmed verbally with RN today to ensure 

it be done.


- Increase dose to Vancomycin 1.5gm IV MWF with HD per ID recs


- Per ID patient can be discharged once random vanc is at target level





hx Afib at therapeutic INR:


- INR: 1.8 (10/26) -> 2.6 (10/27) -> 2.6 on 10/28


-goal range 2-2.5, has been on coumadin and was recommended for INR check twice 

a week as outpatient


- patient agrees to take 5 mg apixaban instead of 5mg warfarin PO QD


- Heme/onc following





ESRD:


- HD on MWF


- Nephro consulted, Dr. Cortes seen 10/26


- BUN/Cr downtrending 64/5.4 -> 48/4.1





DM:


-BG has been 100s and well-controlled 


- ISS 


- Hypoglycemia protocol





CHF:


- Patient has a pacemaker


- Cardiology, Dr. Kaplan consulted


- Metoprolol held for hypotension


- Continue to monitor





JEB/pHTN:


- ECHO 10/19: LVEF 53%, LV function is mild to moderately reduced with 

anteroseptal hypokinesis, and septal flattening c/w elevated RV pressure. The 

aortic root is calcified and demonstrates mildly to moderately reduced opening. 

Peak gradient was 12mmHg, valve area was not performed. There is mildly 

increased left ventricular wall thickness. The right ventricle is severely 

dilated. Systolic function is severely reduced. There is likely severe pulmonary

HTN, underestimated by doppler. 


- Patient uses CPAP at home





HTN:


- Home metoprolol held due to blood pressure in the 90's/50's


- Avoid IV fluid boluses if possible





B/l LE Ulcers:


- Podiatry, Dr. Thornton consulted and was seen by podiatry today


- Continue local wound care


-has on multipodus boots bilaterally











Case discussed with Dr. Nigel Reynoso PGY-1

## 2018-10-29 LAB
ALBUMIN SERPL-MCNC: 3.3 [, G/DL] (ref 3.5–5)
ALBUMIN/GLOB SERPL: 1.1 [,] (ref 1–2.1)
ALT SERPL-CCNC: 19 [, U/L] (ref 21–72)
APTT BLD: 40 [, SECONDS] (ref 21–34)
AST SERPL-CCNC: 14 [, U/L] (ref 17–59)
BASOPHILS # BLD AUTO: 0.1 [, K/UL] (ref 0–0.2)
BASOPHILS NFR BLD: 1.4 [, %] (ref 0–2)
BUN SERPL-MCNC: 71 [, MG/DL] (ref 9–20)
CALCIUM SERPL-MCNC: 8.4 [, MG/DL] (ref 8.6–10.4)
EOSINOPHIL # BLD AUTO: 0.2 [, K/UL] (ref 0–0.7)
EOSINOPHIL NFR BLD: 3.4 [, %] (ref 0–4)
ERYTHROCYTE [DISTWIDTH] IN BLOOD BY AUTOMATED COUNT: 19.5 [, %] (ref 11.5–14.5)
GFR NON-AFRICAN AMERICAN: 10 [,]
HGB BLD-MCNC: 9.5 [, G/DL] (ref 12–18)
INR PPP: 2.1 [,]
LYMPHOCYTES # BLD AUTO: 0.8 [, K/UL] (ref 1–4.3)
LYMPHOCYTES NFR BLD AUTO: 11.3 [, %] (ref 20–40)
MCH RBC QN AUTO: 28.4 [, PG] (ref 27–31)
MCHC RBC AUTO-ENTMCNC: 32 [, G/DL] (ref 33–37)
MCV RBC AUTO: 88.7 [, FL] (ref 80–94)
MONOCYTES # BLD: 0.5 [, K/UL] (ref 0–0.8)
MONOCYTES NFR BLD: 8 [, %] (ref 0–10)
NEUTROPHILS # BLD: 5.1 [, K/UL] (ref 1.8–7)
NEUTROPHILS NFR BLD AUTO: 75.9 [, %] (ref 50–75)
NRBC BLD AUTO-RTO: 0.1 [, %] (ref 0–2)
PLATELET # BLD: 215 [, K/UL] (ref 130–400)
PMV BLD AUTO: 7 [, FL] (ref 7.2–11.7)
PROTHROMBIN TIME: 23.3 [, SECONDS] (ref 9.7–12.2)
RBC # BLD AUTO: 3.35 [, MIL/UL] (ref 4.4–5.9)
WBC # BLD AUTO: 6.7 [, K/UL] (ref 4.8–10.8)

## 2018-10-29 PROCEDURE — 5A1D70Z PERFORMANCE OF URINARY FILTRATION, INTERMITTENT, LESS THAN 6 HOURS PER DAY: ICD-10-PCS

## 2018-10-29 RX ADMIN — HUMAN INSULIN SCH: 100 INJECTION, SOLUTION SUBCUTANEOUS at 08:13

## 2018-10-29 RX ADMIN — HUMAN INSULIN SCH: 100 INJECTION, SOLUTION SUBCUTANEOUS at 17:40

## 2018-10-29 RX ADMIN — HUMAN INSULIN SCH: 100 INJECTION, SOLUTION SUBCUTANEOUS at 21:41

## 2018-10-29 RX ADMIN — HUMAN INSULIN SCH: 100 INJECTION, SOLUTION SUBCUTANEOUS at 11:54

## 2018-10-29 NOTE — CP.PCM.PN
Subjective





- Date & Time of Evaluation


Date of Evaluation: 10/23/18


Time of Evaluation: 20:15





- Subjective


Subjective: 


INFECTIOUS DISEASE 5T 551-A PROGRESS NOTES


RAHEEM CANADA MD, FACP


5T 551-A


10/23/2018





CHART REVIEWED


PT EXAMINED


CASE DISCUSSED








afebrile


denies any pain n/v/d/f/c/dizziness/headache


reviewed with staff, including RN'S.





Objective





- Vital Signs/Intake and Output


Vital Signs (last 24 hours): 


                                        











Temp Pulse Resp BP Pulse Ox


 


 98.3 F   112 H  20   97/61 L  98 


 


 10/23/18 07:00  10/23/18 07:00  10/23/18 07:00  10/23/18 07:00  10/23/18 07:00








Intake and Output: 


                                        











 10/23/18 10/23/18





 06:59 18:59


 


Intake Total 480 


 


Balance 480 














- Medications


Medications: 


                               Current Medications





Acetaminophen (Tylenol 325mg Tab)  650 mg PO Q6 PRN


   PRN Reason: Fever >100.4 F


Calcium Acetate (Phoslo)  667 mg PO TIDCC [facility]


   Last Admin: 10/23/18 08:37 Dose:  667 mg


Dextrose (Dextrose 50% Inj)  0 ml IV STAT PRN; Protocol


   PRN Reason: Hypoglycemia Protocol


Dextrose (Glutose 15)  0 gm PO ONCE PRN; Protocol


   PRN Reason: Hypoglycemia Protocol


Glucagon (Glucagen Diagnostic Kit)  0 mg IM STAT PRN; Protocol


   PRN Reason: Hypoglycemia Protocol


Dextrose (Dextrose 5% In Water 1000 Ml)  1,000 mls @ 0 mls/hr IV .Q0M PRN; 

Protocol


   PRN Reason: Hypoglycemia Protocol


Cefepime HCl 0.5 gm/ Dextrose  50 mls @ 100 mls/hr IVPB DAILY [facility]; Protocol


   Last Admin: 10/22/18 13:49 Dose:  100 mls/hr


Vancomycin HCl 1 gm/ Sodium (Chloride)  250 mls @ 166.7 mls/hr IVPB MWF [facility]; 

Protocol


   Last Admin: 10/22/18 11:15 Dose:  166.7 mls/hr


Insulin Human Regular (Novolin R)  0 unit SC ACHS [facility]; Protocol


   Last Admin: 10/23/18 07:41 Dose:  Not Given


Ondansetron HCl (Zofran Inj)  4 mg IVP Q6 PRN


   PRN Reason: Nausea/Vomiting











- Labs


Labs: 


                                        





                                 10/23/18 07:17 





                                 10/23/18 07:17 





                                        











PT  17.9 SECONDS (9.7-12.2)  H  10/23/18  07:17    


 


INR  1.6   10/23/18  07:17    


 


APTT  37 SECONDS (21-34)  H  10/23/18  07:17    














- Constitutional


Appears: Non-toxic, No Acute Distress, Chronically Ill





- Head Exam


Head Exam: NORMAL INSPECTION





- Eye Exam


Eye Exam: Normal appearance, PERRL





- ENT Exam


ENT Exam: Mucous Membranes Moist, Normal Exam





- Neck Exam


Neck Exam: Normal Inspection





- Respiratory Exam


Respiratory Exam: Clear to Ausculation Bilateral, NORMAL BREATHING PATTERN





- Cardiovascular Exam


Cardiovascular Exam: Irregular Rhythm, WOUND HEALING





- GI/Abdominal Exam


GI & Abdominal Exam: Distended, Soft





- Extremities Exam


Extremities Exam: Pedal Edema (b/l LE edema stasis)





- Neurological Exam


Neurological Exam: Alert, Awake, Oriented x3





- Psychiatric Exam


Psychiatric exam: Normal Affect, Normal Mood





- Skin


Skin Exam: Intact, Warm





Assessment and Plan


(1) Fever


Status: Resolved   





(2) Infection due to Port-A-Cath


Status: Acute   





(3) ESRD (end stage renal disease)


Status: Acute   





(4) A-fib


Status: Chronic   





(5) Anemia


Status: Chronic   





- Assessment and Plan (Free Text)


Assessment: 





hd mwf,


PLEAE ADJUST VANCOMYCIN AND KEEP THE LEVEL AROUND 15 OR SO.





RAFAEL CANADA MD, FACP











Objective





- Vital Signs/Intake and Output


Vital Signs (last 24 hours): 


                                        











Temp Pulse Resp BP Pulse Ox


 


 96.9 F L  115 H  20   134/91 H  98 


 


 10/29/18 13:48  10/29/18 13:48  10/29/18 13:48  10/29/18 13:48  10/29/18 13:48











- Medications


Medications: 


                               Current Medications





Acetaminophen (Tylenol 325mg Tab)  650 mg PO Q6 PRN


   PRN Reason: Fever >100.4 F


Apixaban (Eliquis)  2.5 mg PO BID [facility]


   Last Admin: 10/29/18 10:17 Dose:  Not Given


Calcium Acetate (Phoslo)  667 mg PO TIDCC [facility]


   Last Admin: 10/29/18 11:54 Dose:  Not Given


Dextrose (Dextrose 50% Inj)  0 ml IV STAT PRN; Protocol


   PRN Reason: Hypoglycemia Protocol


Dextrose (Glutose 15)  0 gm PO ONCE PRN; Protocol


   PRN Reason: Hypoglycemia Protocol


Glucagon (Glucagen Diagnostic Kit)  0 mg IM STAT PRN; Protocol


   PRN Reason: Hypoglycemia Protocol


Vancomycin HCl 1.5 gm/ Sodium (Chloride)  500 mls @ 166.7 mls[facility]hr [facility]; 

Protocol


   Last Admin: 10/29/18 11:21 Dose:  166.7 mls/hr


Insulin Human Regular (Novolin R)  0 unit SC [facility]; Protocol


   Last Admin: 10/29/18 11:54 Dose:  Not Given


Ondansetron HCl (Zofran Inj)  4 mg IVP Q6 PRN


   PRN Reason: Nausea/Vomiting











- Labs


Labs: 


                                        





                                 10/29/18 07:44 





                                 10/29/18 07:44 





                                        











PT  23.3 SECONDS (9.7-12.2)  H  10/29/18  07:44    


 


INR  2.1  D 10/29/18  07:44    


 


APTT  40 SECONDS (21-34)  H  10/29/18  07:44

## 2018-10-29 NOTE — CP.PCM.PN
Subjective





- Date & Time of Evaluation


Date of Evaluation: 10/26/18


Time of Evaluation: 19:30





- Subjective


Subjective: 


INFECTIOUS DISEASE PROGRESS NOTES


RAHEEM CANADA MD, FACP


10/26/2018


5T 551-A








CHART REVIEWED


PT EXAMINED


CASE DISCUSSED WITH DR NYLA NICHOLS





This is a a 63M with PMH ESRD, DM, CHF, A FIB W/RVR, AML, HTN, COPD, sleep 

apnea, h/o AML s/p chemotherapy around 10 years ago, came to the ED for 

evaluation of portacath site. He reports feeling a burning sensation two days 

ago and that today after HD his wife noticed some pus coming from the site. Pt 

reports nausea and dry heaving. He denies any fever, abdominal pain, shortness 

of breath, chest pain, diarrhea, constipation. 


In the ED, pt was given tylenol for Tmax 102F and BCx and wound Cx were 

obtained.


Last HD yesterday. 


On anticoagulation for a fib


Pt had an indwelling portacath for iv antibiotics and lab draws, difficult 

stick.


Heme called because of the difficulty in getting the INR to therapeutic levels 

and because of the patient's reluctance in getting the Heparin and concern about

bleeding.





SxH: back surgery, pacemaker


FamH: mom heart disease, DM, dad CAD


SocH: denies tobacco, etoh, recreational drug use


Allergies: NKDA


Meds: coumadin 1 daily, starlix 60 TID, procrit, metoprolol succinate 25 daily





Past Patient History





- Infectious Disease


Hx of Infectious Diseases: None





- Tetanus Immunizations


Tetanus Immunization: Unknown





- Past Medical History & Family History


Past Medical History?: Yes





- Past Social History


Smoking Status: Never Smoked





- CARDIAC


Hx Cardiac Disorders: Yes


Hx Atrial Fibrillation: Yes


Hx Cardia Arrhythmia: Yes (A FIB)


Hx Congestive Heart Failure: Yes


Hx Hypertension: Yes


Hx Pacemaker: Yes


Hx Peripheral Edema: Yes





- PULMONARY


Hx Respiratory Disorders: Yes


Hx Asthma: Yes


Hx Chronic Obstructive Pulmonary Disease (COPD): Yes (uses CPAP for sleep apnea)


Hx Sleep Apnea: Yes (C-PAP)





- NEUROLOGICAL


Hx Neurological Disorder: Yes (PERIPHERAL NEUROPATHY)


Hx Dizziness: Yes





- HEENT


Hx HEENT Problems: No





- RENAL


Hx Chronic Kidney Disease: Yes





- ENDOCRINE/METABOLIC


Hx Endocrine Disorders: Yes


Hx Diabetes Mellitus Type 2: Yes





- HEMATOLOGICAL/ONCOLOGICAL


Hx Blood Disorders: Yes


Hx Blood Transfusions: Yes


Hx Cancer: Yes


Hx Chemotherapy: Yes


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





- INTEGUMENTARY


Hx Dermatological Problems: Yes


Hx Cellulitis: Yes





- MUSCULOSKELETAL/RHEUMATOLOGICAL


Hx Musculoskeletal Disorders: Yes


Hx Arthritis: Yes


Hx Falls: Yes


Hx Fractures: Yes


Hx Osteoporosis: Yes





- GASTROINTESTINAL


Hx Gastrointestinal Disorders: Yes


Hx Constipation: Yes





- GENITOURINARY/GYNECOLOGICAL


Hx Genitourinary Disorders: Yes


Other/Comment: dialysis  TTS





- PSYCHIATRIC


Hx Psychophysiologic Disorder: Yes


Hx Anxiety: Yes


Hx Depression: Yes


Hx Substance Use: No





- SURGICAL HISTORY


Hx Surgeries: Yes


Hx Angiogram: Yes


Hx Arteriovenous Shunt: Yes


Hx Cardiac Catheterization: Yes


Hx Orthopedic Surgery: Yes (KNEE)


Hx Vascular Access Device: Yes (RIGHT EMERSON CATH)


Other/Comment: hx back surgery T5.  defibrillator/pacemaker placement





- ANESTHESIA


Hx Anesthesia: Yes


Hx Anesthesia Reactions: No


Hx Malignant Hyperthermia: No





Meds


Allergies/Adverse Reactions: 


                                    Allergies











Allergy/AdvReac Type Severity Reaction Status Date / Time


 


No Known Allergies Allergy   Verified 08/16/18 04:19














- Medications


Medications: 


                               Current Medications





Acetaminophen (Tylenol 325mg Tab)  650 mg PO Q6 PRN


   PRN Reason: Fever >100.4 F


Calcium Acetate (Phoslo)  667 mg PO TIDCC [facility]


   Last Admin: 10/24/18 17:00 Dose:  667 mg


Dextrose (Dextrose 50% Inj)  0 ml IV STAT PRN; Protocol


   PRN Reason: Hypoglycemia Protocol


Dextrose (Glutose 15)  0 gm PO ONCE PRN; Protocol


   PRN Reason: Hypoglycemia Protocol


Glucagon (Glucagen Diagnostic Kit)  0 mg IM STAT PRN; Protocol


   PRN Reason: Hypoglycemia Protocol


Cefepime HCl 0.5 gm/ Dextrose  50 mls @ 100 mls/hr IVPB DAILY [facility]; Protocol


   Last Admin: 10/24/18 12:57 Dose:  100 mls/hr


Vancomycin HCl 1 gm/ Sodium (Chloride)  250 mls @ 166.7 mls/hr IVPB MWF [facility]; 

Protocol


   Last Admin: 10/24/18 10:48 Dose:  166.7 mls/hr


Insulin Human Regular (Novolin R)  0 unit SC ACHS [facility]; Protocol


   Last Admin: 10/24/18 19:23 Dose:  Not Given


Ondansetron HCl (Zofran Inj)  4 mg IVP Q6 PRN


   PRN Reason: Nausea/Vomiting


Warfarin Sodium (Coumadin)  5 mg PO 1800 [facility]


   Stop: 10/25/18 19:46











Results





- Vital Signs


Recent Vital Signs: 


                                Last Vital Signs











Temp  97.8 F   10/24/18 16:00


 


Pulse  112 H  10/24/18 16:00


 


Resp  20   10/24/18 16:00


 


BP  95/54 L  10/24/18 16:00


 


Pulse Ox  99   10/24/18 16:00














- Labs


Result Diagrams: 


                                 10/24/18 09:15





                                 10/24/18 09:15


Labs: 


                         Laboratory Results - last 24 hr











  10/23/18 10/24/18 10/24/18





  21:10 06:03 09:15


 


WBC    5.9


 


RBC    3.39 L


 


Hgb    9.6 L


 


Hct    29.8 L


 


MCV    87.9


 


MCH    28.4


 


MCHC    32.3 L


 


RDW    19.0 H


 


Plt Count    213


 


MPV    7.1 L


 


Neut % (Auto)    73.7


 


Lymph % (Auto)    11.3 L


 


Mono % (Auto)    8.7


 


Eos % (Auto)    4.8 H


 


Baso % (Auto)    1.5


 


Neut # (Auto)    4.3


 


Lymph # (Auto)    0.7 L


 


Mono # (Auto)    0.5


 


Eos # (Auto)    0.3


 


Baso # (Auto)    0.1


 


PT   


 


INR   


 


APTT   


 


Sodium   


 


Potassium   


 


Chloride   


 


Carbon Dioxide   


 


Anion Gap   


 


BUN   


 


Creatinine   


 


Est GFR ( Amer)   


 


Est GFR (Non-Af Amer)   


 


POC Glucose (mg/dL)  141 H  97 


 


Random Glucose   


 


Calcium   


 


Magnesium   


 


Total Bilirubin   


 


AST   


 


ALT   


 


Alkaline Phosphatase   


 


Total Protein   


 


Albumin   


 


Globulin   


 


Albumin/Globulin Ratio   














  10/24/18 10/24/18 10/24/18





  09:15 09:15 11:09


 


WBC   


 


RBC   


 


Hgb   


 


Hct   


 


MCV   


 


MCH   


 


MCHC   


 


RDW   


 


Plt Count   


 


MPV   


 


Neut % (Auto)   


 


Lymph % (Auto)   


 


Mono % (Auto)   


 


Eos % (Auto)   


 


Baso % (Auto)   


 


Neut # (Auto)   


 


Lymph # (Auto)   


 


Mono # (Auto)   


 


Eos # (Auto)   


 


Baso # (Auto)   


 


PT   15.9 H 


 


INR   1.5 


 


APTT   36 H 


 


Sodium  135  


 


Potassium  4.1  


 


Chloride  98  


 


Carbon Dioxide  21 L  


 


Anion Gap  21 H  


 


BUN  70 H  


 


Creatinine  5.6 H  


 


Est GFR ( Amer)  13  


 


Est GFR (Non-Af Amer)  10  


 


POC Glucose (mg/dL)    114 H


 


Random Glucose  121 H  


 


Calcium  8.3 L  


 


Magnesium  2.1  


 


Total Bilirubin  1.0  


 


AST  21  


 


ALT  20 L  


 


Alkaline Phosphatase  311 H  


 


Total Protein  6.5  


 


Albumin  3.3 L  


 


Globulin  3.2  


 


Albumin/Globulin Ratio  1.1  














  10/24/18





  16:40


 


WBC 


 


RBC 


 


Hgb 


 


Hct 


 


MCV 


 


MCH 


 


MCHC 


 


RDW 


 


Plt Count 


 


MPV 


 


Neut % (Auto) 


 


Lymph % (Auto) 


 


Mono % (Auto) 


 


Eos % (Auto) 


 


Baso % (Auto) 


 


Neut # (Auto) 


 


Lymph # (Auto) 


 


Mono # (Auto) 


 


Eos # (Auto) 


 


Baso # (Auto) 


 


PT 


 


INR 


 


APTT 


 


Sodium 


 


Potassium 


 


Chloride 


 


Carbon Dioxide 


 


Anion Gap 


 


BUN 


 


Creatinine 


 


Est GFR ( Amer) 


 


Est GFR (Non-Af Amer) 


 


POC Glucose (mg/dL)  223 H


 


Random Glucose 


 


Calcium 


 


Magnesium 


 


Total Bilirubin 


 


AST 


 


ALT 


 


Alkaline Phosphatase 


 


Total Protein 


 


Albumin 


 


Globulin 


 


Albumin/Globulin Ratio 














Assessment & Plan


(1) On Coumadin for atrial fibrillation


Assessment and Plan: 


62 yo man with atrial fibrillation, restarted on Coumadin after recent port 

removal, with difficulty in getting the INR to therapeutic levels, had 

discussion with the patient who is concerned about he risks of heparin and the 

need for phlebotomy several times a day.


Have informed the patient that the Coumadin alone may take several days to get 

to therapeutic level and that the bridging with Heparin will decrease the time, 

also that he has the outpatient option of low dose Eliquis , so he can avoid 

weekly phlebotomy, but the Factor Xa level monitoring may be necessary.


Also spoke to his wife over the phone, he is still undecided and said he will 

discuss with his wife and let us know by tomorrow


Status: Acute 





MONITOR VANCOMYCIN LEVELS


DISCUSSED WITH PHARMACY CONCERNING THE FLUX DIALYSIS SYSTEM,


DISCUSSION WITH DR PRUETT TO BE DONE.


PLEASE STOP THE CEFEPIME





RAHEEM CANADA MD  














Objective





- Vital Signs/Intake and Output


Vital Signs (last 24 hours): 


                                        











Temp Pulse Resp BP Pulse Ox


 


 96.9 F L  115 H  20   134/91 H  98 


 


 10/29/18 13:48  10/29/18 13:48  10/29/18 13:48  10/29/18 13:48  10/29/18 13:48











- Medications


Medications: 


                               Current Medications





Acetaminophen (Tylenol 325mg Tab)  650 mg PO Q6 PRN


   PRN Reason: Fever >100.4 F


Apixaban (Eliquis)  2.5 mg PO BID [facility]


   Last Admin: 10/29/18 10:17 Dose:  Not Given


Calcium Acetate (Phoslo)  667 mg PO TIDCC [facility]


   Last Admin: 10/29/18 11:54 Dose:  Not Given


Dextrose (Dextrose 50% Inj)  0 ml IV STAT PRN; Protocol


   PRN Reason: Hypoglycemia Protocol


Dextrose (Glutose 15)  0 gm PO ONCE PRN; Protocol


   PRN Reason: Hypoglycemia Protocol


Glucagon (Glucagen Diagnostic Kit)  0 mg IM STAT PRN; Protocol


   PRN Reason: Hypoglycemia Protocol


Vancomycin HCl 1.5 gm/ Sodium (Chloride)  500 mls @ 166.7 mls/hr IVPB MWF [facility]; 

Protocol


   Last Admin: 10/29/18 11:21 Dose:  166.7 mls/hr


Insulin Human Regular (Novolin R)  0 unit SC [facility]; Protocol


   Last Admin: 10/29/18 11:54 Dose:  Not Given


Ondansetron HCl (Zofran Inj)  4 mg IVP Q6 PRN


   PRN Reason: Nausea/Vomiting











- Labs


Labs: 


                                        





                                 10/29/18 07:44 





                                 10/29/18 07:44 





                                        











PT  23.3 SECONDS (9.7-12.2)  H  10/29/18  07:44    


 


INR  2.1  D 10/29/18  07:44    


 


APTT  40 SECONDS (21-34)  H  10/29/18  07:44

## 2018-10-29 NOTE — CP.PCM.PN
Subjective





- Date & Time of Evaluation


Date of Evaluation: 10/24/18


Time of Evaluation: 15:22





- Subjective


Subjective: 





INFECTIOUS DISEASE PROGRESS NOTES


RAHEEM FINK MD, FACP


20/24/2018


5T 551-A








CHART REVIEWED


PT EXAMINED


CASE DISCUSSED





CLINCIALLY RESPONDING TO IV VANCOMYCIN GIVEN AT THE END OF DIALYSIS


WOUND RESPONDING


KEEP THE VANCO RANDOM LEVEL AROUND 15!








Assessment/Plan


1) Abscess and nfection of Portacath


Assessment/Plan


* Pt with portacath, draining white purulent fluid on R chest on admission; s/p 

  removal by Surgery on 10/18/18 pt doing well post-op. No further surgical 

  interventions needed at this time.


* Remains afebrile since initial fever at 102F


* Infectious Disease (Dr. Fink) on board--> help appreciated


   * Cefepime 0.5gm IVPB qdaily (active since 10/19/18)


   * Vancomycin 1gm IVPB MWF (active since 10/22/18)


   * Resident spoke with Dr. Hernández will recheck random vanc Friday before 

     dialysis.  If vancomycin is at safe blood levels, then patient may be 

     treated as an outpatient with vancomycin MWF, thus not requiring daily Abx.


* Blood culture (10/17/18): no growth after 5 days X2


* Skin/Chest (10/17/18): Staph Aureus 


* Chest (10/18/18): Staph Aureus


* Echocardiogram:  LVEF 53%, LV function is mild to moderately reduced with 

  anteroseptal hypokinesis, and septal flattening c/w elevated RV pressure. The 

  aortic root is calcified and demonstrates mildly to moderately reduced 

  opening. Peak gradient was 12mmHg, valve area was not performed. There is 

  mildly increased left ventricular wall thickness. The right ventricle is 

  severely dilated. Systolic function is severely reduced. There is likely 

  severe pulmonary HTN, underestimated by doppler. 








2) Atrial Fibrillation 


     Subtherapuetic INR


Assessment/Plan


* Dr. Archer (Heme-oncology) on board-->help appreciated


   * I explained to her patient and wife had refused Eliquis because of bleeding

     risk and  had refused heparin drip because of bleeding risk


* Dr. Kaplan (Cardiology) on board-->help appreciated


   * Recommended for Eliquis; patient and wife refused


* Echo: LVEF 53%, LV function is mild to moderately reduced with anteroseptal 

  hypokinesis, and septal flattening c/w elevated RV pressure. The aortic root 

  is calcified and demonstrates mildly to moderately reduced opening. Peak 

  gradient was 12mmHg, valve area was not performed. There is mildly increased 

  left ventricular wall thickness. The right ventricle is severely dilated. 

  Systolic function is severely reduced. There is likely severe pulmonary HTN, 

  underestimated by doppler. 


* Patient refusing heparin bridge secondary to bleeding risk; though he is not 

  therapuetic on Coumadin


* Patient ordered for Coumadin 5mg tonight; INR: 1.5


* off rate control agent secondary to borderline low BP





3) ESRD


Assessment/Plan


* Nephro consulted, Dr. Cortes, help appreciated. Pt to continue HD MWF.


* BUN/Cr today 70/5.6


* Phoslo 667mg PO TIDCC





4) Diabetes


Assessment/Plan


* accuchecks q6h


* low dose sliding scale


* hypoglycemia protocol


* check a1c





5) HTN


Assessment/Plan


* BP 97/52 on admission, BP stable today, pt states his normal bp at home is 

  90s/50s, currently asymptomatic


* Home metoprolol held





6. History of JEB


Assessment/Plan


* pt uses CPAP at home


* not amenable to using bipap


* wife to bring home CPAP for use inpatient





7. B/l LE Ulcers


Assessment/Plan


* On exam noted in L heel superior aspect, R achilles tendon area


* Podiatry consulted (Dr. Thornton) for wound care, recs appreciated


* Podiatry c/w wound care.  Wounds stable, no signs of infection, no surgical 

  intervention indicated.





8. Hx Pacemaker, Systolic CHF


Assessment/Plan


* Per Pt's wife pt has not followed with cardiology outpatient in a while


* Cardiology (Dr. Kaplan) consulted, recs appreciated


* Metoprolol held for low bps, continue to monitor


* Echo: LVEF 53%, LV function is mild to moderately reduced with anteroseptal 

  hypokinesis, and septal flattening c/w elevated RV pressure. The aortic root 

  is calcified and demonstrates mildly to moderately reduced opening. Peak 

  gradient was 12mmHg, valve area was not performed. There is mildly increased 

  left ventricular wall thickness. The right ventricle is severely dilated. 

  Systolic function is severely reduced. There is likely severe pulmonary HTN, 

  underestimated by doppler. 





9. PPX


* Warfarin 5mg PO daily; patient is refusing heparin drip; heme-oncology to 

  speak with him


* Renal diet





Dispo: Blood cultures negative x5.  INR had not been optimized on Coumadin 3mg 

daily.  5mg Coumadin given yesterday as patient refused heparin drip.  If 

patient is amenable to heparin drip, will initiate bridge, otherwise continue 

coumadin only until therapeutic levels are reached. hematology to speak with 

patient and wife. 





RESIDENTS AND HOSPITALIST AWARE OF ID RECOMMENDATIONS!





RAHEEM FINK MD, FACP








Objective





- Vital Signs/Intake and Output


Vital Signs (last 24 hours): 


                                        











Temp Pulse Resp BP Pulse Ox


 


 96.9 F L  115 H  20   134/91 H  98 


 


 10/29/18 13:48  10/29/18 13:48  10/29/18 13:48  10/29/18 13:48  10/29/18 13:48











- Medications


Medications: 


                               Current Medications





Acetaminophen (Tylenol 325mg Tab)  650 mg PO Q6 PRN


   PRN Reason: Fever >100.4 F


Apixaban (Eliquis)  2.5 mg PO BID [facility]


   Last Admin: 10/29/18 10:17 Dose:  Not Given


Calcium Acetate (Phoslo)  667 mg PO TIDCC [facility]


   Last Admin: 10/29/18 11:54 Dose:  Not Given


Dextrose (Dextrose 50% Inj)  0 ml IV STAT PRN; Protocol


   PRN Reason: Hypoglycemia Protocol


Dextrose (Glutose 15)  0 gm PO ONCE PRN; Protocol


   PRN Reason: Hypoglycemia Protocol


Glucagon (Glucagen Diagnostic Kit)  0 mg IM STAT PRN; Protocol


   PRN Reason: Hypoglycemia Protocol


Vancomycin HCl 1.5 gm/ Sodium (Chloride)  500 mls @ 166.7 mls/hr IVPB [facility]; 

Protocol


   Last Admin: 10/29/18 11:21 Dose:  166.7 mls/hr


Insulin Human Regular (Novolin R)  0 unit SC [facility]; Protocol


   Last Admin: 10/29/18 11:54 Dose:  Not Given


Ondansetron HCl (Zofran Inj)  4 mg IVP Q6 PRN


   PRN Reason: Nausea/Vomiting











- Labs


Labs: 


                                        





                                 10/29/18 07:44 





                                 10/29/18 07:44 





                                        











PT  23.3 SECONDS (9.7-12.2)  H  10/29/18  07:44    


 


INR  2.1  D 10/29/18  07:44    


 


APTT  40 SECONDS (21-34)  H  10/29/18  07:44

## 2018-10-29 NOTE — CP.PCM.PN
Subjective





- Date & Time of Evaluation


Date of Evaluation: 10/29/18


Time of Evaluation: 13:27





- Subjective


Subjective: 





PGY-1 Progress Note for Dr. Brown





Patient seen and examamined at dialysis.  Patient appeared comfortable.  Nurse 

endorsed to me that his most recent blood pressure was in 60s/20s.  However 

patient did not appear symptomatic on review of systems or on exam.  When asked 

if patient felt at all dizzy or lightheaded, he responded "I'm fine".  

Subsequent blood pressures have increased to a normal range.  Patient denies any

chest pain, shortness of breath, headaches, nausea, vomiting.





Objective





- Vital Signs/Intake and Output


Vital Signs (last 24 hours): 


                                        











Temp Pulse Resp BP Pulse Ox


 


 97.4 F L  112 H  18   141/45 L  100 


 


 10/29/18 09:15  10/29/18 09:15  10/29/18 09:15  10/29/18 11:15  10/29/18 09:15











- Medications


Medications: 


                               Current Medications





Acetaminophen (Tylenol 325mg Tab)  650 mg PO Q6 PRN


   PRN Reason: Fever >100.4 F


Apixaban (Eliquis)  2.5 mg PO BID [facility]


   Last Admin: 10/29/18 10:17 Dose:  Not Given


Calcium Acetate (Phoslo)  667 mg PO TIDCC [facility]


   Last Admin: 10/29/18 08:46 Dose:  667 mg


Dextrose (Dextrose 50% Inj)  0 ml IV STAT PRN; Protocol


   PRN Reason: Hypoglycemia Protocol


Dextrose (Glutose 15)  0 gm PO ONCE PRN; Protocol


   PRN Reason: Hypoglycemia Protocol


Glucagon (Glucagen Diagnostic Kit)  0 mg IM STAT PRN; Protocol


   PRN Reason: Hypoglycemia Protocol


Vancomycin HCl 1.5 gm/ Sodium (Chloride)  500 mls @ 166.7 mls/hr IVPB MWF [facility]; 

Protocol


   Last Admin: 10/29/18 11:21 Dose:  166.7 mls/hr


Insulin Human Regular (Novolin R)  0 unit SC [facility]; Protocol


   Last Admin: 10/29/18 08:13 Dose:  Not Given


Ondansetron HCl (Zofran Inj)  4 mg IVP Q6 PRN


   PRN Reason: Nausea/Vomiting











- Labs


Labs: 


                                        





                                 10/29/18 07:44 





                                 10/29/18 07:44 





                                        











PT  23.3 SECONDS (9.7-12.2)  H  10/29/18  07:44    


 


INR  2.1  D 10/29/18  07:44    


 


APTT  40 SECONDS (21-34)  H  10/29/18  07:44    














- Constitutional


Appears: Non-toxic, No Acute Distress





- Head Exam


Head Exam: ATRAUMATIC, NORMAL INSPECTION





- Eye Exam


Eye Exam: EOMI, Normal appearance





- ENT Exam


ENT Exam: Mucous Membranes Moist





- Respiratory Exam


Respiratory Exam: Clear to Ausculation Bilateral, NORMAL BREATHING PATTERN





- Cardiovascular Exam


Cardiovascular Exam: RRR, +S1, +S2





- GI/Abdominal Exam


GI & Abdominal Exam: Soft, Normal Bowel Sounds.  absent: Tenderness


Additional comments: 





Obese








- Extremities Exam


Additional comments: 





Chronic venous stasis changes b/l





- Neurological Exam


Neurological Exam: Alert, Awake, Oriented x3





- Psychiatric Exam


Psychiatric exam: Normal Affect, Normal Mood





- Skin


Skin Exam: Dry, Intact





Assessment and Plan





- Assessment and Plan (Free Text)


Assessment: 





Assessment: 


63M with PMH ESRD, DM, CHF, A FIB W/RVR, AML, HTN, COPD, sleep apnea who 

presented with portacath infection, now s/p removal of portacath. INR 

therapeutic on low-dose eliquis.  Vancomycin levels sub-therapeutic.





Abscess/Infection of Portacath


- Pt with portacath, draining white purulent fluid on R chest on admission; s/p 

removal by Surgery


- Tmax 100.3F on admission. Patient continue to be afebrile.


- Leukocytosis resolved


- ID consulted, Dr. Fink, help appreciated.


- Cefepime discontinued 


- Vancomycin increased to 1.5 gm MWF with HD as per ID recs


   random vanc level 10/26 18:00 9.4; Random vanc level today 9.2, continues to 

be subtherapeutic.  Will recheck a vanc level Wednesday before dialysis.  Per 

Dr. Fink, patient may be discharged to receive IV vanc MWF at dialysis once 

he is at a therapeutic dose (ideally 15-20).  


- BCx: NG - FINAL


- Wound Cx: S. aureus, penicillin resistent


- 2D echo ordered to r/o endocarditis: LVEF 53%, LV function is mild to 

moderately reduced with anteroseptal hypokinesis, and septal flattening c/w 

elevated RV pressure. The aortic root is calcified and demonstrates mildly to 

moderately reduced opening. Peak gradient was 12mmHg, valve area was not 

performed. There is mildly increased left ventricular wall thickness. The right 

ventricle is severely dilated. Systolic function is severely reduced. There is 

likely severe pulmonary HTN, underestimated by doppler. 





ESRD


- Nephro consulted, Dr. Cortes, help appreciated. Pt to continue HD MWF.


- BUN/Cr today 54/4.6





Atrial Fibrillation


- INR now therapeutic at 2.1


- recommended for INR check twice a week as outpatient


- Patient now amenanable to low-dose eliquis - receiving 2.5 mg PO BID


- Coumadin discontinued


- Heme/onc following





DM


- accuchecks q6h


- low dose sliding scale


- hypoglycemia protocol





HTN


- BP 97/52 on admission, /74 - 141/45 today. Patient states his normal bp 

at home is 90s/50s.


- Home metoprolol held for asymptomatic hypotension


- Avoid IV fluid boluses if possible





JEB


- Using home CPAP





B/l LE Ulcers


- On exam noted in L heel superior aspect, R achilles tendon area


- Podiatry consulted (Dr. Thornton) for wound care, recs appreciated


- Podiatry c/w wound care.  Wounds stable, no signs of infection, no surgical 

intervention indicated.


- Multipodus boots





Hx Pacemaker, CHF


- Per Pt's wife pt has not followed with cardiology outpatient in a while


- Cardiology (Dr. Kaplan) consulted, recs appreciated


- Metoprolol held for low bps, continue to monitor





PPX


Eliquis 2.5 mg PO BID


Renal diet





Dispo: Blood cultures final.  INR is now therapeutic.  Vancomycin levels remain 

sub-therapeutic.  Recheck vanc levels Wednesday prior to HD.





Case discussed with Dr. Kevin Dumas, PGY-1

## 2018-10-29 NOTE — CP.PCM.PN
Subjective





- Date & Time of Evaluation


Date of Evaluation: 10/29/18


Time of Evaluation: 14:35





- Subjective


Subjective: 


INFECTIOUS DISEASE PROGRESS NOTES


RAHEEM CANADA MD, FACP


10/29/2018


5T 551-A





CHART REVIEWED


PT EXAMINED


CASE DISCUSSED WITH RN'S X 2, RESIDENT AND HOSPITALIST.








Feels better


Accepted eliquis use


On IV vanco for cath infection; VANCO 1.5 GRAMS GIVEN POST DIALYSIS


No new complaint





Objective





- Vital Signs/Intake and Output


Vital Signs (last 24 hours): 


                                        











Temp Pulse Resp BP Pulse Ox


 


 97.4 F L  112 H  18   141/45 L  100 


 


 10/29/18 09:15  10/29/18 09:15  10/29/18 09:15  10/29/18 11:15  10/29/18 09:15











- Medications


Medications: 


                               Current Medications





Acetaminophen (Tylenol 325mg Tab)  650 mg PO Q6 PRN


   PRN Reason: Fever >100.4 F


Apixaban (Eliquis)  2.5 mg PO BID [facility]


   Last Admin: 10/29/18 10:17 Dose:  Not Given


Calcium Acetate (Phoslo)  667 mg PO TIDCC [facility]


   Last Admin: 10/29/18 11:54 Dose:  Not Given


Dextrose (Dextrose 50% Inj)  0 ml IV STAT PRN; Protocol


   PRN Reason: Hypoglycemia Protocol


Dextrose (Glutose 15)  0 gm PO ONCE PRN; Protocol


   PRN Reason: Hypoglycemia Protocol


Glucagon (Glucagen Diagnostic Kit)  0 mg IM STAT PRN; Protocol


   PRN Reason: Hypoglycemia Protocol


Vancomycin HCl 1.5 gm/ Sodium (Chloride)  500 mls @ 166.7 mls/hr IVPB MWF [facility]; 

Protocol


   Last Admin: 10/29/18 11:21 Dose:  166.7 mls/hr


Insulin Human Regular (Novolin R)  0 unit SC [facility]; Protocol


   Last Admin: 10/29/18 11:54 Dose:  Not Given


Ondansetron HCl (Zofran Inj)  4 mg IVP Q6 PRN


   PRN Reason: Nausea/Vomiting











- Labs


Labs: 


                                        





                                 10/29/18 07:44 





                                 10/29/18 07:44 





                                        











PT  23.3 SECONDS (9.7-12.2)  H  10/29/18  07:44    


 


INR  2.1  D 10/29/18  07:44    


 


APTT  40 SECONDS (21-34)  H  10/29/18  07:44    














- Constitutional


Appears: No Acute Distress, Chronically Ill





- Head Exam


Head Exam: ATRAUMATIC, NORMAL INSPECTION





- Eye Exam


Eye Exam: EOMI, Normal appearance





- Neck Exam


Neck Exam: Normal Inspection.  absent: Tenderness





- Respiratory Exam


Respiratory Exam: Clear to Ausculation Bilateral, NORMAL BREATHING PATTERN





- Cardiovascular Exam


Cardiovascular Exam: REGULAR RHYTHM, +S1





- GI/Abdominal Exam


GI & Abdominal Exam: Soft.  absent: Tenderness





- Extremities Exam


Extremities Exam: Normal Inspection.  absent: Tenderness





- Neurological Exam


Neurological Exam: Awake, CN II-XII Intact





- Skin


Skin Exam: Dry, Warm





Assessment and Plan


(1) Infection due to Port-A-Cath


Status: Acute   





(2) ESRD (end stage renal disease)


Status: Acute   





(3) Fluid overload


Status: Acute   





(4) History of non-Hodgkin's lymphoma


Status: Acute   





- Assessment and Plan (Free Text)


Plan: 





Same dialysis MWF


IV vanco-PLEASE GIVE VANCO 1.5 GMS POST EACH DIALYSIS M/W/F.


CHECK VANCO RANDOM LEVEL-KEEP AROUND 15 OR SO.





RAFAEL CANADA MD, FACP














Objective





- Vital Signs/Intake and Output


Vital Signs (last 24 hours): 


                                        











Temp Pulse Resp BP Pulse Ox


 


 96.9 F L  115 H  20   134/91 H  98 


 


 10/29/18 13:48  10/29/18 13:48  10/29/18 13:48  10/29/18 13:48  10/29/18 13:48











- Medications


Medications: 


                               Current Medications





Acetaminophen (Tylenol 325mg Tab)  650 mg PO Q6 PRN


   PRN Reason: Fever >100.4 F


Apixaban (Eliquis)  2.5 mg PO BID [facility]


   Last Admin: 10/29/18 10:17 Dose:  Not Given


Calcium Acetate (Phoslo)  667 mg PO TIDCC [facility]


   Last Admin: 10/29/18 11:54 Dose:  Not Given


Dextrose (Dextrose 50% Inj)  0 ml IV STAT PRN; Protocol


   PRN Reason: Hypoglycemia Protocol


Dextrose (Glutose 15)  0 gm PO ONCE PRN; Protocol


   PRN Reason: Hypoglycemia Protocol


Glucagon (Glucagen Diagnostic Kit)  0 mg IM STAT PRN; Protocol


   PRN Reason: Hypoglycemia Protocol


Vancomycin HCl 1.5 gm/ Sodium (Chloride)  500 mls @ 166.7 mls/hr IVPB MWF [facility]; 

Protocol


   Last Admin: 10/29/18 11:21 Dose:  166.7 mls/hr


Insulin Human Regular (Novolin R)  0 unit SC [facility]; Protocol


   Last Admin: 10/29/18 11:54 Dose:  Not Given


Ondansetron HCl (Zofran Inj)  4 mg IVP Q6 PRN


   PRN Reason: Nausea/Vomiting











- Labs


Labs: 


                                        





                                 10/29/18 07:44 





                                 10/29/18 07:44 





                                        











PT  23.3 SECONDS (9.7-12.2)  H  10/29/18  07:44    


 


INR  2.1  D 10/29/18  07:44    


 


APTT  40 SECONDS (21-34)  H  10/29/18  07:44

## 2018-10-29 NOTE — CP.PCM.PN
Subjective





- Date & Time of Evaluation


Date of Evaluation: 10/27/18


Time of Evaluation: 12:30





- Subjective


Subjective: 


INFECTIOUS DISEASE PROGRESS NOTES


RAHEEM CANADA MD, FACP


10/27/2018


5T 551-A





CHART REVIEWED


EXAMINE NOTED


CASE DISCUSSED





afebrile, SLEEPY AT TIMES


dialysis last PM


no leukocytosis


comfortable in bed





ROS


no chills fever


no chest pain or sob


no abd pain,n,v,d


anuric





Objective





- Vital Signs/Intake and Output


Vital Signs (last 24 hours): 


                                        











Temp Pulse Resp BP Pulse Ox


 


 98.2 F   102 H  20   91/42 L  96 


 


 10/27/18 07:57  10/27/18 07:57  10/27/18 07:57  10/27/18 07:57  10/27/18 07:57











- Medications


Medications: 


                               Current Medications





Acetaminophen (Tylenol 325mg Tab)  650 mg PO Q6 PRN


   PRN Reason: Fever >100.4 F


Apixaban (Eliquis)  5 mg PO BID [facility]


   Last Admin: 10/26/18 19:42 Dose:  5 mg


Calcium Acetate (Phoslo)  667 mg PO TIDCC [facility]


   Last Admin: 10/27/18 08:51 Dose:  667 mg


Dextrose (Dextrose 50% Inj)  0 ml IV STAT PRN; Protocol


   PRN Reason: Hypoglycemia Protocol


Dextrose (Glutose 15)  0 gm PO ONCE PRN; Protocol


   PRN Reason: Hypoglycemia Protocol


Glucagon (Glucagen Diagnostic Kit)  0 mg IM STAT PRN; Protocol


   PRN Reason: Hypoglycemia Protocol


Cefepime HCl 0.5 gm/ Dextrose  50 mls @ 100 mls/hr IVPB DAILY [facility]; Protocol


   Last Admin: 10/26/18 13:58 Dose:  100 mls/hr


Vancomycin HCl 1.5 gm/ Sodium (Chloride)  500 mls @ 166.7 mls/hr IVPB MWF [facility]; 

Protocol


Insulin Human Regular (Novolin R)  0 unit SC [facility]; Protocol


   Last Admin: 10/27/18 07:30 Dose:  Not Given


Ondansetron HCl (Zofran Inj)  4 mg IVP Q6 PRN


   PRN Reason: Nausea/Vomiting











- Labs


Labs: 


                                        





                                 10/27/18 06:29 





                                 10/27/18 06:29 





                                        











PT  28.2 SECONDS (9.7-12.2)  H D 10/27/18  06:29    


 


INR  2.6  D 10/27/18  06:29    


 


APTT  43 SECONDS (21-34)  H D 10/27/18  06:29    














- Constitutional


Appears: No Acute Distress





- ENT Exam


ENT Exam: Mucous Membranes Moist





- Respiratory Exam


Respiratory Exam: Clear to Ausculation Bilateral, NORMAL BREATHING PATTERN





- Cardiovascular Exam


Cardiovascular Exam: Irregular Rhythm





- GI/Abdominal Exam


GI & Abdominal Exam: Distended, Soft.  absent: Tenderness


Additional comments: 





dull distended





- Extremities Exam


Additional comments: 





feet in boots


1-2+ leg edema





- Neurological Exam


Neurological Exam: Alert





- Psychiatric Exam


Psychiatric exam: Flat Affect





- Skin


Skin Exam: Dry





Assessment and Plan


(1) Atrial fibrillation


Status: Acute   





(2) Sepsis associated with vascular access catheter


Status: Resolved   





(3) Cellulitis


Status: Acute   





(4) ESRD needing dialysis


Status: Acute   





(5) Non-ischemic cardiomyopathy


Status: Acute   





(6) PVD (peripheral vascular disease)


Status: Acute   





- Assessment and Plan (Free Text)


Plan: 





continue supportive care; VANCO ADJUSTED AS DISCUSSED-PLEASE, STOP CEFEPIME.


follow cardiology recommendations


next dialysis 10/29





RAHEEM CANADA MD, FACP














Objective





- Vital Signs/Intake and Output


Vital Signs (last 24 hours): 


                                        











Temp Pulse Resp BP Pulse Ox


 


 96.9 F L  115 H  20   134/91 H  98 


 


 10/29/18 13:48  10/29/18 13:48  10/29/18 13:48  10/29/18 13:48  10/29/18 13:48











- Medications


Medications: 


                               Current Medications





Acetaminophen (Tylenol 325mg Tab)  650 mg PO Q6 PRN


   PRN Reason: Fever >100.4 F


Apixaban (Eliquis)  2.5 mg PO BID [facility]


   Last Admin: 10/29/18 10:17 Dose:  Not Given


Calcium Acetate (Phoslo)  667 mg PO TIDCC [facility]


   Last Admin: 10/29/18 11:54 Dose:  Not Given


Dextrose (Dextrose 50% Inj)  0 ml IV STAT PRN; Protocol


   PRN Reason: Hypoglycemia Protocol


Dextrose (Glutose 15)  0 gm PO ONCE PRN; Protocol


   PRN Reason: Hypoglycemia Protocol


Glucagon (Glucagen Diagnostic Kit)  0 mg IM STAT PRN; Protocol


   PRN Reason: Hypoglycemia Protocol


Vancomycin HCl 1.5 gm/ Sodium (Chloride)  500 mls @ 166.7 mls/hr IVPB [facility] [place] [place]; 

Protocol


   Last Admin: 10/29/18 11:21 Dose:  166.7 mls/hr


Insulin Human Regular (Nov[facility] R)  [facility]; Protocol


   Last Admin: 10/29/18 11:54 Dose:  Not Given


Ondansetron HCl (Zofran Inj)  4 mg IVP Q6 PRN


   PRN Reason: Nausea/Vomiting











- Labs


Labs: 


                                        





                                 10/29/18 07:44 





                                 10/29/18 07:44 





                                        











PT  23.3 SECONDS (9.7-12.2)  H  10/29/18  07:44    


 


INR  2.1  D 10/29/18  07:44    


 


APTT  40 SECONDS (21-34)  H  10/29/18  07:44

## 2018-10-29 NOTE — CP.PCM.PN
Subjective





- Date & Time of Evaluation


Date of Evaluation: 10/29/18


Time of Evaluation: 11:56





- Subjective


Subjective: 





Seen at dialysis Trying UF 3500ml


Feels better


Accepted eliquis use


On IV vanco for cath infection


No new complaint





Objective





- Vital Signs/Intake and Output


Vital Signs (last 24 hours): 


                                        











Temp Pulse Resp BP Pulse Ox


 


 97.4 F L  112 H  18   141/45 L  100 


 


 10/29/18 09:15  10/29/18 09:15  10/29/18 09:15  10/29/18 11:15  10/29/18 09:15











- Medications


Medications: 


                               Current Medications





Acetaminophen (Tylenol 325mg Tab)  650 mg PO Q6 PRN


   PRN Reason: Fever >100.4 F


Apixaban (Eliquis)  2.5 mg PO BID [facility]


   Last Admin: 10/29/18 10:17 Dose:  Not Given


Calcium Acetate (Phoslo)  667 mg PO TIDCC [facility]


   Last Admin: 10/29/18 11:54 Dose:  Not Given


Dextrose (Dextrose 50% Inj)  0 ml IV STAT PRN; Protocol


   PRN Reason: Hypoglycemia Protocol


Dextrose (Glutose 15)  0 gm PO ONCE PRN; Protocol


   PRN Reason: Hypoglycemia Protocol


Glucagon (Glucagen Diagnostic Kit)  0 mg IM STAT PRN; Protocol


   PRN Reason: Hypoglycemia Protocol


Vancomycin HCl 1.5 gm/ Sodium (Chloride)  500 mls @ 166.7 mls/hr IVPB MWF [facility]; 

Protocol


   Last Admin: 10/29/18 11:21 Dose:  166.7 mls/hr


Insulin Human Regular (Novolin R)  0 unit SC [facility]; Protocol


   Last Admin: 10/29/18 11:54 Dose:  Not Given


Ondansetron HCl (Zofran Inj)  4 mg IVP Q6 PRN


   PRN Reason: Nausea/Vomiting











- Labs


Labs: 


                                        





                                 10/29/18 07:44 





                                 10/29/18 07:44 





                                        











PT  23.3 SECONDS (9.7-12.2)  H  10/29/18  07:44    


 


INR  2.1  D 10/29/18  07:44    


 


APTT  40 SECONDS (21-34)  H  10/29/18  07:44    














- Constitutional


Appears: No Acute Distress, Chronically Ill





- Head Exam


Head Exam: ATRAUMATIC, NORMAL INSPECTION





- Eye Exam


Eye Exam: EOMI, Normal appearance





- Neck Exam


Neck Exam: Normal Inspection.  absent: Tenderness





- Respiratory Exam


Respiratory Exam: Clear to Ausculation Bilateral, NORMAL BREATHING PATTERN





- Cardiovascular Exam


Cardiovascular Exam: REGULAR RHYTHM, +S1





- GI/Abdominal Exam


GI & Abdominal Exam: Soft.  absent: Tenderness





- Extremities Exam


Extremities Exam: Normal Inspection.  absent: Tenderness





- Neurological Exam


Neurological Exam: Awake, CN II-XII Intact





- Skin


Skin Exam: Dry, Warm





Assessment and Plan


(1) Infection due to Port-A-Cath


Status: Acute   





(2) ESRD (end stage renal disease)


Status: Acute   





(3) Fluid overload


Status: Acute   





(4) History of non-Hodgkin's lymphoma


Status: Acute   





- Assessment and Plan (Free Text)


Plan: 





Same dialysis MWF


IV vanco


uf goal recently increased

## 2018-10-30 VITALS
DIASTOLIC BLOOD PRESSURE: 60 MMHG | OXYGEN SATURATION: 98 % | RESPIRATION RATE: 20 BRPM | SYSTOLIC BLOOD PRESSURE: 113 MMHG | HEART RATE: 88 BPM | TEMPERATURE: 98 F

## 2018-10-30 LAB
ALBUMIN SERPL-MCNC: 3.3 [, G/DL] (ref 3.5–5)
ALBUMIN/GLOB SERPL: 1 [,] (ref 1–2.1)
ALT SERPL-CCNC: 19 [, U/L] (ref 21–72)
APTT BLD: 38 [, SECONDS] (ref 21–34)
AST SERPL-CCNC: 17 [, U/L] (ref 17–59)
BASOPHILS # BLD AUTO: 0.1 [, K/UL] (ref 0–0.2)
BASOPHILS NFR BLD: 1 [, %] (ref 0–2)
BUN SERPL-MCNC: 55 [, MG/DL] (ref 9–20)
CALCIUM SERPL-MCNC: 8.4 [, MG/DL] (ref 8.6–10.4)
EOSINOPHIL # BLD AUTO: 0.2 [, K/UL] (ref 0–0.7)
EOSINOPHIL NFR BLD: 3.2 [, %] (ref 0–4)
ERYTHROCYTE [DISTWIDTH] IN BLOOD BY AUTOMATED COUNT: 19.4 [, %] (ref 11.5–14.5)
GFR NON-AFRICAN AMERICAN: 13 [,]
HGB BLD-MCNC: 9.7 [, G/DL] (ref 12–18)
INR PPP: 1.9 [,]
LYMPHOCYTES # BLD AUTO: 0.7 [, K/UL] (ref 1–4.3)
LYMPHOCYTES NFR BLD AUTO: 11.2 [, %] (ref 20–40)
MCH RBC QN AUTO: 28.9 [, PG] (ref 27–31)
MCHC RBC AUTO-ENTMCNC: 32.7 [, G/DL] (ref 33–37)
MCV RBC AUTO: 88.3 [, FL] (ref 80–94)
MONOCYTES # BLD: 0.6 [, K/UL] (ref 0–0.8)
MONOCYTES NFR BLD: 8.9 [, %] (ref 0–10)
NEUTROPHILS # BLD: 4.9 [, K/UL] (ref 1.8–7)
NEUTROPHILS NFR BLD AUTO: 75.7 [, %] (ref 50–75)
NRBC BLD AUTO-RTO: 0 [, %] (ref 0–2)
PLATELET # BLD: 201 [, K/UL] (ref 130–400)
PMV BLD AUTO: 7.4 [, FL] (ref 7.2–11.7)
PROTHROMBIN TIME: 20.5 [, SECONDS] (ref 9.7–12.2)
RBC # BLD AUTO: 3.34 [, MIL/UL] (ref 4.4–5.9)
WBC # BLD AUTO: 6.5 [, K/UL] (ref 4.8–10.8)

## 2018-10-30 RX ADMIN — HUMAN INSULIN SCH: 100 INJECTION, SOLUTION SUBCUTANEOUS at 18:46

## 2018-10-30 RX ADMIN — HUMAN INSULIN SCH: 100 INJECTION, SOLUTION SUBCUTANEOUS at 12:28

## 2018-10-30 RX ADMIN — HUMAN INSULIN SCH: 100 INJECTION, SOLUTION SUBCUTANEOUS at 08:59

## 2018-10-30 NOTE — CP.PCM.PN
Subjective





- Date & Time of Evaluation


Date of Evaluation: 10/30/18


Time of Evaluation: 10:26





- Subjective


Subjective: 





seen and examined


no events


denies any n/v/d/f/c/dizziness/headache


wants to go home





Objective





- Vital Signs/Intake and Output


Vital Signs (last 24 hours): 


                                        











Temp Pulse Resp BP Pulse Ox


 


 97.6 F   97 H  18   116/74   95 


 


 10/30/18 00:29  10/30/18 00:29  10/30/18 00:29  10/30/18 00:29  10/30/18 00:29











- Medications


Medications: 


                               Current Medications





Acetaminophen (Tylenol 325mg Tab)  650 mg PO Q6 PRN


   PRN Reason: Fever >100.4 F


Apixaban (Eliquis)  2.5 mg PO BID [facility]


   Last Admin: 10/30/18 09:07 Dose:  2.5 mg


Calcium Acetate (Phoslo)  667 mg PO TIDCC [facility]


   Last Admin: 10/30/18 09:04 Dose:  667 mg


Dextrose (Dextrose 50% Inj)  0 ml IV STAT PRN; Protocol


   PRN Reason: Hypoglycemia Protocol


Dextrose (Glutose 15)  0 gm PO ONCE PRN; Protocol


   PRN Reason: Hypoglycemia Protocol


Glucagon (Glucagen Diagnostic Kit)  0 mg IM STAT PRN; Protocol


   PRN Reason: Hypoglycemia Protocol


Vancomycin HCl 1.5 gm/ Sodium (Chloride)  500 mls @ 166.7 mls/hr IVPB MWF [facility]; 

Protocol


   Last Admin: 10/29/18 18:03 Dose:  Not Given


Insulin Human Regular (Novolin R)  0 unit SC [facility]; Protocol


   Last Admin: 10/30/18 08:59 Dose:  Not Given


Ondansetron HCl (Zofran Inj)  4 mg IVP Q6 PRN


   PRN Reason: Nausea/Vomiting











- Labs


Labs: 


                                        





                                 10/30/18 06:36 





                                 10/30/18 06:36 





                                        











PT  20.5 SECONDS (9.7-12.2)  H  10/30/18  06:36    


 


INR  1.9   10/30/18  06:36    


 


APTT  38 SECONDS (21-34)  H  10/30/18  06:36    














- Constitutional


Appears: Older Than Stated Age, Chronically Ill





- Head Exam


Head Exam: NORMAL INSPECTION, NORMOCEPHALIC





- Eye Exam


Eye Exam: Normal appearance, PERRL





- ENT Exam


ENT Exam: Mucous Membranes Moist, Normal Exam





- Neck Exam


Neck Exam: Full ROM, Normal Inspection





- Respiratory Exam


Respiratory Exam: Clear to Ausculation Bilateral, NORMAL BREATHING PATTERN





- Cardiovascular Exam


Cardiovascular Exam: Irregular Rhythm





- GI/Abdominal Exam


GI & Abdominal Exam: Distended, Soft





- Extremities Exam


Extremities Exam: Pedal Edema (b/l LE w/ chronic stasis, skin changes)





- Neurological Exam


Neurological Exam: Alert, Awake, Oriented x3





- Psychiatric Exam


Psychiatric exam: Normal Affect, Normal Mood





- Skin


Skin Exam: Intact





Assessment and Plan


(1) Fever


Status: Resolved   





(2) Infection due to Port-A-Cath


Status: Acute   





(3) ESRD (end stage renal disease)


Status: Acute   





(4) A-fib


Status: Chronic   





(5) Anemia


Status: Chronic   





- Assessment and Plan (Free Text)


Assessment: 





maintain hd mwf


VANCOMYCIN W/ HD

## 2018-10-30 NOTE — CP.PCM.DIS
Provider





- Provider


Date of Admission: 


10/18/18 00:01





Attending physician: 


Arin Manning DO





Time Spent in preparation of Discharge (in minutes): 45





Diagnosis





- Discharge Diagnosis


(1) Infection due to Port-A-Cath


Status: Acute   





(2) Atrial fibrillation


Status: Chronic   





(3) CHF (congestive heart failure)


Status: Chronic   





(4) ESRD needing dialysis


Status: Chronic   





(5) Peripheral edema


Status: Chronic   





Hospital Course





- Lab Results


Lab Results: 


                                  Micro Results





10/17/18 09:33   Blood   Blood Culture - Final


                            NO GROWTH AFTER 5 DAYS


10/17/18 09:33   Blood   Gram Stain - Final


                            TEST NOT PERFORMED


10/17/18 09:33   Blood   Blood Culture - Final


                            NO GROWTH AFTER 5 DAYS


10/17/18 09:33   Blood   Gram Stain - Final


                            TEST NOT PERFORMED


10/18/18 Unknown   Chest   Gram Stain - Final


10/18/18 Unknown   Chest   Wound Culture - Final


                                Staphylococcus Aureus


10/17/18 Unknown   Skin - Chest   Gram Stain - Final


10/17/18 Unknown   Skin - Chest   Wound Culture - Final


                                Staphylococcus Aureus





                             Most Recent Lab Values











WBC  6.5 K/uL (4.8-10.8)   10/30/18  06:36    


 


RBC  3.34 Mil/uL (4.40-5.90)  L  10/30/18  06:36    


 


Hgb  9.7 g/dL (12.0-18.0)  L  10/30/18  06:36    


 


Hct  29.5 % (35.0-51.0)  L  10/30/18  06:36    


 


MCV  88.3 fL (80.0-94.0)   10/30/18  06:36    


 


MCH  28.9 pg (27.0-31.0)   10/30/18  06:36    


 


MCHC  32.7 g/dL (33.0-37.0)  L  10/30/18  06:36    


 


RDW  19.4 % (11.5-14.5)  H  10/30/18  06:36    


 


Plt Count  201 K/uL (130-400)   10/30/18  06:36    


 


MPV  7.4 fL (7.2-11.7)   10/30/18  06:36    


 


Neut % (Auto)  75.7 % (50.0-75.0)  H  10/30/18  06:36    


 


Lymph % (Auto)  11.2 % (20.0-40.0)  L  10/30/18  06:36    


 


Mono % (Auto)  8.9 % (0.0-10.0)   10/30/18  06:36    


 


Eos % (Auto)  3.2 % (0.0-4.0)   10/30/18  06:36    


 


Baso % (Auto)  1.0 % (0.0-2.0)   10/30/18  06:36    


 


Neut # (Auto)  4.9 K/uL (1.8-7.0)   10/30/18  06:36    


 


Lymph # (Auto)  0.7 K/uL (1.0-4.3)  L  10/30/18  06:36    


 


Mono # (Auto)  0.6 K/uL (0.0-0.8)   10/30/18  06:36    


 


Eos # (Auto)  0.2 K/uL (0.0-0.7)   10/30/18  06:36    


 


Baso # (Auto)  0.1 K/uL (0.0-0.2)   10/30/18  06:36    


 


Neutrophils % (Manual)  84 % (50-75)  H  10/19/18  09:39    


 


Band Neutrophils %  1 % (0-2)   10/19/18  09:39    


 


Lymphocytes % (Manual)  10 % (20-40)  L  10/19/18  09:39    


 


Monocytes % (Manual)  3 % (0-10)   10/19/18  09:39    


 


Eosinophils % (Manual)  2 % (0-4)   10/19/18  09:39    


 


Platelet Estimate  Normal  (NORMAL)   10/19/18  09:39    


 


Polychromasia  Slight   10/19/18  09:39    


 


Hypochromasia (manual)  Slight   10/19/18  09:39    


 


Anisocytosis (manual)  Slight   10/19/18  09:39    


 


Microcytosis (manual)  Slight   10/17/18  22:57    


 


Ovalocytes  Slight   10/19/18  09:39    


 


PT  20.5 SECONDS (9.7-12.2)  H  10/30/18  06:36    


 


INR  1.9   10/30/18  06:36    


 


APTT  38 SECONDS (21-34)  H  10/30/18  06:36    


 


Sodium  136 mmol/L (132-148)   10/30/18  06:36    


 


Potassium  4.3 mmol/L (3.6-5.2)   10/30/18  06:36    


 


Chloride  98 mmol/L ()   10/30/18  06:36    


 


Carbon Dioxide  25 mmol/L (22-30)   10/30/18  06:36    


 


Anion Gap  18  (10-20)   10/30/18  06:36    


 


BUN  55 mg/dL (9-20)  H  10/30/18  06:36    


 


Creatinine  4.7 mg/dL (0.8-1.5)  H  10/30/18  06:36    


 


Est GFR ( Amer)  15   10/30/18  06:36    


 


Est GFR (Non-Af Amer)  13   10/30/18  06:36    


 


POC Glucose (mg/dL)  146 mg/dL ()  H  10/30/18  16:15    


 


Random Glucose  106 mg/dL ()   10/30/18  06:36    


 


Hemoglobin A1c  4.8 % (4.2-6.5)   10/25/18  07:20    


 


Lactic Acid  0.9 mmol/L (0.7-2.1)   10/18/18  11:18    


 


Calcium  8.4 mg/dl (8.6-10.4)  L  10/30/18  06:36    


 


Phosphorus  5.6 mg/dL (2.5-4.5)  H  10/30/18  06:36    


 


Magnesium  2.1 mg/dL (1.6-2.3)   10/30/18  06:36    


 


Total Bilirubin  0.9 mg/dL (0.2-1.3)   10/30/18  06:36    


 


AST  17 U/L (17-59)  D 10/30/18  06:36    


 


ALT  19 U/L (21-72)  L  10/30/18  06:36    


 


Alkaline Phosphatase  298 U/L ()  H  10/30/18  06:36    


 


Total Protein  6.5 g/dL (6.3-8.3)   10/30/18  06:36    


 


Albumin  3.3 g/dL (3.5-5.0)  L  10/30/18  06:36    


 


Globulin  3.2 gm/dL (2.2-3.9)   10/30/18  06:36    


 


Albumin/Globulin Ratio  1.0  (1.0-2.1)   10/30/18  06:36    


 


Procalcitonin  4.31 NG/ML (0.19-0.49)  H  10/19/18  09:39    


 


Random Vancomycin  6.2 ug/mL  10/29/18  11:32    














- Hospital Course


Hospital Course: 





Initial HPI:


Pt is a 63M with PMH ESRD, DM, CHF, A FIB W/RVR, AML, HTN, COPD, sleep apnea 

presents to ED with port a cath infection s/p HD. He reports feeling a burning 

sensation two days ago and that today after HD his wife noticed some pus coming 

from the site. Pt reports nausea and dry heaving. He denies any fever, abdominal

pain, shortness of breath, chest pain, diarrhea, constipation.


In the ED, pt was given tylenol for Tmax 102F and BCx and wound Cx were 

obtained.





SxH: back surgery, pacemaker


FamH: mom heart disease, DM, dad CAD


SocH: denies tobacco, etoh, recreational drug use


Allergies: NKDA


Meds: coumadin 1 daily, starlix 60 TID, procrit, metoprolol succinate 25 daily


PMD: Dr. Jackson





This patient had initially presented to [facility] for port-a-cath 

infection, was treated with antibiotics, and then patient signed out AMA as he 

felt that the infection was improving. When the infection began to worsen 

again, patient came to [facility] ER on 10/17 and was admitted for 

infection/abscess of port-a-cath site with fever of 102.0. Patient had 

port-a-cath placed due to difficulty obtaining vascular access during patient's 

multiple prior hospitalizations. Of note, patient has a history of multiple 

hospitalizations for infected wounds





Surgery was consulted (Dr. Lopez) for possible I&D/port-a-cath removal. 

Port-a-cath was removed by the surgical team on 10/18, and a large amount of 

purulent drainage was pulled out within the pocket of the port. It was 

recommended to the patient that he have a new port-a-cath placed following 

removal, however patient refused.





ID was consulted (Dr. Fink), and per his recommendations, patient was treated

throughout hospital course with IV Cefepime daily and IV Vancomycin MWF. Aside 

from initial temperature of 102.0, patient remained afebrile for the remainder 

of his hospital course. Cefepime course finished and patient remained on 

Vancomycine MWF.  2D echo was ordered to rule out endocarditis, and was negative

for any valvular lesions or calcifications.





Patient's has a history of hypertension, however was persistently hypotensive 

during hospital course with lower readings of about 90s/50-60s. Patient stated 

that these were normal pressures for him, and he remained assymptomatic. Home 

metoprolol was held.





Podiatry was consulted for b/l LE ulcerations and wound care. Wounds were 

cleaned and dressed, requiring no further intervention.





Patient is on chronic anticoagulation for Afib. Initially, patient was continued

on Coumadin 3mg daily, however INR was subtherapeutic.  Coumadin was then 

increased to 5mg daily, INR remained subtherapeutic.  Patient was not amenable 

to heparin bridge, but agreed to switch to low dose eliquis 2.5 mg PO BID.





Patient continued on vancomycin, however random vanc levels taken prior to HD 

continued to be subtherapeutic.  Dose was increased from 1gm MWF to 1.5 gm MWF, 

with levels still remaining subtherapeutic.  Patient was discharged with plans 

for MWF dialysis and prescription for 2mg Vanco IV MWF, with random vanc levels 

to be drawn prior to HD and phoned in to Dr. Fink's office.














Discharge Exam





- Head Exam


Head Exam: ATRAUMATIC, NORMAL INSPECTION





- Eye Exam


Eye Exam: EOMI, Normal appearance





- ENT Exam


ENT Exam: Mucous Membranes Moist





- Respiratory Exam


Respiratory Exam: NORMAL BREATHING PATTERN.  absent: Rhonchi, Wheezes





- Cardiovascular Exam


Cardiovascular Exam: REGULAR RHYTHM, +S1, +S2





- GI/Abdominal Exam


GI & Abdominal Exam: Normal Bowel Sounds, Soft.  absent: Tenderness





- Extremities Exam


Extremities exam: normal inspection


Additional comments: 





R AVF with palpable thrill. Chronic venous stasis changes of LEs with pedal 

edema b/l.





- Neurological Exam


Neurological exam: Alert, CN II-XII Intact, Normal Gait, Oriented x3





- Psychiatric Exam


Psychiatric exam: Anxious, Normal Affect





- Skin


Skin Exam: Dry, Intact


Additional comments: 





Chronic venous stasis changes b/l





Discharge Plan





- Discharge Medications


Prescriptions: 


Apixaban [Eliquis] 2.5 mg PO BID #60 tab


Carvedilol [Coreg] 3.125 mg PO BID #30 tab


Vancomycin [Vancomycin Inj] 1.5 gm IVPB MWF #5 vial


Vancomycin HCl in 5 % Dextrose [Vancomycin 2 Gram/500 ml-D5w] 2 gm IV MWF #5 

plast..bag





- Follow Up Plan


Condition: STABLE


Disposition: HOME/ ROUTINE


Instructions:  Heart Failure, Adult (DC), Apixaban, Central Line Infections 

(DC), Carvedilol, Vancomycin


Additional Instructions: 


Patient is cleared for discharge per Dr. Dickinson. 








Patient is to continue taking the following medications:





Eliquis 2.5 mg one tab by mouth twice a day at 8am and 8pm


Phoslo 667 one tab by mout three times a day at 8am 12pm and 8pm


Colace 100mg by mouth daily at 8am


Nateglinide 60mg tab by mouth daily at 8am


Coreg 3.125 mg by mouth daily at 8am


Vancomycin 2mg by IV at dialysis on MWF for 5 doses.








Patient is to continue dialysis on Mondays Wednesdays and Fridays.  Labs are to 

be drawn prior to dialysis and you may inform the lab company that the results 

should be called in to Dr. Fink's office:





Please return to ER if symptoms recur or worsen.


Referrals: 


Peri Fink MD [Staff Provider] - 


Liam Cortes MD [Staff Provider] - 


Faraz Thornton DPM [Staff Provider] -

## 2018-10-30 NOTE — CP.PCM.PN
Subjective





- Date & Time of Evaluation


Date of Evaluation: 10/30/18


Time of Evaluation: 10:01





- Subjective


Subjective: 





Podiatry Progress Note- Dr. Thornton





64 y/o male seen at bedside this morning with attending Dr. Thornton at bedside for

bilateral lower extremity ulcerations- stable. Wife is seen at bedside during 

visitation. Dressing is clean, dry and intact. Multipodus boots are on. NAD. 

Denies pain to his lower extremities. Denies F/C/N/V/CP/SOB








Objective





- Vital Signs/Intake and Output


Vital Signs (last 24 hours): 


                                        











Temp Pulse Resp BP Pulse Ox


 


 97.6 F   97 H  18   116/74   95 


 


 10/30/18 00:29  10/30/18 00:29  10/30/18 00:29  10/30/18 00:29  10/30/18 00:29











- Medications


Medications: 


                               Current Medications





Acetaminophen (Tylenol 325mg Tab)  650 mg PO Q6 PRN


   PRN Reason: Fever >100.4 F


Apixaban (Eliquis)  2.5 mg PO BID [facility]


   Last Admin: 10/30/18 09:07 Dose:  2.5 mg


Calcium Acetate (Phoslo)  667 mg PO TIDCC [facility]


   Last Admin: 10/30/18 09:04 Dose:  667 mg


Dextrose (Dextrose 50% Inj)  0 ml IV STAT PRN; Protocol


   PRN Reason: Hypoglycemia Protocol


Dextrose (Glutose 15)  0 gm PO ONCE PRN; Protocol


   PRN Reason: Hypoglycemia Protocol


Glucagon (Glucagen Diagnostic Kit)  0 mg IM STAT PRN; Protocol


   PRN Reason: Hypoglycemia Protocol


Vancomycin HCl 1.5 gm/ Sodium (Chloride)  500 mls @ 166.7 mls/hr IVPB MWF [facility]; 

Protocol


   Last Admin: 10/29/18 18:03 Dose:  Not Given


Insulin Human Regular (Novolin R)  0 unit SC [facility]; Protocol


   Last Admin: 10/30/18 08:59 Dose:  Not Given


Ondansetron HCl (Zofran Inj)  4 mg IVP Q6 PRN


   PRN Reason: Nausea/Vomiting











- Labs


Labs: 


                                        





                                 10/30/18 06:36 





                                 10/30/18 06:36 





                                        











PT  20.5 SECONDS (9.7-12.2)  H  10/30/18  06:36    


 


INR  1.9   10/30/18  06:36    


 


APTT  38 SECONDS (21-34)  H  10/30/18  06:36    














- Constitutional


Appears: Well, Non-toxic





- Extremities Exam


Extremities Exam: absent: Calf Tenderness


Additional comments: 








Lower extremity focused exam:





Vasc: Nonpalpable pulses to the DP and PT, delayed CFT to the digits, 

temperature gradient WNL,  +1 pitting pedal edema





Derm: chronic skin changes with hyperpigmented skin to entire lower extremity, 

skin thickening and flaking bilateral lower legs.


Left: Left posterior heel ulceration now fully healed with overlying thin fragil

e skin. Mild dark discoloration noted to skin area at site of prior ulceration.


Right: Full thickness ulceration noted to posterior ankle at level of Achilles 

insertion measuring approximately 2 cm x 2 cm x 0.3cm, with mixture granular and

fibrotic wound base. No active drainage noted. No fluctuance noted. Minor marcos-

wound erythema, no streaking appreciated.





Neuro: Gross sensation absent B/L. protective sensation absent





Ortho: no pain appreciated with palpation to surrounding ulceration sites. No 

pain with calf palpation bilaterally.








- Neurological Exam


Neurological Exam: Alert, Awake





- Psychiatric Exam


Psychiatric exam: Normal Affect, Normal Mood





Assessment and Plan





- Assessment and Plan (Free Text)


Assessment: 





62M with B/L lower extremity ulcerations- stable, improving


Plan: 





Patient seen and evaluated at bedside with Dr. Thornton


Patient afebrile, absent leukocytosis 


Podiatry to continue with local wound care: wounds cleansed with saline and 

dressed with ABD, DSD; betadine applied to R posterior ankle wound


Wounds stable, no current signs of infection


plan: No surgical intervention at this time


Patient to wear multipodus boots at all times while in bed 


Will continue to follow

## 2018-11-09 ENCOUNTER — HOSPITAL ENCOUNTER (INPATIENT)
Dept: HOSPITAL 31 - C.ER | Age: 63
LOS: 4 days | Discharge: HOME | DRG: 432 | End: 2018-11-13
Attending: INTERNAL MEDICINE | Admitting: INTERNAL MEDICINE
Payer: MEDICARE

## 2018-11-09 VITALS — BODY MASS INDEX: 32.5 KG/M2 | HEART RATE: 112 BPM

## 2018-11-09 VITALS — RESPIRATION RATE: 20 BRPM

## 2018-11-09 DIAGNOSIS — Z99.2: ICD-10-CM

## 2018-11-09 DIAGNOSIS — E11.621: ICD-10-CM

## 2018-11-09 DIAGNOSIS — I13.2: ICD-10-CM

## 2018-11-09 DIAGNOSIS — R18.8: ICD-10-CM

## 2018-11-09 DIAGNOSIS — I27.20: ICD-10-CM

## 2018-11-09 DIAGNOSIS — N18.6: ICD-10-CM

## 2018-11-09 DIAGNOSIS — I48.2: ICD-10-CM

## 2018-11-09 DIAGNOSIS — E11.52: ICD-10-CM

## 2018-11-09 DIAGNOSIS — J44.9: ICD-10-CM

## 2018-11-09 DIAGNOSIS — E11.22: ICD-10-CM

## 2018-11-09 DIAGNOSIS — L97.429: ICD-10-CM

## 2018-11-09 DIAGNOSIS — L03.115: ICD-10-CM

## 2018-11-09 DIAGNOSIS — I42.9: ICD-10-CM

## 2018-11-09 DIAGNOSIS — C95.91: ICD-10-CM

## 2018-11-09 DIAGNOSIS — I50.9: ICD-10-CM

## 2018-11-09 DIAGNOSIS — L03.116: ICD-10-CM

## 2018-11-09 DIAGNOSIS — Z95.810: ICD-10-CM

## 2018-11-09 DIAGNOSIS — D63.8: ICD-10-CM

## 2018-11-09 DIAGNOSIS — K74.60: Primary | ICD-10-CM

## 2018-11-09 LAB
ALBUMIN SERPL-MCNC: 3.3 G/DL (ref 3.5–5)
ALBUMIN/GLOB SERPL: 1 {RATIO} (ref 1–2.1)
ALT SERPL-CCNC: 17 U/L (ref 21–72)
APTT BLD: 37 SECONDS (ref 21–34)
AST SERPL-CCNC: 23 U/L (ref 17–59)
BASOPHILS # BLD AUTO: 0 K/UL (ref 0–0.2)
BASOPHILS NFR BLD: 1.1 % (ref 0–2)
BUN SERPL-MCNC: 42 MG/DL (ref 9–20)
CALCIUM SERPL-MCNC: 8.1 MG/DL (ref 8.6–10.4)
EOSINOPHIL # BLD AUTO: 0.2 K/UL (ref 0–0.7)
EOSINOPHIL NFR BLD: 3.8 % (ref 0–4)
ERYTHROCYTE [DISTWIDTH] IN BLOOD BY AUTOMATED COUNT: 19.7 % (ref 11.5–14.5)
GFR NON-AFRICAN AMERICAN: 19
HGB BLD-MCNC: 9.6 G/DL (ref 12–18)
INR PPP: 1.6
LYMPHOCYTES # BLD AUTO: 0.6 K/UL (ref 1–4.3)
LYMPHOCYTES NFR BLD AUTO: 14.2 % (ref 20–40)
MCH RBC QN AUTO: 28.9 PG (ref 27–31)
MCHC RBC AUTO-ENTMCNC: 32.6 G/DL (ref 33–37)
MCV RBC AUTO: 88.8 FL (ref 80–94)
MONOCYTES # BLD: 0.5 K/UL (ref 0–0.8)
MONOCYTES NFR BLD: 12.2 % (ref 0–10)
NEUTROPHILS # BLD: 2.9 K/UL (ref 1.8–7)
NEUTROPHILS NFR BLD AUTO: 68.7 % (ref 50–75)
NRBC BLD AUTO-RTO: 0.1 % (ref 0–2)
PLATELET # BLD: 161 K/UL (ref 130–400)
PMV BLD AUTO: 7 FL (ref 7.2–11.7)
PROTHROMBIN TIME: 17.4 SECONDS (ref 9.7–12.2)
RBC # BLD AUTO: 3.3 MIL/UL (ref 4.4–5.9)
WBC # BLD AUTO: 4.3 K/UL (ref 4.8–10.8)

## 2018-11-09 NOTE — C.PDOC
History Of Present Illness


63 year old male presents to the ED complaining of abdominal discomfort and 

swelling for 2 days. Reports blood colored stool but denies any vomiting, 

nausea, diarrhea.  


Chief Complaint (Nursing): GI Problem


History Per: Patient


History/Exam Limitations: no limitations


Onset/Duration Of Symptoms: Days (2)


Current Symptoms Are (Timing): Still Present


Location Of Pain/Discomfort: Diffuse


Radiation Of Pain To:: None


Associated Symptoms: denies: Fever, Chills, Nausea, Vomiting, Diarrhea





Past Medical History


Reviewed: Historical Data, Nursing Documentation, Vital Signs


Vital Signs: 





                                Last Vital Signs











Temp  97.6 F   11/09/18 19:51


 


Pulse  113 H  11/09/18 19:51


 


Resp  20   11/09/18 19:51


 


BP  107/61   11/09/18 19:51


 


Pulse Ox  97   11/09/18 19:51














- Medical History


PMH: Anxiety, Arthritis, Asthma, Atrial Fibrillation, Cardia Arrhythmia (A FIB),

CHF, COPD (uses CPAP for sleep apnea), Depression, Diabetes, Fractures, HTN, 

Malignancy (AML LEUKEMIA), Osteoporosis, Peripheral Edema, End Stage Renal 

Disease, Chronic Kidney Disease, Sleep Apnea (C-PAP)


Surgical History: Back Surgery, Pacemaker





- [facility] Procedures











 (10/18/18)


BONE GRAFT NEC (06/10/14)


BONE MARROW OPS NEC (06/10/14)


CENTRAL VENOUS CATHETER PLACEMENT WITH GUIDANCE (06/10/14)


CHEST CAGE BONE BIOPSY (06/10/14)


DIALYSIS ARTERIOVENOSTOM (09/25/14)


DORSAL & DORSOLUMBAR FUSION OF POSTERIOR COL/TECHNIQUE (06/10/14)


DRAINAGE OF RIGHT LOWER LEG SKIN, EXTERNAL APPROACH, DIAGN (04/17/18)


DX ULTRASOUND-HEART (06/10/14)


EXCISE BONE FOR GFT NEC (06/10/14)


EXCISION OF L FOOT SUBCU/FASCIA, OPEN APPROACH (07/03/17)


EXCISION OF LEFT FOOT SKIN, EXTERNAL APPROACH (04/17/18)


EXCISION OF LEFT TARSAL, OPEN APPROACH, DIAGNOSTIC (10/13/17)


EXCISION OF RIGHT FOOT SKIN, EXTERNAL APPROACH (10/13/17)


EXCISION OF RIGHT LOWER LEG SKIN, EXTERNAL APPROACH (04/17/18)


EXTRACTION OF LEFT FOOT SKIN, EXTERNAL APPROACH (07/03/17)


FUSION/REFUS OF 2-3 VERTEBRAE (06/10/14)


HEAD SOFT TISS X-RAY NEC (09/25/14)


HEMODIALYSIS (08/30/14)


INSERTION OF INFUSION DEV INTO SUP VENA CAVA, PERC APPROACH (10/13/17)


NON-INVASIVE MECHANICAL VENTILATION (08/30/14)


OCCUPATIONAL THERAPY (07/15/14)


PACKED CELL TRANSFUSION (06/10/14)


PERFORMANCE OF URINARY FILTRATION, MULTIPLE (04/17/17)


PHYSICAL THERAPY NEC (07/15/14)


RECREATIONAL THERAPY (07/15/14)


REMOVAL OF RESERVOIR FROM TRUNK SUBCU/FASCIA, OPEN APPROACH (10/18/18)


REPOSITION RIGHT BASILIC VEIN, OPEN APPROACH (04/17/17)


SPINAL CANAL EXPLOR NEC (06/10/14)


SUPPLEMENT LEFT FOOT WITH NONAUT SUB, OPEN APPROACH (04/17/18)


THORACENTESIS (08/30/14)


TRANSFUSE NONAUT RED BLOOD CELLS IN PERIPH VEIN, PERC (08/16/18)


VACCINATION NEC (08/30/14)


VENOUS CATHETERIZATION FOR RENAL DIALYSIS (09/25/14)








Family History: States: No Known Family Hx





- Social History


Hx Tobacco Use: No


Hx Alcohol Use: No


Hx Substance Use: No





- Immunization History


Hx Tetanus Toxoid Vaccination: Yes


Hx Influenza Vaccination: Yes


Hx Pneumococcal Vaccination: Yes





Review Of Systems


Except As Marked, All Systems Reviewed And Found Negative.


Constitutional: Negative for: Fever, Chills


Gastrointestinal: Positive for: Abdominal Pain, Hematochezia.  Negative for: 

Nausea, Vomiting, Diarrhea





Physical Exam





- Physical Exam


Appears: Non-toxic


Skin: Warm, Dry, No Rash


Head: Normacephalic


Eye(s): bilateral: Normal Inspection


Nose: Normal


Oral Mucosa: Moist


Neck: Normal ROM, Supple


Chest: Symmetrical


Cardiovascular: Rhythm Regular


Respiratory: Normal Breath Sounds, No Rales, No Rhonchi, No Wheezing


Gastrointestinal/Abdominal: Soft, Tenderness, Distention (mild ), No Guarding, 

No Rebound


Rectal: Blood Streaked Stool


Extremity: Pedal Edema (B/L 3+ )


Neurological/Psych: Oriented x3, Normal Speech


Gait: Steady





ED Course And Treatment





- Laboratory Results


Result Diagrams: 


                                 11/09/18 20:59





                                 11/09/18 20:59


O2 Sat by Pulse Oximetry: 97 (RA)


Pulse Ox Interpretation: Normal





- CT Scan/US


  ** CT ABD/PEL


Other Rad Studies (CT/US): Read By Radiologist, Radiology Report Reviewed


CT/US Interpretation: History: Black stool.  Rule out bleeding.  Comparison: 

None.  Technique: Noncontrast CT examination abdomen pelvis.  Noncontrast CT 

examination of the abdomen pelvis is obtained.  There is a moderate sized right 

pleural effusion.  There is compressive atelectasis at the right lower lung.  

Left lung appears clear.  Cardiac pacing wires noted.  Heart appears enlarged.  

There is a large amount of ascites seen throughout the abdomen and pelvis.  The 

liver is a small in size and lobulated in contour and likely cirrhotic.  Spleen 

appears within normal limits in size.  Advanced atherosclerotic changes are 

present.  Some high density material seen at the posterior aspect of the 

gallbladder possibly representing sludge.  Pancreas appears normal.  Kidneys are

small in size and demonstrate vascular calcifications.  There is no mass or 

obstruction.  There is no periaortic mass or lymphadenopathy.  Visualized bowel 

appears grossly within normal limits keeping in mind the evaluation of bowel is 

limited due to lack of oral contrast.  There is no distinct mass within the 

pelvis although a large amount of ascites present within the pelvis.  

Subcutaneous edema is seen about the abdomen and pelvis.  Extensive postsurgical

changes with orthopedic fixation devices are seen at the lower thoracic and upp

er lumbar spine.  Impression: Moderate sized right pleural effusion with 

compressive atelectasis right lower lung.  Large amount of ascites within the 

abdomen and pelvis.  Subcutaneous edema noted.  Cirrhotic liver suspected.  High

density material within the gallbladder may represent sludge or calculi.  

Vascular calcifications within the kidneys.  Kidney small in size.  No distinct 

mass within the pelvis.  Postsurgical changes seen at the thoracolumbar 

junction.  .  Electronically signed on Nov 9, 2018 9:01:51 PM EST by:  Sergei Carson M.D., Certified by [facility], Diagnostic Radiology.  





Medical Decision Making


Medical Decision Making: 





Plan 


- Bloodwork 


- EKG


- CXR


- CT abd/pel 








2027 Spoke with LIZ Bejarano. Agrees with plan of care. 








Disposition


Discussed With : Cora Trinh


Doctor Will See Patient In The: Hospital


Counseled Patient/Family Regarding: Diagnosis





- Disposition


Disposition: HOSPITALIZED


Disposition Time: 21:49


Condition: STABLE


Forms:  [facility] (English)





- POA


Present On Arrival: None





- Clinical Impression


Clinical Impression: 


 ESRD (end stage renal disease) on dialysis, Ascites








- Scribe Statement


The provider has reviewed the documentation as recorded by the Omaribprerna Mason








All medical record entries made by the Scribe were at my direction and 

personally dictated by me. I have reviewed the chart and agree that the record 

accurately reflects my personal performance of the history, physical exam, 

medical decision making, and the department course for this patient. I have also

personally directed, reviewed, and agree with the discharge instructions and 

disposition.

## 2018-11-10 NOTE — CP.PCM.HP
History of Present Illness





- History of Present Illness


History of Present Illness: 





Chief complaint:


Patient was sent from hemodialysis because of the abdominal distention.





History present illness:


63-year-old male with history of end-stage renal disease on dialysis, history of

leukemia in the past on the remission, chronic atrial fibrillation, episodes of 

hypotension, chronic leg ulcers,weakness and pedal edema, recent hospitalization

with anasarca, CHF,


Nonhealing leg ulcers, treated aggressively with the multiple wound debridement,

and a dressing.


Significant improvement in the leg ulcers noted.


Patient was recently hospitalized elevated INR and bleeding.


Coumadin was discontinued, patient was placed on newer oral anticoagulants.


patient was getting the hemodialysis yesterday and he got increasing abdominal 

discomfort, and abdominal swelling and shortness of breath.


Because of that patient was sent to the emergency room and.


In the emergency room patient was having increasing abdominal distention.


Also there was a black stools was noted.


But guaiac was done in the emergency room was negative.


Patient underwent a CAT scan of the abdomen showing evidence of large ascites.


But otherwise patient to having good oxygenation and needed hospitalization.





.








Past medical history:


End-stage renal disease on dialysis, history of leukemia in remission, chronic 

atrial fibrillation, chronic cellulitis, CHF, AICD, vertibral abscess and s/p 

surgery





Allergy: No known drug allergy.


Personal history:


Currently patient is a nonsmoker nonalcoholic.





Family history noncontributory.





Review of systems:


The patient is in significant weakness, tiredness, easy fatigability.


Generalized weight gain, and anasarca.


Denies any nausea.


Patient started noticing left heel ulcer, initially started having blisterlike 

lesion, which burst into ulcer.





On examination:


HEENT PERRLA, neck supple


No thyromegaly was noted and no cervical adenopathy noted


Chest bilateral good air entry, no wheezing or rales noted


[facility] regular heart sound, systolic murmur, ACD noted


Abdominal distention, tenderness, negative,


Generalized edema noted, more pronounced in the both lower extremities.


Right heel showing  evidence of necrotic skin changes. Foul-smelling





CNS alert awake oriented x3 no functional neurological deficit.








                                        





                                 11/09/18 20:59 





                                 11/09/18 20:59 











Assessment/recommendation:


63-year-old male with history of end-stage renal disease on dialysis, history of

leukemia in remission, atrial fibrillation on anticoagulation.


He also had a back surgery recently.


Chronic pain.


History of Coumadin toxicity in the past.


Now admitted with abdominal distention, worsening ascites.


Patient also has a cirrhotic changes in the liver from the CAT scan.


Will get a GI evaluation.


We'll possibly hold off the anticoagulation.


Patient will need a paracentesis therapeutically.


Patient also needs dialysis.


Meanwhile will continue the current treatment.


I discussed with the gastroenterologist.


DVT GI prophylaxis and will follow-up the patient


Will continue the BiPAP








Present on Admission





- Present on Admission


Any Indicators Present on Admission: No


History of DVT/PE: No


History of Uncontrolled Diabetes: No


Urinary Catheter: No


Decubitus Ulcer Present: No





Past Patient History





- Infectious Disease


Hx of Infectious Diseases: None





- Tetanus Immunizations


Tetanus Immunization: Unknown





- Past Medical History & Family History


Past Medical History?: Yes





- Past Social History


Smoking Status: Never Smoked





- CARDIAC


Hx Atrial Fibrillation: Yes


Hx Cardia Arrhythmia: Yes (A FIB)


Hx Congestive Heart Failure: Yes


Hx Hypertension: Yes


Hx Pacemaker: Yes


Hx Peripheral Edema: Yes





- PULMONARY


Hx Respiratory Disorders: Yes


Hx Asthma: Yes


Hx Chronic Obstructive Pulmonary Disease (COPD): Yes (uses CPAP for sleep apnea)


Hx Sleep Apnea: Yes (C-PAP)





- NEUROLOGICAL


Hx Neurological Disorder: Yes (PERIPHERAL NEUROPATHY)


Hx Dizziness: Yes





- HEENT


Hx HEENT Problems: No





- RENAL


Hx Chronic Kidney Disease: Yes


Date of Last Dialysis Treatment: 11/09/18





- ENDOCRINE/METABOLIC


Hx Endocrine Disorders: Yes


Hx Diabetes Mellitus Type 2: Yes





- HEMATOLOGICAL/ONCOLOGICAL


Hx Blood Disorders: Yes


Hx Blood Transfusions: Yes


Hx Cancer: Yes


Hx Chemotherapy: Yes


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





- INTEGUMENTARY


Hx Dermatological Problems: Yes


Hx Cellulitis: Yes





- MUSCULOSKELETAL/RHEUMATOLOGICAL


Hx Musculoskeletal Disorders: Yes


Hx Arthritis: Yes


Hx Falls: Yes


Hx Fractures: Yes


Hx Osteoporosis: Yes





- GASTROINTESTINAL


Hx Gastrointestinal Disorders: Yes


Hx Constipation: Yes





- GENITOURINARY/GYNECOLOGICAL


Hx Genitourinary Disorders: Yes


Other/Comment: On HD-M-W-F





- PSYCHIATRIC


Hx Psychophysiologic Disorder: Yes


Hx Anxiety: Yes


Hx Depression: Yes


Hx Substance Use: No





- SURGICAL HISTORY


Hx Surgeries: Yes


Hx Angiogram: Yes


Hx Arteriovenous Shunt: Yes


Hx Cardiac Catheterization: Yes


Hx Orthopedic Surgery: Yes (KNEE)


Hx Vascular Access Device: Yes (RIGHT EMERSON CATH removed)


Other/Comment: hx back surgery T5.  defibrillator/pacemaker placement.  

bilateral heel surgery





- ANESTHESIA


Hx Anesthesia: Yes


Hx Anesthesia Reactions: No


Hx Malignant Hyperthermia: No





Meds


Allergies/Adverse Reactions: 


                                    Allergies











Allergy/AdvReac Type Severity Reaction Status Date / Time


 


No Known Allergies Allergy   Verified 11/09/18 20:02














Results





- Vital Signs


Recent Vital Signs: 





                                Last Vital Signs











Temp  97.4 F L  11/10/18 16:15


 


Pulse  118 H  11/10/18 16:15


 


Resp  20   11/10/18 16:15


 


BP  159/70 H  11/10/18 16:15


 


Pulse Ox  96   11/10/18 16:15














- Labs


Result Diagrams: 


                                 11/09/18 20:59





                                 11/09/18 20:59


Labs: 





                         Laboratory Results - last 24 hr











  11/09/18 11/09/18 11/09/18





  20:27 20:59 20:59


 


WBC   4.3 L 


 


RBC   3.30 L 


 


Hgb   9.6 L 


 


Hct   29.3 L 


 


MCV   88.8 


 


MCH   28.9 


 


MCHC   32.6 L 


 


RDW   19.7 H 


 


Plt Count   161 


 


MPV   7.0 L 


 


Neut % (Auto)   68.7 


 


Lymph % (Auto)   14.2 L 


 


Mono % (Auto)   12.2 H 


 


Eos % (Auto)   3.8 


 


Baso % (Auto)   1.1 


 


Neut # (Auto)   2.9 


 


Lymph # (Auto)   0.6 L 


 


Mono # (Auto)   0.5 


 


Eos # (Auto)   0.2 


 


Baso # (Auto)   0.0 


 


PT    17.4 H


 


INR    1.6


 


APTT    37 H


 


Sodium   


 


Potassium   


 


Chloride   


 


Carbon Dioxide   


 


Anion Gap   


 


BUN   


 


Creatinine   


 


Est GFR ( Amer)   


 


Est GFR (Non-Af Amer)   


 


Random Glucose   


 


Calcium   


 


Total Bilirubin   


 


AST   


 


ALT   


 


Alkaline Phosphatase   


 


Ammonia   


 


Total Protein   


 


Albumin   


 


Globulin   


 


Albumin/Globulin Ratio   


 


Stool Occult Blood  Negative  


 


Blood Type   


 


Antibody Screen   














  11/09/18 11/09/18 11/09/18





  20:59 20:59 21:06


 


WBC   


 


RBC   


 


Hgb   


 


Hct   


 


MCV   


 


MCH   


 


MCHC   


 


RDW   


 


Plt Count   


 


MPV   


 


Neut % (Auto)   


 


Lymph % (Auto)   


 


Mono % (Auto)   


 


Eos % (Auto)   


 


Baso % (Auto)   


 


Neut # (Auto)   


 


Lymph # (Auto)   


 


Mono # (Auto)   


 


Eos # (Auto)   


 


Baso # (Auto)   


 


PT   


 


INR   


 


APTT   


 


Sodium  135  


 


Potassium  3.3 L  


 


Chloride  94 L  


 


Carbon Dioxide  27  


 


Anion Gap  17  


 


BUN  42 H  


 


Creatinine  3.4 H  


 


Est GFR ( Amer)  22  


 


Est GFR (Non-Af Amer)  19  


 


Random Glucose  108  


 


Calcium  8.1 L  


 


Total Bilirubin  0.9  


 


AST  23  


 


ALT  17 L  


 


Alkaline Phosphatase  258 H  


 


Ammonia   11 


 


Total Protein  6.6  


 


Albumin  3.3 L  


 


Globulin  3.3  


 


Albumin/Globulin Ratio  1.0  


 


Stool Occult Blood   


 


Blood Type    AB POSITIVE


 


Antibody Screen    Negative

## 2018-11-10 NOTE — CT
Date of service: 



11/09/2018



PROCEDURE:  CT Abdomen and Pelvis without intravenous contrast. 



HISTORY:

GI Bleeding



COMPARISON:

Comparison made with CTA of the abdomen and pelvis 04/18/2018 



TECHNIQUE:

Contiguous axial images of the abdomen and pelvis performed without 

the use of oral or intravenous contrast material.  Additional 2D   

sagittal and coronal reformats generated. 



Radiation dose:



Total exam DLP = 1196.04 mGy-cm.



This CT exam was performed using one or more of the following dose 

reduction techniques: Automated exposure control, adjustment of the 

mA and/or kV according to patient size, and/or use of iterative 

reconstruction technique.



FINDINGS:



LOWER THORAX:

There is a small to medium size right-sided effusion with mild right 

basilar atelectasis.  Minor linear atelectasis and/or scarring 

changes seen in the left lung base including the lingular region.  

The there is a trace left-sided effusion 



Heart is enlarged.  No significant pericardial effusion. 



Small hiatal hernia. 



The the the 



LIVER:

Liver exhibits a nodular surface contour and with a large amount of 

abdominal and pelvic ascites.  Findings suggest underlying cirrhosis 

appear clinical correlation recommended.  No obvious hepatic mass or 

collection. 



GALLBLADDER AND BILE DUCTS:

Gallbladder is difficult to assess due to fatty infiltration as well 

as large amount of ascites however layering radiopaque material in 

the dependent portion of the gallbladder could represent sludge or 

gravel. 



PANCREAS:

The pancreas appears atrophic and fatty replaced.  No pancreatic mass 

or collection seen on this limited noncontrast study.



SPLEEN:

Spleen exhibits normal size and attenuation pattern without obvious 

mass or collection.. 



ADRENALS:

No adrenal lesions 



KIDNEYS AND URETERS:

Kidneys demonstrate diminutive though symmetric size.  No evidence of 

nephrolithiasis or hydronephrosis. 



BLADDER:

Urinary bladder is incompletely distended which may in part account 

for thick-walled appearance.  Muscular hypertrophy presumably 

contributes.  Rule out cystitis with urinalysis correlation..



REPRODUCTIVE:

Unremarkable. 



APPENDIX:

.  The what appears represent normal appendix best seen on coronal 

axial image number [id number].  No periappendiceal inflammatory changes 

so far as can be seen 



BOWEL:

Evaluation of the bowel is limited due to the lack of oral and 

intravenous contrast material as well as the aforementioned ascites.  

Stomach is distended with liquid food debris and air.  Visualized 

loops of small bowel exhibit normal contour and caliber however are 

centralized due to a large amount of ascites..  No evidence of acute 

mechanical small bowel obstruction.  Large bowel appears unremarkable 

so far as can be seen although all also appears centralized to some 

degree due to the aforementioned large amount of ascites. 



PERITONEUM:

No gross free intraperitoneal air.  Large amount of abdominal and 

pelvic ascites.  The changes of anasarca felt be present as well.  

The 



LYMPH NODES:

Unremarkable. No enlarged lymph nodes. 



VASCULATURE:

Unremarkable. No aortic aneurysm. Minor aortic atherosclerotic 

calcification or mural plaque present however more extensive vascular 

calcifications of the aortic branch vessels. 



BONES:

Bilateral Morales rods are again seen attached to the visualized 

mid and lower thoracic segments 



Multilevel degenerative spondylosis. 



OTHER FINDINGS:

None. 



IMPRESSION:

Small to medium size right-sided effusion and mild right basilar 

atelectasis.  Trace left effusion.



Cardiomegaly.



Large amount of abdominal and pelvic ascites with cirrhotic appearing 

liver.



Questionable cholelithiasis versus layering sludge.  The. 



Diminutive kidneys. 



See above discussion for additional details and findings.

## 2018-11-10 NOTE — CP.PCM.CON
History of Present Illness





- History of Present Illness


History of Present Illness: 





This is a 63 year old man with ascites





Patient states that he noted increased abdominal girth over the past few days.  

He also noted that he could not eat regular amounts of food because of 

distention.  He denies having nausea, vomiting, difficulty swallowing, 

heartburn, loss of appetite and loss of weight.  The bowel movements have been 

frequent, three times daily, formed.  He denies having rectal bleeding.





He has no history of viral hepatitis, jaundice or alcohol abuse.





Ultrasound performed 10/26/2017 showed small abdominal ascites.  The liver 

contour was described as nodular.  Flow in the portal vein was hepatopetal.





He has a known history of cardiomyopathy, arrhythmia with ICD.  Echocardiogram 

from last month showed hypertrophic LV with a hypokinetic septum, slightly 

reduced LVEF, severe RV dilatation, severe reduction in RVEF, severe pulmonary 

hypertension, dilated RV, RA, IVC.





Review of Systems





- Review of Systems


All systems: reviewed and no additional remarkable complaints except





- Constitutional


Constitutional: absent: Chills, Fever





- Gastrointestinal


Gastrointestinal: Early Satiety.  absent: Constipation, Diarrhea, Dysphagia, 

Heartburn, Nausea, Vomiting





Past Patient History





- Infectious Disease


Hx of Infectious Diseases: None





- Tetanus Immunizations


Tetanus Immunization: Unknown





- Past Medical History & Family History


Past Medical History?: Yes





- Past Social History


Smoking Status: Never Smoked





- CARDIAC


Hx Atrial Fibrillation: Yes


Hx Cardia Arrhythmia: Yes (A FIB)


Hx Congestive Heart Failure: Yes


Hx Hypertension: Yes


Hx Pacemaker: Yes


Hx Peripheral Edema: Yes





- PULMONARY


Hx Respiratory Disorders: Yes


Hx Asthma: Yes


Hx Chronic Obstructive Pulmonary Disease (COPD): Yes (uses CPAP for sleep apnea)


Hx Sleep Apnea: Yes (C-PAP)





- NEUROLOGICAL


Hx Neurological Disorder: Yes (PERIPHERAL NEUROPATHY)


Hx Dizziness: Yes





- HEENT


Hx HEENT Problems: No





- RENAL


Hx Chronic Kidney Disease: Yes


Date of Last Dialysis Treatment: 11/09/18





- ENDOCRINE/METABOLIC


Hx Endocrine Disorders: Yes


Hx Diabetes Mellitus Type 2: Yes





- HEMATOLOGICAL/ONCOLOGICAL


Hx Blood Disorders: Yes


Hx Blood Transfusions: Yes


Hx Cancer: Yes


Hx Chemotherapy: Yes


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





- INTEGUMENTARY


Hx Dermatological Problems: Yes


Hx Cellulitis: Yes





- MUSCULOSKELETAL/RHEUMATOLOGICAL


Hx Musculoskeletal Disorders: Yes


Hx Arthritis: Yes


Hx Falls: Yes


Hx Fractures: Yes


Hx Osteoporosis: Yes





- GASTROINTESTINAL


Hx Gastrointestinal Disorders: Yes


Hx Constipation: Yes





- GENITOURINARY/GYNECOLOGICAL


Hx Genitourinary Disorders: Yes


Other/Comment: On HD-M-W-F





- PSYCHIATRIC


Hx Psychophysiologic Disorder: Yes


Hx Anxiety: Yes


Hx Depression: Yes


Hx Substance Use: No





- SURGICAL HISTORY


Hx Surgeries: Yes


Hx Angiogram: Yes


Hx Arteriovenous Shunt: Yes


Hx Cardiac Catheterization: Yes


Hx Orthopedic Surgery: Yes (KNEE)


Hx Vascular Access Device: Yes (RIGHT EMERSON CATH removed)


Other/Comment: hx back surgery T5.  defibrillator/pacemaker placement.  

bilateral heel surgery





- ANESTHESIA


Hx Anesthesia: Yes


Hx Anesthesia Reactions: No


Hx Malignant Hyperthermia: No





Meds


Allergies/Adverse Reactions: 


                                    Allergies











Allergy/AdvReac Type Severity Reaction Status Date / Time


 


No Known Allergies Allergy   Verified 11/09/18 20:02














- Medications


Medications: 


                               Current Medications





Apixaban (Eliquis)  2.5 mg PO BID [facility]


   Last Admin: 11/10/18 09:32 Dose:  2.5 mg


Calcium Acetate (Phoslo)  667 mg PO TID [facility]


   Last Admin: 11/10/18 09:32 Dose:  667 mg


Carvedilol (Coreg)  3.125 mg PO BID [facility]


   Last Admin: 11/10/18 09:39 Dose:  Not Given


Nateglinide (Starlix)  60 mg PO ACTID [facility]


   Last Admin: 11/10/18 08:00 Dose:  Not Given











Physical Exam





- Constitutional


Appears: No Acute Distress





- Head Exam


Head Exam: ATRAUMATIC, NORMOCEPHALIC





- Eye Exam


Eye Exam: EOMI, PERRL





- Neck Exam


Neck exam: Negative for: Lymphadenopathy, Thyromegaly





- Respiratory Exam


Respiratory Exam: NORMAL BREATHING PATTERN.  absent: Rales, Rhonchi, Wheezes





- Cardiovascular Exam


Cardiovascular Exam: REGULAR RHYTHM, +S1, +S2.  absent: Gallop, Rubs, Systolic 

Murmur





- GI/Abdominal Exam


GI & Abdominal Exam: Distended, Normal Bowel Sounds, Soft.  absent: Mass, 

Organomegaly, Tenderness


Additional comments: 





Positive fluid wave





- Rectal Exam


Rectal Exam: Deferred





- Extremities Exam


Extremities exam: Negative for: calf tenderness


Additional comments: 





Extensive stasis dermatitis





Results





- Vital Signs


Recent Vital Signs: 


                                Last Vital Signs











Temp  98.6 F   11/10/18 08:19


 


Pulse  96 H  11/10/18 08:19


 


Resp  20   11/10/18 08:19


 


BP  100/62   11/10/18 08:00


 


Pulse Ox  100   11/10/18 08:19














- Labs


Result Diagrams: 


                                 11/09/18 20:59





                                 11/09/18 20:59


Labs: 


                         Laboratory Results - last 24 hr











  11/09/18 11/09/18 11/09/18





  20:27 20:59 20:59


 


WBC   4.3 L 


 


RBC   3.30 L 


 


Hgb   9.6 L 


 


Hct   29.3 L 


 


MCV   88.8 


 


MCH   28.9 


 


MCHC   32.6 L 


 


RDW   19.7 H 


 


Plt Count   161 


 


MPV   7.0 L 


 


Neut % (Auto)   68.7 


 


Lymph % (Auto)   14.2 L 


 


Mono % (Auto)   12.2 H 


 


Eos % (Auto)   3.8 


 


Baso % (Auto)   1.1 


 


Neut # (Auto)   2.9 


 


Lymph # (Auto)   0.6 L 


 


Mono # (Auto)   0.5 


 


Eos # (Auto)   0.2 


 


Baso # (Auto)   0.0 


 


PT    17.4 H


 


INR    1.6


 


APTT    37 H


 


Sodium   


 


Potassium   


 


Chloride   


 


Carbon Dioxide   


 


Anion Gap   


 


BUN   


 


Creatinine   


 


Est GFR ( Amer)   


 


Est GFR (Non-Af Amer)   


 


Random Glucose   


 


Calcium   


 


Total Bilirubin   


 


AST   


 


ALT   


 


Alkaline Phosphatase   


 


Ammonia   


 


Total Protein   


 


Albumin   


 


Globulin   


 


Albumin/Globulin Ratio   


 


Stool Occult Blood  Negative  


 


Blood Type   


 


Antibody Screen   














  11/09/18 11/09/18 11/09/18





  20:59 20:59 21:06


 


WBC   


 


RBC   


 


Hgb   


 


Hct   


 


MCV   


 


MCH   


 


MCHC   


 


RDW   


 


Plt Count   


 


MPV   


 


Neut % (Auto)   


 


Lymph % (Auto)   


 


Mono % (Auto)   


 


Eos % (Auto)   


 


Baso % (Auto)   


 


Neut # (Auto)   


 


Lymph # (Auto)   


 


Mono # (Auto)   


 


Eos # (Auto)   


 


Baso # (Auto)   


 


PT   


 


INR   


 


APTT   


 


Sodium  135  


 


Potassium  3.3 L  


 


Chloride  94 L  


 


Carbon Dioxide  27  


 


Anion Gap  17  


 


BUN  42 H  


 


Creatinine  3.4 H  


 


Est GFR ( Amer)  22  


 


Est GFR (Non-Af Amer)  19  


 


Random Glucose  108  


 


Calcium  8.1 L  


 


Total Bilirubin  0.9  


 


AST  23  


 


ALT  17 L  


 


Alkaline Phosphatase  258 H  


 


Ammonia   11 


 


Total Protein  6.6  


 


Albumin  3.3 L  


 


Globulin  3.3  


 


Albumin/Globulin Ratio  1.0  


 


Stool Occult Blood   


 


Blood Type    AB POSITIVE


 


Antibody Screen    Negative














Assessment & Plan


(1) Ascites


Assessment and Plan: 


Patient has cardiomyopathy with severe pulmonary hypertension and RV failure.  

Ascites was documented last year, but is much more extensive today.  

Paracentesis should be performed.  Please send fluid for cell count and 

differential, culture and sensitivity, cytology, albumin and total protein.  

Will also check serology for chronic liver disease.


Status: Acute

## 2018-11-10 NOTE — RAD
Date of service: 



11/09/2018



HISTORY:

 ESRD/ abdominal distention 



COMPARISON:

Correlation made with prior chest radiograph 10/18/2018 and CT scan 

abdomen pelvis dated 11/09/2018 the latter of which imaged both lung 

bases. 



FINDINGS:



LUNGS:

Persistent right lower lobe atelectasis and small effusion.  Trace 

left-sided effusion poorly seen 



PLEURA:

As above.  No pneumothorax apparent.



CARDIOVASCULAR:

Mild aortic atherosclerotic calcification present.



Marked cardiomegaly.  No pulmonary vascular congestion. 



OSSEOUS STRUCTURES:

In situ bilateral Morales rods are again noted



VISUALIZED UPPER ABDOMEN:

Normal.



OTHER FINDINGS:

None.



IMPRESSION:

Mild right basilar atelectasis and small effusion.

## 2018-11-11 LAB
% IRON SATURATION: 29 (ref 20–55)
FERRITIN SERPL-MCNC: 502 NG/ML
IRON SERPL-MCNC: 57 UG/DL (ref 49–181)
TIBC SERPL-MCNC: 193 UG/DL (ref 250–450)

## 2018-11-11 NOTE — CP.PCM.PN
Subjective





- Date & Time of Evaluation


Date of Evaluation: 11/11/18


Time of Evaluation: 19:00





- Subjective


Subjective: 





Patient is off anticoagulation since yesterday evening.


Combining of abdominal distention.


Mild SOB.


Using CPAP.


Vital signs stable.


Patient will be getting the hemodialysis tomorrow.


Will schedule the paracentesis possibly tomorrow. Will follow the patient





Objective





- Vital Signs/Intake and Output


Vital Signs (last 24 hours): 


                                        











Temp Pulse Resp BP Pulse Ox


 


 97.4 F L  104 H  20   101/72   100 


 


 11/11/18 16:00  11/11/18 16:00  11/11/18 16:00  11/11/18 16:00  11/11/18 16:00








Intake and Output: 


                                        











 11/11/18 11/12/18





 18:59 06:59


 


Intake Total 300 


 


Balance 300 














- Medications


Medications: 


                               Current Medications





Apixaban (Eliquis)  2.5 mg PO BID [facility]


   Last Admin: 11/10/18 17:19 Dose:  Not Given


Bismuth Subsalicylate (Pepto-Bismol)  524 mg PO ONCE ONE


   Stop: 11/11/18 19:01


Calcium Acetate (Phoslo)  667 mg PO TID [facility]


   Last Admin: 11/11/18 18:43 Dose:  667 mg


Carvedilol (Coreg)  3.125 mg PO BID [facility]


   Last Admin: 11/11/18 18:43 Dose:  3.125 mg


Nateglinide (Starlix)  60 mg PO ACTID [facility]


   Last Admin: 11/11/18 17:00 Dose:  60 mg











- Labs


Labs: 


                                        





                                 11/09/18 20:59 





                                 11/09/18 20:59 





                                        











PT  17.4 SECONDS (9.7-12.2)  H  11/09/18  20:59    


 


INR  1.6   11/09/18  20:59    


 


APTT  37 SECONDS (21-34)  H  11/09/18  20:59

## 2018-11-11 NOTE — CP.PCM.PN
Subjective





- Date & Time of Evaluation


Date of Evaluation: 11/11/18


Time of Evaluation: 10:00





- Subjective


Subjective: 





Patient denies having shortness of breath, nausea, vomiting, abdominal pain.  He

had two "pasty" bowel movements today which were normal in color.





Objective





- Vital Signs/Intake and Output


Vital Signs (last 24 hours): 


                                        











Temp Pulse Resp BP Pulse Ox


 


 98.1 F   87   20   104/64   97 


 


 11/11/18 00:00  11/11/18 00:00  11/11/18 00:00  11/11/18 01:00  11/11/18 00:00








Intake and Output: 


                                        











 11/11/18 11/11/18





 06:59 18:59


 


Intake Total 540 


 


Balance 540 














- Medications


Medications: 


                               Current Medications





Apixaban (Eliquis)  2.5 mg PO BID [facility]


   Last Admin: 11/10/18 17:19 Dose:  Not Given


Calcium Acetate (Phoslo)  667 mg PO TID [facility]


   Last Admin: 11/11/18 09:34 Dose:  667 mg


Carvedilol (Coreg)  3.125 mg PO BID [facility]


   Last Admin: 11/11/18 09:36 Dose:  Not Given


Nateglinide (Starlix)  60 mg PO ACTID [facility]


   Last Admin: 11/11/18 08:30 Dose:  60 mg











- Labs


Labs: 


                                        





                                 11/09/18 20:59 





                                 11/09/18 20:59 





                                        











PT  17.4 SECONDS (9.7-12.2)  H  11/09/18  20:59    


 


INR  1.6   11/09/18  20:59    


 


APTT  37 SECONDS (21-34)  H  11/09/18  20:59    














- Constitutional


Appears: No Acute Distress





- Head Exam


Head Exam: ATRAUMATIC, NORMOCEPHALIC





- Eye Exam


Eye Exam: EOMI, PERRL





- Neck Exam


Neck Exam: absent: Lymphadenopathy, Thyromegaly





- Respiratory Exam


Respiratory Exam: NORMAL BREATHING PATTERN.  absent: Rales, Rhonchi, Wheezes





- Cardiovascular Exam


Cardiovascular Exam: REGULAR RHYTHM, +S1, +S2.  absent: Gallop, Rubs, Murmur





- GI/Abdominal Exam


GI & Abdominal Exam: Distended, Firm, Soft, Normal Bowel Sounds.  absent: 

Tenderness, Organomegaly


Additional comments: 





Positive fluid wave





- Rectal Exam


Rectal Exam: Deferred





- Extremities Exam


Extremities Exam: Pedal Edema.  absent: Calf Tenderness





Assessment and Plan


(1) Ascites


Assessment & Plan: 


Patient has increased ascites, which is now tense.  He does not complain of 

difficulty breathing.  Paracentesis has been scheduled for tomorrow.


Status: Acute

## 2018-11-12 LAB
APPEARANCE FLD: CLEAR
BODY FLD TYPE: (no result)
BODY FLUID MONO/MACROPHAGE: 9 % (ref 0–0)
CELL CNT PNL FLD: 100 (ref 0–0)
HEPATITIS A IGM: NEGATIVE
HEPATITIS B CORE AB: NEGATIVE
HEPATITIS C ANTIBODY: NEGATIVE
RBC # FLD AUTO: 6 /MM3 (ref 0–0)
WBC # FLD: 29 /MM3 (ref 0–300)

## 2018-11-12 PROCEDURE — 0W9G3ZZ DRAINAGE OF PERITONEAL CAVITY, PERCUTANEOUS APPROACH: ICD-10-PCS | Performed by: RADIOLOGY

## 2018-11-12 NOTE — CP.PCM.PN
Subjective





- Date & Time of Evaluation


Date of Evaluation: 11/12/18


Time of Evaluation: 14:50





- Subjective


Subjective: 





cc: ascites





Seen in HD. Lethargic.


Denies abdominal pain


S/P 8 L paracentesis





Objective





- Vital Signs/Intake and Output


Vital Signs (last 24 hours): 


                                        











Temp Pulse Resp BP Pulse Ox


 


 97.3 F L  74   20   87/54 L  100 


 


 11/12/18 13:40  11/12/18 13:40  11/12/18 13:40  11/12/18 14:25  11/12/18 13:40








Intake and Output: 


                                        











 11/12/18 11/12/18





 06:59 18:59


 


Intake Total 470 


 


Balance 470 














- Medications


Medications: 


                               Current Medications





Apixaban (Eliquis)  2.5 mg PO BID [facility]


   Last Admin: 11/10/18 17:19 Dose:  Not Given


Calcium Acetate (Phoslo)  667 mg PO TID [facility]


   Last Admin: 11/12/18 13:31 Dose:  Not Given


Carvedilol (Coreg)  3.125 mg PO BID [facility]


   Last Admin: 11/12/18 11:01 Dose:  Not Given


Nateglinide (Starlix)  60 mg PO ACTID [facility]


   Last Admin: 11/12/18 11:30 Dose:  Not Given











- Labs


Labs: 


                                        





                                 11/09/18 20:59 





                                 11/09/18 20:59 





                                        











PT  17.4 SECONDS (9.7-12.2)  H  11/09/18  20:59    


 


INR  1.6   11/09/18  20:59    


 


APTT  37 SECONDS (21-34)  H  11/09/18  20:59    














- Constitutional


Appears: Chronically Ill





- Head Exam


Head Exam: NORMOCEPHALIC





- Eye Exam


Eye Exam: absent: Scleral icterus





- Respiratory Exam


Respiratory Exam: NORMAL BREATHING PATTERN





- Cardiovascular Exam


Cardiovascular Exam: REGULAR RHYTHM





- GI/Abdominal Exam


GI & Abdominal Exam: Soft.  absent: Tenderness





- Extremities Exam


Additional comments: 





massive bilat LE edema and skin changes of chronic cellulitis





Assessment and Plan


(1) Ascites


Assessment & Plan: 


Due to right sided heart failure


Treat underlying heart disease. S/P paracentesis.


checking labwork for chronic liver disease


D/W Dr Trinh


Status: Acute   





(2) ESRD (end stage renal disease) on dialysis


Status: Acute   





(3) Severe anemia


Assessment & Plan: 


chronic anemia due to chronic disease. Stable.


Status: Acute

## 2018-11-12 NOTE — PCM.SURG1
Surgeon's Initial Post Op Note





- Surgeon's Notes


Surgeon: Aki Greer MD


Assistant: None


Type of Anesthesia: Local


Pre-Operative Diagnosis: Cirrhosis, ascites


Operative Findings: US showed a large amount of ascites


Post-Operative Diagnosis: Cirrhosis, ascites


Operation Performed: US guided paracentesis


Specimen/Specimens Removed: 8 liters of straw colored fluid


Estimated Blood Loss: EBL {In ML}: 0


Blood Products Given: N/A


Drains Used: No Drains


Post-Op Condition: Fair


Date of Surgery/Procedure: 11/12/18


Time of Surgery/Procedure: 10:30

## 2018-11-12 NOTE — CP.PCM.CON
History of Present Illness





- History of Present Illness


History of Present Illness: 





Pt is a 63M with PMH ESRD, DM, CHF, A FIB W/RVR, AML, HTN, COPD, sleep apnea 

presents to ED for evaluation of abdominal pain, distention and dark tarry 

stools. Otherwise denies any n/v/ fevers chills sob dizziness chest pain. c/o 

decreased appetite and poor oral intake.


On HD mwf, last hd friday


REcently dc from hospital after line sepsis.





Pt had paracentesis this am, 8L removed. feels improved abdominal pain





SxH: back surgery, pacemaker


FamH: mom heart disease, DM, dad CAD


SocH: denies tobacco, etoh, recreational drug use


Allergies: NKDA


Meds: coumadin 1 daily, starlix 60 TID, procrit, metoprolol succinate 25 daily





Review of Systems





- Review of Systems


All systems: reviewed and no additional remarkable complaints except (as per 

HPI)





Past Patient History





- Infectious Disease


Hx of Infectious Diseases: None





- Tetanus Immunizations


Tetanus Immunization: Unknown





- Past Medical History & Family History


Past Medical History?: Yes





- Past Social History


Smoking Status: Never Smoked





- CARDIAC


Hx Atrial Fibrillation: Yes


Hx Cardia Arrhythmia: Yes (A FIB)


Hx Congestive Heart Failure: Yes


Hx Hypertension: Yes


Hx Pacemaker: Yes


Hx Peripheral Edema: Yes





- PULMONARY


Hx Respiratory Disorders: Yes


Hx Asthma: Yes


Hx Chronic Obstructive Pulmonary Disease (COPD): Yes (uses CPAP for sleep apnea)


Hx Sleep Apnea: Yes (C-PAP)





- NEUROLOGICAL


Hx Neurological Disorder: Yes (PERIPHERAL NEUROPATHY)


Hx Dizziness: Yes





- HEENT


Hx HEENT Problems: No





- RENAL


Hx Chronic Kidney Disease: Yes


Date of Last Dialysis Treatment: 11/09/18





- ENDOCRINE/METABOLIC


Hx Endocrine Disorders: Yes


Hx Diabetes Mellitus Type 2: Yes





- HEMATOLOGICAL/ONCOLOGICAL


Hx Blood Disorders: Yes


Hx Blood Transfusions: Yes


Hx Cancer: Yes


Hx Chemotherapy: Yes


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





- INTEGUMENTARY


Hx Dermatological Problems: Yes


Hx Cellulitis: Yes





- MUSCULOSKELETAL/RHEUMATOLOGICAL


Hx Musculoskeletal Disorders: Yes


Hx Arthritis: Yes


Hx Falls: Yes


Hx Fractures: Yes


Hx Osteoporosis: Yes





- GASTROINTESTINAL


Hx Gastrointestinal Disorders: Yes


Hx Constipation: Yes





- GENITOURINARY/GYNECOLOGICAL


Hx Genitourinary Disorders: Yes


Other/Comment: On HD-M-W-F





- PSYCHIATRIC


Hx Psychophysiologic Disorder: Yes


Hx Anxiety: Yes


Hx Depression: Yes


Hx Substance Use: No





- SURGICAL HISTORY


Hx Surgeries: Yes


Hx Angiogram: Yes


Hx Arteriovenous Shunt: Yes


Hx Cardiac Catheterization: Yes


Hx Orthopedic Surgery: Yes (KNEE)


Hx Vascular Access Device: Yes (RIGHT EMERSON CATH removed)


Other/Comment: hx back surgery T5.  defibrillator/pacemaker placement.  

bilateral heel surgery





- ANESTHESIA


Hx Anesthesia: Yes


Hx Anesthesia Reactions: No


Hx Malignant Hyperthermia: No





Meds


Allergies/Adverse Reactions: 


                                    Allergies











Allergy/AdvReac Type Severity Reaction Status Date / Time


 


No Known Allergies Allergy   Verified 11/09/18 20:02














- Medications


Medications: 


                               Current Medications





Apixaban (Eliquis)  2.5 mg PO BID [facility]


   Last Admin: 11/10/18 17:19 Dose:  Not Given


Calcium Acetate (Phoslo)  667 mg PO TID [facility]


   Last Admin: 11/11/18 18:43 Dose:  667 mg


Carvedilol (Coreg)  3.125 mg PO BID [facility]


   Last Admin: 11/11/18 18:43 Dose:  3.125 mg


Nateglinide (Starlix)  60 mg PO ACTID [facility]


   Last Admin: 11/12/18 07:59 Dose:  Not Given











Physical Exam





- Constitutional


Appears: Non-toxic, No Acute Distress, Chronically Ill





- Head Exam


Head Exam: NORMAL INSPECTION, NORMOCEPHALIC





- Eye Exam


Eye Exam: Normal appearance, PERRL





- ENT Exam


ENT Exam: Mucous Membranes Moist, Normal Exam





- Neck Exam


Neck exam: Positive for: Full Rom, Normal Inspection





- Respiratory Exam


Respiratory Exam: Decreased Breath Sounds, NORMAL BREATHING PATTERN





- Cardiovascular Exam


Cardiovascular Exam: Irregular Rhythm





- GI/Abdominal Exam


GI & Abdominal Exam: Distended, Hypoactive Bowel Sounds, Soft





- Extremities Exam


Extremities exam: Positive for: normal inspection, pedal edema (chronic stasis, 

b/l avf )





- Neurological Exam


Neurological exam: Alert, Oriented x3





- Psychiatric Exam


Psychiatric exam: Normal Affect, Normal Mood





- Skin


Skin Exam: Dry, Intact, Warm





Results





- Vital Signs


Recent Vital Signs: 


                                Last Vital Signs











Temp  98.1 F   11/12/18 08:31


 


Pulse  91 H  11/12/18 08:31


 


Resp  20   11/12/18 08:31


 


BP  90/58 L  11/12/18 08:31


 


Pulse Ox  98   11/12/18 08:31














- Labs


Result Diagrams: 


                                 11/09/18 20:59





                                 11/09/18 20:59


Labs: 


                         Laboratory Results - last 24 hr











  11/10/18 11/10/18 11/10/18





  07:41 07:43 08:29


 


POC Glucose (mg/dL)  66  67  108


 


Ferritin   


 


Hepatitis A IgM Ab   


 


Hep Bs Antigen   


 


Hep Bs Antibody   


 


Hep B Core IgM Ab   


 


Hepatitis C Antibody   














  11/10/18 11/10/18 11/10/18





  11:21 16:13 21:56


 


POC Glucose (mg/dL)  74  155 H  171 H


 


Ferritin   


 


Hepatitis A IgM Ab   


 


Hep Bs Antigen   


 


Hep Bs Antibody   


 


Hep B Core IgM Ab   


 


Hepatitis C Antibody   














  11/11/18 11/11/18





  07:43 07:43


 


POC Glucose (mg/dL)  


 


Ferritin  502.0 


 


Hepatitis A IgM Ab  Negative 


 


Hep Bs Antigen  Negative 


 


Hep Bs Antibody   Negative


 


Hep B Core IgM Ab  Negative 


 


Hepatitis C Antibody  Negative 














Assessment & Plan


(1) Ascites


Status: Acute   





(2) ESRD (end stage renal disease) on dialysis


Status: Acute   





(3) Anasarca


Status: Acute   





- Assessment and Plan (Free Text)


Assessment: 





maintain hd mwf


uf as tolerated


paracentesis 


hemoglobin stable, no change

## 2018-11-13 VITALS — TEMPERATURE: 98.7 F | DIASTOLIC BLOOD PRESSURE: 66 MMHG | HEART RATE: 102 BPM | SYSTOLIC BLOOD PRESSURE: 106 MMHG

## 2018-11-13 VITALS — OXYGEN SATURATION: 99 %

## 2018-11-13 LAB — CERULOPLASMIN SERPL-MCNC: 30 MG/DL (ref 18–36)

## 2018-11-13 PROCEDURE — 5A1D70Z PERFORMANCE OF URINARY FILTRATION, INTERMITTENT, LESS THAN 6 HOURS PER DAY: ICD-10-PCS

## 2018-11-13 NOTE — CP.PCM.PN
Subjective





- Date & Time of Evaluation


Date of Evaluation: 11/12/18


Time of Evaluation: 18:53





- Subjective


Subjective: 





Patient today underwent abdominal paracentesis.


8 L of of sebastian color fluid removed.


Patient tolerated the hemodialysis.


Patient is feeling weakness.


Cough noted.


His feeling otherwise well.


Abdomen soft nontender.


His clinical is stable.


Possibly discharge plan tomorrow





Objective





- Vital Signs/Intake and Output


Vital Signs (last 24 hours): 


                                        











Temp Pulse Resp BP Pulse Ox


 


 98.7 F   102 H  20   106/66   99 


 


 11/13/18 17:15  11/13/18 17:15  11/13/18 17:15  11/13/18 17:15  11/13/18 17:15








Intake and Output: 


                                        











 11/13/18 11/13/18





 06:59 18:59


 


Intake Total 350 


 


Balance 350 














- Labs


Labs: 


                                        





                                 11/09/18 20:59 





                                 11/09/18 20:59 





                                        











PT  17.4 SECONDS (9.7-12.2)  H  11/09/18  20:59    


 


INR  1.6   11/09/18  20:59    


 


APTT  37 SECONDS (21-34)  H  11/09/18  20:59

## 2018-11-13 NOTE — PCM.HF
Heart Failure Core Measure





- Heart Failure


Ejection Fraction: 40 % or Greater


ACE Inhibitor Prescribed: No


Contraindication/Reason for not providing: ESRD


Beta-Blocker Prescribed: Carvedilol


Angiotensin II Receptor Blocker Prescribed: No


Contraindication/Reason for not providing: ESRD


AnticoagulationTherapy for Atrial Fibrillation/Atrialflutter: Yes


Aldosterone Antagonist Prescribed: No


Contraindication/Reason for not providing: EF>45 / RISK FOR HYPERKALEMIA


Hydralazine Nitrate Prescribed: No


Contraindication/Reason for not providing: EF.45


Implantable Cardioverter Defibrillator Therapy: Yes


Contraindication/Reason for not providing: aicd


Cardiac Resynchronization Therapy Prescribed: No


Contraindication/Reason for not providing: ef>45





- Follow up


Will be discharged to: Home


Follow Up Date (must be within 7 days from discharge): 11/19/18

## 2018-11-13 NOTE — CP.PCM.DIS
Provider





- Provider


Date of Admission: 


11/09/18 21:52





Attending physician: 


Cora Trinh MD





Time Spent in preparation of Discharge (in minutes): 45





Hospital Course





- Lab Results


Lab Results: 


                                  Micro Results





11/12/18 21:38   Peritoneal Fluid   Gram Stain - Final





                             Most Recent Lab Values











WBC  4.3 K/uL (4.8-10.8)  L  11/09/18  20:59    


 


RBC  3.30 Mil/uL (4.40-5.90)  L  11/09/18  20:59    


 


Hgb  9.6 g/dL (12.0-18.0)  L  11/09/18  20:59    


 


Hct  29.3 % (35.0-51.0)  L  11/09/18  20:59    


 


MCV  88.8 fL (80.0-94.0)   11/09/18  20:59    


 


MCH  28.9 pg (27.0-31.0)   11/09/18  20:59    


 


MCHC  32.6 g/dL (33.0-37.0)  L  11/09/18  20:59    


 


RDW  19.7 % (11.5-14.5)  H  11/09/18  20:59    


 


Plt Count  161 K/uL (130-400)   11/09/18  20:59    


 


MPV  7.0 fL (7.2-11.7)  L  11/09/18  20:59    


 


Neut % (Auto)  68.7 % (50.0-75.0)   11/09/18  20:59    


 


Lymph % (Auto)  14.2 % (20.0-40.0)  L  11/09/18  20:59    


 


Mono % (Auto)  12.2 % (0.0-10.0)  H  11/09/18  20:59    


 


Eos % (Auto)  3.8 % (0.0-4.0)   11/09/18  20:59    


 


Baso % (Auto)  1.1 % (0.0-2.0)   11/09/18  20:59    


 


Neut # (Auto)  2.9 K/uL (1.8-7.0)   11/09/18  20:59    


 


Lymph # (Auto)  0.6 K/uL (1.0-4.3)  L  11/09/18  20:59    


 


Mono # (Auto)  0.5 K/uL (0.0-0.8)   11/09/18  20:59    


 


Eos # (Auto)  0.2 K/uL (0.0-0.7)   11/09/18  20:59    


 


Baso # (Auto)  0.0 K/uL (0.0-0.2)   11/09/18  20:59    


 


PT  17.4 SECONDS (9.7-12.2)  H  11/09/18  20:59    


 


INR  1.6   11/09/18  20:59    


 


APTT  37 SECONDS (21-34)  H  11/09/18  20:59    


 


Sodium  135 mmol/L (132-148)   11/09/18  20:59    


 


Potassium  3.3 mmol/L (3.6-5.2)  L  11/09/18  20:59    


 


Chloride  94 mmol/L ()  L  11/09/18  20:59    


 


Carbon Dioxide  27 mmol/L (22-30)   11/09/18  20:59    


 


Anion Gap  17  (10-20)   11/09/18  20:59    


 


BUN  42 mg/dL (9-20)  H  11/09/18  20:59    


 


Creatinine  3.4 mg/dL (0.8-1.5)  H  11/09/18  20:59    


 


Est GFR ( Amer)  22   11/09/18  20:59    


 


Est GFR (Non-Af Amer)  19   11/09/18  20:59    


 


POC Glucose (mg/dL)  131 mg/dL ()  H  11/13/18  11:07    


 


Random Glucose  108 mg/dL ()   11/09/18  20:59    


 


Calcium  8.1 mg/dl (8.6-10.4)  L  11/09/18  20:59    


 


Iron  57 ug/dL ()   11/11/18  07:43    


 


TIBC  193 ug/dL (250-450)  L  11/11/18  07:43    


 


% Saturation  29  (20-55)   11/11/18  07:43    


 


Ferritin  502.0 ng/mL  11/11/18  07:43    


 


Total Bilirubin  0.9 mg/dL (0.2-1.3)   11/09/18  20:59    


 


AST  23 U/L (17-59)   11/09/18  20:59    


 


ALT  17 U/L (21-72)  L  11/09/18  20:59    


 


Alkaline Phosphatase  258 U/L ()  H  11/09/18  20:59    


 


Ammonia  11 umol/L (9-33)   11/09/18  20:59    


 


Total Protein  6.6 g/dL (6.3-8.3)   11/09/18  20:59    


 


Albumin  3.3 g/dL (3.5-5.0)  L  11/09/18  20:59    


 


Globulin  3.3 gm/dL (2.2-3.9)   11/09/18  20:59    


 


Albumin/Globulin Ratio  1.0  (1.0-2.1)   11/09/18  20:59    


 


Alpha-1-Antitrypsin  254 mg/dL ()  H  11/11/18  07:43    


 


Ceruloplasmin  30 mg/dL (18-36)   11/11/18  07:43    


 


Alpha Fetoprotein  1.1 ng/mL (0.0-7.5)   11/11/18  07:43    


 


Fluid Source  Peritoneal/ascites   11/12/18  21:33    


 


Fluid Appearance  Clear  (CLEAR)   11/12/18  21:33    


 


Fluid WBC  29.0 /mm3 (0.0-300.0)   11/12/18  21:33    


 


Fluid RBC  6.0 /mm3 (0.0-0.0)  H  11/12/18  21:33    


 


Fluid Tot Cell Count  100  (0-0)  H  11/12/18  21:33    


 


Fluid Neutrophils  4.0 % (0-0)  H  11/12/18  21:33    


 


Fluid Lymphocytes  86.0 % (0-0)  H  11/12/18  21:33    


 


Fld Monocyte/Macrophag  9 % (0-0)  H  11/12/18  21:33    


 


Fluid Comment     11/12/18  21:33    


 


Stool Occult Blood  Negative  (NEGATIVE)   11/09/18  20:27    


 


Anti-Mitochondrial Ab  Negative  (Negative)   11/11/18  07:43    


 


Smooth Muscle Ab Titer  1:160 Titer (< 1:20)  H  11/11/18  07:43    


 


Anti-Smooth Muscle Ab  Positive  (Negative)  H  11/11/18  07:43    


 


Hepatitis A IgM Ab  Negative  (NEGATIVE)   11/11/18  07:43    


 


Hep Bs Antigen  Negative  (NEGATIVE)   11/11/18  07:43    


 


Hep Bs Antibody  Negative  (NEGATIVE)   11/11/18  07:43    


 


Hep B Core IgM Ab  Negative  (NEGATIVE)   11/11/18  07:43    


 


Hepatitis C Antibody  Negative  (NEGATIVE)   11/11/18  07:43    


 


Blood Type  AB POSITIVE   11/09/18  21:06    


 


Antibody Screen  Negative   11/09/18  21:06    














- Hospital Course


Hospital Course: 





Chief complaint:


Patient was sent from hemodialysis because of the abdominal distention.





History present illness:


63-year-old male with history of end-stage renal disease on dialysis, history of

leukemia in the past on the remission, chronic atrial fibrillation, episodes of 

hypotension, chronic leg ulcers,weakness and pedal edema, recent hospitalization

with anasarca, CHF,


Nonhealing leg ulcers, treated aggressively with the multiple wound debridement,

and a dressing.


Significant improvement in the leg ulcers noted.


Patient was recently hospitalized elevated INR and bleeding.


Coumadin was discontinued, patient was placed on newer oral anticoagulants.


patient was getting the hemodialysis yesterday and he got increasing abdominal 

discomfort, and abdominal swelling and shortness of breath.


Because of that patient was sent to the emergency room and.


In the emergency room patient was having increasing abdominal distention.


Also there was a black stools was noted.


But guaiac was done in the emergency room was negative.


Patient underwent a CAT scan of the abdomen showing evidence of large ascites.


But otherwise patient to having good oxygenation and needed hospitalization.





.








Past medical history:


End-stage renal disease on dialysis, history of leukemia in remission, chronic 

atrial fibrillation, chronic cellulitis, CHF, AICD, vertibral abscess and s/p 

surgery





Allergy: No known drug allergy.


Personal history:


Currently patient is a nonsmoker nonalcoholic.





Family history noncontributory.





Review of systems:


The patient is in significant weakness, tiredness, easy fatigability.


Generalized weight gain, and anasarca.


Denies any nausea.


Patient started noticing left heel ulcer, initially started having blisterlike 

lesion, which burst into ulcer.





On examination:


HEENT PERRLA, neck supple


No thyromegaly was noted and no cervical adenopathy noted


Chest bilateral good air entry, no wheezing or rales noted


CV[facility] regular heart sound, systolic murmur, ACD noted


Abdominal distention, tenderness, negative,


Generalized edema noted, more pronounced in the both lower extremities.


Right heel showing  evidence of necrotic skin changes. Foul-smelling





CNS alert awake oriented x3 no functional neurological deficit.








                                        





                                 11/09/18 20:59 





                                 11/09/18 20:59 











Assessment/recommendation:


63-year-old male with history of end-stage renal disease on dialysis, history of

leukemia in remission, atrial fibrillation on anticoagulation.


He also had a back surgery recently.


Chronic pain.


History of Coumadin toxicity in the past.


Now admitted with abdominal distention, worsening ascites.


Patient also has a cirrhotic changes in the liver from the CAT scan.


Will get a GI evaluation.


We'll possibly hold off the anticoagulation.


Patient will need a paracentesis therapeutically.


Patient also needs dialysis.


Meanwhile will continue the current treatment.


I discussed with the gastroenterologist.


DVT GI prophylaxis and will follow-up the patient


Will continue the BiPAP





Course in the Hospital:


Patient admitted to the hospital with acute intestinal abdominal distention.


I discussed with the patient, there was a significant large ascites noted.


GI evaluation was called in.


Discussed with the patient, he agreed to have a paracentesis.


Patient was on anticoagulation which was on hold.


Finally patient underwent paracentesis yesterday.


8 L of fluid removed


Patient abdomen got better.


He was feeling well.


Tolerated the dialysis.


He will be discharged home.


He will continue his home medication including anticoagulation.


Final diagnosis acute decompensated right-sided heart failure.


Cardiogenic liver disease.


Liver cirrhosis.


Ascites.


Patient will continue the current treatment.


He may need a recurrent the paracentesis and will follow the patient








Discharge Exam





- Head Exam


Head Exam: NORMAL INSPECTION, NORMOCEPHALIC





Discharge Plan





- Discharge Medications


Prescriptions: 


Pantoprazole [Protonix EC Tab] 40 mg PO DAILY #30 ect





- Follow Up Plan


Condition: STABLE


Disposition: HOME/ ROUTINE


Instructions:  Heart Failure, Adult (DC), Fluid in the Belly (Ascites) (DC), 

Pantoprazole, Dialysis and Diet


Additional Instructions: 


Please f/u with Dr. Trinh office in 1 week 


Please f/c with HD as scheduled 


Please resume anticougulant 


continue medication as per med. rec. 


[facility] for home care / home PT 


Referrals: 


Cora Trinh MD [Staff Provider] -

## 2018-11-13 NOTE — CP.PCM.PN
Subjective





- Date & Time of Evaluation


Date of Evaluation: 11/13/18


Time of Evaluation: 11:10





- Subjective


Subjective: 





NP NOTES 


patient seen today states feels better after the paracnetesis, denies any chest 

pain, sob, abdominal pain, N/V/D 


 vss reviewed  a febrile - bp stable 


s/p paracentesis with 8 lit removed 11/12/18


s/p HD 11/12/18 








Objective





- Vital Signs/Intake and Output


Vital Signs (last 24 hours): 


                                        











Temp Pulse Resp BP Pulse Ox


 


 97.4 F L  90   20   98/66 L  99 


 


 11/13/18 07:47  11/13/18 07:47  11/13/18 07:47  11/13/18 07:47  11/13/18 07:47








Intake and Output: 


                                        











 11/13/18 11/13/18





 06:59 18:59


 


Intake Total 350 


 


Balance 350 














- Medications


Medications: 


                               Current Medications





Apixaban (Eliquis)  2.5 mg PO BID [facility]


   Last Admin: 11/13/18 10:54 Dose:  2.5 mg


Bismuth Subsalicylate (Pepto Bismol)  524 mg PO BID PRN


   PRN Reason: upset gi


   Last Admin: 11/13/18 10:55 Dose:  524 mg


Calcium Acetate (Phoslo)  667 mg PO TID [facility]


   Last Admin: 11/13/18 14:49 Dose:  667 mg


Carvedilol (Coreg)  3.125 mg PO BID [facility]


   Last Admin: 11/13/18 10:54 Dose:  Not Given


Hydrocortisone (Anusol-Hc)  0 gm NE BID [facility]


   Last Admin: 11/13/18 10:53 Dose:  1 applic


Nateglinide (Starlix)  60 mg PO ACTID [facility]


   Last Admin: 11/13/18 12:36 Dose:  Not Given


Pantoprazole Sodium (Protonix Ec Tab)  40 mg PO DAILY [facility]


   Last Admin: 11/13/18 10:54 Dose:  40 mg











- Labs


Labs: 


                                        





                                 11/09/18 20:59 





                                 11/09/18 20:59 





                                        











PT  17.4 SECONDS (9.7-12.2)  H  11/09/18  20:59    


 


INR  1.6   11/09/18  20:59    


 


APTT  37 SECONDS (21-34)  H  11/09/18  20:59    














Assessment and Plan





- Assessment and Plan (Free Text)


Assessment: 





A/P 


 63 yr old male  with pmhx of Atrial Fibrillation, on elequis , , CHF, COPD, 

Depression, Diabetes, , HTN, ESRD on HD admitted with abdominal pain an 

dabdomnal distention 


s/p Paracentesis with 8 li fluid removed and abdominal pain improved after 

paracentesis and tolerated diet  and bp being stable 


D/W Dr. Trinh cleared for discharge home today and f/u with Dr. Trinh 

office in 1 week and resume eliquis  and continue HD as scheduled


discharge plan discussed with patient


patient instructed to returns to ED if symptoms returns or worsening  of 

symptoms

## 2018-11-13 NOTE — CP.PCM.PN
Subjective





- Date & Time of Evaluation


Date of Evaluation: 11/13/18


Time of Evaluation: 10:34





- Subjective


Subjective: 





no events


chronically low bp, pt is awake and alert


improved abdominal pain








Objective





- Vital Signs/Intake and Output


Vital Signs (last 24 hours): 


                                        











Temp Pulse Resp BP Pulse Ox


 


 97.4 F L  90   20   98/66 L  99 


 


 11/13/18 07:47  11/13/18 07:47  11/13/18 07:47  11/13/18 07:47  11/13/18 07:47








Intake and Output: 


                                        











 11/13/18 11/13/18





 06:59 18:59


 


Intake Total 350 


 


Balance 350 














- Medications


Medications: 


                               Current Medications





Apixaban (Eliquis)  2.5 mg PO BID [facility]


   Last Admin: 11/10/18 17:19 Dose:  Not Given


Bismuth Subsalicylate (Pepto Bismol)  524 mg PO BID PRN


   PRN Reason: upset gi


Calcium Acetate (Phoslo)  667 mg PO TID [facility]


   Last Admin: 11/12/18 18:21 Dose:  667 mg


Carvedilol (Coreg)  3.125 mg PO BID [facility]


   Last Admin: 11/12/18 18:21 Dose:  Not Given


Hydrocortisone (Anusol-Hc)  0 gm LA BID [facility]


Nateglinide (Starlix)  60 mg PO ACTID [facility]


   Last Admin: 11/13/18 07:51 Dose:  60 mg


Pantoprazole Sodium (Protonix Ec Tab)  40 mg PO DAILY [facility]











- Labs


Labs: 


                                        





                                 11/09/18 20:59 





                                 11/09/18 20:59 





                                        











PT  17.4 SECONDS (9.7-12.2)  H  11/09/18  20:59    


 


INR  1.6   11/09/18  20:59    


 


APTT  37 SECONDS (21-34)  H  11/09/18  20:59    














- Constitutional


Appears: No Acute Distress, Older Than Stated Age, Chronically Ill





- Head Exam


Head Exam: NORMAL INSPECTION, NORMOCEPHALIC





- Eye Exam


Eye Exam: Normal appearance, PERRL





- ENT Exam


ENT Exam: Mucous Membranes Moist, Normal Exam





- Neck Exam


Neck Exam: Full ROM, Normal Inspection





- Respiratory Exam


Respiratory Exam: Clear to Ausculation Bilateral, NORMAL BREATHING PATTERN





- Cardiovascular Exam


Cardiovascular Exam: Irregular Rhythm





- GI/Abdominal Exam


GI & Abdominal Exam: Distended, Soft, Hypoactive Bowel Sounds





- Extremities Exam


Extremities Exam: Normal Inspection, Pedal Edema (chronic stasis)





- Neurological Exam


Neurological Exam: Alert, Awake





- Psychiatric Exam


Psychiatric exam: Normal Affect, Normal Mood





- Skin


Skin Exam: Dry, Intact





Assessment and Plan


(1) Ascites


Status: Acute   





(2) ESRD (end stage renal disease) on dialysis


Status: Acute   





(3) Anasarca


Status: Acute   





- Assessment and Plan (Free Text)


Assessment: 





maintain hd mwf


fluid restriction advised


GI work up ongoing

## 2018-11-13 NOTE — CP.PCM.PN
Subjective





- Date & Time of Evaluation


Date of Evaluation: 11/13/18


Time of Evaluation: 13:00





- Subjective


Subjective: 





f/u ascites.


Feels better after tap.


Denies RB, melena, fever, dysphagia, HA, SZ, Hemoptysis, hematuria





Objective





- Vital Signs/Intake and Output


Vital Signs (last 24 hours): 


                                        











Temp Pulse Resp BP Pulse Ox


 


 97.4 F L  90   20   98/66 L  99 


 


 11/13/18 07:47  11/13/18 07:47  11/13/18 07:47  11/13/18 07:47  11/13/18 07:47








Intake and Output: 


                                        











 11/13/18 11/13/18





 06:59 18:59


 


Intake Total 350 


 


Balance 350 














- Medications


Medications: 


                               Current Medications





Apixaban (Eliquis)  2.5 mg PO BID [facility]


   Last Admin: 11/13/18 10:54 Dose:  2.5 mg


Bismuth Subsalicylate (Pepto Bismol)  524 mg PO BID [facility]N


   PRN Reason: upset gi


   Last Admin: 11/13/18 10:55 Dose:  524 mg


Calcium Acetate (Phoslo)  667 mg PO TID [facility]


   Last Admin: 11/13/18 10:54 Dose:  667 mg


Carvedilol (Coreg)  3.125 mg PO BID [facility]


   Last Admin: 11/13/18 10:54 Dose:  Not Given


Hydrocortisone (Anusol-Hc)  0 gm [place] BID [facility]


   Last Admin: 11/13/18 10:53 Dose:  1 applic


Nateglinide (Starlix)  60 mg PO ACTID [facility]


   Last Admin: 11/13/18 12:36 Dose:  60 mg


Pantoprazole Sodium (Protonix Ec Tab)  40 mg PO DAILY [facility]


   Last Admin: 11/13/18 10:54 Dose:  40 mg











- Labs


Labs: 


                                        





                                 11/09/18 20:59 





                                 11/09/18 20:59 





                                        











PT  17.4 SECONDS (9.7-12.2)  H  11/09/18  20:59    


 


INR  1.6   11/09/18  20:59    


 


APTT  37 SECONDS (21-34)  H  11/09/18  20:59    














- Constitutional


Appears: Chronically Ill





- Respiratory Exam


Respiratory Exam: Rales





- Cardiovascular Exam


Cardiovascular Exam: RRR





- GI/Abdominal Exam


GI & Abdominal Exam: Distended, Soft, Normal Bowel Sounds.  absent: Mass, 

Rebound





- Extremities Exam


Extremities Exam: Pedal Edema





- Neurological Exam


Neurological Exam: Alert, Awake, Oriented x3





Assessment and Plan


(1) Ascites


Assessment & Plan: 


Multifactorial.  Cirrhosis, renal, cardiac.  s/p paracentesis- 8 liters removed


Status: Acute   





(2) ESRD (end stage renal disease) on dialysis


Status: Acute   





(3) Anasarca


Status: Acute   





(4) Anemia of renal disease


Status: Acute   





(5) ESRD needing dialysis


Status: Chronic

## 2018-12-30 ENCOUNTER — HOSPITAL ENCOUNTER (INPATIENT)
Dept: HOSPITAL 31 - C.ER | Age: 63
LOS: 23 days | Discharge: HOSPICE | DRG: 871 | End: 2019-01-22
Attending: INTERNAL MEDICINE | Admitting: INTERNAL MEDICINE
Payer: MEDICARE

## 2018-12-30 VITALS — HEART RATE: 112 BPM | BODY MASS INDEX: 33.7 KG/M2 | HEART RATE: 112 BPM

## 2018-12-30 DIAGNOSIS — E78.00: ICD-10-CM

## 2018-12-30 DIAGNOSIS — I13.2: ICD-10-CM

## 2018-12-30 DIAGNOSIS — G47.30: ICD-10-CM

## 2018-12-30 DIAGNOSIS — T82.868A: ICD-10-CM

## 2018-12-30 DIAGNOSIS — C95.91: ICD-10-CM

## 2018-12-30 DIAGNOSIS — J44.9: ICD-10-CM

## 2018-12-30 DIAGNOSIS — D63.8: ICD-10-CM

## 2018-12-30 DIAGNOSIS — E11.621: ICD-10-CM

## 2018-12-30 DIAGNOSIS — Z79.4: ICD-10-CM

## 2018-12-30 DIAGNOSIS — I42.9: ICD-10-CM

## 2018-12-30 DIAGNOSIS — E11.69: ICD-10-CM

## 2018-12-30 DIAGNOSIS — E11.21: ICD-10-CM

## 2018-12-30 DIAGNOSIS — L03.115: ICD-10-CM

## 2018-12-30 DIAGNOSIS — L97.519: ICD-10-CM

## 2018-12-30 DIAGNOSIS — A41.9: Primary | ICD-10-CM

## 2018-12-30 DIAGNOSIS — E11.51: ICD-10-CM

## 2018-12-30 DIAGNOSIS — R65.20: ICD-10-CM

## 2018-12-30 DIAGNOSIS — E11.22: ICD-10-CM

## 2018-12-30 DIAGNOSIS — I48.92: ICD-10-CM
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
## 2018-12-30 DIAGNOSIS — N18.6: ICD-10-CM

## 2018-12-30 DIAGNOSIS — Z95.0: ICD-10-CM

## 2018-12-30 DIAGNOSIS — I95.89: ICD-10-CM

## 2018-12-30 DIAGNOSIS — E87.6: ICD-10-CM

## 2018-12-30 DIAGNOSIS — I48.91: ICD-10-CM

## 2018-12-30 DIAGNOSIS — M86.171: ICD-10-CM

## 2018-12-30 DIAGNOSIS — I50.9: ICD-10-CM

## 2018-12-30 LAB
ALBUMIN SERPL-MCNC: 2.8 G/DL (ref 3.5–5)
ALBUMIN/GLOB SERPL: 0.9 {RATIO} (ref 1–2.1)
ALT SERPL-CCNC: 22 U/L (ref 21–72)
APTT BLD: 36 SECONDS (ref 21–34)
AST SERPL-CCNC: 24 U/L (ref 17–59)
BASE EXCESS BLDV CALC-SCNC: -3.1 MMOL/L (ref 0–2)
BASOPHILS # BLD AUTO: 0 K/UL (ref 0–0.2)
BASOPHILS NFR BLD: 0.3 % (ref 0–2)
BUN SERPL-MCNC: 97 MG/DL (ref 9–20)
CALCIUM SERPL-MCNC: 6.8 MG/DL (ref 8.6–10.4)
EOSINOPHIL # BLD AUTO: 0.2 K/UL (ref 0–0.7)
EOSINOPHIL NFR BLD: 1 % (ref 0–4)
EOSINOPHIL NFR BLD: 1.2 % (ref 0–4)
ERYTHROCYTE [DISTWIDTH] IN BLOOD BY AUTOMATED COUNT: 17.8 % (ref 11.5–14.5)
GFR NON-AFRICAN AMERICAN: 8
HGB BLD-MCNC: 7.7 G/DL (ref 12–18)
INR PPP: 2
LYMPHOCYTE: 9 % (ref 20–40)
LYMPHOCYTES # BLD AUTO: 1.2 K/UL (ref 1–4.3)
LYMPHOCYTES NFR BLD AUTO: 8.1 % (ref 20–40)
MCH RBC QN AUTO: 28.3 PG (ref 27–31)
MCHC RBC AUTO-ENTMCNC: 32 G/DL (ref 33–37)
MCV RBC AUTO: 88.2 FL (ref 80–94)
MONOCYTE: 4 % (ref 0–10)
MONOCYTES # BLD: 0.8 K/UL (ref 0–0.8)
MONOCYTES NFR BLD: 5.3 % (ref 0–10)
NEUTROPHILS # BLD: 12.7 K/UL (ref 1.8–7)
NEUTROPHILS NFR BLD AUTO: 85.1 % (ref 50–75)
NEUTROPHILS NFR BLD AUTO: 86 % (ref 50–75)
NRBC BLD AUTO-RTO: 0.6 % (ref 0–2)
PCO2 BLDV: 35 MMHG (ref 40–60)
PH BLDV: 7.39 [PH] (ref 7.32–7.43)
PLATELET # BLD EST: NORMAL 10*3/UL
PLATELET # BLD: 152 K/UL (ref 130–400)
PMV BLD AUTO: 8.2 FL (ref 7.2–11.7)
PROTHROMBIN TIME: 22 SECONDS (ref 9.7–12.2)
RBC # BLD AUTO: 2.73 MIL/UL (ref 4.4–5.9)
TOTAL CELLS COUNTED BLD: 100
VENOUS BLOOD GAS PO2: 35 MM/HG (ref 30–55)
WBC # BLD AUTO: 14.9 K/UL (ref 4.8–10.8)

## 2018-12-30 RX ADMIN — HUMAN INSULIN SCH: 100 INJECTION, SOLUTION SUBCUTANEOUS at 22:52

## 2018-12-30 RX ADMIN — TRAMADOL HYDROCHLORIDE PRN MG: 50 TABLET, FILM COATED ORAL at 20:35

## 2018-12-30 NOTE — RAD
Date of service: 



12/30/2018



HISTORY:

 Sepsis Patient 



COMPARISON:

11/09/2018 



FINDINGS:



LUNGS:

There are low lung volumes.  There is interval development of diffuse 

haziness in the right lung.  There is subsegmental atelectasis in the 

left mid lung. 



PLEURA:

No pleural effusions or pneumothorax. 



CARDIOVASCULAR:

Persistent moderate cardiomegaly.  There are aortic atherosclerotic 

calcifications present.  There is stable appearance of left-sided 

AICD. 



OSSEOUS STRUCTURES:

Within normal limits for the patient's age.  Status post posterior 

spinal fixation in the mid and lower thoracic spine. 



VISUALIZED UPPER ABDOMEN:

Normal.



OTHER FINDINGS:

None.



IMPRESSION:

Diffuse haziness in the right lung which may represent layering 

pleural effusion or alveolar pulmonary edema.  No focal consolidation.



Persistent moderate cardiomegaly.

## 2018-12-30 NOTE — CP.PCM.HP
History of Present Illness





- History of Present Illness


History of Present Illness: 





Chief complaint:


Increasing drainage of the right leg ulcer


HPI:


Patient is a 63-year-old male with history of end-stage renal disease on 

dialysis, hypertension, diabetes, congestive heart failure, atrial fibrillation,

history of leukemia in the past on remission, COPD, sleep apnea, on dialysis, 

recurrent leg ulcers admitted to the hospital with increasing fever, right leg 

and draining swelling swelling with the foul-smelling discharge noted.


He was also having episodes of fever.


Chills.


He was not feeling well.


Nausea vomiting and hives noted





Social history:


Patient is a non-smoker nonalcoholic


Surgical history:


History of back surgery spinal stabilization surgery pacemaker


Family history:


Mother had a history of heart disease and diabetes and also CAD


Allergies no known drug allergy


Medications reviewed


Review of systems:


Patient complaining of increasing fatigue.


Weakness.


Also nausea.


He was also having episodes of fever.





On examination:


Patient is looking very sick.


But he is able to communicate.


Complaining of left ear pain.


Nausea noted.


Abdominal pain noted.


Did not have any bowel movements.


Right leg foul-smelling discharge noted in the heel.





Patient was seen by GYN podiatrist already.


Wound debridement was also being carried out





Labs reviewed


Elevated WBC noted.


Patient was also seen by infectious disease.


Assessment and recommendation:


Patient is a 63-year-old with a history of stage renal disease on dialysis 

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 

status post treatment on remission, COPD hypertension high cholesterol and sleep

apnea.


On BiPAP.


Now admitted with the severe sepsis, possible bacteremia associate with a severe

septic symptoms.


Foul-smelling the right leg ulcers.


Patient in the past had a recurrent ulcers.


We will get infectious disease evaluation.


Surgical evaluation.


Podiatry consultation renal evaluation.


On antibiotic.


I spoke to the family and patient in detail.


We will follow the patient





Present on Admission





- Present on Admission


Any Indicators Present on Admission: No


History of DVT/PE: No


History of Uncontrolled Diabetes: No


Urinary Catheter: No


Decubitus Ulcer Present: No





Past Patient History





- Infectious Disease


Hx of Infectious Diseases: None





- Tetanus Immunizations


Tetanus Immunization: Unknown





- Past Medical History & Family History


Past Medical History?: Yes





- Past Social History


Smoking Status: Never Smoked





- CARDIAC


Hx Cardiac Disorders: Yes


Hx Congestive Heart Failure: Yes


Hx Hypertension: Yes





- PULMONARY


Hx Respiratory Disorders: Yes


Hx Chronic Obstructive Pulmonary Disease (COPD): Yes (uses CPAP for sleep apnea)





- NEUROLOGICAL


Hx Neurological Disorder: Yes (PERIPHERAL NEUROPATHY)


Hx Dizziness: Yes





- HEENT


Hx HEENT Problems: No





- RENAL


Hx Chronic Kidney Disease: Yes


Hx Dialysis: Yes


Type of Dialysis Access: R arm av shunt- MWF





- ENDOCRINE/METABOLIC


Hx Endocrine Disorders: Yes


Hx Diabetes Mellitus Type 2: Yes





- HEMATOLOGICAL/ONCOLOGICAL


Hx Blood Disorders: Yes


Hx Blood Transfusions: Yes


Hx Cancer: Yes


Hx Chemotherapy: Yes


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





- INTEGUMENTARY


Hx Dermatological Problems: Yes


Hx Cellulitis: Yes





- MUSCULOSKELETAL/RHEUMATOLOGICAL


Hx Musculoskeletal Disorders: Yes


Hx Arthritis: Yes





- GASTROINTESTINAL


Hx Gastrointestinal Disorders: Yes


Hx Constipation: Yes





- GENITOURINARY/GYNECOLOGICAL


Hx Genitourinary Disorders: Yes


Other/Comment: On [id number] arm AV shunt





- PSYCHIATRIC


Hx Psychophysiologic Disorder: Yes


Hx Anxiety: Yes


Hx Depression: Yes


Hx Substance Use: No





- SURGICAL HISTORY


Hx Surgeries: Yes


Hx Angiogram: Yes


Hx Arteriovenous Shunt: Yes


Hx Cardiac Catheterization: Yes


Hx Orthopedic Surgery: Yes (KNEE)


Hx Vascular Access Device: Yes


Other/Comment: hx back surgery T5.  defibrillator/pacemaker placement.  

bilateral heel surgery





- ANESTHESIA


Hx Anesthesia: Yes


Hx Anesthesia Reactions: No


Hx Malignant Hyperthermia: No





Meds


Allergies/Adverse Reactions: 


                                    Allergies











Allergy/AdvReac Type Severity Reaction Status Date / Time


 


No Known Allergies Allergy   Verified 12/30/18 14:49














Results





- Vital Signs


Recent Vital Signs: 





                                Last Vital Signs











Temp  98.1 F   12/30/18 17:04


 


Pulse  103 H  12/30/18 17:04


 


Resp  22   12/30/18 17:04


 


BP  92/70 L  12/30/18 17:04


 


Pulse Ox  98   12/30/18 17:04














- Labs


Result Diagrams: 


                                 01/01/19 11:32





                                 01/01/19 11:32


Labs: 





                         Laboratory Results - last 24 hr











  12/30/18 12/30/18 12/30/18





  14:56 15:38 15:38


 


WBC   14.9 H D 


 


RBC   2.73 L 


 


Hgb   7.7 L 


 


Hct   24.1 L 


 


MCV   88.2 


 


MCH   28.3 


 


MCHC   32.0 L 


 


RDW   17.8 H 


 


Plt Count   152 


 


MPV   8.2 


 


Neut % (Auto)   85.1 H 


 


Lymph % (Auto)   8.1 L 


 


Mono % (Auto)   5.3 


 


Eos % (Auto)   1.2 


 


Baso % (Auto)   0.3 


 


Neut # (Auto)   12.7 H 


 


Lymph # (Auto)   1.2 


 


Mono # (Auto)   0.8 


 


Eos # (Auto)   0.2 


 


Baso # (Auto)   0.0 


 


Neutrophils % (Manual)   86 H 


 


Lymphocytes % (Manual)   9 L 


 


Monocytes % (Manual)   4 


 


Eosinophils % (Manual)   1 


 


Platelet Estimate   Normal 


 


PT    22.0 H


 


INR    2.0


 


APTT    36 H


 


pO2   


 


VBG pH   


 


VBG pCO2   


 


VBG HCO3   


 


VBG Total CO2   


 


VBG O2 Sat (Calc)   


 


VBG Base Excess   


 


VBG Potassium   


 


Glucose   


 


Lactate   


 


Sodium   


 


Potassium   


 


Chloride   


 


Carbon Dioxide   


 


Anion Gap   


 


BUN   


 


Creatinine   


 


Est GFR ( Amer)   


 


Est GFR (Non-Af Amer)   


 


POC Glucose (mg/dL)  78  


 


Random Glucose   


 


Calcium   


 


Phosphorus   


 


Magnesium   


 


Total Bilirubin   


 


AST   


 


ALT   


 


Alkaline Phosphatase   


 


C-React Prot High Sens   


 


Total Protein   


 


Albumin   


 


Globulin   


 


Albumin/Globulin Ratio   


 


Venous Blood Potassium   


 


Blood Type   


 


Antibody Screen   














  12/30/18 12/30/18 12/30/18





  15:38 15:38 16:13


 


WBC   


 


RBC   


 


Hgb   


 


Hct   


 


MCV   


 


MCH   


 


MCHC   


 


RDW   


 


Plt Count   


 


MPV   


 


Neut % (Auto)   


 


Lymph % (Auto)   


 


Mono % (Auto)   


 


Eos % (Auto)   


 


Baso % (Auto)   


 


Neut # (Auto)   


 


Lymph # (Auto)   


 


Mono # (Auto)   


 


Eos # (Auto)   


 


Baso # (Auto)   


 


Neutrophils % (Manual)   


 


Lymphocytes % (Manual)   


 


Monocytes % (Manual)   


 


Eosinophils % (Manual)   


 


Platelet Estimate   


 


PT   


 


INR   


 


APTT   


 


pO2    35


 


VBG pH    7.39


 


VBG pCO2    35 L


 


VBG HCO3    21.7


 


VBG Total CO2    22.3


 


VBG O2 Sat (Calc)    70.4 H


 


VBG Base Excess    -3.1 L


 


VBG Potassium    2.8 L


 


Glucose    70 L


 


Lactate    1.8


 


Sodium  128 L   130.0 L


 


Potassium  3.1 L  


 


Chloride  93 L   95.0 L


 


Carbon Dioxide  19 L  


 


Anion Gap  19  


 


BUN  97 H  


 


Creatinine  7.4 H* D  


 


Est GFR ( Amer)  9  


 


Est GFR (Non-Af Amer)  8  


 


POC Glucose (mg/dL)   


 


Random Glucose  81  D  


 


Calcium  6.8 L  


 


Phosphorus  5.2 H  


 


Magnesium  2.0  


 


Total Bilirubin  2.5 H  


 


AST  24  


 


ALT  22  


 


Alkaline Phosphatase  444 H D  


 


C-React Prot High Sens   > 15.00 H 


 


Total Protein  5.9 L  


 


Albumin  2.8 L  


 


Globulin  3.1  


 


Albumin/Globulin Ratio  0.9 L  


 


Venous Blood Potassium    2.8 L


 


Blood Type   


 


Antibody Screen   














  12/30/18





  16:25


 


WBC 


 


RBC 


 


Hgb 


 


Hct 


 


MCV 


 


MCH 


 


MCHC 


 


RDW 


 


Plt Count 


 


MPV 


 


Neut % (Auto) 


 


Lymph % (Auto) 


 


Mono % (Auto) 


 


Eos % (Auto) 


 


Baso % (Auto) 


 


Neut # (Auto) 


 


Lymph # (Auto) 


 


Mono # (Auto) 


 


Eos # (Auto) 


 


Baso # (Auto) 


 


Neutrophils % (Manual) 


 


Lymphocytes % (Manual) 


 


Monocytes % (Manual) 


 


Eosinophils % (Manual) 


 


Platelet Estimate 


 


PT 


 


INR 


 


APTT 


 


pO2 


 


VBG pH 


 


VBG pCO2 


 


VBG HCO3 


 


VBG Total CO2 


 


VBG O2 Sat (Calc) 


 


VBG Base Excess 


 


VBG Potassium 


 


Glucose 


 


Lactate 


 


Sodium 


 


Potassium 


 


Chloride 


 


Carbon Dioxide 


 


Anion Gap 


 


BUN 


 


Creatinine 


 


Est GFR ( Amer) 


 


Est GFR (Non-Af Amer) 


 


POC Glucose (mg/dL) 


 


Random Glucose 


 


Calcium 


 


Phosphorus 


 


Magnesium 


 


Total Bilirubin 


 


AST 


 


ALT 


 


Alkaline Phosphatase 


 


C-React Prot High Sens 


 


Total Protein 


 


Albumin 


 


Globulin 


 


Albumin/Globulin Ratio 


 


Venous Blood Potassium 


 


Blood Type  AB POSITIVE


 


Antibody Screen  Negative

## 2018-12-30 NOTE — C.PDOC
History Of Present Illness


63 year old male with PMHx of diabetes, COPD, CHF, and AFIB  presents to the ED 

accompanied with wife for evaluation of draining right foot ulcer since last 

night. Denies any fever, chills, vomiting, or nausea. Patient has a history of 

multiple debridements for bilateral heel ulcers. As per wife and patient, 

patient has decreased appetite over past week. Patient is due for dialysis 

tomorrow morning. 


Time Seen by Provider: 18 14:56


Chief Complaint (Nursing): Abnormal Skin Integrity





Past Medical History


Vital Signs: 





                                Last Vital Signs











Temp  98.1 F   18 17:04


 


Pulse  103 H  18 17:04


 


Resp  22   18 17:04


 


BP  92/70 L  18 17:04


 


Pulse Ox  98   18 17:04














- Medical History


PMH: Anxiety, Arthritis, Asthma, Atrial Fibrillation, Cardia Arrhythmia (A FIB),

CHF, COPD (uses CPAP for sleep apnea), Depression, Diabetes, Fractures, HTN, 

Malignancy (AML LEUKEMIA), Osteoporosis, Peripheral Edema, End Stage Renal 

Disease, Chronic Kidney Disease, Sleep Apnea (C-PAP)


Surgical History: Back Surgery, Pacemaker





- [facility] Procedures











 (18)


BONE GRAFT NEC (06/10/14)


BONE MARROW OPS NEC (06/10/14)


CENTRAL VENOUS CATHETER PLACEMENT WITH GUIDANCE (06/10/14)


CHEST CAGE BONE BIOPSY (06/10/14)


DIALYSIS ARTERIOVENOSTOM (14)


DORSAL & DORSOLUMBAR FUSION OF POSTERIOR COL/TECHNIQUE (06/10/14)


DRAINAGE OF PERITONEAL CAVITY, PERCUTANEOUS APPROACH (18)


DRAINAGE OF RIGHT LOWER LEG SKIN, EXTERNAL APPROACH, DIAGN (18)


DX ULTRASOUND-HEART (06/10/14)


EXCISE BONE FOR GFT NEC (06/10/14)


EXCISION OF L FOOT SUBCU/FASCIA, OPEN APPROACH (17)


EXCISION OF LEFT FOOT SKIN, EXTERNAL APPROACH (18)


EXCISION OF LEFT TARSAL, OPEN APPROACH, DIAGNOSTIC (10/13/17)


EXCISION OF RIGHT FOOT SKIN, EXTERNAL APPROACH (10/13/17)


EXCISION OF RIGHT LOWER LEG SKIN, EXTERNAL APPROACH (18)


EXTRACTION OF LEFT FOOT SKIN, EXTERNAL APPROACH (17)


FUSION/REFUS OF 2-3 VERTEBRAE (06/10/14)


HEAD SOFT TISS X-RAY NEC (14)


HEMODIALYSIS (14)


INSERTION OF INFUSION DEV INTO SUP VENA CAVA, PERC APPROACH (10/13/17)


NON-INVASIVE MECHANICAL VENTILATION (14)


OCCUPATIONAL THERAPY (07/15/14)


PACKED CELL TRANSFUSION (06/10/14)


PERFORMANCE OF URINARY FILTRATION, MULTIPLE (17)


PHYSICAL THERAPY NEC (07/15/14)


RECREATIONAL THERAPY (07/15/14)


REMOVAL OF RESERVOIR FROM TRUNK SUBCU/FASCIA, OPEN APPROACH (10/18/18)


REPOSITION RIGHT BASILIC VEIN, OPEN APPROACH (17)


SPINAL CANAL EXPLOR NEC (06/10/14)


SUPPLEMENT LEFT FOOT WITH NONAUT SUB, OPEN APPROACH (18)


THORACENTESIS (14)


TRANSFUSE NONAUT RED BLOOD CELLS IN PERIPH VEIN, PERC (18)


VACCINATION NEC (14)


VENOUS CATHETERIZATION FOR RENAL DIALYSIS (14)








Family History: States: Unknown Family Hx





- Social History


Hx Tobacco Use: No


Hx Alcohol Use: No


Hx Substance Use: No





- Immunization History


Hx Tetanus Toxoid Vaccination: Yes


Hx Influenza Vaccination: Yes


Hx Pneumococcal Vaccination: Yes





Physical Exam





- Physical Exam


Appears: Non-toxic, No Acute Distress


Skin: Warm, Pale


Head: Normacephalic


Eye(s): bilateral: Conjunctiva Pale


Nose: Normal


Oral Mucosa: Moist


Neck: Supple


Chest: Symmetrical


Cardiovascular: Other (irregularly irregular)


Respiratory: Normal Breath Sounds, No Rales, No Rhonchi, No Wheezing


Gastrointestinal/Abdominal: Distention, Other ((+) fluid wave )


Extremity: Normal ROM, No Deformity, Other (AV graft noted to right upper arm. 

chronic venus skin changes to b/l lower extremities. actively draning right heel

ulcer. )


Neurological/Psych: Oriented x3, Normal Speech





ED Course And Treatment





- Laboratory Results


Result Diagrams: 


                                 18 15:38





                                 18 15:38


ECG: Interpreted By Me, Viewed By Me


ECG Rhythm: Atrial Fibrillation


ECG Interpretation: No Acute Changes


Rate From EC


O2 Sat by Pulse Oximetry: 98 (RA)


Pulse Ox Interpretation: Normal





- Other Rad


  ** CXR


X-Ray: Viewed By Me, Read By Radiologist


Interpretation: Accession No. : [id number].  Patient Name / ID : CHRISTOPHER MCLAIN V  / [id number].  Exam Date : 2018 15:42:24 ( Approved ).  Study Comment

:  Sex / Age : M  / 63Y.  Creator : Eri Smallwood MD.  Dictator : Eri Smallwood MD.   :  Approver : Eri Smallwood MD.  Approver2 : 

Report Date : 2018 17:50:33.  My Comment :  *************

**********************************************************************.  Date of

service:  2018.  HISTORY:  Sepsis Patient.  COMPARISON:  2018.  

FINDINGS:  LUNGS:  There are low lung volumes.  There is interval development of

diffuse haziness in the right lung.  There is subsegmental atelectasis in the 

left mid lung.  PLEURA:  No pleural effusions or pneumothorax.  CARDIOVASCULAR: 

Persistent moderate cardiomegaly.  There are aortic atherosclerotic 

calcifications present.  There is stable appearance of left-sided AICD.  OSSEOUS

STRUCTURES:  Within normal limits for the patient's age.  Status post posterior 

spinal fixation in the mid and lower thoracic spine.  VISUALIZED UPPER ABDOMEN: 

Normal.  OTHER FINDINGS:  None.  IMPRESSION:  Diffuse haziness in the right lung

which may represent layering pleural effusion or alveolar pulmonary edema.  No 

focal consolidation.  Persistent moderate cardiomegaly.





  ** XR right foot 


X-Ray: Viewed By Me, Read By Radiologist


Interpretation: Accession No. : [id number].  Patient Name / ID : CHRISTOPHER BISHOP V  / [id number].  Exam Date : 2018 15:48:52 ( Approved ).  Study 

Comment :  Sex / Age : M  / 63Y.  Creator : Eri Smallwood MD.  Dictator : 

Eri Smallwood MD.   :  Approver : Eri Smallwood MD.  

Approver2 :  Report Date : 2018 16:37:42.  My Comment :  

********************************************************************************


***.  Date of service:  2018.  PROCEDURE:  Right Foot Radiographs.  

HISTORY:  draining heel ulcer.  COMPARISON:  None.  FINDINGS:  BONES:  There is 

severe diffuse bone demineralization.  There is no acute displaced fracture.  

There is apparent radiolucency and erosion in the superior calcaneus.  JOINTS:  

Normal.  SOFT TISSUES:  There is soft tissue irregularity and ulceration 

overlying the superior calcaneus.  OTHER FINDINGS:  There are advanced 

atherosclerotic vascular calcifications..  IMPRESSION:  Apparent radiolucency 

and erosion in the superior calcaneus underneath the large ulcer concerning for 

osteomyelitis.  An MRI of the foot without and with intravenous contrast would 

be helpful for further evaluation.





Medical Decision Making


Medical Decision Making: 





Plan


- Bloodwork 


- EKG 


- UA








Spoke with Dr. Trinh. Patient will be admitted under his service. 








Disposition





- Disposition


Disposition: HOSPITALIZED


Disposition Time: 17:00


Condition: FAIR





- Clinical Impression


Clinical Impression: 


 Foot ulcer








- Scribe Statement


The provider has reviewed the documentation as recorded by the Scribe


Devora Mason








All medical record entries made by the Omaribe were at my direction and 

personally dictated by me. I have reviewed the chart and agree that the record 

accurately reflects my personal performance of the history, physical exam, 

medical decision making, and the department course for this patient. I have also

personally directed, reviewed, and agree with the discharge instructions and 

disposition.

## 2018-12-30 NOTE — RAD
Date of service: 



12/30/2018



PROCEDURE:  Right Foot Radiographs.



HISTORY:

 draining heel ulcer 



COMPARISON:

None.



FINDINGS:



BONES:

There is severe diffuse bone demineralization.  There is no acute 

displaced fracture.  There is apparent radiolucency and erosion in 

the superior calcaneus. 



JOINTS:

Normal. 



SOFT TISSUES:

There is soft tissue irregularity and ulceration overlying the 

superior calcaneus. 



OTHER FINDINGS:

There are advanced atherosclerotic vascular calcifications..



IMPRESSION:

Apparent radiolucency and erosion in the superior calcaneus 

underneath the large ulcer concerning for osteomyelitis.  An MRI of 

the foot without and with intravenous contrast would be helpful for 

further evaluation.

## 2018-12-31 RX ADMIN — TRAMADOL HYDROCHLORIDE PRN MG: 50 TABLET, FILM COATED ORAL at 11:05

## 2018-12-31 RX ADMIN — PANTOPRAZOLE SODIUM SCH MG: 40 TABLET, DELAYED RELEASE ORAL at 11:07

## 2018-12-31 RX ADMIN — HUMAN INSULIN SCH: 100 INJECTION, SOLUTION SUBCUTANEOUS at 16:30

## 2018-12-31 RX ADMIN — OXYCODONE HYDROCHLORIDE AND ACETAMINOPHEN PRN TAB: 5; 325 TABLET ORAL at 22:30

## 2018-12-31 RX ADMIN — HUMAN INSULIN SCH: 100 INJECTION, SOLUTION SUBCUTANEOUS at 11:53

## 2018-12-31 RX ADMIN — OXYCODONE HYDROCHLORIDE AND ACETAMINOPHEN PRN TAB: 5; 325 TABLET ORAL at 13:26

## 2018-12-31 RX ADMIN — HUMAN INSULIN SCH: 100 INJECTION, SOLUTION SUBCUTANEOUS at 21:45

## 2018-12-31 RX ADMIN — HUMAN INSULIN SCH: 100 INJECTION, SOLUTION SUBCUTANEOUS at 08:12

## 2018-12-31 NOTE — CP.PCM.CON
History of Present Illness





- History of Present Illness


History of Present Illness: 


INFECTIOUS DISEASE CONSULTATION


RAHEEM CANADA MD, FACP


2018


3T 369


CHART REVIEWED


PT EXAMINED


CASE DISCUSSED





MR WHITE IS A 63 YR OLD MALE WITH MULTPLE MEDICAL PROBLEMS, PRESENT TO  

ER/ED WITH RIGHT FOOT DRAINING AND PAIN WITH INCREASED CONFUSION-APPEARS SEPTIC.





AN INFECTIOUS DISEASE CONSULTATION REQUESTED FOR POSSIBLE SEPSIS, AND NEW 

BACTEREMIA-GRAM POSITIVE BACTEREMIA, AND POOR PROTOPLASMA





PMHX:


LEUKEMIA 2008


ESRF- AV SHUNT


KNEE SX


CARDIAC CATH


FOOT SX


ATRIAL FIB


CHF


PACEMAKER/AICD


COPD/CPAP


DM


BACK SX


ETC, ETC.





NO ALLERGIES


NO RECENT TOBACCO/ETOH


FAMILY HX OF HTN/CAD/DM


 ROS:


AS PER H&P





 (18)


BONE GRAFT NEC (06/10/14)


BONE MARROW OPS NEC (06/10/14)


CENTRAL VENOUS CATHETER PLACEMENT WITH GUIDANCE (06/10/14)


CHEST CAGE BONE BIOPSY (06/10/14)


DIALYSIS ARTERIOVENOSTOM (14)


DORSAL & DORSOLUMBAR FUSION OF POSTERIOR COL/TECHNIQUE (06/10/14)


DRAINAGE OF PERITONEAL CAVITY, PERCUTANEOUS APPROACH (18)


DRAINAGE OF RIGHT LOWER LEG SKIN, EXTERNAL APPROACH, DIAGN (18)


DX ULTRASOUND-HEART (06/10/14)


EXCISE BONE FOR GFT NEC (06/10/14)


EXCISION OF L FOOT SUBCU/FASCIA, OPEN APPROACH (17)


EXCISION OF LEFT FOOT SKIN, EXTERNAL APPROACH (18)


EXCISION OF LEFT TARSAL, OPEN APPROACH, DIAGNOSTIC (10/13/17)


EXCISION OF RIGHT FOOT SKIN, EXTERNAL APPROACH (10/13/17)


EXCISION OF RIGHT LOWER LEG SKIN, EXTERNAL APPROACH (18)


EXTRACTION OF LEFT FOOT SKIN, EXTERNAL APPROACH (17)


FUSION/REFUS OF 2-3 VERTEBRAE (06/10/14)


HEAD SOFT TISS X-RAY NEC (14)


HEMODIALYSIS (14)


INSERTION OF INFUSION DEV INTO SUP VENA CAVA, PERC APPROACH (10/13/17)


NON-INVASIVE MECHANICAL VENTILATION (14)


OCCUPATIONAL THERAPY (07/15/14)


PACKED CELL TRANSFUSION (06/10/14)


PERFORMANCE OF URINARY FILTRATION, MULTIPLE (17)


PHYSICAL THERAPY NEC (07/15/14)


RECREATIONAL THERAPY (07/15/14)


REMOVAL OF RESERVOIR FROM TRUNK SUBCU/FASCIA, OPEN APPROACH (10/18/18)


REPOSITION RIGHT BASILIC VEIN, OPEN APPROACH (17)


SPINAL CANAL EXPLOR NEC (06/10/14)


SUPPLEMENT LEFT FOOT WITH NONAUT SUB, OPEN APPROACH (18)


THORACENTESIS (14)


TRANSFUSE NONAUT RED BLOOD CELLS IN PERIPH VEIN, PERC (18)


VACCINATION NEC (14)


VENOUS CATHETERIZATION FOR RENAL DIALYSIS (14)








Family History: States: Unknown Family Hx





- Social History


Hx Tobacco Use: No


Hx Alcohol Use: No


Hx Substance Use: No





- Immunization History


Hx Tetanus Toxoid Vaccination: Yes


Hx Influenza Vaccination: Yes


Hx Pneumococcal Vaccination: Yes





Physical Exam





- Physical Exam


Appears: Non-toxic, No Acute Distress


Skin: Warm, Pale


Head: Normacephalic


Eye(s): bilateral: Conjunctiva Pale


Nose: Normal


Oral Mucosa: Moist


Neck: Supple


Chest: Symmetrical


Cardiovascular: Other (irregularly irregular)


Respiratory: Normal Breath Sounds, No Rales, No Rhonchi, No Wheezing


Gastrointestinal/Abdominal: Distention, Other ((+) fluid wave )


Extremity: Normal ROM, No Deformity, Other (AV graft noted to right upper arm. 

chronic venus skin changes to b/l lower extremities. actively draning right heel

ulcer. )


Neurological/Psych: Oriented x3, Normal Speech





ED Course And Treatment





- Laboratory Results


Result Diagrams: 


                                 18 15:38





                                 18 15:38


ECG: Interpreted By Me, Viewed By Me


ECG Rhythm: Atrial Fibrillation


ECG Interpretation: No Acute Changes


Rate From EC


O2 Sat by Pulse Oximetry: 98 (RA)


Pulse Ox Interpretation: Normal





- Other Rad


  ** CXR


X-Ray: Viewed By Me, Read By Radiologist


Interpretation: Accession No. : [id number].  Patient Name / ID : CHRISTOPHER BISHOP V  / [id number].  Exam Date : 2018 15:42:24 ( Approved ).  Study 

Comment :  Sex / Age : M  / 63Y.  Creator : Eri Smallwood MD.  Dictator : 

Eri Smallwood MD.   :  Approver : Eri Smallwood MD.  

Approver2 :  Report Date : 2018 17:50:33.  My Comment :  

*******************************************************************

****************.  Date of service:  2018.  HISTORY:  Sepsis Patient.  

COMPARISON:  2018.  FINDINGS:  LUNGS:  There are low lung volumes.  There 

is interval development of diffuse haziness in the right lung.  There is 

subsegmental atelectasis in the left mid lung.  PLEURA:  No pleural effusions or

pneumothorax.  CARDIOVASCULAR:  Persistent moderate cardiomegaly.  There are 

aortic atherosclerotic calcifications present.  There is stable appearance of 

left-sided AICD.  OSSEOUS STRUCTURES:  Within normal limits for the patient's 

age.  Status post posterior spinal fixation in the mid and lower thoracic spine.

 VISUALIZED UPPER ABDOMEN:  Normal.  OTHER FINDINGS:  None.  IMPRESSION:  D

iffuse haziness in the right lung which may represent layering pleural effusion 

or alveolar pulmonary edema.  No focal consolidation.  Persistent moderate 

cardiomegaly.





  ** XR right foot 


X-Ray: 


Interpretation: Accession No. : [id number].  Patient Name / ID : CHRISTOPHER BISHOP V  / [id number].  Exam Date : 2018 15:48:52 ( Approved ).  Study 

Comment :  Sex / Age : M  / 63Y.  Creator : Eri Smallwood MD.  Dictator : 

Eri Smallwood MD.   :  Approver : Eri Smallwood MD.  

Approver2 :  Report Date : 2018 16:37:42. *************************

**********************************************************.  Date of service:  

2018.  PROCEDURE:  Right Foot Radiographs.  HISTORY:  draining heel ulcer.

 COMPARISON:  None.  FINDINGS:  BONES:  There is severe diffuse bone 

demineralization.  There is no acute displaced fracture.  There is apparent 

radiolucency and erosion in the superior calcaneus.  JOINTS:  Normal.  SOFT 

TISSUES:  There is soft tissue irregularity and ulceration overlying the 

superior calcaneus.  OTHER FINDINGS:  There are advanced atherosclerotic 

vascular calcifications.. 





 IMPRESSION:  Apparent radiolucency and erosion in the superior calcaneus 

underneath the large ulcer concerning for osteomyelitis.  An MRI of the foot 

without and with intravenous contrast would be helpful for further evaluation.





START VANCOMYCIN 1.5 GM IVPB STAT


AND WILL CONTINUE POST EACH DIALYSIS FOLLOWING VANCO LEVELS PRIOR TO EACH DOSE.


PROGRNOSIS IS GUARDED


MAY PREFER PO TREATMENT, IN THE LONG RUN SINCE "CURE" IS GOING TO BE IMPOSSIBLE-

STILL PENDING C/S RESULTS.





RAHEEM CANADA MD, FACP








Past Patient History





- Infectious Disease


Hx of Infectious Diseases: None





- Tetanus Immunizations


Tetanus Immunization: Unknown





- Past Medical History & Family History


Past Medical History?: Yes





- Past Social History


Smoking Status: Never Smoked





- CARDIAC


Hx Atrial Fibrillation: Yes


Hx Cardia Arrhythmia: Yes (A FIB)


Hx Congestive Heart Failure: Yes


Hx Hypertension: Yes


Hx Pacemaker: Yes


Hx Peripheral Edema: Yes





- PULMONARY


Hx Asthma: Yes


Hx Chronic Obstructive Pulmonary Disease (COPD): Yes (uses CPAP for sleep apnea)


Hx Sleep Apnea: Yes (C-PAP)





- NEUROLOGICAL


Hx Neurological Disorder: Yes (PERIPHERAL NEUROPATHY)


Hx Dizziness: Yes





- HEENT


Hx HEENT Problems: No





- RENAL


Hx Chronic Kidney Disease: Yes





- ENDOCRINE/METABOLIC


Hx Endocrine Disorders: Yes


Hx Diabetes Mellitus Type 2: Yes





- HEMATOLOGICAL/ONCOLOGICAL


Hx Blood Disorders: Yes


Hx Blood Transfusions: Yes


Hx Cancer: Yes


Hx Chemotherapy: Yes


Hx Leukemia: Yes (ACUTE MYELO LEUKEMIA 2008)





- INTEGUMENTARY


Hx Dermatological Problems: Yes


Hx Cellulitis: Yes





- MUSCULOSKELETAL/RHEUMATOLOGICAL


Hx Arthritis: Yes


Hx Falls: No


Hx Fractures: Yes


Hx Osteoporosis: Yes





- GASTROINTESTINAL


Hx Gastrointestinal Disorders: Yes


Hx Constipation: Yes





- GENITOURINARY/GYNECOLOGICAL


Hx Genitourinary Disorders: Yes


Other/Comment: On HD-M-W-F- R arm AV shunt





- PSYCHIATRIC


Hx Anxiety: Yes


Hx Depression: Yes


Hx Substance Use: No





- SURGICAL HISTORY


Hx Surgeries: Yes


Hx Angiogram: Yes


Hx Arteriovenous Shunt: Yes


Hx Cardiac Catheterization: Yes


Hx Orthopedic Surgery: Yes (KNEE)


Hx Vascular Access Device: Yes


Other/Comment: hx back surgery T5.  defibrillator/pacemaker placement.  

bilateral heel surgery





- ANESTHESIA


Hx Anesthesia: Yes


Hx Anesthesia Reactions: No


Hx Malignant Hyperthermia: No





Meds


Allergies/Adverse Reactions: 


                                    Allergies











Allergy/AdvReac Type Severity Reaction Status Date / Time


 


No Known Allergies Allergy   Verified 18 14:49














- Medications


Medications: 


                               Current Medications





Apixaban (Eliquis)  2.5 mg PO BID [facility]


   Last Admin: 18 11:07 Dose:  2.5 mg


Calcium Acetate (Phoslo)  667 mg PO TIDCC [facility]


   Last Admin: 18 11:56 Dose:  667 mg


Carvedilol (Coreg)  3.125 mg PO BID [facility]


   Last Admin: 18 11:05 Dose:  Not Given


Dextrose (Dextrose 50% Inj)  0 ml IV STAT PRN; Protocol


   PRN Reason: Hypoglycemia Protocol


   Last Admin: 18 08:43 Dose:  25 ml


Dextrose (Glutose 15)  0 gm PO ONCE PRN; Protocol


   PRN Reason: Hypoglycemia Protocol


Glucagon (Glucagen Diagnostic Kit)  0 mg IM STAT PRN; Protocol


   PRN Reason: Hypoglycemia Protocol


Dextrose (Dextrose 5% In Water 1000 Ml)  1,000 mls @ 0 mls/hr IV .Q0M PRN; 

Protocol


   PRN Reason: Hypoglycemia Protocol


Vancomycin HCl 1,500 mg/ (Sodium Chloride)  500 mls @ 125 mls/hr IVPB ONCE ONE; 

Protocol


   Stop: 18 14:29


Insulin Human Regular (Novolin R)  0 unit SC [facility]; Protocol


   Last Admin: 18 11:53 Dose:  Not Given


Nateglinide (Starlix)  60 mg PO ACTID [facility]


   Last Admin: 18 08:12 Dose:  Not Given


Oxycodone/Acetaminophen (Percocet 5/325 Mg Tab)  1 tab PO Q6H PRN


   PRN Reason: Pain, severe (8-10)


   Stop: 19 13:11


   Last Admin: 18 13:26 Dose:  1 tab


Pantoprazole Sodium (Protonix Ec Tab)  40 mg PO DAILY [facility]


   Last Admin: 18 11:07 Dose:  40 mg











Results





- Vital Signs


Recent Vital Signs: 


                                Last Vital Signs











Temp  98.0 F   18 01:45


 


Pulse  93 H  18 01:45


 


Resp  18   12/31/18 01:45


 


BP  144/76   18 23:50


 


Pulse Ox  97   18 01:45














- Labs


Result Diagrams: 


                                 18 15:38





                                 18 15:38


Labs: 


                         Laboratory Results - last 24 hr











  18





  14:56 15:38 15:38


 


WBC   14.9 H D 


 


RBC   2.73 L 


 


Hgb   7.7 L 


 


Hct   24.1 L 


 


MCV   88.2 


 


MCH   28.3 


 


MCHC   32.0 L 


 


RDW   17.8 H 


 


Plt Count   152 


 


MPV   8.2 


 


Neut % (Auto)   85.1 H 


 


Lymph % (Auto)   8.1 L 


 


Mono % (Auto)   5.3 


 


Eos % (Auto)   1.2 


 


Baso % (Auto)   0.3 


 


Neut # (Auto)   12.7 H 


 


Lymph # (Auto)   1.2 


 


Mono # (Auto)   0.8 


 


Eos # (Auto)   0.2 


 


Baso # (Auto)   0.0 


 


Neutrophils % (Manual)   86 H 


 


Lymphocytes % (Manual)   9 L 


 


Monocytes % (Manual)   4 


 


Eosinophils % (Manual)   1 


 


Platelet Estimate   Normal 


 


PT    22.0 H


 


INR    2.0


 


APTT    36 H


 


pO2   


 


VBG pH   


 


VBG pCO2   


 


VBG HCO3   


 


VBG Total CO2   


 


VBG O2 Sat (Calc)   


 


VBG Base Excess   


 


VBG Potassium   


 


Glucose   


 


Lactate   


 


Sodium   


 


Potassium   


 


Chloride   


 


Carbon Dioxide   


 


Anion Gap   


 


BUN   


 


Creatinine   


 


Est GFR ( Amer)   


 


Est GFR (Non-Af Amer)   


 


POC Glucose (mg/dL)  78  


 


Random Glucose   


 


Calcium   


 


Phosphorus   


 


Magnesium   


 


Total Bilirubin   


 


AST   


 


ALT   


 


Alkaline Phosphatase   


 


C-React Prot High Sens   


 


Total Protein   


 


Albumin   


 


Globulin   


 


Albumin/Globulin Ratio   


 


Venous Blood Potassium   


 


Blood Type   


 


Antibody Screen   














  18





  15:38 15:38 16:13


 


WBC   


 


RBC   


 


Hgb   


 


Hct   


 


MCV   


 


MCH   


 


MCHC   


 


RDW   


 


Plt Count   


 


MPV   


 


Neut % (Auto)   


 


Lymph % (Auto)   


 


Mono % (Auto)   


 


Eos % (Auto)   


 


Baso % (Auto)   


 


Neut # (Auto)   


 


Lymph # (Auto)   


 


Mono # (Auto)   


 


Eos # (Auto)   


 


Baso # (Auto)   


 


Neutrophils % (Manual)   


 


Lymphocytes % (Manual)   


 


Monocytes % (Manual)   


 


Eosinophils % (Manual)   


 


Platelet Estimate   


 


PT   


 


INR   


 


APTT   


 


pO2    35


 


VBG pH    7.39


 


VBG pCO2    35 L


 


VBG HCO3    21.7


 


VBG Total CO2    22.3


 


VBG O2 Sat (Calc)    70.4 H


 


VBG Base Excess    -3.1 L


 


VBG Potassium    2.8 L


 


Glucose    70 L


 


Lactate    1.8


 


Sodium  128 L   130.0 L


 


Potassium  3.1 L  


 


Chloride  93 L   95.0 L


 


Carbon Dioxide  19 L  


 


Anion Gap  19  


 


BUN  97 H  


 


Creatinine  7.4 H* D  


 


Est GFR ( Amer)  9  


 


Est GFR (Non-Af Amer)  8  


 


POC Glucose (mg/dL)   


 


Random Glucose  81  D  


 


Calcium  6.8 L  


 


Phosphorus  5.2 H  


 


Magnesium  2.0  


 


Total Bilirubin  2.5 H  


 


AST  24  


 


ALT  22  


 


Alkaline Phosphatase  444 H D  


 


C-React Prot High Sens   > 15.00 H 


 


Total Protein  5.9 L  


 


Albumin  2.8 L  


 


Globulin  3.1  


 


Albumin/Globulin Ratio  0.9 L  


 


Venous Blood Potassium    2.8 L


 


Blood Type   


 


Antibody Screen   














  18





  16:25 07:12 07:13


 


WBC   


 


RBC   


 


Hgb   


 


Hct   


 


MCV   


 


MCH   


 


MCHC   


 


RDW   


 


Plt Count   


 


MPV   


 


Neut % (Auto)   


 


Lymph % (Auto)   


 


Mono % (Auto)   


 


Eos % (Auto)   


 


Baso % (Auto)   


 


Neut # (Auto)   


 


Lymph # (Auto)   


 


Mono # (Auto)   


 


Eos # (Auto)   


 


Baso # (Auto)   


 


Neutrophils % (Manual)   


 


Lymphocytes % (Manual)   


 


Monocytes % (Manual)   


 


Eosinophils % (Manual)   


 


Platelet Estimate   


 


PT   


 


INR   


 


APTT   


 


pO2   


 


VBG pH   


 


VBG pCO2   


 


VBG HCO3   


 


VBG Total CO2   


 


VBG O2 Sat (Calc)   


 


VBG Base Excess   


 


VBG Potassium   


 


Glucose   


 


Lactate   


 


Sodium   


 


Potassium   


 


Chloride   


 


Carbon Dioxide   


 


Anion Gap   


 


BUN   


 


Creatinine   


 


Est GFR ( Amer)   


 


Est GFR (Non-Af Amer)   


 


POC Glucose (mg/dL)   53 L  53 L


 


Random Glucose   


 


Calcium   


 


Phosphorus   


 


Magnesium   


 


Total Bilirubin   


 


AST   


 


ALT   


 


Alkaline Phosphatase   


 


C-React Prot High Sens   


 


Total Protein   


 


Albumin   


 


Globulin   


 


Albumin/Globulin Ratio   


 


Venous Blood Potassium   


 


Blood Type  AB POSITIVE  


 


Antibody Screen  Negative  














  18





  07:58 09:09 11:38


 


WBC   


 


RBC   


 


Hgb   


 


Hct   


 


MCV   


 


MCH   


 


MCHC   


 


RDW   


 


Plt Count   


 


MPV   


 


Neut % (Auto)   


 


Lymph % (Auto)   


 


Mono % (Auto)   


 


Eos % (Auto)   


 


Baso % (Auto)   


 


Neut # (Auto)   


 


Lymph # (Auto)   


 


Mono # (Auto)   


 


Eos # (Auto)   


 


Baso # (Auto)   


 


Neutrophils % (Manual)   


 


Lymphocytes % (Manual)   


 


Monocytes % (Manual)   


 


Eosinophils % (Manual)   


 


Platelet Estimate   


 


PT   


 


INR   


 


APTT   


 


pO2   


 


VBG pH   


 


VBG pCO2   


 


VBG HCO3   


 


VBG Total CO2   


 


VBG O2 Sat (Calc)   


 


VBG Base Excess   


 


VBG Potassium   


 


Glucose   


 


Lactate   


 


Sodium   


 


Potassium   


 


Chloride   


 


Carbon Dioxide   


 


Anion Gap   


 


BUN   


 


Creatinine   


 


Est GFR ( Amer)   


 


Est GFR (Non-Af Amer)   


 


POC Glucose (mg/dL)  53 L  133 H  120 H


 


Random Glucose   


 


Calcium   


 


Phosphorus   


 


Magnesium   


 


Total Bilirubin   


 


AST   


 


ALT   


 


Alkaline Phosphatase   


 


C-React Prot High Sens   


 


Total Protein   


 


Albumin   


 


Globulin   


 


Albumin/Globulin Ratio   


 


Venous Blood Potassium   


 


Blood Type   


 


Antibody Screen

## 2018-12-31 NOTE — CP.PCM.CON
History of Present Illness





- History of Present Illness


History of Present Illness: 





63M with pmhx of ESRD, leukemia in remission, afib, CHF, AICD seen and presented

to the ED with right posterior ankle wound. Patient is well known to Dr. Thornton 

and has had multiple debridements of the wound in the past. Patient is confused 

today and does not respond to questions. Patient's wife is bedside. Patient does

state that his right foot is in pain.


Last HD friday





PMHx: see above


PSHx: pacemaker, knee sx, cardiac cath, AV shunt, foot surgery


All: NKDA





Review of Systems





- Review of Systems


All systems: reviewed and no additional remarkable complaints except (as per 

HPI)





Past Patient History





- Infectious Disease


Hx of Infectious Diseases: None





- Tetanus Immunizations


Tetanus Immunization: Unknown





- Past Medical History & Family History


Past Medical History?: Yes





- Past Social History


Smoking Status: Never Smoked





- CARDIAC


Hx Atrial Fibrillation: Yes


Hx Cardia Arrhythmia: Yes (A FIB)


Hx Congestive Heart Failure: Yes


Hx Hypertension: Yes


Hx Pacemaker: Yes


Hx Peripheral Edema: Yes





- PULMONARY


Hx Asthma: Yes


Hx Chronic Obstructive Pulmonary Disease (COPD): Yes (uses CPAP for sleep apnea)


Hx Sleep Apnea: Yes (C-PAP)





- NEUROLOGICAL


Hx Neurological Disorder: Yes (PERIPHERAL NEUROPATHY)


Hx Dizziness: Yes





- HEENT


Hx HEENT Problems: No





- RENAL


Hx Chronic Kidney Disease: Yes





- ENDOCRINE/METABOLIC


Hx Endocrine Disorders: Yes


Hx Diabetes Mellitus Type 2: Yes





- HEMATOLOGICAL/ONCOLOGICAL


Hx Blood Disorders: Yes


Hx Blood Transfusions: Yes


Hx Cancer: Yes


Hx Chemotherapy: Yes


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





- INTEGUMENTARY


Hx Dermatological Problems: Yes


Hx Cellulitis: Yes





- MUSCULOSKELETAL/RHEUMATOLOGICAL


Hx Arthritis: Yes


Hx Falls: No


Hx Fractures: Yes


Hx Osteoporosis: Yes





- GASTROINTESTINAL


Hx Gastrointestinal Disorders: Yes


Hx Constipation: Yes





- GENITOURINARY/GYNECOLOGICAL


Hx Genitourinary Disorders: Yes


Other/Comment: On HD-M-[id number]- R arm AV shunt





- PSYCHIATRIC


Hx Anxiety: Yes


Hx Depression: Yes


Hx Substance Use: No





- SURGICAL HISTORY


Hx Surgeries: Yes


Hx Angiogram: Yes


Hx Arteriovenous Shunt: Yes


Hx Cardiac Catheterization: Yes


Hx Orthopedic Surgery: Yes (KNEE)


Hx Vascular Access Device: Yes


Other/Comment: hx back surgery T5.  defibrillator/pacemaker placement.  

bilateral heel surgery





- ANESTHESIA


Hx Anesthesia: Yes


Hx Anesthesia Reactions: No


Hx Malignant Hyperthermia: No





Meds


Allergies/Adverse Reactions: 


                                    Allergies











Allergy/AdvReac Type Severity Reaction Status Date / Time


 


No Known Allergies Allergy   Verified 12/30/18 14:49














- Medications


Medications: 


                               Current Medications





Apixaban (Eliquis)  2.5 mg PO BID [facility]


   Last Admin: 12/31/18 11:07 Dose:  2.5 mg


Calcium Acetate (Phoslo)  667 mg PO TIDCC [facility]


   Last Admin: 12/31/18 11:56 Dose:  667 mg


Carvedilol (Coreg)  3.125 mg PO BID [facility]


   Last Admin: 12/31/18 11:05 Dose:  Not Given


Dextrose (Dextrose 50% Inj)  0 ml IV STAT PRN; Protocol


   PRN Reason: Hypoglycemia Protocol


   Last Admin: 12/31/18 08:43 Dose:  25 ml


Dextrose (Glutose 15)  0 gm PO ONCE PRN; Protocol


   PRN Reason: Hypoglycemia Protocol


Glucagon (Glucagen Diagnostic Kit)  0 mg IM STAT PRN; Protocol


   PRN Reason: Hypoglycemia Protocol


Sodium Chloride (Sodium Chloride 0.9%)  1,000 mls @ 40 mls/hr IV .Q24H [facility]


   Last Admin: 12/30/18 19:10 Dose:  40 mls/hr


Dextrose (Dextrose 5% In Water 1000 Ml)  1,000 mls @ 0 mls/hr IV .Q0M PRN; 

Protocol


   PRN Reason: Hypoglycemia Protocol


Vancomycin HCl 1,500 mg/ (Sodium Chloride)  500 mls @ 125 mls/hr IVPB ONCE ONE; 

Protocol


   Stop: 12/31/18 14:29


Insulin Human Regular (Novolin R)  0 unit SC ACHS [facility]; Protocol


   Last Admin: 12/31/18 11:53 Dose:  Not Given


Nateglinide (Starlix)  60 mg PO ACTID [facility]


   Last Admin: 12/31/18 08:12 Dose:  Not Given


Oxycodone/Acetaminophen (Percocet 5/325 Mg Tab)  1 tab PO Q6H PRN


   PRN Reason: Pain, severe (8-10)


   Stop: 01/03/19 13:11


   Last Admin: 12/31/18 13:26 Dose:  1 tab


Pantoprazole Sodium (Protonix Ec Tab)  40 mg PO DAILY [facility]


   Last Admin: 12/31/18 11:07 Dose:  40 mg











Physical Exam





- Constitutional


Appears: Non-toxic, No Acute Distress, Chronically Ill





- Head Exam


Head Exam: NORMAL INSPECTION, NORMOCEPHALIC





- Eye Exam


Eye Exam: Normal appearance, PERRL





- ENT Exam


ENT Exam: Mucous Membranes Moist, Normal Exam





- Respiratory Exam


Respiratory Exam: Decreased Breath Sounds, NORMAL BREATHING PATTERN





- Cardiovascular Exam


Cardiovascular Exam: Irregular Rhythm





- GI/Abdominal Exam


GI & Abdominal Exam: Distended, Hypoactive Bowel Sounds, Soft





- Extremities Exam


Extremities exam: Positive for: normal inspection, pedal edema (RUE avf without 

thrill or bruit)





- Neurological Exam


Neurological exam: Alert, Oriented x3





- Psychiatric Exam


Psychiatric exam: Anxious





- Skin


Skin Exam: Dry, Intact





Results





- Vital Signs


Recent Vital Signs: 


                                Last Vital Signs











Temp  98.0 F   12/31/18 01:45


 


Pulse  93 H  12/31/18 01:45


 


Resp  18   12/31/18 01:45


 


BP  144/76   12/30/18 23:50


 


Pulse Ox  97   12/31/18 01:45














- Labs


Result Diagrams: 


                                 12/30/18 15:38





                                 12/30/18 15:38


Labs: 


                         Laboratory Results - last 24 hr











  12/30/18 12/30/18 12/30/18





  14:56 15:38 15:38


 


WBC   14.9 H D 


 


RBC   2.73 L 


 


Hgb   7.7 L 


 


Hct   24.1 L 


 


MCV   88.2 


 


MCH   28.3 


 


MCHC   32.0 L 


 


RDW   17.8 H 


 


Plt Count   152 


 


MPV   8.2 


 


Neut % (Auto)   85.1 H 


 


Lymph % (Auto)   8.1 L 


 


Mono % (Auto)   5.3 


 


Eos % (Auto)   1.2 


 


Baso % (Auto)   0.3 


 


Neut # (Auto)   12.7 H 


 


Lymph # (Auto)   1.2 


 


Mono # (Auto)   0.8 


 


Eos # (Auto)   0.2 


 


Baso # (Auto)   0.0 


 


Neutrophils % (Manual)   86 H 


 


Lymphocytes % (Manual)   9 L 


 


Monocytes % (Manual)   4 


 


Eosinophils % (Manual)   1 


 


Platelet Estimate   Normal 


 


PT    22.0 H


 


INR    2.0


 


APTT    36 H


 


pO2   


 


VBG pH   


 


VBG pCO2   


 


VBG HCO3   


 


VBG Total CO2   


 


VBG O2 Sat (Calc)   


 


VBG Base Excess   


 


VBG Potassium   


 


Glucose   


 


Lactate   


 


Sodium   


 


Potassium   


 


Chloride   


 


Carbon Dioxide   


 


Anion Gap   


 


BUN   


 


Creatinine   


 


Est GFR ( Amer)   


 


Est GFR (Non-Af Amer)   


 


POC Glucose (mg/dL)  78  


 


Random Glucose   


 


Calcium   


 


Phosphorus   


 


Magnesium   


 


Total Bilirubin   


 


AST   


 


ALT   


 


Alkaline Phosphatase   


 


C-React Prot High Sens   


 


Total Protein   


 


Albumin   


 


Globulin   


 


Albumin/Globulin Ratio   


 


Venous Blood Potassium   


 


Blood Type   


 


Antibody Screen   














  12/30/18 12/30/18 12/30/18





  15:38 15:38 16:13


 


WBC   


 


RBC   


 


Hgb   


 


Hct   


 


MCV   


 


MCH   


 


MCHC   


 


RDW   


 


Plt Count   


 


MPV   


 


Neut % (Auto)   


 


Lymph % (Auto)   


 


Mono % (Auto)   


 


Eos % (Auto)   


 


Baso % (Auto)   


 


Neut # (Auto)   


 


Lymph # (Auto)   


 


Mono # (Auto)   


 


Eos # (Auto)   


 


Baso # (Auto)   


 


Neutrophils % (Manual)   


 


Lymphocytes % (Manual)   


 


Monocytes % (Manual)   


 


Eosinophils % (Manual)   


 


Platelet Estimate   


 


PT   


 


INR   


 


APTT   


 


pO2    35


 


VBG pH    7.39


 


VBG pCO2    35 L


 


VBG HCO3    21.7


 


VBG Total CO2    22.3


 


VBG O2 Sat (Calc)    70.4 H


 


VBG Base Excess    -3.1 L


 


VBG Potassium    2.8 L


 


Glucose    70 L


 


Lactate    1.8


 


Sodium  128 L   130.0 L


 


Potassium  3.1 L  


 


Chloride  93 L   95.0 L


 


Carbon Dioxide  19 L  


 


Anion Gap  19  


 


BUN  97 H  


 


Creatinine  7.4 H* D  


 


Est GFR ( Amer)  9  


 


Est GFR (Non-Af Amer)  8  


 


POC Glucose (mg/dL)   


 


Random Glucose  81  D  


 


Calcium  6.8 L  


 


Phosphorus  5.2 H  


 


Magnesium  2.0  


 


Total Bilirubin  2.5 H  


 


AST  24  


 


ALT  22  


 


Alkaline Phosphatase  444 H D  


 


C-React Prot High Sens   > 15.00 H 


 


Total Protein  5.9 L  


 


Albumin  2.8 L  


 


Globulin  3.1  


 


Albumin/Globulin Ratio  0.9 L  


 


Venous Blood Potassium    2.8 L


 


Blood Type   


 


Antibody Screen   














  12/30/18 12/31/18 12/31/18





  16:25 07:12 07:13


 


WBC   


 


RBC   


 


Hgb   


 


Hct   


 


MCV   


 


MCH   


 


MCHC   


 


RDW   


 


Plt Count   


 


MPV   


 


Neut % (Auto)   


 


Lymph % (Auto)   


 


Mono % (Auto)   


 


Eos % (Auto)   


 


Baso % (Auto)   


 


Neut # (Auto)   


 


Lymph # (Auto)   


 


Mono # (Auto)   


 


Eos # (Auto)   


 


Baso # (Auto)   


 


Neutrophils % (Manual)   


 


Lymphocytes % (Manual)   


 


Monocytes % (Manual)   


 


Eosinophils % (Manual)   


 


Platelet Estimate   


 


PT   


 


INR   


 


APTT   


 


pO2   


 


VBG pH   


 


VBG pCO2   


 


VBG HCO3   


 


VBG Total CO2   


 


VBG O2 Sat (Calc)   


 


VBG Base Excess   


 


VBG Potassium   


 


Glucose   


 


Lactate   


 


Sodium   


 


Potassium   


 


Chloride   


 


Carbon Dioxide   


 


Anion Gap   


 


BUN   


 


Creatinine   


 


Est GFR ( Amer)   


 


Est GFR (Non-Af Amer)   


 


POC Glucose (mg/dL)   53 L  53 L


 


Random Glucose   


 


Calcium   


 


Phosphorus   


 


Magnesium   


 


Total Bilirubin   


 


AST   


 


ALT   


 


Alkaline Phosphatase   


 


C-React Prot High Sens   


 


Total Protein   


 


Albumin   


 


Globulin   


 


Albumin/Globulin Ratio   


 


Venous Blood Potassium   


 


Blood Type  AB POSITIVE  


 


Antibody Screen  Negative  














  12/31/18 12/31/18 12/31/18





  07:58 09:09 11:38


 


WBC   


 


RBC   


 


Hgb   


 


Hct   


 


MCV   


 


MCH   


 


MCHC   


 


RDW   


 


Plt Count   


 


MPV   


 


Neut % (Auto)   


 


Lymph % (Auto)   


 


Mono % (Auto)   


 


Eos % (Auto)   


 


Baso % (Auto)   


 


Neut # (Auto)   


 


Lymph # (Auto)   


 


Mono # (Auto)   


 


Eos # (Auto)   


 


Baso # (Auto)   


 


Neutrophils % (Manual)   


 


Lymphocytes % (Manual)   


 


Monocytes % (Manual)   


 


Eosinophils % (Manual)   


 


Platelet Estimate   


 


PT   


 


INR   


 


APTT   


 


pO2   


 


VBG pH   


 


VBG pCO2   


 


VBG HCO3   


 


VBG Total CO2   


 


VBG O2 Sat (Calc)   


 


VBG Base Excess   


 


VBG Potassium   


 


Glucose   


 


Lactate   


 


Sodium   


 


Potassium   


 


Chloride   


 


Carbon Dioxide   


 


Anion Gap   


 


BUN   


 


Creatinine   


 


Est GFR ( Amer)   


 


Est GFR (Non-Af Amer)   


 


POC Glucose (mg/dL)  53 L  133 H  120 H


 


Random Glucose   


 


Calcium   


 


Phosphorus   


 


Magnesium   


 


Total Bilirubin   


 


AST   


 


ALT   


 


Alkaline Phosphatase   


 


C-React Prot High Sens   


 


Total Protein   


 


Albumin   


 


Globulin   


 


Albumin/Globulin Ratio   


 


Venous Blood Potassium   


 


Blood Type   


 


Antibody Screen   














Assessment & Plan


(1) Foot ulcer


Status: Acute   





(2) Cellulitis


Status: Acute   Priority: High   





(3) ESRD (end stage renal disease)


Status: Acute   





(4) Non-ischemic cardiomyopathy


Status: Acute   





(5) A-fib


Status: Chronic   





- Assessment and Plan (Free Text)


Assessment: 





hd attempted today  however avf clotted


plan for hd tomorrow after vascular eval. would hold off blood transfusion until

hd tomorrow


dc iv fluids


podiatry eval ongoing


gianluac w/ hd,

## 2018-12-31 NOTE — CARD
--------------- APPROVED REPORT --------------





Date of service: 12/30/2018



EKG Measurement

Heart [id number]

[id number]

[id number]

[id number]



<Conclusion>

Atrial fibrillation with rapid ventricular response with premature 

ventricular or aberrantly conducted complexes

Right axis deviation

Possible Right ventricular hypertrophy

Nonspecific ST abnormality

Abnormal ECG

## 2018-12-31 NOTE — CP.PCM.PN
<Jigar Stokes - Last Filed: 12/31/18 10:50>





Subjective





- Date & Time of Evaluation


Date of Evaluation: 12/31/18


Time of Evaluation: 10:50





- Subjective


Subjective: 


Podiatry progress note for Dr. Thornton, 





62 y/o male patient seen and evaluated at bedside with Dr. Thornton. Patient is 

resting comfortably. Patient denies any acute overnight events. Patient denies 

F/N/V/SOB/chills. 





Dressing was intact with sero-sanguinous strike-through








Objective





- Vital Signs/Intake and Output


Vital Signs (last 24 hours): 


                                        











Temp Pulse Resp BP Pulse Ox


 


 98.0 F   93 H  18   144/76   97 


 


 12/31/18 01:45  12/31/18 01:45  12/31/18 01:45  12/30/18 23:50  12/31/18 01:45








Intake and Output: 


                                        











 12/31/18 12/31/18





 06:59 18:59


 


Intake Total 440 


 


Balance 440 














- Medications


Medications: 


                               Current Medications





Apixaban (Eliquis)  2.5 mg PO BID [facility]


   Last Admin: 12/30/18 20:35 Dose:  2.5 mg


Calcium Acetate (Phoslo)  667 mg PO TIDCC [facility]


   Last Admin: 12/31/18 08:14 Dose:  667 mg


Carvedilol (Coreg)  3.125 mg PO BID [facility]


   Last Admin: 12/30/18 20:35 Dose:  3.125 mg


Dextrose (Dextrose 50% Inj)  0 ml IV STAT PRN; Protocol


   PRN Reason: Hypoglycemia Protocol


   Last Admin: 12/31/18 08:43 Dose:  25 ml


Dextrose (Glutose 15)  0 gm PO ONCE PRN; Protocol


   PRN Reason: Hypoglycemia Protocol


Glucagon (Glucagen Diagnostic Kit)  0 mg IM STAT PRN; Protocol


   PRN Reason: Hypoglycemia Protocol


Sodium Chloride (Sodium Chloride 0.9%)  1,000 mls @ 40 mls/hr IV .Q24H [facility]


   Last Admin: 12/30/18 19:10 Dose:  40 mls/hr


Dextrose (Dextrose 5% In Water 1000 Ml)  1,000 mls @ 0 mls/hr IV .Q0M PRN; 

Protocol


   PRN Reason: Hypoglycemia Protocol


Vancomycin HCl 1,500 mg/ (Sodium Chloride)  500 mls @ 125 mls/hr IVPB ONCE ONE; 

Protocol


   Stop: 12/31/18 14:29


Insulin Human Regular (Novolin R)  0 unit SC ACHS [facility]; Protocol


   Last Admin: 12/31/18 08:12 Dose:  Not Given


Nateglinide (Starlix)  60 mg PO ACTID [facility]


   Last Admin: 12/31/18 08:12 Dose:  Not Given


Pantoprazole Sodium (Protonix Ec Tab)  40 mg PO DAILY [facility]


Tramadol HCl (Ultram)  25 mg PO Q12H PRN


   PRN Reason: Pain, moderate (4-7)


   Last Admin: 12/30/18 20:35 Dose:  25 mg











- Labs


Labs: 


                                        





                                 12/30/18 15:38 





                                 12/30/18 15:38 





                                        











PT  22.0 SECONDS (9.7-12.2)  H  12/30/18  15:38    


 


INR  2.0   12/30/18  15:38    


 


APTT  36 SECONDS (21-34)  H  12/30/18  15:38    














- Constitutional


Appears: Well, Non-toxic, No Acute Distress





- Head Exam


Head Exam: ATRAUMATIC, NORMOCEPHALIC





- Extremities Exam


Additional comments: 


Vasc: Nonpalpable pulses to the DP and PT, delayed CFT to the digits, 

temperature gradient WNL,  +1 pitting pedal edema





Derm: chronic skin changes with hyperpigmented skin to entire lower extremity, 

skin thickening and flaking bilateral lower legs.


Right: Full thickness ulceration noted to posterior ankle at level of Achilles 

insertion measuring approximately 2 cm x 2 cm x 1cm, with mixture granular and 

fibrotic wound base. No active drainage noted. No fluctuance noted. Minor marcos-

wound erythema, no streaking appreciated.





Neuro: Gross sensation absent B/L. protective sensation absent





Ortho: pain on palpation to the right ankle and LE








- Neurological Exam


Neurological Exam: Alert, Awake, Oriented x3





- Psychiatric Exam


Psychiatric exam: Normal Affect, Normal Mood





Assessment and Plan





- Assessment and Plan (Free Text)


Assessment: 


63M with pmhx of ESRD, leukemia in remission, afib, CHF, AICD, and chronic 

posterior ankle ulceration seen and evaluated in the ED for right posterior 

ankle ulceration





Plan: 


Patient seen and evaluated with Dr. Thornton


Discussed in detail with Dr. Thornton


Afebrile, no new labs from today


Wound cleansed with sterile saline and dressed with betadine and DSD


Wound cx taken: f/u results


Blood cx taken: Gram Positive Cocci


X-ray taken: possible signs of acute OM, further imaging recommended/clinical 

presentation to evaluated


ID consulted, Dr. Aleks sheikh appreciated, f/u


Nephro consult, Dr. Cortes


Will continue to follow


Thank you for the consult








<HillaryFaraz - Last Filed: 12/31/18 16:26>





Objective





- Vital Signs/Intake and Output


Vital Signs (last 24 hours): 


                                        











Temp Pulse Resp BP Pulse Ox


 


 97.6 F   86   20   144/76   96 


 


 12/31/18 16:09  12/31/18 16:09  12/31/18 16:09  12/30/18 23:50  12/31/18 16:09








Intake and Output: 


                                        











 12/31/18 12/31/18





 06:59 18:59


 


Intake Total 440 380


 


Balance 440 380














- Medications


Medications: 


                               Current Medications





Apixaban (Eliquis)  2.5 mg PO BID [facility]


   Last Admin: 12/31/18 11:07 Dose:  2.5 mg


Calcium Acetate (Phoslo)  667 mg PO TIDCC [facility]


   Last Admin: 12/31/18 11:56 Dose:  667 mg


Carvedilol (Coreg)  3.125 mg PO BID [facility]


   Last Admin: 12/31/18 11:05 Dose:  Not Given


Dextrose (Dextrose 50% Inj)  0 ml IV STAT PRN; Protocol


   PRN Reason: Hypoglycemia Protocol


   Last Admin: 12/31/18 08:43 Dose:  25 ml


Dextrose (Glutose 15)  0 gm PO ONCE PRN; Protocol


   PRN Reason: Hypoglycemia Protocol


Glucagon (Glucagen Diagnostic Kit)  0 mg IM STAT PRN; Protocol


   PRN Reason: Hypoglycemia Protocol


Dextrose (Dextrose 5% In Water 1000 Ml)  1,000 mls @ 0 mls/hr IV .Q0M PRN; 

Protocol


   PRN Reason: Hypoglycemia Protocol


Insulin Human Regular (Novolin R)  0 unit SC ACHS [facility]; Protocol


   Last Admin: 12/31/18 11:53 Dose:  Not Given


Nateglinide (Starlix)  60 mg PO ACTID [facility]


   Last Admin: 12/31/18 08:12 Dose:  Not Given


Oxycodone/Acetaminophen (Percocet 5/325 Mg Tab)  1 tab PO Q6H PRN


   PRN Reason: Pain, severe (8-10)


   Stop: 01/03/19 13:11


   Last Admin: 12/31/18 13:26 Dose:  1 tab


Pantoprazole Sodium (Protonix Ec Tab)  40 mg PO DAILY [facility]


   Last Admin: 12/31/18 11:07 Dose:  40 mg











- Labs


Labs: 


                                        





                                 12/30/18 15:38 





                                 12/30/18 15:38 





                                        











PT  22.0 SECONDS (9.7-12.2)  H  12/30/18  15:38    


 


INR  2.0   12/30/18  15:38    


 


APTT  36 SECONDS (21-34)  H  12/30/18  15:38    














Assessment and Plan





- Assessment and Plan (Free Text)


Plan: 


above noted and agree. No abscess or gas in tissue noted . wound is stage 4 

pressure ulcer . antibiotic coverage as per Dr Fink '


to start Silversorb dressing in AM . /DR Thornton

## 2018-12-31 NOTE — CP.PCM.CON
History of Present Illness





- History of Present Illness


History of Present Illness: 





Podiatry consult note for Dr. Thornton





63M with pmhx of ESRD, leukemia in remission, afib, CHF, AICD seen and evaluated

in the ED with right posterior ankle wound. Patient is well known to Dr. Thornton 

and has had multiple debridements of the wound in the past. Patient is confused 

today and does not respond to questions. Patient's wife is bedside. Patient does

state that his right foot is in pain.





PMHx: see above


PSHx: pacemaker, knee sx, cardiac cath, AV shunt, foot surgery


All: NKDA





Past Patient History





- Infectious Disease


Hx of Infectious Diseases: None





- Tetanus Immunizations


Tetanus Immunization: Unknown





- Past Medical History & Family History


Past Medical History?: Yes





- Past Social History


Smoking Status: Never Smoked





- CARDIAC


Hx Atrial Fibrillation: Yes


Hx Cardia Arrhythmia: Yes (A FIB)


Hx Congestive Heart Failure: Yes


Hx Hypertension: Yes


Hx Pacemaker: Yes


Hx Peripheral Edema: Yes





- PULMONARY


Hx Asthma: Yes


Hx Chronic Obstructive Pulmonary Disease (COPD): Yes (uses CPAP for sleep apnea)


Hx Sleep Apnea: Yes (C-PAP)





- NEUROLOGICAL


Hx Neurological Disorder: Yes (PERIPHERAL NEUROPATHY)


Hx Dizziness: Yes





- HEENT


Hx HEENT Problems: No





- RENAL


Hx Chronic Kidney Disease: Yes





- ENDOCRINE/METABOLIC


Hx Endocrine Disorders: Yes


Hx Diabetes Mellitus Type 2: Yes





- HEMATOLOGICAL/ONCOLOGICAL


Hx Blood Disorders: Yes


Hx Blood Transfusions: Yes


Hx Cancer: Yes


Hx Chemotherapy: Yes


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





- INTEGUMENTARY


Hx Dermatological Problems: Yes


Hx Cellulitis: Yes





- MUSCULOSKELETAL/RHEUMATOLOGICAL


Hx Arthritis: Yes


Hx Falls: No


Hx Fractures: Yes


Hx Osteoporosis: Yes





- GASTROINTESTINAL


Hx Gastrointestinal Disorders: Yes


Hx Constipation: Yes





- GENITOURINARY/GYNECOLOGICAL


Hx Genitourinary Disorders: Yes


Other/Comment: On [id number]- [id number] arm AV shunt





- PSYCHIATRIC


Hx Anxiety: Yes


Hx Depression: Yes


Hx Substance Use: No





- SURGICAL HISTORY


Hx Surgeries: Yes


Hx Angiogram: Yes


Hx Arteriovenous Shunt: Yes


Hx Cardiac Catheterization: Yes


Hx Orthopedic Surgery: Yes (KNEE)


Hx Vascular Access Device: Yes


Other/Comment: hx back surgery T5.  defibrillator/pacemaker placement.  

bilateral heel surgery





- ANESTHESIA


Hx Anesthesia: Yes


Hx Anesthesia Reactions: No


Hx Malignant Hyperthermia: No





Meds


Allergies/Adverse Reactions: 


                                    Allergies











Allergy/AdvReac Type Severity Reaction Status Date / Time


 


No Known Allergies Allergy   Verified 12/30/18 14:49














- Medications


Medications: 


                               Current Medications





Apixaban (Eliquis)  2.5 mg PO BID [facility]


   Last Admin: 12/30/18 20:35 Dose:  2.5 mg


Calcium Acetate (Phoslo)  667 mg PO TIDCC [facility]


   Last Admin: 12/31/18 08:14 Dose:  667 mg


Carvedilol (Coreg)  3.125 mg PO BID [facility]


   Last Admin: 12/30/18 20:35 Dose:  3.125 mg


Dextrose (Dextrose 50% Inj)  0 ml IV STAT PRN; Protocol


   PRN Reason: Hypoglycemia Protocol


   Last Admin: 12/31/18 08:43 Dose:  25 ml


Dextrose (Glutose 15)  0 gm PO ONCE PRN; Protocol


   PRN Reason: Hypoglycemia Protocol


Glucagon (Glucagen Diagnostic Kit)  0 mg IM STAT PRN; Protocol


   PRN Reason: Hypoglycemia Protocol


Sodium Chloride (Sodium Chloride 0.9%)  1,000 mls @ 40 mls/hr IV .Q24H [facility]


   Last Admin: 12/30/18 19:10 Dose:  40 mls/hr


Dextrose (Dextrose 5% In Water 1000 Ml)  1,000 mls @ 0 mls/hr IV .Q0M PRN; P

rotocol


   PRN Reason: Hypoglycemia Protocol


Vancomycin HCl 1,500 mg/ (Sodium Chloride)  500 mls @ 125 mls/hr IVPB ONCE ONE; 

Protocol


   Stop: 12/31/18 14:29


Insulin Human Regular (Novolin R)  0 unit SC ACHS [facility]; Protocol


   Last Admin: 12/31/18 08:12 Dose:  Not Given


Nateglinide (Starlix)  60 mg PO ACTID [facility]


   Last Admin: 12/31/18 08:12 Dose:  Not Given


Pantoprazole Sodium (Protonix Ec Tab)  40 mg PO DAILY [facility]


Tramadol HCl (Ultram)  25 mg PO Q12H PRN


   PRN Reason: Pain, moderate (4-7)


   Last Admin: 12/30/18 20:35 Dose:  25 mg











Physical Exam





- Constitutional


Appears: Non-toxic





- Head Exam


Head Exam: ATRAUMATIC, NORMOCEPHALIC





- Extremities Exam


Additional comments: 





Vasc: Nonpalpable pulses to the DP and PT, delayed CFT to the digits, 

temperature gradient WNL,  +1 pitting pedal edema





Derm: chronic skin changes with hyperpigmented skin to entire lower extremity, 

skin thickening and flaking bilateral lower legs.


Right: Full thickness ulceration noted to posterior ankle at level of Achilles 

insertion measuring approximately 2 cm x 2 cm x 1cm, with mixture granular and 

fibrotic wound base. No active drainage noted. No fluctuance noted. Minor 

marcos-wound erythema, no streaking appreciated.





Neuro: Gross sensation absent B/L. protective sensation absent





Ortho: pain on palpation to the right ankle and LE





- Neurological Exam


Neurological exam: Alert





Results





- Vital Signs


Recent Vital Signs: 


                                Last Vital Signs











Temp  98.0 F   12/31/18 01:45


 


Pulse  93 H  12/31/18 01:45


 


Resp  18   12/31/18 01:45


 


BP  144/76   12/30/18 23:50


 


Pulse Ox  97   12/31/18 01:45














- Labs


Result Diagrams: 


                                 12/30/18 15:38





                                 12/30/18 15:38


Labs: 


                         Laboratory Results - last 24 hr











  12/30/18 12/30/18 12/30/18





  14:56 15:38 15:38


 


WBC   14.9 H D 


 


RBC   2.73 L 


 


Hgb   7.7 L 


 


Hct   24.1 L 


 


MCV   88.2 


 


MCH   28.3 


 


MCHC   32.0 L 


 


RDW   17.8 H 


 


Plt Count   152 


 


MPV   8.2 


 


Neut % (Auto)   85.1 H 


 


Lymph % (Auto)   8.1 L 


 


Mono % (Auto)   5.3 


 


Eos % (Auto)   1.2 


 


Baso % (Auto)   0.3 


 


Neut # (Auto)   12.7 H 


 


Lymph # (Auto)   1.2 


 


Mono # (Auto)   0.8 


 


Eos # (Auto)   0.2 


 


Baso # (Auto)   0.0 


 


Neutrophils % (Manual)   86 H 


 


Lymphocytes % (Manual)   9 L 


 


Monocytes % (Manual)   4 


 


Eosinophils % (Manual)   1 


 


Platelet Estimate   Normal 


 


PT    22.0 H


 


INR    2.0


 


APTT    36 H


 


pO2   


 


VBG pH   


 


VBG pCO2   


 


VBG HCO3   


 


VBG Total CO2   


 


VBG O2 Sat (Calc)   


 


VBG Base Excess   


 


VBG Potassium   


 


Glucose   


 


Lactate   


 


Sodium   


 


Potassium   


 


Chloride   


 


Carbon Dioxide   


 


Anion Gap   


 


BUN   


 


Creatinine   


 


Est GFR ( Amer)   


 


Est GFR (Non-Af Amer)   


 


POC Glucose (mg/dL)  78  


 


Random Glucose   


 


Calcium   


 


Phosphorus   


 


Magnesium   


 


Total Bilirubin   


 


AST   


 


ALT   


 


Alkaline Phosphatase   


 


C-React Prot High Sens   


 


Total Protein   


 


Albumin   


 


Globulin   


 


Albumin/Globulin Ratio   


 


Venous Blood Potassium   


 


Blood Type   


 


Antibody Screen   














  12/30/18 12/30/18 12/30/18





  15:38 15:38 16:13


 


WBC   


 


RBC   


 


Hgb   


 


Hct   


 


MCV   


 


MCH   


 


MCHC   


 


RDW   


 


Plt Count   


 


MPV   


 


Neut % (Auto)   


 


Lymph % (Auto)   


 


Mono % (Auto)   


 


Eos % (Auto)   


 


Baso % (Auto)   


 


Neut # (Auto)   


 


Lymph # (Auto)   


 


Mono # (Auto)   


 


Eos # (Auto)   


 


Baso # (Auto)   


 


Neutrophils % (Manual)   


 


Lymphocytes % (Manual)   


 


Monocytes % (Manual)   


 


Eosinophils % (Manual)   


 


Platelet Estimate   


 


PT   


 


INR   


 


APTT   


 


pO2    35


 


VBG pH    7.39


 


VBG pCO2    35 L


 


VBG HCO3    21.7


 


VBG Total CO2    22.3


 


VBG O2 Sat (Calc)    70.4 H


 


VBG Base Excess    -3.1 L


 


VBG Potassium    2.8 L


 


Glucose    70 L


 


Lactate    1.8


 


Sodium  128 L   130.0 L


 


Potassium  3.1 L  


 


Chloride  93 L   95.0 L


 


Carbon Dioxide  19 L  


 


Anion Gap  19  


 


BUN  97 H  


 


Creatinine  7.4 H* D  


 


Est GFR ( Amer)  9  


 


Est GFR (Non-Af Amer)  8  


 


POC Glucose (mg/dL)   


 


Random Glucose  81  D  


 


Calcium  6.8 L  


 


Phosphorus  5.2 H  


 


Magnesium  2.0  


 


Total Bilirubin  2.5 H  


 


AST  24  


 


ALT  22  


 


Alkaline Phosphatase  444 H D  


 


C-React Prot High Sens   > 15.00 H 


 


Total Protein  5.9 L  


 


Albumin  2.8 L  


 


Globulin  3.1  


 


Albumin/Globulin Ratio  0.9 L  


 


Venous Blood Potassium    2.8 L


 


Blood Type   


 


Antibody Screen   














  12/30/18 12/31/18 12/31/18





  16:25 07:12 07:13


 


WBC   


 


RBC   


 


Hgb   


 


Hct   


 


MCV   


 


MCH   


 


MCHC   


 


RDW   


 


Plt Count   


 


MPV   


 


Neut % (Auto)   


 


Lymph % (Auto)   


 


Mono % (Auto)   


 


Eos % (Auto)   


 


Baso % (Auto)   


 


Neut # (Auto)   


 


Lymph # (Auto)   


 


Mono # (Auto)   


 


Eos # (Auto)   


 


Baso # (Auto)   


 


Neutrophils % (Manual)   


 


Lymphocytes % (Manual)   


 


Monocytes % (Manual)   


 


Eosinophils % (Manual)   


 


Platelet Estimate   


 


PT   


 


INR   


 


APTT   


 


pO2   


 


VBG pH   


 


VBG pCO2   


 


VBG HCO3   


 


VBG Total CO2   


 


VBG O2 Sat (Calc)   


 


VBG Base Excess   


 


VBG Potassium   


 


Glucose   


 


Lactate   


 


Sodium   


 


Potassium   


 


Chloride   


 


Carbon Dioxide   


 


Anion Gap   


 


BUN   


 


Creatinine   


 


Est GFR ( Amer)   


 


Est GFR (Non-Af Amer)   


 


POC Glucose (mg/dL)   53 L  53 L


 


Random Glucose   


 


Calcium   


 


Phosphorus   


 


Magnesium   


 


Total Bilirubin   


 


AST   


 


ALT   


 


Alkaline Phosphatase   


 


C-React Prot High Sens   


 


Total Protein   


 


Albumin   


 


Globulin   


 


Albumin/Globulin Ratio   


 


Venous Blood Potassium   


 


Blood Type  AB POSITIVE  


 


Antibody Screen  Negative  














  12/31/18





  07:58


 


WBC 


 


RBC 


 


Hgb 


 


Hct 


 


MCV 


 


MCH 


 


MCHC 


 


RDW 


 


Plt Count 


 


MPV 


 


Neut % (Auto) 


 


Lymph % (Auto) 


 


Mono % (Auto) 


 


Eos % (Auto) 


 


Baso % (Auto) 


 


Neut # (Auto) 


 


Lymph # (Auto) 


 


Mono # (Auto) 


 


Eos # (Auto) 


 


Baso # (Auto) 


 


Neutrophils % (Manual) 


 


Lymphocytes % (Manual) 


 


Monocytes % (Manual) 


 


Eosinophils % (Manual) 


 


Platelet Estimate 


 


PT 


 


INR 


 


APTT 


 


pO2 


 


VBG pH 


 


VBG pCO2 


 


VBG HCO3 


 


VBG Total CO2 


 


VBG O2 Sat (Calc) 


 


VBG Base Excess 


 


VBG Potassium 


 


Glucose 


 


Lactate 


 


Sodium 


 


Potassium 


 


Chloride 


 


Carbon Dioxide 


 


Anion Gap 


 


BUN 


 


Creatinine 


 


Est GFR ( Amer) 


 


Est GFR (Non-Af Amer) 


 


POC Glucose (mg/dL)  53 L


 


Random Glucose 


 


Calcium 


 


Phosphorus 


 


Magnesium 


 


Total Bilirubin 


 


AST 


 


ALT 


 


Alkaline Phosphatase 


 


C-React Prot High Sens 


 


Total Protein 


 


Albumin 


 


Globulin 


 


Albumin/Globulin Ratio 


 


Venous Blood Potassium 


 


Blood Type 


 


Antibody Screen 














Assessment & Plan





- Assessment and Plan (Free Text)


Assessment: 





63M with pmhx of ESRD, leukemia in remission, afib, CHF, AICD, and chronic 

posterior ankle ulceration seen and evaluated in the ED for right posterior 

ankle ulceration


Plan: 





Patient seen and evaluated


Discussed in detail with Dr. Thornton


Afebrile, WBC 14.9


Wound cleansed with sterile saline and dressed with betadine and DSD


Wound cx taken: f/u results


Blood cx taken: f/u results


X-ray taken: possible signs of acute OM, further imaging recommended/clinical 

presentation to evaluated


ID consulted, Dr. Aleks sheikh appreciated, f/u


Nephro consult, Dr. Cortes


Will continue to follow


Thank you for the consult





- Date & Time


Date: 12/30/18


Time: 16:30

## 2018-12-31 NOTE — CP.PCM.PN
Subjective





- Date & Time of Evaluation


Date of Evaluation: 12/31/18


Time of Evaluation: 23:53





- Subjective


Subjective: 





Patient overall doing somewhat very poor now.


More pain in the right leg.


Seen by vascular surgery.


Podiatry.


Nephrologist.


Patient admitted with possible sepsis associate with the fever and antibiotic 

will continue to monitor.





Objective





- Vital Signs/Intake and Output


Vital Signs (last 24 hours): 


                                        











Temp Pulse Resp BP Pulse Ox


 


 97.6 F   86   20   144/76   96 


 


 12/31/18 16:09  12/31/18 16:09  12/31/18 16:09  12/30/18 23:50  12/31/18 16:09








Intake and Output: 


                                        











 12/31/18 01/01/19





 18:59 06:59


 


Intake Total 380 


 


Balance 380 














- Medications


Medications: 


                               Current Medications





Apixaban (Eliquis)  2.5 mg PO BID [facility]


   Last Admin: 12/31/18 18:05 Dose:  2.5 mg


Calcium Acetate (Phoslo)  667 mg PO TIDCC [facility]


   Last Admin: 12/31/18 17:05 Dose:  667 mg


Carvedilol (Coreg)  3.125 mg PO BID [facility]


   Last Admin: 12/31/18 18:04 Dose:  3.125 mg


Dextrose (Dextrose 50% Inj)  0 ml IV STAT PRN; Protocol


   PRN Reason: Hypoglycemia Protocol


   Last Admin: 12/31/18 08:43 Dose:  25 ml


Dextrose (Glutose 15)  0 gm PO ONCE PRN; Protocol


   PRN Reason: Hypoglycemia Protocol


Glucagon (Glucagen Diagnostic Kit)  0 mg IM STAT PRN; Protocol


   PRN Reason: Hypoglycemia Protocol


Dextrose (Dextrose 5% In Water 1000 Ml)  1,000 mls @ 0 mls/hr IV .Q0M PRN; 

Protocol


   PRN Reason: Hypoglycemia Protocol


Insulin Human Regular (Novolin R)  0 unit SC ACHS [facility]; Protocol


   Last Admin: 12/31/18 21:45 Dose:  Not Given


Nateglinide (Starlix)  60 mg PO ACTID [facility]


   Last Admin: 12/31/18 08:12 Dose:  Not Given


Oxycodone/Acetaminophen (Percocet 5/325 Mg Tab)  1 tab PO Q6H PRN


   PRN Reason: Pain, severe (8-10)


   Stop: 01/03/19 13:11


   Last Admin: 12/31/18 22:30 Dose:  1 tab


Pantoprazole Sodium (Protonix Ec Tab)  40 mg PO DAILY [facility]


   Last Admin: 12/31/18 11:07 Dose:  40 mg











- Labs


Labs: 


                                        





                                 12/30/18 15:38 





                                 12/30/18 15:38 





                                        











PT  22.0 SECONDS (9.7-12.2)  H  12/30/18  15:38    


 


INR  2.0   12/30/18  15:38    


 


APTT  36 SECONDS (21-34)  H  12/30/18  15:38

## 2018-12-31 NOTE — CT
Date of service: 12/31/2018



PROCEDURE:  CT HEAD WITHOUT CONTRAST.



HISTORY:

ear pain and hearing loss



COMPARISON:

Noncontrast head CT performed 6/6/13



TECHNIQUE:

Axial computed tomography images were obtained through the head/brain 

without intravenous contrast.  



Radiation dose:



Total exam DLP = 1146.62 mGy-cm.



This CT exam was performed using one or more of the following dose 

reduction techniques: Automated exposure control, adjustment of the 

mA and/or kV according to patient size, and/or use of iterative 

reconstruction technique.



FINDINGS:



HEMORRHAGE:

No intracranial hemorrhage. 



BRAIN:

Diffuse atrophy with prominence of the ventricles and sulci noted. No 

mass effect or edema.  Intracranial atherosclerosis. 



Scattered periventricular and subcortical white matter hypodensities, 

which are nonspecific, but often seen with chronic microvascular 

ischemic disease.  Right basal ganglia lacunar type infarct. 



Please note that MRI with diffusion imaging is more sensitive in the 

detection of acute ischemic event.



VENTRICLES:

No hydrocephalus. 



CALVARIUM:

Unremarkable.



PARANASAL SINUSES:

Unremarkable as visualized. No significant inflammatory changes.



MASTOID AIR CELLS:

Unremarkable as visualized. No inflammatory changes.



OTHER FINDINGS:

Vascular calcifications.



IMPRESSION:

Generalized atrophy.  Nonspecific white matter changes.  Right basal 

ganglia lacunar type infarct.

## 2018-12-31 NOTE — CP.PCM.CON
History of Present Illness





- History of Present Illness


History of Present Illness: 





Vascular Surgery Consult Note. Dr. Lopez





64yo M with PMHx of ESRD, Leukemia in remission, AFib, CHF, AICD here with right

foot pain. He states that he was scheduled to have HD today however, his AVF was

malfunctioning. He last had successful HD on Friday. He denies any other arm 

complaints. No swelling. No other complaints. Vascular surgery consult requested

to evaluate RUE AVF malfunction. 





PMHx: ESRD on HD, Leukemia (remission), AFib on Eliquis, CHF, COPD


PSHx: AICD, Knee Surgery, Cardiac Cath, Multiple foot debridements, R AVF 

(revision 05/2017 w basilic vein transposition)


Family Hx: non-contributory


Social Hx: Denies Tobacco use, Denies ETOH use, Denies illicit drugs


NKDA





Review of Systems





- Review of Systems


All systems: reviewed and no additional remarkable complaints except





- Constitutional


Constitutional: absent: Chills, Fever





- Cardiovascular


Cardiovascular: absent: Chest Pain, Dyspnea





- Respiratory


Respiratory: absent: Cough, Dyspnea





- Gastrointestinal


Gastrointestinal: absent: Abdominal Pain, Nausea, Vomiting





- Musculoskeletal


Additional comments: 





foot pain





- Integumentary


Integumentary: Non-Healing Lesions





Past Patient History





- Infectious Disease


Hx of Infectious Diseases: None





- Tetanus Immunizations


Tetanus Immunization: Unknown





- Past Medical History & Family History


Past Medical History?: Yes


Past Family History: Reviewed and not pertinent





- Past Social History


Smoking Status: Never Smoked


Alcohol: None


Drugs: Denies





- CARDIAC


Hx Atrial Fibrillation: Yes


Hx Cardia Arrhythmia: Yes (A FIB)


Hx Congestive Heart Failure: Yes


Hx Hypertension: Yes


Hx Pacemaker: Yes


Hx Peripheral Edema: Yes





- PULMONARY


Hx Asthma: Yes


Hx Chronic Obstructive Pulmonary Disease (COPD): Yes (uses CPAP for sleep apnea)


Hx Sleep Apnea: Yes (C-PAP)





- NEUROLOGICAL


Hx Neurological Disorder: Yes (PERIPHERAL NEUROPATHY)


Hx Dizziness: Yes





- HEENT


Hx HEENT Problems: No





- RENAL


Hx Chronic Kidney Disease: Yes





- ENDOCRINE/METABOLIC


Hx Endocrine Disorders: Yes


Hx Diabetes Mellitus Type 2: Yes





- HEMATOLOGICAL/ONCOLOGICAL


Hx Blood Disorders: Yes


Hx Blood Transfusions: Yes


Hx Cancer: Yes


Hx Chemotherapy: Yes


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





- INTEGUMENTARY


Hx Dermatological Problems: Yes


Hx Cellulitis: Yes





- MUSCULOSKELETAL/RHEUMATOLOGICAL


Hx Arthritis: Yes


Hx Falls: No


Hx Fractures: Yes


Hx Osteoporosis: Yes





- GASTROINTESTINAL


Hx Gastrointestinal Disorders: Yes


Hx Constipation: Yes





- GENITOURINARY/GYNECOLOGICAL


Hx Genitourinary Disorders: Yes


Other/Comment: On HD-M-W-F- R arm AV shunt





- PSYCHIATRIC


Hx Anxiety: Yes


Hx Depression: Yes


Hx Substance Use: No





- SURGICAL HISTORY


Hx Surgeries: Yes


Hx Angiogram: Yes


Hx Arteriovenous Shunt: Yes


Hx Cardiac Catheterization: Yes


Hx Orthopedic Surgery: Yes (KNEE)


Hx Vascular Access Device: Yes


Other/Comment: hx back surgery T5.  defibrillator/pacemaker placement.  

bilateral heel surgery





- ANESTHESIA


Hx Anesthesia: Yes


Hx Anesthesia Reactions: No


Hx Malignant Hyperthermia: No





Meds


Allergies/Adverse Reactions: 


                                    Allergies











Allergy/AdvReac Type Severity Reaction Status Date / Time


 


No Known Allergies Allergy   Verified 12/30/18 14:49














- Medications


Medications: 


                               Current Medications





Apixaban (Eliquis)  2.5 mg PO BID [facility]


   Last Admin: 12/31/18 11:07 Dose:  2.5 mg


Calcium Acetate (Phoslo)  667 mg PO TIDCC [facility]


   Last Admin: 12/31/18 11:56 Dose:  667 mg


Carvedilol (Coreg)  3.125 mg PO BID [facility]


   Last Admin: 12/31/18 11:05 Dose:  Not Given


Dextrose (Dextrose 50% Inj)  0 ml IV STAT PRN; Protocol


   PRN Reason: Hypoglycemia Protocol


   Last Admin: 12/31/18 08:43 Dose:  25 ml


Dextrose (Glutose 15)  0 gm PO ONCE PRN; Protocol


   PRN Reason: Hypoglycemia Protocol


Glucagon (Glucagen Diagnostic Kit)  0 mg IM STAT PRN; Protocol


   PRN Reason: Hypoglycemia Protocol


Dextrose (Dextrose 5% In Water 1000 Ml)  1,000 mls @ 0 mls/hr IV .Q0M PRN; 

Protocol


   PRN Reason: Hypoglycemia Protocol


Insulin Human Regular (Novolin R)  0 unit SC ACHS [facility]; Protocol


   Last Admin: 12/31/18 11:53 Dose:  Not Given


Nateglinide (Starlix)  60 mg PO ACTID [facility]


   Last Admin: 12/31/18 08:12 Dose:  Not Given


Oxycodone/Acetaminophen (Percocet 5/325 Mg Tab)  1 tab PO Q6H PRN


   PRN Reason: Pain, severe (8-10)


   Stop: 01/03/19 13:11


   Last Admin: 12/31/18 13:26 Dose:  1 tab


Pantoprazole Sodium (Protonix Ec Tab)  40 mg PO DAILY [facility]


   Last Admin: 12/31/18 11:07 Dose:  40 mg











Physical Exam





- Constitutional


Appears: Non-toxic, No Acute Distress





- Head Exam


Head Exam: ATRAUMATIC, NORMAL INSPECTION, NORMOCEPHALIC





- Eye Exam


Eye Exam: EOMI, Normal appearance.  absent: Scleral icterus





- ENT Exam


ENT Exam: Mucous Membranes Moist





- Respiratory Exam


Respiratory Exam: absent: Accessory Muscle Use, Respiratory Distress





- Cardiovascular Exam


Cardiovascular Exam: absent: JVD





- Extremities Exam


Extremities exam: Negative for: calf tenderness


Additional comments: 





Right upper Extremity AVF site with skin well healed. No palpable thrill or 

audible bruit over the AVF site. 


Warm





- Neurological Exam


Neurological exam: Alert





- Psychiatric Exam


Psychiatric exam: Anxious





Results





- Vital Signs


Recent Vital Signs: 


                                Last Vital Signs











Temp  98.0 F   12/31/18 01:45


 


Pulse  93 H  12/31/18 01:45


 


Resp  18   12/31/18 01:45


 


BP  144/76   12/30/18 23:50


 


Pulse Ox  97   12/31/18 01:45














- Labs


Result Diagrams: 


                                 12/30/18 15:38





                                 12/30/18 15:38


Labs: 


                         Laboratory Results - last 24 hr











  12/30/18 12/30/18 12/30/18





  15:38 15:38 15:38


 


WBC  14.9 H D  


 


RBC  2.73 L  


 


Hgb  7.7 L  


 


Hct  24.1 L  


 


MCV  88.2  


 


MCH  28.3  


 


MCHC  32.0 L  


 


RDW  17.8 H  


 


Plt Count  152  


 


MPV  8.2  


 


Neut % (Auto)  85.1 H  


 


Lymph % (Auto)  8.1 L  


 


Mono % (Auto)  5.3  


 


Eos % (Auto)  1.2  


 


Baso % (Auto)  0.3  


 


Neut # (Auto)  12.7 H  


 


Lymph # (Auto)  1.2  


 


Mono # (Auto)  0.8  


 


Eos # (Auto)  0.2  


 


Baso # (Auto)  0.0  


 


Neutrophils % (Manual)  86 H  


 


Lymphocytes % (Manual)  9 L  


 


Monocytes % (Manual)  4  


 


Eosinophils % (Manual)  1  


 


Platelet Estimate  Normal  


 


PT   22.0 H 


 


INR   2.0 


 


APTT   36 H 


 


pO2   


 


VBG pH   


 


VBG pCO2   


 


VBG HCO3   


 


VBG Total CO2   


 


VBG O2 Sat (Calc)   


 


VBG Base Excess   


 


VBG Potassium   


 


Glucose   


 


Lactate   


 


Sodium    128 L


 


Potassium    3.1 L


 


Chloride    93 L


 


Carbon Dioxide    19 L


 


Anion Gap    19


 


BUN    97 H


 


Creatinine    7.4 H* D


 


Est GFR ( Amer)    9


 


Est GFR (Non-Af Amer)    8


 


POC Glucose (mg/dL)   


 


Random Glucose    81  D


 


Calcium    6.8 L


 


Phosphorus    5.2 H


 


Magnesium    2.0


 


Total Bilirubin    2.5 H


 


AST    24


 


ALT    22


 


Alkaline Phosphatase    444 H D


 


C-React Prot High Sens   


 


Total Protein    5.9 L


 


Albumin    2.8 L


 


Globulin    3.1


 


Albumin/Globulin Ratio    0.9 L


 


Venous Blood Potassium   


 


Blood Type   


 


Antibody Screen   














  12/30/18 12/30/18 12/30/18





  15:38 16:13 16:25


 


WBC   


 


RBC   


 


Hgb   


 


Hct   


 


MCV   


 


MCH   


 


MCHC   


 


RDW   


 


Plt Count   


 


MPV   


 


Neut % (Auto)   


 


Lymph % (Auto)   


 


Mono % (Auto)   


 


Eos % (Auto)   


 


Baso % (Auto)   


 


Neut # (Auto)   


 


Lymph # (Auto)   


 


Mono # (Auto)   


 


Eos # (Auto)   


 


Baso # (Auto)   


 


Neutrophils % (Manual)   


 


Lymphocytes % (Manual)   


 


Monocytes % (Manual)   


 


Eosinophils % (Manual)   


 


Platelet Estimate   


 


PT   


 


INR   


 


APTT   


 


pO2   35 


 


VBG pH   7.39 


 


VBG pCO2   35 L 


 


VBG HCO3   21.7 


 


VBG Total CO2   22.3 


 


VBG O2 Sat (Calc)   70.4 H 


 


VBG Base Excess   -3.1 L 


 


VBG Potassium   2.8 L 


 


Glucose   70 L 


 


Lactate   1.8 


 


Sodium   130.0 L 


 


Potassium   


 


Chloride   95.0 L 


 


Carbon Dioxide   


 


Anion Gap   


 


BUN   


 


Creatinine   


 


Est GFR ( Amer)   


 


Est GFR (Non-Af Amer)   


 


POC Glucose (mg/dL)   


 


Random Glucose   


 


Calcium   


 


Phosphorus   


 


Magnesium   


 


Total Bilirubin   


 


AST   


 


ALT   


 


Alkaline Phosphatase   


 


C-React Prot High Sens  > 15.00 H  


 


Total Protein   


 


Albumin   


 


Globulin   


 


Albumin/Globulin Ratio   


 


Venous Blood Potassium   2.8 L 


 


Blood Type    AB POSITIVE


 


Antibody Screen    Negative














  12/31/18 12/31/18 12/31/18





  07:12 07:13 07:58


 


WBC   


 


RBC   


 


Hgb   


 


Hct   


 


MCV   


 


MCH   


 


MCHC   


 


RDW   


 


Plt Count   


 


MPV   


 


Neut % (Auto)   


 


Lymph % (Auto)   


 


Mono % (Auto)   


 


Eos % (Auto)   


 


Baso % (Auto)   


 


Neut # (Auto)   


 


Lymph # (Auto)   


 


Mono # (Auto)   


 


Eos # (Auto)   


 


Baso # (Auto)   


 


Neutrophils % (Manual)   


 


Lymphocytes % (Manual)   


 


Monocytes % (Manual)   


 


Eosinophils % (Manual)   


 


Platelet Estimate   


 


PT   


 


INR   


 


APTT   


 


pO2   


 


VBG pH   


 


VBG pCO2   


 


VBG HCO3   


 


VBG Total CO2   


 


VBG O2 Sat (Calc)   


 


VBG Base Excess   


 


VBG Potassium   


 


Glucose   


 


Lactate   


 


Sodium   


 


Potassium   


 


Chloride   


 


Carbon Dioxide   


 


Anion Gap   


 


BUN   


 


Creatinine   


 


Est GFR ( Amer)   


 


Est GFR (Non-Af Amer)   


 


POC Glucose (mg/dL)  53 L  53 L  53 L


 


Random Glucose   


 


Calcium   


 


Phosphorus   


 


Magnesium   


 


Total Bilirubin   


 


AST   


 


ALT   


 


Alkaline Phosphatase   


 


C-React Prot High Sens   


 


Total Protein   


 


Albumin   


 


Globulin   


 


Albumin/Globulin Ratio   


 


Venous Blood Potassium   


 


Blood Type   


 


Antibody Screen   














  12/31/18 12/31/18 12/31/18





  09:09 11:38 13:36


 


WBC   


 


RBC   


 


Hgb   


 


Hct   


 


MCV   


 


MCH   


 


MCHC   


 


RDW   


 


Plt Count   


 


MPV   


 


Neut % (Auto)   


 


Lymph % (Auto)   


 


Mono % (Auto)   


 


Eos % (Auto)   


 


Baso % (Auto)   


 


Neut # (Auto)   


 


Lymph # (Auto)   


 


Mono # (Auto)   


 


Eos # (Auto)   


 


Baso # (Auto)   


 


Neutrophils % (Manual)   


 


Lymphocytes % (Manual)   


 


Monocytes % (Manual)   


 


Eosinophils % (Manual)   


 


Platelet Estimate   


 


PT   


 


INR   


 


APTT   


 


pO2   


 


VBG pH   


 


VBG pCO2   


 


VBG HCO3   


 


VBG Total CO2   


 


VBG O2 Sat (Calc)   


 


VBG Base Excess   


 


VBG Potassium   


 


Glucose   


 


Lactate   


 


Sodium   


 


Potassium   


 


Chloride   


 


Carbon Dioxide   


 


Anion Gap   


 


BUN   


 


Creatinine   


 


Est GFR ( Amer)   


 


Est GFR (Non-Af Amer)   


 


POC Glucose (mg/dL)  133 H  120 H 


 


Random Glucose   


 


Calcium   


 


Phosphorus   


 


Magnesium   


 


Total Bilirubin   


 


AST   


 


ALT   


 


Alkaline Phosphatase   


 


C-React Prot High Sens   


 


Total Protein   


 


Albumin   


 


Globulin   


 


Albumin/Globulin Ratio   


 


Venous Blood Potassium   


 


Blood Type    AB POSITIVE


 


Antibody Screen    Negative














Assessment & Plan





- Assessment and Plan (Free Text)


Assessment: 





64yo M with ESRD on HD with malfunctioning RUE AVF


Plan: 





- NPO past mn


- To OR tomorrow, 1/1


- Continue medical management as per primary and renal team


- Podiatry recs for foot ulcers





Further Recs as per Dr. Jessica Parra PGY2


surgery

## 2019-01-01 LAB
ALBUMIN SERPL-MCNC: 2.6 G/DL (ref 3.5–5)
ALBUMIN/GLOB SERPL: 0.8 {RATIO} (ref 1–2.1)
ALT SERPL-CCNC: 22 U/L (ref 21–72)
ANISOCYTOSIS BLD QL SMEAR: (no result)
AST SERPL-CCNC: 19 U/L (ref 17–59)
BASOPHILS # BLD AUTO: 0 K/UL (ref 0–0.2)
BASOPHILS NFR BLD: 0 % (ref 0–2)
BUN SERPL-MCNC: 102 MG/DL (ref 9–20)
CALCIUM SERPL-MCNC: 6.8 MG/DL (ref 8.6–10.4)
EOSINOPHIL # BLD AUTO: 0.2 K/UL (ref 0–0.7)
EOSINOPHIL NFR BLD: 2 % (ref 0–4)
EOSINOPHIL NFR BLD: 2 % (ref 0–4)
ERYTHROCYTE [DISTWIDTH] IN BLOOD BY AUTOMATED COUNT: 18.3 % (ref 11.5–14.5)
GFR NON-AFRICAN AMERICAN: 7
HGB BLD-MCNC: 6.9 G/DL (ref 12–18)
HYPOCHROMIC: SLIGHT
LYMPHOCYTE: 7 % (ref 20–40)
LYMPHOCYTES # BLD AUTO: 0.7 K/UL (ref 1–4.3)
LYMPHOCYTES NFR BLD AUTO: 6 % (ref 20–40)
MCH RBC QN AUTO: 29 PG (ref 27–31)
MCHC RBC AUTO-ENTMCNC: 32.4 G/DL (ref 33–37)
MCV RBC AUTO: 89.3 FL (ref 80–94)
MONOCYTE: 7 % (ref 0–10)
MONOCYTES # BLD: 1.2 K/UL (ref 0–0.8)
MONOCYTES NFR BLD: 10 % (ref 0–10)
NEUTROPHILS # BLD: 9.3 K/UL (ref 1.8–7)
NEUTROPHILS NFR BLD AUTO: 82 % (ref 50–75)
NEUTROPHILS NFR BLD AUTO: 87 % (ref 50–75)
NEUTS BAND NFR BLD: 2 % (ref 0–2)
NRBC BLD AUTO-RTO: 0 % (ref 0–2)
OVALOCYTES BLD QL SMEAR: SLIGHT
PLATELET # BLD EST: (no result) 10*3/UL
PLATELET # BLD: 107 K/UL (ref 130–400)
PMV BLD AUTO: 8.3 FL (ref 7.2–11.7)
POIKILOCYTOSIS BLD QL SMEAR: SLIGHT
POLYCHROMIC: SLIGHT
RBC # BLD AUTO: 2.4 MIL/UL (ref 4.4–5.9)
TOTAL CELLS COUNTED BLD: 100
WBC # BLD AUTO: 11.4 K/UL (ref 4.8–10.8)

## 2019-01-01 PROCEDURE — 02HV33Z INSERTION OF INFUSION DEVICE INTO SUPERIOR VENA CAVA, PERCUTANEOUS APPROACH: ICD-10-PCS | Performed by: SURGERY

## 2019-01-01 PROCEDURE — 5A1D70Z PERFORMANCE OF URINARY FILTRATION, INTERMITTENT, LESS THAN 6 HOURS PER DAY: ICD-10-PCS

## 2019-01-01 RX ADMIN — PANTOPRAZOLE SODIUM SCH: 40 TABLET, DELAYED RELEASE ORAL at 10:06

## 2019-01-01 RX ADMIN — Medication SCH APPLIC: at 11:23

## 2019-01-01 RX ADMIN — HUMAN INSULIN SCH: 100 INJECTION, SOLUTION SUBCUTANEOUS at 16:46

## 2019-01-01 RX ADMIN — OXYCODONE HYDROCHLORIDE AND ACETAMINOPHEN PRN TAB: 5; 325 TABLET ORAL at 06:29

## 2019-01-01 RX ADMIN — HUMAN INSULIN SCH: 100 INJECTION, SOLUTION SUBCUTANEOUS at 11:39

## 2019-01-01 RX ADMIN — HUMAN INSULIN SCH: 100 INJECTION, SOLUTION SUBCUTANEOUS at 08:42

## 2019-01-01 RX ADMIN — HUMAN INSULIN SCH: 100 INJECTION, SOLUTION SUBCUTANEOUS at 22:59

## 2019-01-01 NOTE — RAD
HISTORY:

 permacath right jugular 



COMPARISON:

Chest x-ray performed 12/30/18 



TECHNIQUE:

Chest, one view.



FINDINGS:

Right IJ approach dialysis catheter terminates at the cavoatrial 

junction/SVC.



LUNGS:

Mild to moderate pulmonary venous congestion.  No focal consolidation.



Please note that chest x-ray has limited sensitivity for the 

detection of pulmonary masses.



PLEURA:

No significant pleural effusion identified. No definite pneumothorax .



CARDIOVASCULAR:

Single lead left-sided AICD.  Cardiomegaly.  Atherosclerotic 

calcification present. 



OSSEOUS STRUCTURES:

Bilateral Morales rods, thoracolumbar spine. 



VISUALIZED UPPER ABDOMEN:

Unremarkable.



OTHER FINDINGS:

None.



IMPRESSION:

Right-sided dialysis catheter. 



Mild to moderate pulmonary venous congestion. 



Cardiomegaly.  Single lead left-sided AICD.

## 2019-01-01 NOTE — CP.PCM.PN
Subjective





- Date & Time of Evaluation


Date of Evaluation: 01/01/19


Time of Evaluation: 09:09





- Subjective


Subjective: 





seen and examined


hd deferred yesterday due to malfunctioning avf


for surgical eval today





pt is anxious and worried about the procedure


he denies any pain nausea [id number] diarrhea sob cp dizziness f c





Objective





- Vital Signs/Intake and Output


Vital Signs (last 24 hours): 


                                        











Temp Pulse Resp BP Pulse Ox


 


 97.5 F L  74   20   96/56 L  99 


 


 01/01/19 07:48  01/01/19 07:48  01/01/19 07:48  01/01/19 07:48  01/01/19 07:48








Intake and Output: 


                                        











 01/01/19 01/01/19





 06:59 18:59


 


Intake Total 0 


 


Balance 0 














- Medications


Medications: 


                               Current Medications





Apixaban (Eliquis)  2.5 mg PO BID [facility]


   Last Admin: 12/31/18 18:05 Dose:  2.5 mg


Calcium Acetate (Phoslo)  667 mg PO TIDCC [facility]


   Last Admin: 01/01/19 08:34 Dose:  Not Given


Carvedilol (Coreg)  3.125 mg PO BID [facility]


   Last Admin: 12/31/18 18:04 Dose:  3.125 mg


Dextrose (Dextrose 50% Inj)  0 ml IV STAT PRN; Protocol


   PRN Reason: Hypoglycemia Protocol


   Last Admin: 12/31/18 08:43 Dose:  25 ml


Dextrose (Glutose 15)  0 gm PO ONCE PRN; Protocol


   PRN Reason: Hypoglycemia Protocol


Glucagon (Glucagen Diagnostic Kit)  0 mg IM STAT PRN; Protocol


   PRN Reason: Hypoglycemia Protocol


Dextrose (Dextrose 5% In Water 1000 Ml)  1,000 mls @ 0 mls/hr IV .Q0M PRN; 

Protocol


   PRN Reason: Hypoglycemia Protocol


Insulin Human Regular (Novolin R)  0 unit SC ACHS [facility]; Protocol


   Last Admin: 01/01/19 08:42 Dose:  Not Given


Nateglinide (Starlix)  60 mg PO ACTID [facility]


   Last Admin: 12/31/18 08:12 Dose:  Not Given


Oxycodone/Acetaminophen (Percocet 5/325 Mg Tab)  1 tab PO Q6H PRN


   PRN Reason: Pain, severe (8-10)


   Stop: 01/03/19 13:11


   Last Admin: 01/01/19 06:29 Dose:  1 tab


Pantoprazole Sodium (Protonix Ec Tab)  40 mg PO DAILY [facility]


   Last Admin: 12/31/18 11:07 Dose:  40 mg











- Labs


Labs: 


                                        





                                 12/30/18 15:38 





                                 12/30/18 15:38 





                                        











PT  22.0 SECONDS (9.7-12.2)  H  12/30/18  15:38    


 


INR  2.0   12/30/18  15:38    


 


APTT  36 SECONDS (21-34)  H  12/30/18  15:38    














- Constitutional


Appears: No Acute Distress, Chronically Ill





- Head Exam


Head Exam: NORMAL INSPECTION, NORMOCEPHALIC





- Eye Exam


Eye Exam: Normal appearance


Pupil Exam: PERRL





- ENT Exam


ENT Exam: Mucous Membranes Moist, Normal Exam





- Neck Exam


Neck Exam: Full ROM, Normal Inspection





- Respiratory Exam


Respiratory Exam: Decreased Breath Sounds, NORMAL BREATHING PATTERN





- Cardiovascular Exam


Cardiovascular Exam: Irregular Rhythm





- GI/Abdominal Exam


GI & Abdominal Exam: Distended, Soft





- Extremities Exam


Extremities Exam: Normal Inspection, Pedal Edema (chronic stasis)





- Neurological Exam


Neurological Exam: Alert, Awake





- Psychiatric Exam


Psychiatric exam: Anxious





- Skin


Skin Exam: Intact





Assessment and Plan


(1) Foot ulcer


Status: Acute   





(2) Cellulitis


Status: Acute   





(3) ESRD (end stage renal disease)


Status: Acute   





(4) Non-ischemic cardiomyopathy


Status: Acute   





(5) A-fib


Status: Chronic   





- Assessment and Plan (Free Text)


Assessment: 





esrd


hypokalemia


chronic hypotension


foot ulcer / staph bacteremia





antibiotics


surgical avf eval / possible catheter


hd today

## 2019-01-01 NOTE — OP
PROCEDURE DATE:  01/01/2019



PREOPERATIVE DIAGNOSES:  Renal failure, clotted fistula in the right arm.



POSTOPERATIVE DIAGNOSES:  Renal failure, clotted fistula in the right arm.



PROCEDURE CARRIED OUT:  Perm-A-cath in right jugular vein with C-arm

fluoroscopy, ultrasound-guided puncture and micropuncture technique.



INDICATIONS OF PROCEDURE:  A 63-year-old man with renal insufficiency and a

variety of other medical problems who presents with a clotted shunt during

hospitalization.  He is also on Eliquis, variety of other medical problems,

required an urgent dialysis.



OPERATIVE FINDINGS:  The catheter was inserted relatively uneventfully via

the right jugular vein.



DESCRIPTION OF PROCEDURE:  The patient was given local anesthesia. 

Intravenous antibiotics were previously and systemically administered. 

Using ultrasound guidance and micropuncture technique, the right internal

jugular vein was punctured.  Under fluoroscopic control, the guidewire was

advanced centrally.  This was exchanged for an 0.035 wire.  A sheath

dilator was passed over this, and then the catheter was positioned with the

tip terminating in the superior vena cava of the right atrium and flowed

through the jugular vein _____ on the right chest wall.  There were variety

of other devices, already existing pacemaker and defibrillator in the

central vasculature.  Procedure was then terminated.  Blood loss was 15 mL.

There was no particular bleeding even though the patient was on Eliquis. 

We terminated the procedure and secured the catheter in the skin. 

Ultrasound images of the neck showed the vein was 14 mm in diameter, normal

compressibility and no intraluminal thrombosis.







__________________________________________

Alex Lopez Jr., MD





DD:  01/01/2019 13:24:13

DT:  01/01/2019 19:49:10

Job # [id number]

## 2019-01-02 LAB
BASOPHILS # BLD AUTO: 0.1 K/UL (ref 0–0.2)
BASOPHILS NFR BLD: 0.5 % (ref 0–2)
EOSINOPHIL # BLD AUTO: 0.2 K/UL (ref 0–0.7)
EOSINOPHIL NFR BLD: 1.4 % (ref 0–4)
ERYTHROCYTE [DISTWIDTH] IN BLOOD BY AUTOMATED COUNT: 18 % (ref 11.5–14.5)
HGB BLD-MCNC: 8.3 G/DL (ref 12–18)
LYMPHOCYTES # BLD AUTO: 1.3 K/UL (ref 1–4.3)
LYMPHOCYTES NFR BLD AUTO: 11.7 % (ref 20–40)
MCH RBC QN AUTO: 28.4 PG (ref 27–31)
MCHC RBC AUTO-ENTMCNC: 32.5 G/DL (ref 33–37)
MCV RBC AUTO: 87.5 FL (ref 80–94)
MONOCYTES # BLD: 0.4 K/UL (ref 0–0.8)
MONOCYTES NFR BLD: 3.3 % (ref 0–10)
NEUTROPHILS # BLD: 9.2 K/UL (ref 1.8–7)
NEUTROPHILS NFR BLD AUTO: 83.1 % (ref 50–75)
NRBC BLD AUTO-RTO: 0.3 % (ref 0–2)
PLATELET # BLD: 99 K/UL (ref 130–400)
PMV BLD AUTO: 8 FL (ref 7.2–11.7)
RBC # BLD AUTO: 2.94 MIL/UL (ref 4.4–5.9)
WBC # BLD AUTO: 11.1 K/UL (ref 4.8–10.8)

## 2019-01-02 RX ADMIN — HUMAN INSULIN SCH: 100 INJECTION, SOLUTION SUBCUTANEOUS at 17:59

## 2019-01-02 RX ADMIN — HUMAN INSULIN SCH: 100 INJECTION, SOLUTION SUBCUTANEOUS at 14:26

## 2019-01-02 RX ADMIN — PANTOPRAZOLE SODIUM SCH MG: 40 TABLET, DELAYED RELEASE ORAL at 10:15

## 2019-01-02 RX ADMIN — HUMAN INSULIN SCH: 100 INJECTION, SOLUTION SUBCUTANEOUS at 08:16

## 2019-01-02 RX ADMIN — Medication SCH: at 10:16

## 2019-01-02 NOTE — CP.PCM.PN
Subjective





- Date & Time of Evaluation


Date of Evaluation: 01/02/19


Time of Evaluation: 14:41





- Subjective


Subjective: 





diminished pulse right arm with pain with occluded fistula





 full rom


v good doppler signals at both wrist vessels





formal duplex ordered





Objective





- Vital Signs/Intake and Output


Vital Signs (last 24 hours): 


                                        











Temp Pulse Resp BP Pulse Ox


 


 97.3 F L  62   20   102/64   97 


 


 01/02/19 07:29  01/02/19 07:29  01/02/19 07:29  01/02/19 07:29  01/02/19 07:29











- Medications


Medications: 


                               Current Medications





Apixaban (Eliquis)  2.5 mg PO BID [facility]


   Last Admin: 01/02/19 10:15 Dose:  2.5 mg


Calcium Acetate (Phoslo)  667 mg PO TIDCC [facility]


   Last Admin: 01/02/19 12:25 Dose:  667 mg


Carvedilol (Coreg)  3.125 mg PO BID [facility]


   Last Admin: 01/02/19 10:14 Dose:  Not Given


Dextrose (Dextrose 50% Inj)  0 ml IV STAT PRN; Protocol


   PRN Reason: Hypoglycemia Protocol


   Last Admin: 12/31/18 08:43 Dose:  25 ml


Dextrose (Glutose 15)  0 gm PO ONCE PRN; Protocol


   PRN Reason: Hypoglycemia Protocol


Glucagon (Glucagen Diagnostic Kit)  0 mg IM STAT PRN; Protocol


   PRN Reason: Hypoglycemia Protocol


Dextrose (Dextrose 5% In Water 1000 Ml)  1,000 mls @ 0 mls/hr IV .Q0M PRN; 

Protocol


   PRN Reason: Hypoglycemia Protocol


Daptomycin 700 mg/ Sodium (Chloride)  100 mls @ 100 mls/hr IVPB ONCE ONE; 

Protocol


   Stop: 01/03/19 18:59


Daptomycin 700 mg/ Sodium (Chloride)  100 mls @ 100 mls/hr IV ONCE ONE; Protocol


   Stop: 01/05/19 18:59


Insulin Human Regular (Novolin R)  0 unit SC ACHS [facility]; Protocol


   Last Admin: 01/02/19 14:26 Dose:  Not Given


Nateglinide (Starlix)  60 mg PO ACTID [facility]


   Last Admin: 01/02/19 12:25 Dose:  60 mg


Oxycodone/Acetaminophen (Percocet 5/325 Mg Tab)  1 tab PO Q6H PRN


   PRN Reason: Pain, severe (8-10)


   Stop: 01/03/19 13:11


   Last Admin: 01/01/19 06:29 Dose:  1 tab


Pantoprazole Sodium (Protonix Ec Tab)  40 mg PO DAILY [facility]


   Last Admin: 01/02/19 10:15 Dose:  40 mg











- Labs


Labs: 


                                        





                                 01/01/19 11:32 





                                 01/01/19 11:32 





                                        











PT  22.0 SECONDS (9.7-12.2)  H  12/30/18  15:38    


 


INR  2.0   12/30/18  15:38    


 


APTT  36 SECONDS (21-34)  H  12/30/18  15:38

## 2019-01-02 NOTE — CP.PCM.PN
Subjective





- Date & Time of Evaluation


Date of Evaluation: 01/02/19


Time of Evaluation: 09:13





- Subjective


Subjective: 


Podiatry progress note for Dr. Thornton





64 y/o male patient seen and evaluated at bedside with Dr. Thornton. Patient is 

flustered and upset on visit. Patient denies any acute overnight events. Denies 

any pain to the lower extremities. Multipodus boots in place. Patient denies 

F/N/V/SOB/chills. 








Objective





- Vital Signs/Intake and Output


Vital Signs (last 24 hours): 


                                        











Temp Pulse Resp BP Pulse Ox


 


 97.3 F L  62   20   102/64   97 


 


 01/02/19 07:29  01/02/19 07:29  01/02/19 07:29  01/02/19 07:29  01/02/19 07:29











- Medications


Medications: 


                               Current Medications





Apixaban (Eliquis)  2.5 mg PO BID [facility]


   Last Admin: 01/01/19 17:09 Dose:  Not Given


Calcium Acetate (Phoslo)  667 mg PO TIDCC [facility]


   Last Admin: 01/01/19 16:46 Dose:  Not Given


Carvedilol (Coreg)  3.125 mg PO BID [facility]


   Last Admin: 01/01/19 17:09 Dose:  Not Given


Dextrose (Dextrose 50% Inj)  0 ml IV STAT PRN; Protocol


   PRN Reason: Hypoglycemia Protocol


   Last Admin: 12/31/18 08:43 Dose:  25 ml


Dextrose (Glutose 15)  0 gm PO ONCE PRN; Protocol


   PRN Reason: Hypoglycemia Protocol


Glucagon (Glucagen Diagnostic Kit)  0 mg IM STAT PRN; Protocol


   PRN Reason: Hypoglycemia Protocol


Dextrose (Dextrose 5% In Water 1000 Ml)  1,000 mls @ 0 mls/hr IV .Q0M PRN; 

Protocol


   PRN Reason: Hypoglycemia Protocol


Insulin Human Regular (Novolin R)  0 unit SC ACHS [facility]; Protocol


   Last Admin: 01/02/19 08:16 Dose:  Not Given


Nateglinide (Starlix)  60 mg PO ACTID [facility]


   Last Admin: 01/01/19 16:46 Dose:  Not Given


Oxycodone/Acetaminophen (Percocet 5/325 Mg Tab)  1 tab PO Q6H PRN


   PRN Reason: Pain, severe (8-10)


   Stop: 01/03/19 13:11


   Last Admin: 01/01/19 06:29 Dose:  1 tab


Pantoprazole Sodium (Protonix Ec Tab)  40 mg PO DAILY [facility]


   Last Admin: 01/01/19 10:06 Dose:  Not Given











- Labs


Labs: 


                                        





                                 01/01/19 11:32 





                                 01/01/19 11:32 





                                        











PT  22.0 SECONDS (9.7-12.2)  H  12/30/18  15:38    


 


INR  2.0   12/30/18  15:38    


 


APTT  36 SECONDS (21-34)  H  12/30/18  15:38    














- Constitutional


Appears: Well, Non-toxic, No Acute Distress





- Extremities Exam


Additional comments: 


Lower extremity exam:


Vasc: Nonpalpable pulses to the DP and PT, delayed CFT to the digits, 

temperature gradient WNL,  +1 pitting pedal edema





Derm: chronic skin changes with hyperpigmented skin to entire lower extremity, 

skin thickening and flaking bilateral lower legs. Full thickness ulceration 

noted to right posterior ankle at level of Achilles insertion measuring 

approximately 2 cm x 2 cm x 1cm, with mixture granular and fibrotic wound base. 

No active drainage noted. No fluctuance noted. Minor marcos-wound erythema, no 

streaking appreciated.





Neuro: Gross sensation absent B/L. protective sensation absent





Ortho: pain on palpation to the right ankle and LE














- Neurological Exam


Neurological Exam: Alert, Awake





- Psychiatric Exam


Psychiatric exam: Normal Affect, Normal Mood





Assessment and Plan





- Assessment and Plan (Free Text)


Assessment: 


64 y/o male with right posterior ankle full thickness ulceration





Plan: 


Patient seen and evaluated with Dr. Thornton


Wound cx + for MRSA


Blood cx taken:+ MRSA


X-ray taken: possible signs of acute OM, further imaging recommended/clinical 

presentation to evaluated


ID on board, appreciate recommendations


Recommend possible surgical intervention in the future for debridement of wounds


Will proceed with local wound care at this time


Will continue to follow patient while in house

## 2019-01-02 NOTE — CP.PCM.PN
Subjective





- Date & Time of Evaluation


Date of Evaluation: 01/02/19


Time of Evaluation: 12:57





- Subjective


Subjective: 





tolerated HD yesterday


2 U PRBC


foot getting care


feels better


no chest pain


no sob


anuric


no headache


no fever


no vomiting


no rash


no abdominal pain





Objective





- Vital Signs/Intake and Output


Vital Signs (last 24 hours): 


                                        











Temp Pulse Resp BP Pulse Ox


 


 97.3 F L  62   20   102/64   97 


 


 01/02/19 07:29  01/02/19 07:29  01/02/19 07:29  01/02/19 07:29  01/02/19 07:29











- Medications


Medications: 


                               Current Medications





Apixaban (Eliquis)  2.5 mg PO BID [facility]


   Last Admin: 01/02/19 10:15 Dose:  2.5 mg


Calcium Acetate (Phoslo)  667 mg PO TIDCC [facility]


   Last Admin: 01/02/19 10:15 Dose:  667 mg


Carvedilol (Coreg)  3.125 mg PO BID [facility]


   Last Admin: 01/02/19 10:14 Dose:  Not Given


Dextrose (Dextrose 50% Inj)  0 ml IV STAT PRN; Protocol


   PRN Reason: Hypoglycemia Protocol


   Last Admin: 12/31/18 08:43 Dose:  25 ml


Dextrose (Glutose 15)  0 gm PO ONCE PRN; Protocol


   PRN Reason: Hypoglycemia Protocol


Glucagon (Glucagen Diagnostic Kit)  0 mg IM STAT PRN; Protocol


   PRN Reason: Hypoglycemia Protocol


Dextrose (Dextrose 5% In Water 1000 Ml)  1,000 mls @ 0 mls/hr IV .Q0M PRN; 

Protocol


   PRN Reason: Hypoglycemia Protocol


Daptomycin 700 mg/ Sodium (Chloride)  100 mls @ 100 mls/hr IVPB ONCE ONE; 

Protocol


Insulin Human Regular (Novolin R)  [facility]; [facility]


   Last Admin: 01/02/19 08:16 Dose:  Not Given


Nateglinide (Starlix)  60 mg PO ACTID [facility]


   Last Admin: 01/02/19 10:15 Dose:  60 mg


Oxycodone/Acetaminophen (Percocet 5/325 Mg Tab)  1 tab PO Q6H PRN


   PRN Reason: Pain, severe (8-10)


   Stop: 01/03/19 13:11


   Last Admin: 01/01/19 06:29 Dose:  1 tab


Pantoprazole Sodium (Protonix Ec Tab)  40 mg PO DAILY [facility]


   Last Admin: 01/02/19 10:15 Dose:  40 mg











- Labs


Labs: 


                                        





                                 01/01/19 11:32 





                                 01/01/19 11:32 





                                        











PT  22.0 SECONDS (9.7-12.2)  H  12/30/18  15:38    


 


INR  2.0   12/30/18  15:38    


 


APTT  36 SECONDS (21-34)  H  12/30/18  15:38    














- Constitutional


Appears: Non-toxic, Chronically Ill





- Head Exam


Head Exam: ATRAUMATIC, NORMAL INSPECTION





- Eye Exam


Eye Exam: EOMI





- ENT Exam


ENT Exam: Mucous Membranes Moist





- Neck Exam


Neck Exam: Full ROM.  absent: Lymphadenopathy





- Respiratory Exam


Respiratory Exam: Decreased Breath Sounds.  absent: Rhonchi





- Cardiovascular Exam


Cardiovascular Exam: REGULAR RHYTHM.  absent: Rubs





- GI/Abdominal Exam


GI & Abdominal Exam: Distended, Soft.  absent: Tenderness





- Extremities Exam


Additional comments: 





feet bandaged





- Neurological Exam


Neurological Exam: Alert, Awake





- Psychiatric Exam


Psychiatric exam: Normal Affect





Assessment and Plan





- Assessment and Plan (Free Text)


Assessment: 





esrd, for HD in am


wound infection in foot, continue wound care and AB


anemia of chronic disease, s/p blood transfusions

## 2019-01-02 NOTE — CP.PCM.PN
Subjective





- Date & Time of Evaluation


Date of Evaluation: 01/02/19


Time of Evaluation: 10:14





- Subjective


Subjective: 





Vascular Surgery Progress Note for Dr. Lopez





63M seen and evaluated at bedside this morning. No acute events overnight. 

Patient complains of feeling weak and tired this morning. Due for dialysis today

with newly inserted permacath. Denies f/c, n/v/d, SOB, CP, headaches, or 

dizziness. 





Objective





- Vital Signs/Intake and Output


Vital Signs (last 24 hours): 


                                        











Temp Pulse Resp BP Pulse Ox


 


 97.3 F L  62   20   119/52 L  97 


 


 01/02/19 07:29  01/02/19 07:29  01/02/19 07:29  01/02/19 10:00  01/02/19 07:29











- Medications


Medications: 


                               Current Medications





Apixaban (Eliquis)  2.5 mg PO BID [facility]


   Last Admin: 01/01/19 17:09 Dose:  Not Given


Calcium Acetate (Phoslo)  667 mg PO TIDCC [facility]


   Last Admin: 01/01/19 16:46 Dose:  Not Given


Carvedilol (Coreg)  3.125 mg PO BID [facility]


   Last Admin: 01/01/19 17:09 Dose:  Not Given


Dextrose (Dextrose 50% Inj)  0 ml IV STAT PRN; Protocol


   PRN Reason: Hypoglycemia Protocol


   Last Admin: 12/31/18 08:43 Dose:  25 ml


Dextrose (Glutose 15)  0 gm PO ONCE PRN; Protocol


   PRN Reason: Hypoglycemia Protocol


Glucagon (Glucagen Diagnostic Kit)  0 mg IM STAT PRN; Protocol


   PRN Reason: Hypoglycemia Protocol


Dextrose (Dextrose 5% In Water 1000 Ml)  1,000 mls @ 0 mls/hr IV .Q0M PRN; 

Protocol


   PRN Reason: Hypoglycemia Protocol


Daptomycin 700 mg/ Sodium (Chloride)  100 mls @ 100 mls/hr IVPB ONCE ONE; 

Protocol


Insulin Human Regular (Novolin R)  0 unit SC ACHS [facility]; Protocol


   Last Admin: 01/02/19 08:16 Dose:  Not Given


Nateglinide (Starlix)  60 mg PO ACTID [facility]


   Last Admin: 01/01/19 16:46 Dose:  Not Given


Oxycodone/Acetaminophen (Percocet 5/325 Mg Tab)  1 tab PO Q6H PRN


   PRN Reason: Pain, severe (8-10)


   Stop: 01/03/19 13:11


   Last Admin: 01/01/19 06:29 Dose:  1 tab


Pantoprazole Sodium (Protonix Ec Tab)  40 mg PO DAILY [facility]


   Last Admin: 01/01/19 10:06 Dose:  Not Given











- Labs


Labs: 


                                        





                                 01/01/19 11:32 





                                 01/01/19 11:32 





                                        











PT  22.0 SECONDS (9.7-12.2)  H  12/30/18  15:38    


 


INR  2.0   12/30/18  15:38    


 


APTT  36 SECONDS (21-34)  H  12/30/18  15:38    














- Constitutional


Appears: Chronically Ill





- Head Exam


Head Exam: ATRAUMATIC, NORMAL INSPECTION, NORMOCEPHALIC





- Eye Exam


Eye Exam: EOMI





- ENT Exam


ENT Exam: Mucous Membranes Dry





- Respiratory Exam


Respiratory Exam: absent: Respiratory Distress





- Neurological Exam


Neurological Exam: Alert, Awake





- Psychiatric Exam


Psychiatric exam: Normal Affect, Normal Mood





Assessment and Plan





- Assessment and Plan (Free Text)


Assessment: 





63M s/p right Permacath insertion POD1


Plan: 





Dialysis today 


Continue to monitor permacath function


Will possibly need future surgical intervention 


Will need to be medically optimized before any intervention


Further recommendations per Dr. Jessica Lutz PGY1

## 2019-01-02 NOTE — CP.PCM.PN
Subjective





- Date & Time of Evaluation


Date of Evaluation: 01/02/19


Time of Evaluation: 23:56





- Subjective


Subjective: 





Patient is morning looks comfortable.


He was having pain in the legs.


He has no chest pain.


He had a hemodialysis yesterday along with the transfusion





Patient had a problem in the AV shunt.


Received a hemodialysis catheter on the right chest area.





We will continue the current treatment.


On antibiotic.


Blood culture showing evidence of gram-positive cocci on antibiotic and will 

follow the patient





Objective





- Vital Signs/Intake and Output


Vital Signs (last 24 hours): 


                                        











Temp Pulse Resp BP Pulse Ox


 


 97.3 F L  62   20   102/64   97 


 


 01/02/19 07:29  01/02/19 07:29  01/02/19 07:29  01/02/19 07:29  01/02/19 07:29








Intake and Output: 


                                        











 01/02/19 01/03/19





 18:59 06:59


 


Intake Total  100


 


Balance  100














- Medications


Medications: 


                               Current Medications





Apixaban (Eliquis)  2.5 mg PO BID [facility]


   Last Admin: 01/02/19 17:19 Dose:  2.5 mg


Calcium Acetate (Phoslo)  667 mg PO TIDCC [facility]


   Last Admin: 01/02/19 17:19 Dose:  667 mg


Carvedilol (Coreg)  3.125 mg PO BID [facility]


   Last Admin: 01/02/19 17:19 Dose:  Not Given


Dextrose (Dextrose 50% Inj)  0 ml IV STAT PRN; Protocol


   PRN Reason: Hypoglycemia Protocol


   Last Admin: 12/31/18 08:43 Dose:  25 ml


Dextrose (Glutose 15)  0 gm PO ONCE PRN; Protocol


   PRN Reason: Hypoglycemia Protocol


Glucagon (Glucagen Diagnostic Kit)  0 mg IM STAT PRN; Protocol


   PRN Reason: Hypoglycemia Protocol


Dextrose (Dextrose 5% In Water 1000 Ml)  1,000 mls @ 0 mls/hr IV .Q0M PRN; 

Protocol


   PRN Reason: Hypoglycemia Protocol


Daptomycin 700 mg/ Sodium (Chloride)  100 mls @ 100 mls/hr IVPB ONCE ONE; 

Protocol


   Stop: 01/03/19 18:59


Daptomycin 700 mg/ Sodium (Chloride)  100 mls @ 100 mls/hr IV ONCE ONE; Protocol


   Stop: 01/05/19 18:59


Insulin Human Regular (Novolin R)  0 unit SC [facility]; Protocol


   Last Admin: 01/02/19 17:59 Dose:  Not Given


Nateglinide (Starlix)  60 mg PO ACTID [facility]


   Last Admin: 01/02/19 17:59 Dose:  Not Given


Oxycodone/Acetaminophen (Percocet 5/325 Mg Tab)  1 tab PO Q6H PRN


   PRN Reason: Pain, severe (8-10)


   Stop: 01/03/19 13:11


   Last Admin: 01/01/19 06:29 Dose:  1 tab


Pantoprazole Sodium (Protonix Ec Tab)  40 mg PO DAILY [facility]


   Last Admin: 01/02/19 10:15 Dose:  40 mg











- Labs


Labs: 


                                        





                                 01/02/19 21:13 





                                 01/01/19 11:32 





                                        











PT  22.0 SECONDS (9.7-12.2)  H  12/30/18  15:38    


 


INR  2.0   12/30/18  15:38    


 


APTT  36 SECONDS (21-34)  H  12/30/18  15:38

## 2019-01-03 RX ADMIN — Medication SCH: at 11:06

## 2019-01-03 RX ADMIN — HUMAN INSULIN SCH: 100 INJECTION, SOLUTION SUBCUTANEOUS at 12:30

## 2019-01-03 RX ADMIN — HUMAN INSULIN SCH: 100 INJECTION, SOLUTION SUBCUTANEOUS at 22:00

## 2019-01-03 RX ADMIN — PANTOPRAZOLE SODIUM SCH MG: 40 TABLET, DELAYED RELEASE ORAL at 13:53

## 2019-01-03 RX ADMIN — HUMAN INSULIN SCH: 100 INJECTION, SOLUTION SUBCUTANEOUS at 16:45

## 2019-01-03 RX ADMIN — HUMAN INSULIN SCH: 100 INJECTION, SOLUTION SUBCUTANEOUS at 08:00

## 2019-01-03 NOTE — CP.PCM.PN
Subjective





- Date & Time of Evaluation


Date of Evaluation: 01/03/19


Time of Evaluation: 18:52





- Subjective


Subjective: 


INFECTIOUS DISEASE PROGRESS NOTES


1/2-3/2019


RAHEEM CANADA MD, FACP


6T 655


CHART REVIEWED


PT EXAMINED


CSE DISCUSSED WITH PHARMACY





CLINICALLY RESPONDING TO IV CUBICIN,


SINCE VANCOMYCIN LEVELS NOT CLEARLY APPRECIATED, AND ELEVATED LEVELS MAY NOT BE 

FESSABLE, ERGO WILL CONTINUE IV CUBICIN AROUND 6-8MG/KG, FOR HIS TREATMENT 

THERAPEUTIC PERIOD OF TIME NEEDED.





C/C: BACL PAIN ESPECIALLY LYING IN BED.


AFEBRILE AND MENTALLY MORE CLEAR NOW.


LUNGS CLEAR


COR RR


ABD SOFT


EXT ON IV ANTIBIOTICS FOR WOUND AND BLOOD C/S + FOR MRSA.





SEE INCREASE IN DOSE.


RECENT DOCUMENTATION OF POOR RESPONSE TO VANCO - I WOULD APPRECIATE THE ROSE'S 

NOT JUST S/R.





RAHEEM CANADA MD, FACP








Objective





- Vital Signs/Intake and Output


Vital Signs (last 24 hours): 


                                        











Temp Pulse Resp BP Pulse Ox


 


 97.3 F L  108 H  20   133/76   100 


 


 01/03/19 15:58  01/03/19 15:58  01/03/19 15:58  01/03/19 15:58  01/03/19 15:58








Intake and Output: 


                                        











 01/03/19 01/03/19





 06:59 18:59


 


Intake Total 100 100


 


Balance 100 100














- Medications


Medications: 


                               Current Medications





Apixaban (Eliquis)  2.5 mg PO BID [facility]


   Last Admin: 01/03/19 17:48 Dose:  2.5 mg


Calcium Acetate (Phoslo)  667 mg PO TIDCC [facility]


   Last Admin: 01/03/19 16:44 Dose:  667 mg


Dextrose (Dextrose 50% Inj)  0 ml IV STAT PRN; Protocol


   PRN Reason: Hypoglycemia Protocol


   Last Admin: 12/31/18 08:43 Dose:  25 ml


Dextrose (Glutose 15)  0 gm PO ONCE PRN; Protocol


   PRN Reason: Hypoglycemia Protocol


Glucagon (Glucagen Diagnostic Kit)  0 mg IM STAT PRN; Protocol


   PRN Reason: Hypoglycemia Protocol


Dextrose (Dextrose 5% In Water 1000 Ml)  1,000 mls @ 0 mls/hr IV .Q0M PRN; 

Protocol


   PRN Reason: Hypoglycemia Protocol


Daptomycin 700 mg/ Sodium (Chloride)  100 mls @ 100 mls/hr IVPB ONCE ONE; 

Protocol


   Stop: 01/03/19 18:59


   Last Admin: 01/03/19 17:00 Dose:  100 mls/hr


Daptomycin 700 mg/ Sodium (Chloride)  100 mls @ 100 mls/hr IV ONCE ONE; Protocol


   Stop: 01/05/19 18:59


Insulin Human Regular (Novolin R)  0 unit SC ACHS [facility]; Protocol


   Last Admin: 01/03/19 16:45 Dose:  Not Given


Ketorolac Tromethamine (Toradol)  10 mg PO BID PRN


   PRN Reason: 4-8


   Last Admin: 01/03/19 16:27 Dose:  10 mg


Nateglinide (Starlix)  60 mg PO ACTID [facility]


   Last Admin: 01/03/19 17:49 Dose:  60 mg


Pantoprazole Sodium (Protonix Ec Tab)  40 mg PO DAILY [facility]


   Last Admin: 01/03/19 13:53 Dose:  40 mg











- Labs


Labs: 


                                        





                                 01/02/19 21:13 





                                 01/01/19 11:32 





                                        











PT  22.0 SECONDS (9.7-12.2)  H  12/30/18  15:38    


 


INR  2.0   12/30/18  15:38    


 


APTT  36 SECONDS (21-34)  H  12/30/18  15:38

## 2019-01-03 NOTE — CP.PCM.PN
Subjective





- Date & Time of Evaluation


Date of Evaluation: 01/03/19


Time of Evaluation: 10:03





- Subjective


Subjective: 


Podiatry Progress Note- Dr. Thornton





64 y/o male patient seen and evaluated at bedside for right posterior lower leg 

wound. Patient resting in bed at time of visit. Patient denies any acute 

overnight events. Denies any pain to the lower extremities. Multipodus boots in 

place. Patient denies F/N/V/SOB/chills. 











Objective





- Vital Signs/Intake and Output


Vital Signs (last 24 hours): 


                                        











Temp Pulse Resp BP Pulse Ox


 


 98.4 F   86   20   105/68   99 


 


 01/03/19 04:46  01/03/19 04:46  01/03/19 04:46  01/03/19 04:46  01/03/19 04:46








Intake and Output: 


                                        











 01/03/19 01/03/19





 06:59 18:59


 


Intake Total 100 


 


Balance 100 














- Medications


Medications: 


                               Current Medications





Apixaban (Eliquis)  2.5 mg PO BID [facility]


   Last Admin: 01/02/19 17:19 Dose:  2.5 mg


Calcium Acetate (Phoslo)  667 mg PO TIDCC [facility]


   Last Admin: 01/03/19 08:35 Dose:  667 mg


Dextrose (Dextrose 50% Inj)  0 ml IV STAT PRN; Protocol


   PRN Reason: Hypoglycemia Protocol


   Last Admin: 12/31/18 08:43 Dose:  25 ml


Dextrose (Glutose 15)  0 gm PO ONCE PRN; Protocol


   PRN Reason: Hypoglycemia Protocol


Glucagon (Glucagen Diagnostic Kit)  0 mg IM STAT PRN; Protocol


   PRN Reason: Hypoglycemia Protocol


Dextrose (Dextrose 5% In Water 1000 Ml)  1,000 mls @ 0 mls/hr IV .Q0M PRN; 

Protocol


   PRN Reason: Hypoglycemia Protocol


Daptomycin 700 mg/ Sodium (Chloride)  100 mls @ 100 mls/hr IVPB ONCE ONE; 

Protocol


   Stop: 01/03/19 18:59


Daptomycin 700 mg/ Sodium (Chloride)  100 mls @ 100 mls/hr IV ONCE ONE; Protocol


   Stop: 01/05/19 18:59


Insulin Human Regular (Novolin R)  0 unit SC ACHS [facility]; Protocol


   Last Admin: 01/03/19 08:00 Dose:  Not Given


Nateglinide (Starlix)  60 mg PO ACTID [facility]


   Last Admin: 01/03/19 07:30 Dose:  Not Given


Oxycodone/Acetaminophen (Percocet 5/325 Mg Tab)  1 tab PO Q6H PRN


   PRN Reason: Pain, severe (8-10)


   Stop: 01/03/19 13:11


   Last Admin: 01/01/19 06:29 Dose:  1 tab


Pantoprazole Sodium (Protonix Ec Tab)  40 mg PO DAILY [facility]


   Last Admin: 01/02/19 10:15 Dose:  40 mg











- Labs


Labs: 


                                        





                                 01/02/19 21:13 





                                 01/01/19 11:32 





                                        











PT  22.0 SECONDS (9.7-12.2)  H  12/30/18  15:38    


 


INR  2.0   12/30/18  15:38    


 


APTT  36 SECONDS (21-34)  H  12/30/18  15:38    














- Constitutional


Appears: Well, Non-toxic, No Acute Distress





- Extremities Exam


Additional comments: 


Multipodus boots in place-


Lower extremity exam:


Vasc: Nonpalpable pulses to the DP and PT. Delayed CFT to the digits. 

Temperature gradient WNL. +1 pitting pedal edema





Derm: Full thickness ulceration noted to right posterior ankle at level of 

Achilles insertion measuring approximately 2 cm x 2 cm x 1cm, with mixture 

granular and fibrotic wound base. No active drainage noted. No fluctuance noted.

Minor marcos-wound erythema, no streaking appreciated. Chronic skin changes with 

hyperpigmented skin to entire lower extremity, skin thickening and flaking 

bilateral lower legs. 





Neuro: Gross sensation absent B/L. protective sensation absent





Ortho: pain on palpation to the right ankle and LE


























- Neurological Exam


Neurological Exam: Alert, Awake, Oriented x3





Assessment and Plan





- Assessment and Plan (Free Text)


Assessment: 


64 y/o male with right posterior ankle full thickness ulceration





Plan: 


Patient seen and evaluated at bedside


Discussed plan with Dr. Thornton


Wound cx + for MRSA


Blood cx taken:+ MRSA


X-ray taken: possible signs of acute OM, further imaging recommended


ID on board, appreciate recommendations


Recommend possible surgical intervention in the future for debridement of wounds


Will continue treating with local wound care at this time


Will continue to follow patient while in house

## 2019-01-03 NOTE — CP.PCM.PN
Subjective





- Date & Time of Evaluation


Date of Evaluation: 01/03/19


Time of Evaluation: 09:30





- Subjective


Subjective: 





Seen at dialysis - trying to UF


+MRSA bacteremia being treated


BP low- will hold carvedilol


Appears lethargic





Objective





- Vital Signs/Intake and Output


Vital Signs (last 24 hours): 


                                        











Temp Pulse Resp BP Pulse Ox


 


 98.4 F   86   20   105/68   99 


 


 01/03/19 04:46  01/03/19 04:46  01/03/19 04:46  01/03/19 04:46  01/03/19 04:46








Intake and Output: 


                                        











 01/03/19 01/03/19





 06:59 18:59


 


Intake Total 100 


 


Balance 100 














- Medications


Medications: 


                               Current Medications





Apixaban (Eliquis)  2.5 mg PO BID [facility]


   Last Admin: 01/02/19 17:19 Dose:  2.5 mg


Calcium Acetate (Phoslo)  667 mg PO TIDCC [facility]


   Last Admin: 01/03/19 08:35 Dose:  667 mg


Dextrose (Dextrose 50% Inj)  0 ml IV STAT PRN; Protocol


   PRN Reason: Hypoglycemia Protocol


   Last Admin: 12/31/18 08:43 Dose:  25 ml


Dextrose (Glutose 15)  0 gm PO ONCE PRN; Protocol


   PRN Reason: Hypoglycemia Protocol


Glucagon (Glucagen Diagnostic Kit)  0 mg IM STAT PRN; Protocol


   PRN Reason: Hypoglycemia Protocol


Dextrose (Dextrose 5% In Water 1000 Ml)  1,000 mls @ 0 mls/hr IV .Q0M PRN; 

Protocol


   PRN Reason: Hypoglycemia Protocol


Daptomycin 700 mg/ Sodium (Chloride)  100 mls @ 100 mls/hr IVPB ONCE ONE; 

Protocol


   Stop: 01/03/19 18:59


Daptomycin 700 mg/ Sodium (Chloride)  100 mls @ 100 mls/hr IV ONCE ONE; Protocol


   Stop: 01/05/19 18:59


Insulin Human Regular (Novolin R)  0 unit SC ACHS [facility]; Protocol


   Last Admin: 01/03/19 08:00 Dose:  Not Given


Nateglinide (Starlix)  60 mg PO ACTID [facility]


   Last Admin: 01/03/19 07:30 Dose:  Not Given


Oxycodone/Acetaminophen (Percocet 5/325 Mg Tab)  1 tab PO Q6H PRN


   PRN Reason: Pain, severe (8-10)


   Stop: 01/03/19 13:11


   Last Admin: 01/01/19 06:29 Dose:  1 tab


Pantoprazole Sodium (Protonix Ec Tab)  40 mg PO DAILY [facility]


   Last Admin: 01/02/19 10:15 Dose:  40 mg











- Labs


Labs: 


                                        





                                 01/02/19 21:13 





                                 01/01/19 11:32 





                                        











PT  22.0 SECONDS (9.7-12.2)  H  12/30/18  15:38    


 


INR  2.0   12/30/18  15:38    


 


APTT  36 SECONDS (21-34)  H  12/30/18  15:38    














- Constitutional


Appears: No Acute Distress, Confused, Chronically Ill





- Head Exam


Head Exam: ATRAUMATIC, NORMAL INSPECTION





- Eye Exam


Eye Exam: EOMI, Normal appearance





- Neck Exam


Neck Exam: Normal Inspection.  absent: Tenderness





- Respiratory Exam


Respiratory Exam: Clear to Ausculation Bilateral, NORMAL BREATHING PATTERN





- Cardiovascular Exam


Cardiovascular Exam: REGULAR RHYTHM, +S1





- GI/Abdominal Exam


GI & Abdominal Exam: Soft.  absent: Tenderness





- Extremities Exam


Extremities Exam: Pedal Edema, Tenderness





- Neurological Exam


Neurological Exam: Awake, CN II-XII Intact





- Skin


Skin Exam: Rash, Warm





Assessment and Plan


(1) Cellulitis


Status: Acute   





(2) ESRD (end stage renal disease)


Status: Acute   





(3) Fluid overload


Status: Acute   





(4) Severe anemia


Status: Acute   





(5) A-fib


Status: Chronic   





- Assessment and Plan (Free Text)


Plan: 





Repeat CBC


IV ABs


IV ABs

## 2019-01-03 NOTE — CP.PCM.PN
Subjective





- Date & Time of Evaluation


Date of Evaluation: 01/03/19


Time of Evaluation: 21:36





- Subjective


Subjective: 





Patient is currently feeling well.


Complaining of pain in the lower back pain


Also complaining of pain in the left leg.


Also in the right leg.


She he is able to eat better.


Bowel movements are okay.


No fever.


Patient had a right arm DVT study.


Right now patient is being using the right subclavian hemodialysis catheter





On examination:


Vital signs stable.


Chest good air entry


Regular heart sounds.


Abdominal obesity, edema noted





Assessment and recognition:


63-year-old  male with a history of leukemia, in remission.


History of spinal disease.


Spinal surgical intervention in the past.


End-stage renal disease on dialysis.


Ischemic cardiomyopathy.


Patient has a recurrent cellulitis of the bilateral legs.


On antibiotic treatment.


Now admitted with gram-positive  bacteremia and on Cubicin.


Discussed with infectious disease we will continue current treatment.


Pain management and will follow the patient





Objective





- Vital Signs/Intake and Output


Vital Signs (last 24 hours): 


                                        











Temp Pulse Resp BP Pulse Ox


 


 97.3 F L  108 H  20   133/76   100 


 


 01/03/19 15:58  01/03/19 15:58  01/03/19 15:58  01/03/19 15:58  01/03/19 15:58








Intake and Output: 


                                        











 01/03/19 01/04/19





 18:59 06:59


 


Intake Total 100 


 


Balance 100 














- Medications


Medications: 


                               Current Medications





Apixaban (Eliquis)  2.5 mg PO BID On license of [facility]


   Last Admin: 01/03/19 17:48 Dose:  2.5 mg


Calcium Acetate (Phoslo)  667 mg PO TIDCC On license of [facility]


   Last Admin: 01/03/19 16:44 Dose:  667 mg


Dextrose (Dextrose 50% Inj)  0 ml IV STAT PRN; Protocol


   PRN Reason: Hypoglycemia Protocol


   Last Admin: 12/31/18 08:43 Dose:  25 ml


Dextrose (Glutose 15)  0 gm PO ONCE PRN; Protocol


   PRN Reason: Hypoglycemia Protocol


Glucagon (Glucagen Diagnostic Kit)  0 mg IM STAT PRN; Protocol


   PRN Reason: Hypoglycemia Protocol


Dextrose (Dextrose 5% In Water 1000 Ml)  1,000 mls @ 0 mls/hr IV .Q0M PRN; 

Protocol


   PRN Reason: Hypoglycemia Protocol


Daptomycin 900 mg/ Sodium (Chloride)  100 mls @ 100 mls/hr IV ONCE ONE; Protocol


   Stop: 01/05/19 18:59


Insulin Human Regular (Novolin R)  0 unit SC ACHS On license of [facility]; Protocol


   Last Admin: 01/03/19 16:45 Dose:  Not Given


Ketorolac Tromethamine (Toradol)  10 mg PO BID PRN


   PRN Reason: 4-8


   Last Admin: 01/03/19 16:27 Dose:  10 mg


Nateglinide (Starlix)  60 mg PO ACTID On license of [facility]


   Last Admin: 01/03/19 17:49 Dose:  60 mg


Pantoprazole Sodium (Protonix Ec Tab)  40 mg PO DAILY On license of [facility]


   Last Admin: 01/03/19 13:53 Dose:  40 mg











- Labs


Labs: 


                                        





                                 01/02/19 21:13 





                                 01/01/19 11:32 





                                        











PT  22.0 SECONDS (9.7-12.2)  H  12/30/18  15:38    


 


INR  2.0   12/30/18  15:38    


 


APTT  36 SECONDS (21-34)  H  12/30/18  15:38

## 2019-01-03 NOTE — RAD
PROCEDURE:  



HISTORY:

As above



COMPARISON:

None



TECHNIQUE:

Total fluoroscopic time utilized during the procedure: 21.6 seconds  

;  1.9 mGy 



FINDINGS:

Submitted images from the current procedure: 2



Please refer to the  physician's notes performing the procedure. 



IMPRESSION:

Less than 1 hour fluoroscopic time utilized during performance of the 

procedure

## 2019-01-04 LAB
ALBUMIN SERPL-MCNC: 3 G/DL (ref 3.5–5)
ALBUMIN/GLOB SERPL: 0.9 {RATIO} (ref 1–2.1)
ALT SERPL-CCNC: 16 U/L (ref 21–72)
AST SERPL-CCNC: 15 U/L (ref 17–59)
BUN SERPL-MCNC: 44 MG/DL (ref 9–20)
CALCIUM SERPL-MCNC: 7.5 MG/DL (ref 8.6–10.4)
ERYTHROCYTE [DISTWIDTH] IN BLOOD BY AUTOMATED COUNT: 17.7 % (ref 11.5–14.5)
FERRITIN SERPL-MCNC: 966 NG/ML
GFR NON-AFRICAN AMERICAN: 15
HGB BLD-MCNC: 8.6 G/DL (ref 12–18)
MCH RBC QN AUTO: 29.3 PG (ref 27–31)
MCHC RBC AUTO-ENTMCNC: 33.2 G/DL (ref 33–37)
MCV RBC AUTO: 88.3 FL (ref 80–94)
PLATELET # BLD: 95 K/UL (ref 130–400)
PMV BLD AUTO: 8.4 FL (ref 7.2–11.7)
RBC # BLD AUTO: 2.95 MIL/UL (ref 4.4–5.9)
WBC # BLD AUTO: 9.8 K/UL (ref 4.8–10.8)

## 2019-01-04 RX ADMIN — HUMAN INSULIN SCH: 100 INJECTION, SOLUTION SUBCUTANEOUS at 12:30

## 2019-01-04 RX ADMIN — PANTOPRAZOLE SODIUM SCH MG: 40 TABLET, DELAYED RELEASE ORAL at 10:47

## 2019-01-04 RX ADMIN — HUMAN INSULIN SCH: 100 INJECTION, SOLUTION SUBCUTANEOUS at 22:30

## 2019-01-04 RX ADMIN — HUMAN INSULIN SCH: 100 INJECTION, SOLUTION SUBCUTANEOUS at 08:14

## 2019-01-04 RX ADMIN — Medication SCH APPLIC: at 10:55

## 2019-01-04 RX ADMIN — HUMAN INSULIN SCH: 100 INJECTION, SOLUTION SUBCUTANEOUS at 16:30

## 2019-01-04 NOTE — CP.PCM.PN
Subjective





- Date & Time of Evaluation


Date of Evaluation: 01/04/19


Time of Evaluation: 14:19





- Subjective


Subjective: 





s/p dialysis 1/3


s/p blood transfusion


Hg increased to 8.6


On IV ABs for bacteremia


More alert today, afebrile





Objective





- Vital Signs/Intake and Output


Vital Signs (last 24 hours): 


                                        











Temp Pulse Resp BP Pulse Ox


 


 97.7 F   98 H  20   107/55 L  100 


 


 01/03/19 23:35  01/03/19 23:35  01/03/19 23:35  01/03/19 23:35  01/03/19 23:35








Intake and Output: 


                                        











 01/04/19 01/04/19





 06:59 18:59


 


Intake Total 480 


 


Balance 480 














- Medications


Medications: 


                               Current Medications





Apixaban (Eliquis)  2.5 mg PO BID [facility]


   Last Admin: 01/04/19 10:47 Dose:  2.5 mg


Calcium Acetate (Phoslo)  667 mg PO TIDCC [facility]


   Last Admin: 01/04/19 12:30 Dose:  667 mg


Daptomycin 900 mg/ Sodium (Chloride)  100 mls @ 100 mls/hr IV ONCE ONE; Protocol


   Stop: 01/05/19 18:59


Insulin Human Regular (Novolin R)  0 unit SC ACHS [facility]; Protocol


   Last Admin: 01/04/19 12:30 Dose:  Not Given


Ketorolac Tromethamine (Toradol)  10 mg PO BID PRN


   PRN Reason: 4-8


   Last Admin: 01/04/19 10:56 Dose:  10 mg


Nateglinide (Starlix)  60 mg PO ACTID [facility]


   Last Admin: 01/04/19 10:49 Dose:  60 mg


Pantoprazole Sodium (Protonix Ec Tab)  40 mg PO DAILY [facility]


   Last Admin: 01/04/19 10:47 Dose:  40 mg


Tobramycin/Dexamethasone (Tobradex Opht Susp)  0.1 ml OS BID PRN











- Labs


Labs: 


                                        





                                 01/04/19 06:49 





                                 01/04/19 06:49 





                                        











PT  22.0 SECONDS (9.7-12.2)  H  12/30/18  15:38    


 


INR  2.0   12/30/18  15:38    


 


APTT  36 SECONDS (21-34)  H  12/30/18  15:38    














- Constitutional


Appears: No Acute Distress, Chronically Ill





- Head Exam


Head Exam: ATRAUMATIC, NORMAL INSPECTION





- Eye Exam


Eye Exam: EOMI, Normal appearance





- Neck Exam


Neck Exam: Normal Inspection.  absent: Tenderness





- Respiratory Exam


Respiratory Exam: Clear to Ausculation Bilateral, NORMAL BREATHING PATTERN





- Cardiovascular Exam


Cardiovascular Exam: Irregular Rhythm, +S1





- GI/Abdominal Exam


GI & Abdominal Exam: Soft.  absent: Tenderness





- Extremities Exam


Extremities Exam: Normal Inspection.  absent: Tenderness





- Neurological Exam


Neurological Exam: Awake, CN II-XII Intact





- Skin


Skin Exam: Dry, Warm





Assessment and Plan


(1) Cellulitis


Status: Acute   





(2) ESRD (end stage renal disease)


Status: Acute   





(3) Fluid overload


Status: Acute   





(4) Severe anemia


Status: Acute   





(5) A-fib


Status: Chronic   





- Assessment and Plan (Free Text)


Plan: 





IV ABs


HD TTS


Add EPO


Eventually repeat cultures

## 2019-01-04 NOTE — VASCLAB
Date of service: 



01/03/2019



PROCEDURE:  Right Upper Extremity Arterial Exam.



HISTORY:

diminshed pulse right arm



COMPARISON:

None available. 



TECHNIQUE:

Grayscale and duplex Doppler evaluation of the right upper extremity 

was performed.



Report prepared by   vascular technologist.



FINDINGS:



RIGHT UPPER EXTREMITY:  

* Prox SCA : Peak Systolic Velocity - : Doppler Waveform: : Plaque 

description -  



* Distal SCA: Peak Systolic Velocity - : Doppler Waveform: : Plaque 

description -  



* Axillary: Peak Systolic Velocity - 48: Doppler Waveform: Biphasic: 

Plaque description - Calcific 



* Brachial 



o Proximal Segment: Peak Systolic Velocity - 46: Doppler Waveform: 

Biphasic: Plaque description - Calcific



o Distal Segment: Peak Systolic Velocity - 32: Doppler Waveform: 

Biphasic: Plaque description - Calcific 



* Radial 



o Proximal Segment: Peak Systolic Velocity - 43: Doppler Waveform: 

Biphasic: Plaque description - Calcific 



o Distal Segment: Peak Systolic Velocity - 29: Doppler Waveform: 

Biphasic: Plaque description - Calcific 



* Radial 

o Proximal Segment: Peak Systolic Velocity - 43: Doppler Waveform: 

Biphasic: Plaque description - Calcific



o Distal Segment: Peak Systolic Velocity - 39: Doppler Waveform: 

Biphasic: Plaque description - Calcific 





OTHER FINDINGS:  Technically difficult study due to severe calcified 

arterial walls. 



Unable to image the right subclavian artery due to dialysis catheter 

in the chest. 



IMPRESSION:

 There is no evidence of hemodynamically significant arterial 

insufficiency in right upper extremity.

## 2019-01-04 NOTE — CP.PCM.PN
Subjective





- Date & Time of Evaluation


Date of Evaluation: 01/04/19


Time of Evaluation: 20:12





- Subjective


Subjective: 


INFECTIOUS DISEASE PROGRESS NOTES


RAHEEM CANADA MD, FACP


1/4/2019


CHART REVIEWED


PT EXAMINED


CASE DISCUSSED WITH PHARMACY/PMD





IN VIEW OF ROSE'S OF 1, VANCOMYCIN, IS NOT THE DRUG OF CHOICE IN THE PT'S MRSA.


[facility] WILL CONTINUE TO GIVE CUBICIN 8-10MG/KG POST EACH DIALYSIS.


s/p dialysis 1/3


s/p blood transfusion


Hg increased to 8.6


On IV ABs for bacteremia


More alert today, afebrile





Objective





- Vital Signs/Intake and Output


Vital Signs (last 24 hours): 


                                        











Temp Pulse Resp BP Pulse Ox


 


 97.7 F   98 H  20   107/55 L  100 


 


 01/03/19 23:35  01/03/19 23:35  01/03/19 23:35  01/03/19 23:35  01/03/19 23:35








Intake and Output: 


                                        











 01/04/19 01/04/19





 06:59 18:59


 


Intake Total 480 


 


Balance 480 














- Medications


Medications: 


                               Current Medications





Apixaban (Eliquis)  2.5 mg PO BID [facility]


   Last Admin: 01/04/19 10:47 Dose:  2.5 mg


Calcium Acetate (Phoslo)  667 mg PO TIDCC [facility]


   Last Admin: 01/04/19 12:30 Dose:  667 mg


Daptomycin 900 mg/ Sodium (Chloride)  100 mls @ 100 mls/hr IV ONCE ONE; Protocol


   Stop: 01/05/19 18:59


Insulin Human Regular (Novolin R)  0 unit SC ACHS [facility]; Protocol


   Last Admin: 01/04/19 12:30 Dose:  Not Given


Ketorolac Tromethamine (Toradol)  10 mg PO BID PRN


   PRN Reason: 4-8


   Last Admin: 01/04/19 10:56 Dose:  10 mg


Nateglinide (Starlix)  60 mg PO ACTID [facility]


   Last Admin: 01/04/19 10:49 Dose:  60 mg


Pantoprazole Sodium (Protonix Ec Tab)  40 mg PO DAILY [facility]


   Last Admin: 01/04/19 10:47 Dose:  40 mg


Tobramycin/Dexamethasone (Tobradex Opht Susp)  0.1 ml OS BID PRN











- Labs


Labs: 


                                        





                                 01/04/19 06:49 





                                 01/04/19 06:49 





                                        











PT  22.0 SECONDS (9.7-12.2)  H  12/30/18  15:38    


 


INR  2.0   12/30/18  15:38    


 


APTT  36 SECONDS (21-34)  H  12/30/18  15:38    














- Constitutional


Appears: No Acute Distress, Chronically Ill





- Head Exam


Head Exam: ATRAUMATIC, NORMAL INSPECTION





- Eye Exam


Eye Exam: EOMI, Normal appearance





- Neck Exam


Neck Exam: Normal Inspection.  absent: Tenderness





- Respiratory Exam


Respiratory Exam: Clear to Ausculation Bilateral, NORMAL BREATHING PATTERN





- Cardiovascular Exam


Cardiovascular Exam: Irregular Rhythm, +S1





- GI/Abdominal Exam


GI & Abdominal Exam: Soft.  absent: Tenderness





- Extremities Exam


Extremities Exam: Normal Inspection.  absent: Tenderness





- Neurological Exam


Neurological Exam: Awake, CN II-XII Intact





- Skin


Skin Exam: Dry, Warm





Assessment and Plan


(1) Cellulitis


Status: Acute   





(2) ESRD (end stage renal disease)


Status: Acute   





(3) Fluid overload


Status: Acute   





(4) Severe anemia


Status: Acute   





(5) A-fib


Status: Chronic   





- Assessment and Plan (Free Text)


Plan: 





IV ABs-CUBICIN HIGH DOSE POST EACH DIALYSIS


HD TTS


Add EPO








Objective





- Vital Signs/Intake and Output


Vital Signs (last 24 hours): 


                                        











Temp Pulse Resp BP Pulse Ox


 


 98.8 F   100 H  20   128/50 L  100 


 


 01/04/19 07:00  01/04/19 07:00  01/04/19 07:00  01/04/19 07:00  01/03/19 23:35








Intake and Output: 


                                        











 01/04/19 01/05/19





 18:59 06:59


 


Intake Total 150 


 


Balance 150 














- Medications


Medications: 


                               Current Medications





Apixaban (Eliquis)  2.5 mg PO BID [facility]


   Last Admin: 01/04/19 17:21 Dose:  2.5 mg


Calcium Acetate (Phoslo)  667 mg PO TIDCC [facility]


   Last Admin: 01/04/19 17:21 Dose:  667 mg


Epoetin Damir (Procrit)  10,000 unit IV TTS [facility]


Daptomycin 900 mg/ Sodium (Chloride)  100 mls @ 100 mls/hr IV ONCE ONE; Protocol


   Stop: 01/05/19 18:59


Insulin Human Regular (Novolin R)  0 unit SC [facility]; Protocol


   Last Admin: 01/04/19 16:30 Dose:  Not Given


Ketorolac Tromethamine (Toradol)  10 mg PO BID PRN


   PRN Reason: 4-8


   Last Admin: 01/04/19 10:56 Dose:  10 mg


Nateglinide (Starlix)  60 mg PO ACTID [facility]


   Last Admin: 01/04/19 17:21 Dose:  60 mg


Pantoprazole Sodium (Protonix Ec Tab)  40 mg PO DAILY [facility]


   Last Admin: 01/04/19 10:47 Dose:  40 mg


Tobramycin/Dexamethasone (Tobradex Opht Susp)  0.1 ml OS BID PRN











- Labs


Labs: 


                                        





                                 01/04/19 06:49 





                                 01/04/19 06:49 





                                        











PT  22.0 SECONDS (9.7-12.2)  H  12/30/18  15:38    


 


INR  2.0   12/30/18  15:38    


 


APTT  36 SECONDS (21-34)  H  12/30/18  15:38

## 2019-01-05 RX ADMIN — PANTOPRAZOLE SODIUM SCH: 40 TABLET, DELAYED RELEASE ORAL at 10:38

## 2019-01-05 RX ADMIN — HUMAN INSULIN SCH: 100 INJECTION, SOLUTION SUBCUTANEOUS at 17:28

## 2019-01-05 RX ADMIN — HUMAN INSULIN SCH: 100 INJECTION, SOLUTION SUBCUTANEOUS at 22:15

## 2019-01-05 RX ADMIN — HUMAN INSULIN SCH: 100 INJECTION, SOLUTION SUBCUTANEOUS at 11:32

## 2019-01-05 RX ADMIN — HUMAN INSULIN SCH: 100 INJECTION, SOLUTION SUBCUTANEOUS at 10:38

## 2019-01-05 RX ADMIN — Medication SCH: at 10:39

## 2019-01-05 NOTE — CP.PCM.PN
Subjective





- Date & Time of Evaluation


Date of Evaluation: 01/05/19


Time of Evaluation: 23:57





- Subjective


Subjective: 





Patient received hemodialysis yesterday.


He denies any chest pain.


Complaining of pain in the lower extremities


Denies any nausea, denies any vomiting.


Left arm AV fistula noted.





On examination:


Vital signs stable.


But the blood pressure slightly on the low side.


Chest good air entry


Abdominal distention, and obesity noted.


Leg edema present.


 There is a wound dressing noted





Labs reviewed


Currently patient is on antibiotic as per the infectious disease.


Gram-positive toxemia present.


Patient will need antibiotic for at least a 6 weeks we will discuss with 

infectious disease about the proper antibiotic, patient wanted to leave home


We will follow the patient





Objective





- Vital Signs/Intake and Output


Vital Signs (last 24 hours): 


                                        











Temp Pulse Resp BP Pulse Ox


 


 97.5 F L  100 H  20   109/70   100 


 


 01/05/19 15:00  01/05/19 15:00  01/05/19 15:00  01/05/19 15:00  01/05/19 15:00











- Medications


Medications: 


                               Current Medications





Apixaban (Eliquis)  2.5 mg PO BID [facility]


   Last Admin: 01/05/19 17:58 Dose:  2.5 mg


Calcium Acetate (Phoslo)  667 mg PO TIDCC [facility]


   Last Admin: 01/05/19 17:58 Dose:  667 mg


Epoetin Damir (Procrit)  10,000 unit IV TTS [facility]


   Last Admin: 01/05/19 12:04 Dose:  10,000 unit


[facility] Human Regular [facility]  [facility]


   Last Admin: 01/05/19 22:15 Dose:  Not Given


Ketorolac Tromethamine (Toradol)  10 mg PO BID PRN


   PRN Reason: 4-8


   Last Admin: 01/04/19 20:22 Dose:  10 mg


Nateglinide (Starlix)  60 mg PO ACTID [facility]


   Last Admin: 01/05/19 17:29 Dose:  Not Given


Pantoprazole Sodium (Protonix Ec Tab)  40 mg PO DAILY [facility]


   Last Admin: 01/05/19 10:38 Dose:  Not Given


Tobramycin/Dexamethasone (Tobradex Opht Susp)  0.1 ml OS BID PRN











- Labs


Labs: 


                                        





                                 01/04/19 06:49 





                                 01/04/19 06:49 





                                        











PT  22.0 SECONDS (9.7-12.2)  H  12/30/18  15:38    


 


INR  2.0   12/30/18  15:38    


 


APTT  36 SECONDS (21-34)  H  12/30/18  15:38

## 2019-01-05 NOTE — CP.PCM.PN
Subjective





- Date & Time of Evaluation


Date of Evaluation: 01/05/19


Time of Evaluation: 08:51





- Subjective


Subjective: 





Notes reviewed


Comfortable in bed


Lower ext pain controlled


No overnight events


ID evaluation noted


Appetite good, eating well


NO cp or palp, no sob or cough


No n/v/d


No ha or vision changes


10 point ros negative other than stated above





Objective





- Vital Signs/Intake and Output


Vital Signs (last 24 hours): 


                                        











Temp Pulse Resp BP Pulse Ox


 


 98.0 F   98 H  20   109/67   96 


 


 01/05/19 07:00  01/05/19 07:00  01/05/19 07:00  01/05/19 07:00  01/05/19 07:00








Intake and Output: 


                                        











 01/05/19 01/05/19





 06:59 18:59


 


Intake Total 400 


 


Balance 400 














- Medications


Medications: 


                               Current Medications





Apixaban (Eliquis)  2.5 mg PO BID [facility]


   Last Admin: 01/04/19 17:21 Dose:  2.5 mg


Calcium Acetate (Phoslo)  667 mg PO TIDCC [facility]


   Last Admin: 01/05/19 07:46 Dose:  667 mg


Epoetin Damir (Procrit)  10,000 unit IV TTS [facility]


Daptomycin 900 mg/ Sodium (Chloride)  100 mls @ 100 mls/hr IV ONCE ONE; Protocol


   Stop: 01/05/19 18:59


Insulin Human Regular (Novolin R)  0 unit SC ACHS [facility]; Protocol


   Last Admin: 01/04/19 22:30 Dose:  Not Given


Ketorolac Tromethamine (Toradol)  10 mg PO BID PRN


   PRN Reason: 4-8


   Last Admin: 01/04/19 20:22 Dose:  10 mg


Nateglinide (Starlix)  60 mg PO ACTID [facility]


   Last Admin: 01/05/19 07:46 Dose:  60 mg


Pantoprazole Sodium (Protonix Ec Tab)  40 mg PO DAILY [facility]


   Last Admin: 01/04/19 10:47 Dose:  40 mg


Tobramycin/Dexamethasone (Tobradex Opht Susp)  0.1 ml OS BID PRN











- Labs


Labs: 


                                        





                                 01/04/19 06:49 





                                 01/04/19 06:49 





                                        











PT  22.0 SECONDS (9.7-12.2)  H  12/30/18  15:38    


 


INR  2.0   12/30/18  15:38    


 


APTT  36 SECONDS (21-34)  H  12/30/18  15:38    














- Constitutional


Appears: Unkempt, Chronically Ill





- Head Exam


Head Exam: ATRAUMATIC, NORMAL INSPECTION





- Eye Exam


Eye Exam: EOMI, Normal appearance





- ENT Exam


ENT Exam: Mucous Membranes Moist, Normal Exam





- Neck Exam


Neck Exam: absent: Lymphadenopathy, Thyromegaly





- Respiratory Exam


Respiratory Exam: Clear to Ausculation Bilateral.  absent: Rales, Rhonchi





- Cardiovascular Exam


Cardiovascular Exam: REGULAR RHYTHM, +S1, +S2.  absent: Rubs





- GI/Abdominal Exam


GI & Abdominal Exam: Soft, Normal Bowel Sounds.  absent: Organomegaly





- Extremities Exam


Extremities Exam: Pedal Edema, Tenderness





- Neurological Exam


Neurological Exam: Alert, Awake





- Skin


Skin Exam: Dry, Warm


Additional comments: 





Chronic lower ext skin changes





Assessment and Plan


(1) Hypokalemia


Status: Acute   





(2) Cellulitis


Status: Acute   





(3) ESRD (end stage renal disease) on dialysis


Status: Acute   





(4) PVD (peripheral vascular disease)


Status: Acute   





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


Status: Acute   





(6) Coagulopathy


Status: Chronic   





(7) Peripheral edema


Status: Chronic   





- Assessment and Plan (Free Text)


Assessment: 





Maintain dialysis schedule


Dialysis today


Uf as tolerated


Monitor bp on hd


Anticoagulation as ordered


Abx per id - plan noted


Continue wound care


Pain management


PATRICIA with dialysis


Supportive care, stable renal wise

## 2019-01-05 NOTE — CP.PCM.PN
Subjective





- Date & Time of Evaluation


Date of Evaluation: 01/05/19


Time of Evaluation: 19:33





- Subjective


Subjective: 


INFECTIOUS DISEASE PROGRESS NOTES


RAHEEM CANADA MD, FACP


6T 655


1/5/2019


CHART REVIEWED


PT EXAMINED


CASE DISCUSSED





CONTINUE CUBICIN POST EACH DIALYSIS MWF X 5 MORE WEEKS.











Comfortable in bed, OCC BACK DISCOMFORT FROM LYING IN BED-CHRONIC


Lower ext pain controlled


No overnight events





Appetite good, eating well


NO cp or palp, no sob or cough


No n/v/d


No ha or vision changes


10 point ros negative other than stated above





Objective





- Vital Signs/Intake and Output


Vital Signs (last 24 hours): 


                                        











Temp Pulse Resp BP Pulse Ox


 


 98.0 F   98 H  20   109/67   96 


 


 01/05/19 07:00  01/05/19 07:00  01/05/19 07:00  01/05/19 07:00  01/05/19 07:00








Intake and Output: 


                                        











 01/05/19 01/05/19





 06:59 18:59


 


Intake Total 400 


 


Balance 400 














- Medications


Medications: 


                               Current Medications





Apixaban (Eliquis)  2.5 mg PO BID [facility]


   Last Admin: 01/04/19 17:21 Dose:  2.5 mg


Calcium Acetate (Phoslo)  667 mg PO TIDCC [facility]


   Last Admin: 01/05/19 07:46 Dose:  667 mg


Epoetin Damir (Procrit)  10,000 unit IV TTS [facility]


Daptomycin 900 mg/ Sodium (Chloride)  100 mls @ 100 mls/hr IV ONCE ONE; Protocol


   Stop: 01/05/19 18:59


Insulin Human Regular (Novolin R)  0 unit SC ACHS [facility]; Protocol


   Last Admin: 01/04/19 22:30 Dose:  Not Given


Ketorolac Tromethamine (Toradol)  10 mg PO BID PRN


   PRN Reason: 4-8


   Last Admin: 01/04/19 20:22 Dose:  10 mg


Nateglinide (Starlix)  60 mg PO ACTID [facility]


   Last Admin: 01/05/19 07:46 Dose:  60 mg


Pantoprazole Sodium (Protonix Ec Tab)  40 mg PO DAILY [facility]


   Last Admin: 01/04/19 10:47 Dose:  40 mg


Tobramycin/Dexamethasone (Tobradex Opht Susp)  0.1 ml OS BID PRN











- Labs


Labs: 


                                        





                                 01/04/19 06:49 





                                 01/04/19 06:49 





                                        











PT  22.0 SECONDS (9.7-12.2)  H  12/30/18  15:38    


 


INR  2.0   12/30/18  15:38    


 


APTT  36 SECONDS (21-34)  H  12/30/18  15:38    














- Constitutional


Appears: Unkempt, Chronically Ill





- Head Exam


Head Exam: ATRAUMATIC, NORMAL INSPECTION





- Eye Exam


Eye Exam: EOMI, Normal appearance





- ENT Exam


ENT Exam: Mucous Membranes Moist, Normal Exam





- Neck Exam


Neck Exam: absent: Lymphadenopathy, Thyromegaly





- Respiratory Exam


Respiratory Exam: Clear to Ausculation Bilateral.  absent: Rales, Rhonchi





- Cardiovascular Exam


Cardiovascular Exam: REGULAR RHYTHM, +S1, +S2.  absent: Rubs





- GI/Abdominal Exam


GI & Abdominal Exam: Soft, Normal Bowel Sounds.  absent: Organomegaly





- Extremities Exam


Extremities Exam: Pedal Edema, Tenderness





- Neurological Exam


Neurological Exam: Alert, Awake





- Skin


Skin Exam: Dry, Warm


Additional comments: 





Chronic lower ext skin changes





Assessment and Plan


(1) Hypokalemia


Status: Acute   





(2) Cellulitis


Status: Acute   





(3) ESRD (end stage renal disease) on dialysis


Status: Acute   





(4) PVD (peripheral vascular disease)


Status: Acute   





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


Status: Acute   





(6) Coagulopathy


Status: Chronic   





(7) Peripheral edema


Status: Chronic   





- Assessment and Plan (Free Text)


Assessment: 





Maintain dialysis schedule


Dialysis today


Uf as tolerated


Monitor bp on hd


Anticoagulation as ordered


Abx per id - plan noted


Continue wound care


Pain management


PATRICIA with dialysis


Supportive care, stable renal wise











Objective





- Vital Signs/Intake and Output


Vital Signs (last 24 hours): 


                                        











Temp Pulse Resp BP Pulse Ox


 


 97.5 F L  100 H  20   109/70   100 


 


 01/05/19 15:00  01/05/19 15:00  01/05/19 15:00  01/05/19 15:00  01/05/19 15:00











- Medications


Medications: 


                               Current Medications





Apixaban (Eliquis)  2.5 mg PO BID [facility]


   Last Admin: 01/05/19 17:58 Dose:  2.5 mg


Calcium Acetate (Phoslo)  667 mg PO TIDCC [facility]


   Last Admin: 01/05/19 17:58 Dose:  667 mg


Epoetin Damir (Procrit)  10,000 unit IV TTS [facility]


   Last Admin: 01/05/19 12:04 Dose:  10,000 unit


Insulin Human Regular (Novolin R)  0 unit SC ACHS [facility]; Protocol


   Last Admin: 01/05/19 17:28 Dose:  Not Given


Ketorolac Tromethamine (Toradol)  10 mg PO BID PRN


   PRN Reason: 4-8


   Last Admin: 01/04/19 20:22 Dose:  10 mg


Nateglinide (Starlix)  60 mg PO ACTID [facility]


   Last Admin: 01/05/19 17:29 Dose:  Not Given


Pantoprazole Sodium (Protonix Ec Tab)  40 mg PO DAILY [facility]


   Last Admin: 01/05/19 10:38 Dose:  Not Given


Tobramycin/Dexamethasone (Tobradex Opht Susp)  0.1 ml OS BID PRN











- Labs


Labs: 


                                        





                                 01/04/19 06:49 





                                 01/04/19 06:49 





                                        











PT  22.0 SECONDS (9.7-12.2)  H  12/30/18  15:38    


 


INR  2.0   12/30/18  15:38    


 


APTT  36 SECONDS (21-34)  H  12/30/18  15:38

## 2019-01-06 LAB
ALBUMIN SERPL-MCNC: 2.9 G/DL (ref 3.5–5)
ALBUMIN/GLOB SERPL: 0.9 {RATIO} (ref 1–2.1)
ALT SERPL-CCNC: 16 U/L (ref 21–72)
ANISOCYTOSIS BLD QL SMEAR: (no result)
AST SERPL-CCNC: 12 U/L (ref 17–59)
BASOPHILS # BLD AUTO: 0.1 K/UL (ref 0–0.2)
BASOPHILS NFR BLD: 0.7 % (ref 0–2)
BUN SERPL-MCNC: 32 MG/DL (ref 9–20)
CALCIUM SERPL-MCNC: 7.7 MG/DL (ref 8.6–10.4)
EOSINOPHIL # BLD AUTO: 0.1 K/UL (ref 0–0.7)
EOSINOPHIL NFR BLD: 1.6 % (ref 0–4)
EOSINOPHIL NFR BLD: 2 % (ref 0–4)
ERYTHROCYTE [DISTWIDTH] IN BLOOD BY AUTOMATED COUNT: 18.1 % (ref 11.5–14.5)
GFR NON-AFRICAN AMERICAN: 19
HGB BLD-MCNC: 8.5 G/DL (ref 12–18)
HYPOCHROMIC: SLIGHT
LYMPHOCYTE: 3 % (ref 20–40)
LYMPHOCYTES # BLD AUTO: 0.6 K/UL (ref 1–4.3)
LYMPHOCYTES NFR BLD AUTO: 7.3 % (ref 20–40)
MCH RBC QN AUTO: 29.3 PG (ref 27–31)
MCHC RBC AUTO-ENTMCNC: 32.6 G/DL (ref 33–37)
MCV RBC AUTO: 89.7 FL (ref 80–94)
MONOCYTE: 6 % (ref 0–10)
MONOCYTES # BLD: 0.5 K/UL (ref 0–0.8)
MONOCYTES NFR BLD: 6.7 % (ref 0–10)
NEUTROPHILS # BLD: 6.7 K/UL (ref 1.8–7)
NEUTROPHILS NFR BLD AUTO: 83.7 % (ref 50–75)
NEUTROPHILS NFR BLD AUTO: 87 % (ref 50–75)
NEUTS BAND NFR BLD: 2 % (ref 0–2)
NRBC BLD AUTO-RTO: 0.2 % (ref 0–2)
OVALOCYTES BLD QL SMEAR: SLIGHT
PLATELET # BLD EST: (no result) 10*3/UL
PLATELET # BLD: 90 K/UL (ref 130–400)
PMV BLD AUTO: 7.6 FL (ref 7.2–11.7)
RBC # BLD AUTO: 2.89 MIL/UL (ref 4.4–5.9)
TOTAL CELLS COUNTED BLD: 100
WBC # BLD AUTO: 8 K/UL (ref 4.8–10.8)

## 2019-01-06 RX ADMIN — HUMAN INSULIN SCH: 100 INJECTION, SOLUTION SUBCUTANEOUS at 07:23

## 2019-01-06 RX ADMIN — HUMAN INSULIN SCH: 100 INJECTION, SOLUTION SUBCUTANEOUS at 22:19

## 2019-01-06 RX ADMIN — HUMAN INSULIN SCH: 100 INJECTION, SOLUTION SUBCUTANEOUS at 17:00

## 2019-01-06 RX ADMIN — Medication SCH: at 12:09

## 2019-01-06 RX ADMIN — PANTOPRAZOLE SODIUM SCH MG: 40 TABLET, DELAYED RELEASE ORAL at 09:03

## 2019-01-06 RX ADMIN — HUMAN INSULIN SCH: 100 INJECTION, SOLUTION SUBCUTANEOUS at 12:08

## 2019-01-06 NOTE — CP.PCM.PN
Subjective





- Date & Time of Evaluation


Date of Evaluation: 01/06/19


Time of Evaluation: 08:34





- Subjective


Subjective: 


Podiatry Progress Note: Dr. Thornton





63 year old male patient seen and evaluated at bedside for right lower leg 

wound. Patient resting in bed at time of visit and in NAD. Patient denies any 

acute overnight events. Patient wearing multipodus boots b/l.  Patient denies 

F/N/V/SOB/chills. 








Objective





- Vital Signs/Intake and Output


Vital Signs (last 24 hours): 


                                        











Temp Pulse Resp BP Pulse Ox


 


 98.3 F   101 H  18   80/50 L  99 


 


 01/06/19 07:00  01/06/19 07:00  01/06/19 07:00  01/06/19 07:00  01/06/19 07:00











- Medications


Medications: 


                               Current Medications





Apixaban (Eliquis)  2.5 mg PO BID [facility]


   Last Admin: 01/05/19 17:58 Dose:  2.5 mg


Calcium Acetate (Phoslo)  667 mg PO TIDCC [facility]


   Last Admin: 01/06/19 07:49 Dose:  667 mg


Epoetin Damir (Procrit)  10,000 unit IV TTS [facility]


   Last Admin: 01/05/19 12:04 Dose:  10,000 unit


Insulin Human Regular (Novolin R)  0 unit SC ACHS [facility]; Protocol


   Last Admin: 01/06/19 07:23 Dose:  Not Given


Ketorolac Tromethamine (Toradol)  10 mg PO BID PRN


   PRN Reason: 4-8


   Last Admin: 01/04/19 20:22 Dose:  10 mg


Nateglinide (Starlix)  60 mg PO ACTID [facility]


   Last Admin: 01/06/19 07:23 Dose:  Not Given


Pantoprazole Sodium (Protonix Ec Tab)  40 mg PO DAILY [facility]


   Last Admin: 01/05/19 10:38 Dose:  Not Given


Tobramycin/Dexamethasone (Tobradex Opht Susp)  0.1 ml OS BID PRN











- Labs


Labs: 


                                        





                                 01/04/19 06:49 





                                 01/04/19 06:49 





                                        











PT  22.0 SECONDS (9.7-12.2)  H  12/30/18  15:38    


 


INR  2.0   12/30/18  15:38    


 


APTT  36 SECONDS (21-34)  H  12/30/18  15:38    














- Constitutional


Appears: Non-toxic, No Acute Distress





- Head Exam


Head Exam: ATRAUMATIC, NORMOCEPHALIC





- Extremities Exam


Additional comments: 


Right lower extremity focused exam:


Vasc: Nonpalpable pulses to the DP and PT, Delayed CFT to the digits, 

Temperature gradient WNL, +1 pitting pedal edema


Derm: Full thickness ulceration noted to right posterior ankle at level of 

Achilles insertion measuring approximately 2 cm x 2 cm x 1cm, with mixture 

granular and fibrotic wound base. Mild serous drainage appreciated, no flu

ctuance, no streaking, mild erythema appreciated periwound. Additional small 

ulceration appreciated to medail aspect of ankle measuring approximately 1cm x 

1cm x .01cm.  Mild serous drainage appreciated, no purulence. Hyperpigmentation 

appreciated circumfrentially to lower extremity.  No fluctuance noted. 


Neuro: Gross sensation diminished, protective sensation absent 


Ortho: Tenderness to palpation of R heel ulceration 








- Neurological Exam


Neurological Exam: Alert, Awake, Oriented x3





Assessment and Plan





- Assessment and Plan (Free Text)


Assessment: 


63 year old male patient with right lower extremity ulcerations 





Plan: 


Patient seen and evaluated at bedside


Discussed plan with Dr. Thornton


WBC 8.0 


R foot Wound cx taken;  MRSA


Blood cx taken;MRSA


Foot X-ray taken: possible signs of acute OM, further imaging recommended


ID on board, appreciate recommendations


Recommend possible surgical intervention in the future for debridement of wounds


Will continue to follow patient while in house

## 2019-01-06 NOTE — CP.PCM.PN
Subjective





- Date & Time of Evaluation


Date of Evaluation: 01/06/19


Time of Evaluation: 08:08





- Subjective


Subjective: 





Patient received hemodialysis yesterday.


He denies any chest pain.


Complaining of pain in the lower extremities


Denies any nausea, denies any vomiting.


Left arm AV fistula noted.





On examination:


Vital signs stable.


But the blood pressure slightly on the low side.


Chest good air entry


Abdominal distention, and obesity noted.


Leg edema present.


There is a wound dressing noted





Labs reviewed


Currently patient is on antibiotic as per the infectious disease.


Gram-positive toxemia present.


Patient will need antibiotic for at least a 6 weeks we will discuss with 

infectious disease about the proper antibiotic, patient wanted to leave home


We will follow the patient





Objective





- Vital Signs/Intake and Output


Vital Signs (last 24 hours): 


                                        











Temp Pulse Resp BP Pulse Ox


 


 97.2 F L  108 H  20   103/45 L  100 


 


 01/05/19 23:20  01/05/19 23:20  01/05/19 23:20  01/05/19 23:20  01/05/19 23:20











- Medications


Medications: 


                               Current Medications





Apixaban (Eliquis)  2.5 mg PO BID [facility]


   Last Admin: 01/05/19 17:58 Dose:  2.5 mg


Calcium Acetate (Phoslo)  667 mg PO TIDCC [facility]


   Last Admin: 01/06/19 07:49 Dose:  667 mg


Epoetin Damir (Procrit)  10,000 unit IV TTS [facility]


   Last Admin: 01/05/19 12:04 Dose:  10,000 unit


Ins[facility] ([facility]  [facility]


   Last Admin: 01/06/19 07:23 Dose:  Not Given


Ketorolac Tromethamine (Toradol)  10 mg PO BID PRN


   PRN Reason: 4-8


   Last Admin: 01/04/19 20:22 Dose:  10 mg


Nateglinide (Starlix)  60 mg PO ACTID [facility]


   Last Admin: 01/06/19 07:23 Dose:  Not Given


Pantoprazole Sodium (Protonix Ec Tab)  40 mg PO DAILY [facility]


   Last Admin: 01/05/19 10:38 Dose:  Not Given


Tobramycin/Dexamethasone (Tobradex Opht Susp)  0.1 ml OS BID PRN











- Labs


Labs: 


                                        





                                 01/04/19 06:49 





                                 01/04/19 06:49 





                                        











PT  22.0 SECONDS (9.7-12.2)  H  12/30/18  15:38    


 


INR  2.0   12/30/18  15:38    


 


APTT  36 SECONDS (21-34)  H  12/30/18  15:38

## 2019-01-07 RX ADMIN — HUMAN INSULIN SCH: 100 INJECTION, SOLUTION SUBCUTANEOUS at 07:48

## 2019-01-07 RX ADMIN — PANTOPRAZOLE SODIUM SCH: 40 TABLET, DELAYED RELEASE ORAL at 10:26

## 2019-01-07 RX ADMIN — HUMAN INSULIN SCH: 100 INJECTION, SOLUTION SUBCUTANEOUS at 21:32

## 2019-01-07 RX ADMIN — HUMAN INSULIN SCH: 100 INJECTION, SOLUTION SUBCUTANEOUS at 17:33

## 2019-01-07 RX ADMIN — HUMAN INSULIN SCH: 100 INJECTION, SOLUTION SUBCUTANEOUS at 10:56

## 2019-01-07 RX ADMIN — ERYTHROPOIETIN SCH UNIT: 10000 INJECTION, SOLUTION INTRAVENOUS; SUBCUTANEOUS at 10:49

## 2019-01-07 RX ADMIN — Medication SCH: at 10:26

## 2019-01-07 NOTE — CP.PCM.PN
Subjective





- Date & Time of Evaluation


Date of Evaluation: 01/07/19


Time of Evaluation: 18:15





- Subjective


Subjective: 





Patient had a hemodialysis today.


Discussed with ID.


Continue the current treatment.


He is on antibiotic.


Overall prognosis is guarded.


Right leg ulcer.


Wound is getting better at this time.








Objective





- Vital Signs/Intake and Output


Vital Signs (last 24 hours): 


                                        











Temp Pulse Resp BP Pulse Ox


 


 98.1 F   109 H  20   120/87   100 


 


 01/07/19 15:35  01/07/19 15:35  01/07/19 15:35  01/07/19 15:35  01/07/19 15:35











- Medications


Medications: 


                               Current Medications





Apixaban (Eliquis)  2.5 mg PO BID [facility]


   Last Admin: 01/07/19 17:33 Dose:  2.5 mg


Calcium Acetate (Phoslo)  667 mg PO TIDCC [facility]


   Last Admin: 01/07/19 17:32 Dose:  667 mg


Epoetin Damir (Procrit)  10,000 unit IV MWF [facility]


   Last Admin: 01/07/19 10:49 Dose:  10,000 unit


Daptomycin 900 mg/ Sodium (Chloride)  100 mls @ 100 mls/hr IV MWF@1400 [facility]; 

Protocol


   Last Admin: 01/07/19 14:34 Dose:  100 mls/hr


Insulin Human Regular (Novolin R)  0 unit SC ACHS [facility]; Protocol


   Last Admin: 01/07/19 17:33 Dose:  Not Given


Ketorolac Tromethamine (Toradol)  10 mg PO BID PRN


   PRN Reason: 4-8


   Last Admin: 01/06/19 13:58 Dose:  10 mg


Nateglinide (Starlix)  60 mg PO ACTID [facility]


   Last Admin: 01/07/19 17:30 Dose:  60 mg


Pantoprazole Sodium (Protonix Ec Tab)  40 mg PO DAILY [facility]


   Last Admin: 01/07/19 10:26 Dose:  Not Given


Tobramycin/Dexamethasone (Tobradex Opht Susp)  0.1 ml OS BID PRN











- Labs


Labs: 


                                        





                                 01/06/19 08:17 





                                 01/06/19 08:17 





                                        











PT  22.0 SECONDS (9.7-12.2)  H  12/30/18  15:38    


 


INR  2.0   12/30/18  15:38    


 


APTT  36 SECONDS (21-34)  H  12/30/18  15:38

## 2019-01-07 NOTE — CP.PCM.PN
Subjective





- Date & Time of Evaluation


Date of Evaluation: 01/07/19


Time of Evaluation: 12:51





- Subjective


Subjective: 


INFECTIOUS DISEASE PROGRESS NOTES


RAHEEM CANADA MD, FACP


655


1/7/2019


CHART REVIEWED


PT EXAMINED


CASE DISCUSSED





WITH PHARMACIES HELP WE CONTINUED IN THE COMPUTER SYSTEM CUBICIN 900MG IVPB POST

DIALYSIS MWF.


WITH THIS WEEK HE WILL NEED 5 MORE WEEKS OF TREATMENT, OF NOTE THE ROSE'S TO HIS 

MRSA =1, USUALLY A RELATIVELY RESISTANT STRAIN, SO TO SPEAK.


LUNGS CLEAR


COR


ABD SOFT














Objective





- Vital Signs/Intake and Output


Vital Signs (last 24 hours): 


                                        











Temp Pulse Resp BP Pulse Ox


 


 97.4 F L  95 H  18   109/55 L  100 


 


 01/07/19 09:45  01/07/19 09:45  01/07/19 09:45  01/07/19 11:45  01/07/19 09:45











- Medications


Medications: 


                               Current Medications





Apixaban (Eliquis)  [facility]ID [facility]


   Last Admin: 01/07/19 10:26 Dose:  Not Given


Calcium Acetate (Phoslo)  667 mg PO [facility]CC [facility]


   Last Admin: 01/07/19 10:26 Dose:  Not Given


Epoetin Damir (Procrit)  10,000 unit IV [facility]


   Last Admin: 01/07/19 10:49 Dose:  10,000 unit


Daptomycin 900 mg/ Sodium (Chloride)  100 mls @ 100 mls/hr IV MWF@1400 [facility]; 

Protocol


Insulin Human Regular (Novolin R)  0 unit SC ACHS [facility]; Protocol


   Last Admin: 01/07/19 10:56 Dose:  Not Given


Ketorolac Tromethamine (Toradol)  10 mg PO BID PRN


   PRN Reason: 4-8


   Last Admin: 01/06/19 13:58 Dose:  10 mg


Nateglinide (Starlix)  60 mg PO ACTID [facility]


   Last Admin: 01/07/19 10:55 Dose:  Not Given


Pantoprazole Sodium (Protonix Ec Tab)  40 mg PO DAILY [facility]


   Last Admin: 01/07/19 10:26 Dose:  Not Given


Tobramycin/Dexamethasone (Tobradex Opht Susp)  0.1 ml OS BID PRN











- Labs


Labs: 


                                        





                                 01/06/19 08:17 





                                 01/06/19 08:17 





                                        











PT  22.0 SECONDS (9.7-12.2)  H  12/30/18  15:38    


 


INR  2.0   12/30/18  15:38    


 


APTT  36 SECONDS (21-34)  H  12/30/18  15:38

## 2019-01-07 NOTE — CP.PCM.PN
Subjective





- Date & Time of Evaluation


Date of Evaluation: 01/07/19


Time of Evaluation: 12:00





- Subjective


Subjective: 





Podiatry Progress Note - Dr. Thornton





63M seen and evaluated for right lower leg wound with attending Dr. Thornton.  

Patient is seen resting comfortably in bed at time of visit and in NAD. Patient 

denies any acute overnight events. Denies any pain. Patient seen wearing 

multipodus boots b/l.  Patient denies F/N/V/SOB/chills. 





Objective





- Vital Signs/Intake and Output


Vital Signs (last 24 hours): 


                                        











Temp Pulse Resp BP Pulse Ox


 


 98.1 F   109 H  20   120/87   100 


 


 01/07/19 15:35  01/07/19 15:35  01/07/19 15:35  01/07/19 15:35  01/07/19 15:35











- Medications


Medications: 


                               Current Medications





Apixaban (Eliquis)  2.5 mg PO BID [facility]


   Last Admin: 01/07/19 17:33 Dose:  2.5 mg


Calcium Acetate (Phoslo)  667 mg PO TIDCC [facility]


   Last Admin: 01/07/19 17:32 Dose:  667 mg


Epoetin Damir (Procrit)  10,000 unit IV MWF [facility]


   Last Admin: 01/07/19 10:49 Dose:  10,000 unit


Daptomycin 900 mg/ Sodium (Chloride)  100 mls @ 100 mls/hr IV MWF@1400 [facility]; 

Protocol


   Last Admin: 01/07/19 14:34 Dose:  100 mls/hr


Insulin Human Regular (Novolin R)  0 unit SC ACHS [facility]; Protocol


   Last Admin: 01/07/19 21:32 Dose:  Not Given


Ketorolac Tromethamine (Toradol)  10 mg PO BID PRN


   PRN Reason: 4-8


   Last Admin: 01/06/19 13:58 Dose:  10 mg


Nateglinide (Starlix)  60 mg PO ACTID [facility]


   Last Admin: 01/07/19 17:30 Dose:  60 mg


Pantoprazole Sodium (Protonix Ec Tab)  40 mg PO DAILY [facility]


   Last Admin: 01/07/19 10:26 Dose:  Not Given


Tobramycin/Dexamethasone (Tobradex Opht Susp)  0.1 ml OS BID PRN











- Labs


Labs: 


                                        





                                 01/06/19 08:17 





                                 01/06/19 08:17 





                                        











PT  22.0 SECONDS (9.7-12.2)  H  12/30/18  15:38    


 


INR  2.0   12/30/18  15:38    


 


APTT  36 SECONDS (21-34)  H  12/30/18  15:38    














- Constitutional


Appears: Well, Non-toxic, No Acute Distress





- Extremities Exam


Extremities Exam: absent: Calf Tenderness


Additional comments: 





Right lower extremity focused exam:


Vasc: Nonpalpable pulses to the DP and PT, Delayed CFT to the digits, 

Temperature gradient WNL, +1 pitting pedal edema


Derm: Full thickness ulceration noted to right posterior ankle at level of 

Achilles insertion measuring approximately 2 cm x 2 cm x 1cm, with mixture 

granular and fibrotic wound base. Mild serous drainage appreciated, no 

fluctuance, no streaking, mild erythema appreciated periwound. Additional small 

ulceration appreciated to medail aspect of ankle measuring approximately 1cm x 

1cm x .01cm.  Mild serous drainage appreciated, no purulence. Hyperpigmentation 

appreciated circumfrentially to lower extremity.  No fluctuance noted. 


Neuro: Gross sensation diminished, protective sensation absent 


Ortho: Tenderness to palpation of R heel ulceration 








Assessment and Plan





- Assessment and Plan (Free Text)


Assessment: 





63 year old male patient with right lower extremity ulcerations- stable


Plan: 





Patient seen and evaluated at bedside with attending Dr. Thornton


Labs, vitals reviewed- Afebrile, absent leukocytosis WBC 8.0 


R foot Wound cx  MRSA


Blood cx MRSA


Foot X-ray taken: possible signs of acute OM, further imaging recommended


IV abx per ID


Recommend possible surgical intervention in the future for debridement of wounds


Cleansed wounds with saline, dressed with saline edmundo and kerlix


Will continue to follow patient while in house

## 2019-01-07 NOTE — CP.PCM.PN
Subjective





- Date & Time of Evaluation


Date of Evaluation: 01/07/19


Time of Evaluation: 11:38





- Subjective


Subjective: 





Seen at dialysis now; UF 3000ml


BP at a more acceptable level


On IV ABs for bacteremia


Hg stable





Objective





- Vital Signs/Intake and Output


Vital Signs (last 24 hours): 


                                        











Temp Pulse Resp BP Pulse Ox


 


 97.4 F L  95 H  18   114/54 L  100 


 


 01/07/19 09:45  01/07/19 09:45  01/07/19 09:45  01/07/19 11:15  01/07/19 09:45











- Medications


Medications: 


                               Current Medications





Apixaban (Eliquis)  2.5 mg PO BID [facility]


   Last Admin: 01/07/19 10:26 Dose:  Not Given


Calcium Acetate (Phoslo)  667 mg PO TIDCC [facility]


   Last Admin: 01/07/19 10:26 Dose:  Not Given


Epoetin Damir (Procrit)  10,000 unit IV MWF [facility]


   Last Admin: 01/07/19 10:49 Dose:  10,000 unit


Insulin Human Regular (Novolin R)  0 unit SC [facility]; Protocol


   Last Admin: 01/07/19 10:56 Dose:  Not Given


Ketorolac Tromethamine (Toradol)  10 mg PO BID PRN


   PRN Reason: 4-8


   Last Admin: 01/06/19 13:58 Dose:  10 mg


Nateglinide (Starlix)  60 mg PO ACTID [facility]


   Last Admin: 01/07/19 10:55 Dose:  Not Given


Pantoprazole Sodium (Protonix Ec Tab)  40 mg PO DAILY [facility]


   Last Admin: 01/07/19 10:26 Dose:  Not Given


Tobramycin/Dexamethasone (Tobradex Opht Susp)  0.1 ml OS BID PRN











- Labs


Labs: 


                                        





                                 01/06/19 08:17 





                                 01/06/19 08:17 





                                        











PT  22.0 SECONDS (9.7-12.2)  H  12/30/18  15:38    


 


INR  2.0   12/30/18  15:38    


 


APTT  36 SECONDS (21-34)  H  12/30/18  15:38    














- Constitutional


Appears: No Acute Distress, Chronically Ill





- Head Exam


Head Exam: ATRAUMATIC, NORMAL INSPECTION





- Eye Exam


Eye Exam: EOMI, Normal appearance





- Neck Exam


Neck Exam: Normal Inspection.  absent: Tenderness





- Respiratory Exam


Respiratory Exam: Clear to Ausculation Bilateral, NORMAL BREATHING PATTERN





- Cardiovascular Exam


Cardiovascular Exam: Irregular Rhythm, +S1





- GI/Abdominal Exam


GI & Abdominal Exam: Soft.  absent: Tenderness





- Extremities Exam


Extremities Exam: Normal Inspection.  absent: Tenderness





- Neurological Exam


Neurological Exam: Awake, CN II-XII Intact





- Skin


Skin Exam: Dry, Warm





Assessment and Plan


(1) Cellulitis


Status: Acute   





(2) ESRD (end stage renal disease)


Status: Acute   





(3) Fluid overload


Status: Acute   





(4) Severe anemia


Status: Acute   





(5) A-fib


Status: Chronic   





- Assessment and Plan (Free Text)


Plan: 





Same dialysis orders- now MWF


EPO; monitor Hg


Same IV ABs as per ID


monitor eleazar

## 2019-01-08 RX ADMIN — HUMAN INSULIN SCH: 100 INJECTION, SOLUTION SUBCUTANEOUS at 06:52

## 2019-01-08 RX ADMIN — HUMAN INSULIN SCH: 100 INJECTION, SOLUTION SUBCUTANEOUS at 13:38

## 2019-01-08 RX ADMIN — PANTOPRAZOLE SODIUM SCH MG: 40 TABLET, DELAYED RELEASE ORAL at 09:29

## 2019-01-08 RX ADMIN — HUMAN INSULIN SCH: 100 INJECTION, SOLUTION SUBCUTANEOUS at 21:58

## 2019-01-08 RX ADMIN — Medication SCH: at 12:41

## 2019-01-08 RX ADMIN — HUMAN INSULIN SCH: 100 INJECTION, SOLUTION SUBCUTANEOUS at 17:38

## 2019-01-08 NOTE — CP.PCM.PN
Subjective





- Date & Time of Evaluation


Date of Evaluation: 01/08/19


Time of Evaluation: 12:51





- Subjective


Subjective: 





seen and examined


s/p hd yesterday


on iv cubicin, afebrile. 





feels tired


otherwise no complaints


10 point ros negative





Objective





- Vital Signs/Intake and Output


Vital Signs (last 24 hours): 


                                        











Temp Pulse Resp BP Pulse Ox


 


 98.1 F   103 H  18   121/75   100 


 


 01/08/19 07:00  01/08/19 07:00  01/08/19 07:00  01/08/19 07:00  01/08/19 07:00











- Medications


Medications: 


                               Current Medications





Apixaban (Eliquis)  2.5 mg PO BID [facility]


   Last Admin: 01/08/19 09:29 Dose:  2.5 mg


Calcium Acetate (Phoslo)  667 mg PO TIDCC [facility]


   Last Admin: 01/08/19 09:29 Dose:  667 mg


Epoetin Damir (Procrit)  10,000 unit IV MWF [facility]


   Last Admin: 01/07/19 10:49 Dose:  10,000 unit


Daptomycin 900 mg/ Sodium (Chloride)  100 mls @ 100 mls/hr IV MWF@1400 [facility]; 

Protocol


   Last Admin: 01/07/19 14:34 Dose:  100 mls/hr


Insulin Human Regular (Novolin R)  0 unit SC ACHS [facility]; Protocol


   Last Admin: 01/08/19 06:52 Dose:  Not Given


Ketorolac Tromethamine (Toradol)  10 mg PO BID PRN


   PRN Reason: 4-8


   Last Admin: 01/08/19 05:12 Dose:  10 mg


Nateglinide (Starlix)  60 mg PO ACTID [facility]


   Last Admin: 01/08/19 06:48 Dose:  60 mg


Pantoprazole Sodium (Protonix Ec Tab)  40 mg PO DAILY [facility]


   Last Admin: 01/08/19 09:29 Dose:  40 mg


Tobramycin/Dexamethasone (Tobradex Opht Susp)  0.1 ml OS BID PRN











- Labs


Labs: 


                                        





                                 01/06/19 08:17 





                                 01/06/19 08:17 





                                        











PT  22.0 SECONDS (9.7-12.2)  H  12/30/18  15:38    


 


INR  2.0   12/30/18  15:38    


 


APTT  36 SECONDS (21-34)  H  12/30/18  15:38    














- Constitutional


Appears: Older Than Stated Age (obese), Chronically Ill





- Head Exam


Head Exam: NORMAL INSPECTION, NORMOCEPHALIC





- Eye Exam


Eye Exam: Normal appearance, PERRL





- ENT Exam


ENT Exam: Mucous Membranes Moist, Normal Exam





- Neck Exam


Neck Exam: Full ROM, Normal Inspection





- Respiratory Exam


Respiratory Exam: Decreased Breath Sounds (b/l bases), NORMAL BREATHING PATTERN





- Cardiovascular Exam


Cardiovascular Exam: Irregular Rhythm





- GI/Abdominal Exam


GI & Abdominal Exam: Distended, Soft





- Extremities Exam


Extremities Exam: Pedal Edema (b/l chronic stasis, LLE ulcer in dressing)





- Neurological Exam


Neurological Exam: Alert, Awake, Oriented x3





- Psychiatric Exam


Psychiatric exam: Normal Affect, Normal Mood





- Skin


Skin Exam: Dry, Intact


Additional comments: 





chest permcath





Assessment and Plan


(1) Foot ulcer


Status: Acute   





(2) Cellulitis


Status: Acute   





(3) ESRD (end stage renal disease)


Status: Acute   





(4) Non-ischemic cardiomyopathy


Status: Acute   





(5) A-fib


Status: Chronic   





- Assessment and Plan (Free Text)


Assessment: 





maintain hd mwf


aggressive uf as tolerated


antibiotics per ID

## 2019-01-08 NOTE — CP.PCM.PN
Subjective





- Date & Time of Evaluation


Date of Evaluation: 01/08/19


Time of Evaluation: 20:35





- Subjective


Subjective: 


INECTIOUS DISEASE PROGRESS NOTES


RAHEEM CANADA MD, FACP


6T 655


1/8/2019


CHART REVIEWED


EXAM NOTED


CASE DISCUSSED WITH DR PULIDO-PMD TODAY AND PHYSICIAN ADVISOR, YESTERDAY





CLINICALLY RESPONDING TO CUBICIN FOR MRSA BACTEREMIA/AND WOUND INFECTION


PT'S CONDITION, GIVEN THE DEPTH AND EXTENT OF HIS TOTAL MEDICAL/SURGICAL 

WOUNDS-INCLUDING ALL THE EXTENSIVE HEALTH ISSUES OF THIS PATIENT PRECLUDES OTHER

MANAGEMENT, DIRECT OR INDIRECT.


IN HIS LAST ADMISSION HE DID NOT ACHIEVE OPTIMUM RESULTS WITH BACTEREMIA, 

DIFFICULTIES APPRECIATED EVEN IN THE BEST OF HANDS.





Overall prognosis is guarded.


Right leg ulcer.


Wound is RESPONDING SLOWLY at this time.


MANAGEMENT WILL BE CHALLENGING ESPECIALLY OUT OF DIRECT PT CARE.





MRSA WITH ROSE'S OF 1 ARE DIFFICULT TO DEAL WITH.


I WILL ATTEMPT TO ADMINISTER THE BEST ID CARE AVAILABLE, IF ALLOWED.

















Objective





- Vital Signs/Intake and Output


Vital Signs (last 24 hours): 


                                        











Temp Pulse Resp BP Pulse Ox


 


 97.3 F L  90   20   101/61   100 


 


 01/08/19 15:00  01/08/19 15:00  01/08/19 15:00  01/08/19 15:00  01/08/19 15:00











- Medications


Medications: 


                               Current Medications





Apixaban (Eliquis)  2.5 mg PO BID [facility]


   Last Admin: 01/08/19 17:38 Dose:  Not Given


Calcium Acetate (Phoslo)  667 mg PO TIDCC [facility]


   Last Admin: 01/08/19 17:38 Dose:  Not Given


Epoetin Damir (Procrit)  10,000 unit IV MWF [facility]


   Last Admin: 01/07/19 10:49 Dose:  10,000 unit


Daptomycin 900 mg/ Sodium (Chloride)  100 mls @ 100 mls/hr IV MWF@1400 [facility]; 

Protocol


   Last Admin: 01/07/19 14:34 Dose:  100 mls/hr


Insulin Human Regular (Novolin R)  0 unit SC [facility]; Protocol


   Last Admin: 01/08/19 17:38 Dose:  Not Given


Ketorolac Tromethamine (Toradol)  10 mg PO BID PRN


   PRN Reason: 4-8


   Last Admin: 01/08/19 05:12 Dose:  10 mg


Nateglinide (Starlix)  60 mg PO ACTID [facility]


   Last Admin: 01/08/19 17:38 Dose:  Not Given


Pantoprazole Sodium (Protonix Ec Tab)  40 mg PO DAILY [facility]


   Last Admin: 01/08/19 09:29 Dose:  40 mg


Tobramycin/Dexamethasone (Tobradex Opht Susp)  0.1 ml OS BID PRN











- Labs


Labs: 


                                        





                                 01/06/19 08:17 





                                 01/06/19 08:17 





                                        











PT  22.0 SECONDS (9.7-12.2)  H  12/30/18  15:38    


 


INR  2.0   12/30/18  15:38    


 


APTT  36 SECONDS (21-34)  H  12/30/18  15:38

## 2019-01-09 RX ADMIN — ERYTHROPOIETIN SCH UNIT: 10000 INJECTION, SOLUTION INTRAVENOUS; SUBCUTANEOUS at 11:34

## 2019-01-09 RX ADMIN — PANTOPRAZOLE SODIUM SCH: 40 TABLET, DELAYED RELEASE ORAL at 09:06

## 2019-01-09 RX ADMIN — HUMAN INSULIN SCH: 100 INJECTION, SOLUTION SUBCUTANEOUS at 17:07

## 2019-01-09 RX ADMIN — HUMAN INSULIN SCH: 100 INJECTION, SOLUTION SUBCUTANEOUS at 21:50

## 2019-01-09 RX ADMIN — Medication SCH: at 09:06

## 2019-01-09 RX ADMIN — HUMAN INSULIN SCH: 100 INJECTION, SOLUTION SUBCUTANEOUS at 13:07

## 2019-01-09 RX ADMIN — HUMAN INSULIN SCH: 100 INJECTION, SOLUTION SUBCUTANEOUS at 06:45

## 2019-01-09 NOTE — CP.PCM.PN
Subjective





- Date & Time of Evaluation


Date of Evaluation: 01/09/19


Time of Evaluation: 18:03





- Subjective


Subjective: 


INFECTIOUS DISEASE PROGRESS NOTES


RAHEEM CANADA MD, FACP


6T 655


1/9/2019CHART REVIEWED


PT EXAMINED


CASE DISCUSSED


CHART REVIEWED





RESTING COMFORTABLY


LOOKS MORE AT REST


CONTINUE CUBICIN AS WRITTEN


QUESTION OF ALTERNATIVE PROBABLY NOT VIABLE, WILL CONTINUE TO REVIEW FOR THE 

PATIENTS BENEFIT.


on IV daptomycin for bacteremia


has been afebrile


no new wound discharges


feels same





Objective





- Vital Signs/Intake and Output


Vital Signs (last 24 hours): 


                                        











Temp Pulse Resp BP Pulse Ox


 


 97.5 F L  84   16   108/70   100 


 


 01/09/19 12:07  01/09/19 12:07  01/09/19 12:07  01/09/19 12:07  01/09/19 09:10








Intake and Output: 


                                        











 01/09/19 01/09/19





 06:59 18:59


 


Intake Total 400 


 


Balance 400 














- Medications


Medications: 


                               Current Medications





Apixaban (Eliquis)  2.5 mg PO BID [facility]


   Last Admin: 01/09/19 09:05 Dose:  Not Given


Calcium Acetate (Phoslo)  667 mg PO TIDCC [facility]


   Last Admin: 01/09/19 13:08 Dose:  Not Given


Epoetin Damir (Procrit)  10,000 unit IV MWF [facility]


   Last Admin: 01/09/19 11:34 Dose:  10,000 unit


Daptomycin 900 mg/ Sodium (Chloride)  100 mls @ 100 mls/hr IV MWF@1400 [facility]; 

Protocol


   Last Admin: 01/07/19 14:34 Dose:  100 mls/hr


Insulin Human Regular (Novolin R)  0 unit SC ACHS [facility]; Protocol


   Last Admin: 01/09/19 13:07 Dose:  Not Given


Ketorolac Tromethamine (Toradol)  10 mg PO BID PRN


   PRN Reason: 4-8


   Last Admin: 01/09/19 01:15 Dose:  10 mg


Nateglinide (Starlix)  60 mg PO ACTID [facility]


   Last Admin: 01/09/19 13:08 Dose:  Not Given


Pantoprazole Sodium (Protonix Ec Tab)  40 mg PO DAILY [facility]


   Last Admin: 01/09/19 09:06 Dose:  Not Given


Tobramycin/Dexamethasone (Tobradex Opht Susp)  0.1 ml OS BID PRN











- Labs


Labs: 


                                        





                                 01/06/19 08:17 





                                 01/06/19 08:17 





                                        











PT  22.0 SECONDS (9.7-12.2)  H  12/30/18  15:38    


 


INR  2.0   12/30/18  15:38    


 


APTT  36 SECONDS (21-34)  H  12/30/18  15:38    














- Constitutional


Appears: Non-toxic, No Acute Distress





- Head Exam


Head Exam: ATRAUMATIC, NORMAL INSPECTION





- Eye Exam


Eye Exam: EOMI, Normal appearance





- Neck Exam


Neck Exam: Normal Inspection.  absent: Tenderness





- Respiratory Exam


Respiratory Exam: Clear to Ausculation Bilateral, NORMAL BREATHING PATTERN





- Cardiovascular Exam


Cardiovascular Exam: REGULAR RHYTHM, +S1





- GI/Abdominal Exam


GI & Abdominal Exam: Soft.  absent: Tenderness





- Extremities Exam


Extremities Exam: Pedal Edema, Tenderness





- Neurological Exam


Neurological Exam: Awake, CN II-XII Intact





- Skin


Skin Exam: Dry, Warm





Assessment and Plan


(1) Cellulitis


Status: Acute   





(2) ESRD (end stage renal disease)


Status: Acute   





(3) Fluid overload


Status: Acute   





(4) Severe anemia


Status: Acute   





(5) A-fib


Status: Chronic   





- Assessment and Plan (Free Text)


Plan: 





same dialysis MWF


IV ABs 


wound care














Objective





- Vital Signs/Intake and Output


Vital Signs (last 24 hours): 


                                        











Temp Pulse Resp BP Pulse Ox


 


 97.8 F   84   16   89/76 L  100 


 


 01/09/19 13:36  01/09/19 12:07  01/09/19 13:36  01/09/19 13:36  01/09/19 13:36








Intake and Output: 


                                        











 01/09/19 01/09/19





 06:59 18:59


 


Intake Total 400 


 


Output Total  3000


 


Balance 400 -3000














- Medications


Medications: 


                               Current Medications





Apixaban (Eliquis)  2.5 mg PO BID [facility]


   Last Admin: 01/09/19 09:05 Dose:  Not Given


Calcium Acetate (Phoslo)  667 mg PO TIDCC [facility]


   Last Admin: 01/09/19 13:08 Dose:  Not Given


Epoetin Damir (Procrit)  10,000 unit IV MWF [facility]


   Last Admin: 01/09/19 11:34 Dose:  10,000 unit


Daptomycin 900 mg/ Sodium (Chloride)  100 mls @ 100 mls/hr IV MWF@1400 [facility]; 

Protocol


   Last Admin: 01/09/19 15:02 Dose:  100 mls/hr


Insulin Human Regular (Novolin R)  0 unit SC ACHS [facility]; Protocol


   Last Admin: 01/09/19 17:07 Dose:  Not Given


Ketorolac Tromethamine (Toradol)  10 mg PO BID PRN


   PRN Reason: 4-8


   Last Admin: 01/09/19 15:03 Dose:  10 mg


Nateglinide (Starlix)  60 mg PO ACTID [facility]


   Last Admin: 01/09/19 13:08 Dose:  Not Given


Pantoprazole Sodium (Protonix Ec Tab)  40 mg PO DAILY [facility]


   Last Admin: 01/09/19 09:06 Dose:  Not Given


Tobramycin/Dexamethasone (Tobradex Opht Susp)  0.1 ml OS BID PRN











- Labs


Labs: 


                                        





                                 01/06/19 08:17 





                                 01/06/19 08:17 





                                        











PT  22.0 SECONDS (9.7-12.2)  H  12/30/18  15:38    


 


INR  2.0   12/30/18  15:38    


 


APTT  36 SECONDS (21-34)  H  12/30/18  15:38

## 2019-01-09 NOTE — CP.PCM.PN
Subjective





- Date & Time of Evaluation


Date of Evaluation: 01/09/19


Time of Evaluation: 13:16





- Subjective


Subjective: 





seen on dialysis this AM


tolerated UF 3000ml desite lower BP


on IV daptomycin for bacteremia


has been afebrile


no new wound discharges


feels same





Objective





- Vital Signs/Intake and Output


Vital Signs (last 24 hours): 


                                        











Temp Pulse Resp BP Pulse Ox


 


 97.5 F L  84   16   108/70   100 


 


 01/09/19 12:07  01/09/19 12:07  01/09/19 12:07  01/09/19 12:07  01/09/19 09:10








Intake and Output: 


                                        











 01/09/19 01/09/19





 06:59 18:59


 


Intake Total 400 


 


Balance 400 














- Medications


Medications: 


                               Current Medications





Apixaban (Eliquis)  2.5 mg PO BID [facility]


   Last Admin: 01/09/19 09:05 Dose:  Not Given


Calcium Acetate (Phoslo)  667 mg PO TIDCC [facility]


   Last Admin: 01/09/19 13:08 Dose:  Not Given


Epoetin Damir (Procrit)  10,000 unit IV MWF [facility]


   Last Admin: 01/09/19 11:34 Dose:  10,000 unit


Daptomycin 900 mg/ Sodium (Chloride)  100 mls @ 100 mls/hr IV MWF@1400 [facility]; 

Protocol


   Last Admin: 01/07/19 14:34 Dose:  100 mls/hr


Insulin Human Regular (Novolin R)  0 unit SC ACHS [facility]; Protocol


   Last Admin: 01/09/19 13:07 Dose:  Not Given


Ketorolac Tromethamine (Toradol)  10 mg PO BID PRN


   PRN Reason: 4-8


   Last Admin: 01/09/19 01:15 Dose:  10 mg


Nateglinide (Starlix)  60 mg PO ACTID [facility]


   Last Admin: 01/09/19 13:08 Dose:  Not Given


Pantoprazole Sodium (Protonix Ec Tab)  40 mg PO DAILY [facility]


   Last Admin: 01/09/19 09:06 Dose:  Not Given


Tobramycin/Dexamethasone (Tobradex Opht Susp)  0.1 ml OS BID PRN











- Labs


Labs: 


                                        





                                 01/06/19 08:17 





                                 01/06/19 08:17 





                                        











PT  22.0 SECONDS (9.7-12.2)  H  12/30/18  15:38    


 


INR  2.0   12/30/18  15:38    


 


APTT  36 SECONDS (21-34)  H  12/30/18  15:38    














- Constitutional


Appears: Non-toxic, No Acute Distress





- Head Exam


Head Exam: ATRAUMATIC, NORMAL INSPECTION





- Eye Exam


Eye Exam: EOMI, Normal appearance





- Neck Exam


Neck Exam: Normal Inspection.  absent: Tenderness





- Respiratory Exam


Respiratory Exam: Clear to Ausculation Bilateral, NORMAL BREATHING PATTERN





- Cardiovascular Exam


Cardiovascular Exam: REGULAR RHYTHM, +S1





- GI/Abdominal Exam


GI & Abdominal Exam: Soft.  absent: Tenderness





- Extremities Exam


Extremities Exam: Pedal Edema, Tenderness





- Neurological Exam


Neurological Exam: Awake, CN II-XII Intact





- Skin


Skin Exam: Dry, Warm





Assessment and Plan


(1) Cellulitis


Status: Acute   





(2) ESRD (end stage renal disease)


Status: Acute   





(3) Fluid overload


Status: Acute   





(4) Severe anemia


Status: Acute   





(5) A-fib


Status: Chronic   





- Assessment and Plan (Free Text)


Plan: 





same dialysis MWF


IV ABs 


wound care

## 2019-01-10 RX ADMIN — HUMAN INSULIN SCH: 100 INJECTION, SOLUTION SUBCUTANEOUS at 09:13

## 2019-01-10 RX ADMIN — PANTOPRAZOLE SODIUM SCH MG: 40 TABLET, DELAYED RELEASE ORAL at 12:14

## 2019-01-10 RX ADMIN — HUMAN INSULIN SCH: 100 INJECTION, SOLUTION SUBCUTANEOUS at 22:23

## 2019-01-10 RX ADMIN — HUMAN INSULIN SCH: 100 INJECTION, SOLUTION SUBCUTANEOUS at 19:44

## 2019-01-10 RX ADMIN — Medication SCH: at 11:28

## 2019-01-10 RX ADMIN — PANTOPRAZOLE SODIUM SCH: 40 TABLET, DELAYED RELEASE ORAL at 11:29

## 2019-01-10 NOTE — CP.PCM.PN
Subjective





- Date & Time of Evaluation


Date of Evaluation: 01/10/19


Time of Evaluation: 10:16





- Subjective


Subjective: 





appears same


s/p echo


stable dialysis 1/9


on IV ABs for bacteremia





Objective





- Vital Signs/Intake and Output


Vital Signs (last 24 hours): 


                                        











Temp Pulse Resp BP Pulse Ox


 


 98.3 F   67   20   115/68   98 


 


 01/10/19 07:41  01/10/19 07:41  01/10/19 07:41  01/10/19 07:41  01/10/19 07:41











- Medications


Medications: 


                               Current Medications





Apixaban (Eliquis)  2.5 mg PO BID [facility]


   Last Admin: 01/09/19 21:52 Dose:  2.5 mg


Calcium Acetate (Phoslo)  667 mg PO TIDCC [facility]


   Last Admin: 01/10/19 09:13 Dose:  Not Given


Epoetin Damir (Procrit)  10,000 unit IV MWF [facility]


   Last Admin: 01/09/19 11:34 Dose:  10,000 unit


Daptomycin 900 mg/ Sodium (Chloride)  100 mls @ 100 mls/hr IV MWF@1400 [facility]; 

Protocol


   Last Admin: 01/09/19 15:02 Dose:  100 mls/hr


Insulin Human Regular (Novolin R)  0 unit SC ACHS [facility]; Protocol


   Last Admin: 01/10/19 09:13 Dose:  Not Given


Ketorolac Tromethamine (Toradol)  10 mg PO BID PRN


   PRN Reason: 4-8


   Last Admin: 01/09/19 15:03 Dose:  10 mg


Nateglinide (Starlix)  60 mg PO ACTID [facility]


   Last Admin: 01/10/19 09:13 Dose:  Not Given


Pantoprazole Sodium (Protonix Ec Tab)  40 mg PO DAILY [facility]


   Last Admin: 01/09/19 09:06 Dose:  Not Given


Tobramycin/Dexamethasone (Tobradex Opht Susp)  0.1 ml OS BID PRN











- Labs


Labs: 


                                        





                                 01/06/19 08:17 





                                 01/06/19 08:17 





                                        











PT  22.0 SECONDS (9.7-12.2)  H  12/30/18  15:38    


 


INR  2.0   12/30/18  15:38    


 


APTT  36 SECONDS (21-34)  H  12/30/18  15:38    














- Constitutional


Appears: No Acute Distress, Chronically Ill





- Head Exam


Head Exam: ATRAUMATIC, NORMAL INSPECTION





- Eye Exam


Eye Exam: EOMI, Normal appearance





- Neck Exam


Neck Exam: Normal Inspection.  absent: Tenderness





- Respiratory Exam


Respiratory Exam: Clear to Ausculation Bilateral, NORMAL BREATHING PATTERN





- Cardiovascular Exam


Cardiovascular Exam: REGULAR RHYTHM, +S1





- GI/Abdominal Exam


GI & Abdominal Exam: Soft.  absent: Tenderness





- Extremities Exam


Extremities Exam: Pedal Edema, Tenderness





- Neurological Exam


Neurological Exam: Awake, CN II-XII Intact





- Skin


Skin Exam: Dry, Warm





Assessment and Plan


(1) Cellulitis


Status: Acute   





(2) ESRD (end stage renal disease)


Status: Acute   





(3) Fluid overload


Status: Acute   





(4) Severe anemia


Status: Acute   





(5) A-fib


Status: Chronic   





- Assessment and Plan (Free Text)


Plan: 





dialysis MWF


IV ABs


check labs

## 2019-01-10 NOTE — CP.PCM.PN
Subjective





- Date & Time of Evaluation


Date of Evaluation: 01/10/19


Time of Evaluation: 19:46





- Subjective


Subjective: 


INFECTIOUS DISEASE PROGRESS NOTES


RAHEEM CANADA MD, FACP


BOARD CERTIFIED ID X [id number]


6T 655


1/10/2019


CHART REVIEWED


PT EXAMINED


CASE DISCUSSED





RESPONDING TO DAPTOMYCIN FOR MRSA BACTEREMIA/WOUND INFECTION IN A COMPROMISED 

PATIENT


TOMORROW SHOULD FINISH 2 FULL WEEKS


WILL NEED 4 MORE WEEKS OF DAPTOMYCIN-CUBICIN


TREATMENT AS ORDERED








appears same


s/p echo


stable dialysis 1/9


on IV ABs for bacteremia





Objective





- Vital Signs/Intake and Output


Vital Signs (last 24 hours): 


                                        











Temp Pulse Resp BP Pulse Ox


 


 98.3 F   67   20   115/68   98 


 


 01/10/19 07:41  01/10/19 07:41  01/10/19 07:41  01/10/19 07:41  01/10/19 07:41











- Medications


Medications: 


                               Current Medications





Apixaban (Eliquis)  2.5 mg PO BID [facility]


   Last Admin: 01/09/19 21:52 Dose:  2.5 mg


Calcium Acetate (Phoslo)  667 mg PO TIDCC [facility]


   Last Admin: 01/10/19 09:13 Dose:  Not Given


Epoetin Damir (Procrit)  10,000 unit IV MWF [facility]


   Last Admin: 01/09/19 11:34 Dose:  10,000 unit


Daptomycin 900 mg/ Sodium (Chloride)  100 mls @ 100 mls/hr IV MWF@1400 [facility]; Pro

tocol


   Last Admin: 01/09/19 15:02 Dose:  100 mls/hr


Insulin Human Regular (Novolin R)  0 unit SC ACHS [facility]; Protocol


   Last Admin: 01/10/19 09:13 Dose:  Not Given


Ketorolac Tromethamine (Toradol)  10 mg PO BID PRN


   PRN Reason: 4-8


   Last Admin: 01/09/19 15:03 Dose:  10 mg


Nateglinide (Starlix)  60 mg PO ACTID [facility]


   Last Admin: 01/10/19 09:13 Dose:  Not Given


Pantoprazole Sodium (Protonix Ec Tab)  40 mg PO DAILY [facility]


   Last Admin: 01/09/19 09:06 Dose:  Not Given


Tobramycin/Dexamethasone (Tobradex Opht Susp)  0.1 ml OS BID PRN











- Labs


Labs: 


                                        





                                 01/06/19 08:17 





                                 01/06/19 08:17 





                                        











PT  22.0 SECONDS (9.7-12.2)  H  12/30/18  15:38    


 


INR  2.0   12/30/18  15:38    


 


APTT  36 SECONDS (21-34)  H  12/30/18  15:38    














- Constitutional


Appears: No Acute Distress, Chronically Ill





- Head Exam


Head Exam: ATRAUMATIC, NORMAL INSPECTION





- Eye Exam


Eye Exam: EOMI, Normal appearance





- Neck Exam


Neck Exam: Normal Inspection.  absent: Tenderness





- Respiratory Exam


Respiratory Exam: Clear to Ausculation Bilateral, NORMAL BREATHING PATTERN





- Cardiovascular Exam


Cardiovascular Exam: REGULAR RHYTHM, +S1





- GI/Abdominal Exam


GI & Abdominal Exam: Soft.  absent: Tenderness





- Extremities Exam


Extremities Exam: Pedal Edema, Tenderness





- Neurological Exam


Neurological Exam: Awake, CN II-XII Intact





- Skin


Skin Exam: Dry, Warm





Assessment and Plan


(1) Cellulitis


Status: Acute   





(2) ESRD (end stage renal disease)


Status: Acute   





(3) Fluid overload


Status: Acute   





(4) Severe anemia


Status: Acute   





(5) A-fib


Status: Chronic   





- Assessment and Plan (Free Text)


Plan: 





dialysis MWF


IV ABs


check labs











Objective





- Vital Signs/Intake and Output


Vital Signs (last 24 hours): 


                                        











Temp Pulse Resp BP Pulse Ox


 


 98.3 F   67   20   115/68   98 


 


 01/10/19 07:41  01/10/19 07:41  01/10/19 07:41  01/10/19 07:41  01/10/19 07:41








Intake and Output: 


                                        











 01/10/19 01/11/19





 18:59 06:59


 


Intake Total 400 


 


Balance 400 














- Medications


Medications: 


                               Current Medications





Apixaban (Eliquis)  2.5 mg PO BID [facility]


   Last Admin: 01/10/19 12:13 Dose:  2.5 mg


Calcium Acetate (Phoslo)  667 mg PO TIDCC [facility]


   Last Admin: 01/10/19 12:13 Dose:  667 mg


Epoetin Damir (Procrit)  10,000 unit IV MWF [facility]


   Last Admin: 01/09/19 11:34 Dose:  10,000 unit


Daptomycin 900 mg/ Sodium (Chloride)  100 mls @ 100 mls/hr IV MWF@1400 [facility]; Prot

ocol


   Last Admin: 01/09/19 15:02 Dose:  100 mls/hr


Insulin Human Regular (Novolin R)  0 unit SC [facility]; Protocol


   Last Admin: 01/10/19 09:13 Dose:  Not Given


Ketorolac Tromethamine (Toradol)  10 mg PO BID PRN


   PRN Reason: 4-8


   Last Admin: 01/10/19 12:18 Dose:  10 mg


Nateglinide (Starlix)  60 mg PO ACTID [facility]


   Last Admin: 01/10/19 12:19 Dose:  Not Given


Pantoprazole Sodium (Protonix Ec Tab)  40 mg PO DAILY [facility]


   Last Admin: 01/10/19 12:14 Dose:  40 mg


Tobramycin/Dexamethasone (Tobradex Opht Susp)  0.1 ml OS BID PRN











- Labs


Labs: 


                                        





                                 01/06/19 08:17 





                                 01/06/19 08:17 





                                        











PT  22.0 SECONDS (9.7-12.2)  H  12/30/18  15:38    


 


INR  2.0   12/30/18  15:38    


 


APTT  36 SECONDS (21-34)  H  12/30/18  15:38

## 2019-01-10 NOTE — CP.PCM.PN
Subjective





- Date & Time of Evaluation


Date of Evaluation: 01/10/19


Time of Evaluation: 10:10





- Subjective


Subjective: 








Podiatry Progress Note - Dr. Thornton





63M seen and evaluated for right lower leg wound with attending Dr. Thornton at 

bedside.  Patient is seen resting comfortably in bed at time of visit and in 

NAD. Patient denies any acute overnight events. Denies any pain. Patient seen 

wearing multipodus boots b/l. Dressing clean, dry and intact.  Patient denies 

F/N/V/SOB/chills. 





Objective





- Vital Signs/Intake and Output


Vital Signs (last 24 hours): 


                                        











Temp Pulse Resp BP Pulse Ox


 


 98.3 F   67   20   115/68   98 


 


 01/10/19 07:41  01/10/19 07:41  01/10/19 07:41  01/10/19 07:41  01/10/19 07:41











- Medications


Medications: 


                               Current Medications





Apixaban (Eliquis)  2.5 mg PO BID [facility]


   Last Admin: 01/10/19 12:13 Dose:  2.5 mg


Calcium Acetate (Phoslo)  667 mg PO TIDCC [facility]


   Last Admin: 01/10/19 12:13 Dose:  667 mg


Epoetin Damir (Procrit)  10,000 unit IV MWF [facility]


   Last Admin: 01/09/19 11:34 Dose:  10,000 unit


Daptomycin 900 mg/ Sodium (Chloride)  100 mls @ 100 mls/hr IV MWF@1400 [facility]; 

Protocol


   Last Admin: 01/09/19 15:02 Dose:  100 mls/hr


Insulin Human Regular (Novolin R)  0 unit SC ACHS [facility]; Protocol


   Last Admin: 01/10/19 09:13 Dose:  Not Given


Ketorolac Tromethamine (Toradol)  10 mg PO BID PRN


   PRN Reason: 4-8


   Last Admin: 01/10/19 12:18 Dose:  10 mg


Nateglinide (Starlix)  60 mg PO ACTID [facility]


   Last Admin: 01/10/19 12:19 Dose:  Not Given


Pantoprazole Sodium (Protonix Ec Tab)  40 mg PO DAILY [facility]


   Last Admin: 01/10/19 12:14 Dose:  40 mg


Tobramycin/Dexamethasone (Tobradex Opht Susp)  0.1 ml OS BID PRN











- Labs


Labs: 


                                        





                                 01/06/19 08:17 





                                 01/06/19 08:17 





                                        











PT  22.0 SECONDS (9.7-12.2)  H  12/30/18  15:38    


 


INR  2.0   12/30/18  15:38    


 


APTT  36 SECONDS (21-34)  H  12/30/18  15:38    














- Constitutional


Appears: Well, Non-toxic, No Acute Distress





- Extremities Exam


Extremities Exam: absent: Calf Tenderness


Additional comments: 





Right lower extremity focused exam:


Vasc: Nonpalpable pulses to the DP and PT, Delayed CFT to the digits, 

Temperature gradient WNL, +1 pitting pedal edema


Derm: Full thickness ulceration noted to right posterior ankle at level of 

Achilles insertion measuring approximately 2 cm x 2 cm x 1cm, with mixture 

granular and fibrotic wound base. Mild serous drainage appreciated, no 

fluctuance, no streaking, mild erythema appreciated periwound. Additional small 

ulceration appreciated to medail aspect of ankle measuring approximately 1cm x 

1cm x .01cm.  Mild serous drainage appreciated, no purulence. Hyperpigmentation 

appreciated circumfrentially to lower extremity.  No fluctuance noted. 


Neuro: Gross sensation diminished, protective sensation absent 


Ortho: Tenderness to palpation of R heel ulceration 








- Neurological Exam


Neurological Exam: Alert, Awake, Oriented x3





Assessment and Plan





- Assessment and Plan (Free Text)


Assessment: 





63 year old male patient with right lower extremity ulcerations- stable


Plan: 





Patient seen and evaluated at bedside with attending Dr. Thornton


Labs, vitals reviewed- Afebrile, absent leukocytosis WBC 8.0 


R foot Wound cx  MRSA


Blood cx MRSA


Foot X-ray taken: possible signs of acute OM, further imaging recommended


IV abx per ID


No surgical intervention during hospital stay


Will provide local wound care


Cleansed wounds with saline, dressed with saline w2d and kerlix


Will continue to follow patient while in house

## 2019-01-10 NOTE — CARD
--------------- APPROVED REPORT --------------





Date of service: 01/10/2019



EXAM: LIMITED Two-dimensional and M-mode echocardiogram.



INDICATION

Infection:



Mitral Valve

E/A ratio0.0



TDI

E/Lateral E'0.0E/Medial E'0.0



<Conclusion>

Nodefinite vegetation seen.

Catheter seen in RA. 

Consider REINA if clinically indicated.

## 2019-01-11 LAB
ALBUMIN SERPL-MCNC: 2.8 G/DL (ref 3.5–5)
ALBUMIN/GLOB SERPL: 0.8 {RATIO} (ref 1–2.1)
ALT SERPL-CCNC: 18 U/L (ref 21–72)
AST SERPL-CCNC: 14 U/L (ref 17–59)
BUN SERPL-MCNC: 32 MG/DL (ref 9–20)
CALCIUM SERPL-MCNC: 7.6 MG/DL (ref 8.6–10.4)
ERYTHROCYTE [DISTWIDTH] IN BLOOD BY AUTOMATED COUNT: 18.4 % (ref 11.5–14.5)
GFR NON-AFRICAN AMERICAN: 18
HGB BLD-MCNC: 8.2 G/DL (ref 12–18)
MCH RBC QN AUTO: 27.8 PG (ref 27–31)
MCHC RBC AUTO-ENTMCNC: 31 G/DL (ref 33–37)
MCV RBC AUTO: 89.7 FL (ref 80–94)
PLATELET # BLD: 184 K/UL (ref 130–400)
PMV BLD AUTO: 7.7 FL (ref 7.2–11.7)
RBC # BLD AUTO: 2.94 MIL/UL (ref 4.4–5.9)
WBC # BLD AUTO: 5.7 K/UL (ref 4.8–10.8)

## 2019-01-11 RX ADMIN — HUMAN INSULIN SCH: 100 INJECTION, SOLUTION SUBCUTANEOUS at 21:43

## 2019-01-11 RX ADMIN — PANTOPRAZOLE SODIUM SCH: 40 TABLET, DELAYED RELEASE ORAL at 10:33

## 2019-01-11 RX ADMIN — HUMAN INSULIN SCH: 100 INJECTION, SOLUTION SUBCUTANEOUS at 07:37

## 2019-01-11 RX ADMIN — HUMAN INSULIN SCH: 100 INJECTION, SOLUTION SUBCUTANEOUS at 17:16

## 2019-01-11 RX ADMIN — Medication SCH: at 11:08

## 2019-01-11 RX ADMIN — HUMAN INSULIN SCH: 100 INJECTION, SOLUTION SUBCUTANEOUS at 12:11

## 2019-01-11 RX ADMIN — ERYTHROPOIETIN SCH UNIT: 10000 INJECTION, SOLUTION INTRAVENOUS; SUBCUTANEOUS at 10:47

## 2019-01-11 NOTE — CP.PCM.PN
Subjective





- Date & Time of Evaluation


Date of Evaluation: 01/11/19


Time of Evaluation: 18:47





- Subjective


Subjective: 


INFECTIOUS DISEASE PROGRESS NOTES


RAHEEM CANADA MD, FACP


6T [id number]


1/11/2019


CHART REVIEWED


PT EXAMINED


CASE DISCUSSED 





AWAKE, ALERT, UNDERSTANDS HIS ACUTE/CHRONIC CONDITION BEING TREATED PRESENTLY BY

HIS PHYSICIANS.


afebrile course


BP low





echo- no vegetation


remains on daptomycin FOR MRSA WITH NOTED ROSE=1, AND DIFFICULT TO OBTAIN 

ADEQUATE VANCO LEVELS HISTORICALLY APPRECIATED.


WILL NEED 4 MORE WEEKS OF DAPTOMYCIN.





Objective





- Vital Signs/Intake and Output


Vital Signs (last 24 hours): 


                                        











Temp Pulse Resp BP Pulse Ox


 


 97.7 F   94 H  19   71/37 L  100 


 


 01/11/19 09:40  01/11/19 09:40  01/11/19 09:40  01/11/19 12:40  01/11/19 09:40








Intake and Output: 


                                        











 01/11/19 01/11/19





 06:59 18:59


 


Intake Total 300 


 


Balance 300 














- Medications


Medications: 


                               Current Medications





Apixaban (Eliquis)  2.5 mg PO BID [facility]


   Last Admin: 01/11/19 10:33 Dose:  Not Given


Calcium Acetate (Phoslo)  667 mg PO TIDCC [facility]


   Last Admin: 01/11/19 13:03 Dose:  Not Given


Epoetin Damir (Procrit)  10,000 unit IV MWF [facility]


   Last Admin: 01/11/19 10:47 Dose:  10,000 unit


Daptomycin 900 mg/ Sodium (Chloride)  100 mls @ 100 mls/hr IV MWF@1400 [facility]; 

Protocol


   Last Admin: 01/09/19 15:02 Dose:  100 mls/hr


Insulin Human Regular (Novolin R)  0 unit SC ACHS [facility]; Protocol


   Last Admin: 01/11/19 12:11 Dose:  Not Given


Ketorolac Tromethamine (Toradol)  10 mg PO BID PRN


   PRN Reason: 4-8


   Last Admin: 01/10/19 22:34 Dose:  10 mg


Nateglinide (Starlix)  60 mg PO ACTID [facility]


   Last Admin: 01/11/19 13:03 Dose:  Not Given


Pantoprazole Sodium (Protonix Ec Tab)  40 mg PO DAILY [facility]


   Last Admin: 01/11/19 10:33 Dose:  Not Given


Tobramycin/Dexamethasone (Tobradex Opht Susp)  0.1 ml OS BID PRN











- Labs


Labs: 


                                        





                                 01/11/19 06:36 





                                 01/11/19 06:36 





                                        











PT  22.0 SECONDS (9.7-12.2)  H  12/30/18  15:38    


 


INR  2.0   12/30/18  15:38    


 


APTT  36 SECONDS (21-34)  H  12/30/18  15:38    














- Constitutional


Appears: No Acute Distress, Chronically Ill





- Head Exam


Head Exam: ATRAUMATIC, NORMAL INSPECTION





- Eye Exam


Eye Exam: EOMI, Normal appearance





- Neck Exam


Neck Exam: Normal Inspection.  absent: Tenderness





- Respiratory Exam


Respiratory Exam: Clear to Ausculation Bilateral, NORMAL BREATHING PATTERN





- Cardiovascular Exam


Cardiovascular Exam: REGULAR RHYTHM, +S1





- GI/Abdominal Exam


GI & Abdominal Exam: Soft.  absent: Tenderness





- Extremities Exam


Extremities Exam: Normal Inspection.  absent: Tenderness





- Neurological Exam


Neurological Exam: Awake, CN II-XII Intact





- Skin


Skin Exam: Dry, Warm





Assessment and Plan


(1) Cellulitis


Status: Acute   





(2) ESRD (end stage renal disease)


Status: Acute   





(3) Fluid overload


Status: Acute   





(4) Severe anemia


Status: Acute   





(5) A-fib


Status: Chronic   





- Assessment and Plan (Free Text)


Plan: 





dialysis MWF


Adequate UF


CONTINUE DAPTOMYCIN AT MRSA DOSES


WILL REVIEW AND DISCUSS ACCORDINGLY


Monitor BP











Objective





- Vital Signs/Intake and Output


Vital Signs (last 24 hours): 


                                        











Temp Pulse Resp BP Pulse Ox


 


 97.5 F L  101 H  20   124/73   99 


 


 01/11/19 15:55  01/11/19 15:55  01/11/19 15:55  01/11/19 15:55  01/11/19 15:55








Intake and Output: 


                                        











 01/11/19 01/11/19





 06:59 18:59


 


Intake Total 300 


 


Balance 300 














- Medications


Medications: 


                               Current Medications





Apixaban (Eliquis)  2.5 mg PO BID [facility]


   Last Admin: 01/11/19 17:58 Dose:  2.5 mg


Calcium Acetate (Phoslo)  667 mg PO TIDCC [facility]


   Last Admin: 01/11/19 17:58 Dose:  667 mg


Epoetin Damir (Procrit)  10,000 unit IV MWF [facility]


   Last Admin: 01/11/19 10:47 Dose:  10,000 unit


Daptomycin 900 mg/ Sodium (Chloride)  100 mls @ 100 mls/hr IV MWF@1400 [facility]; 

Protocol


   Last Admin: 01/11/19 13:45 Dose:  100 mls/hr


Insulin Human Regular (Novolin R)  0 unit SC ACHS [facility]; Protocol


   Last Admin: 01/11/19 17:16 Dose:  Not Given


Ketorolac Tromethamine (Toradol)  10 mg PO BID PRN


   PRN Reason: 4-8


   Last Admin: 01/10/19 22:34 Dose:  10 mg


Nateglinide (Starlix)  60 mg PO ACTID [facility]


   Last Admin: 01/11/19 17:17 Dose:  Not Given


Pantoprazole Sodium (Protonix Ec Tab)  40 mg PO DAILY [facility]


   Last Admin: 01/11/19 10:33 Dose:  Not Given


Tobramycin/Dexamethasone (Tobradex Opht Susp)  0.1 ml OS BID PRN











- Labs


Labs: 


                                        





                                 01/11/19 06:36 





                                 01/11/19 06:36 





                                        











PT  22.0 SECONDS (9.7-12.2)  H  12/30/18  15:38    


 


INR  2.0   12/30/18  15:38    


 


APTT  36 SECONDS (21-34)  H  12/30/18  15:38

## 2019-01-11 NOTE — CP.PCM.PN
Subjective





- Date & Time of Evaluation


Date of Evaluation: 01/11/19


Time of Evaluation: 13:12





- Subjective


Subjective: 





seen at dialysis; UF 3000ml


same sluggishness


afebrile course


BP low


Hg still low despite EPO


echo- no vegetation


remains on daptomycin





Objective





- Vital Signs/Intake and Output


Vital Signs (last 24 hours): 


                                        











Temp Pulse Resp BP Pulse Ox


 


 97.7 F   94 H  19   71/37 L  100 


 


 01/11/19 09:40  01/11/19 09:40  01/11/19 09:40  01/11/19 12:40  01/11/19 09:40








Intake and Output: 


                                        











 01/11/19 01/11/19





 06:59 18:59


 


Intake Total 300 


 


Balance 300 














- Medications


Medications: 


                               Current Medications





Apixaban (Eliquis)  2.5 mg PO BID [facility]


   Last Admin: 01/11/19 10:33 Dose:  Not Given


Calcium Acetate (Phoslo)  667 mg PO TIDCC [facility]


   Last Admin: 01/11/19 13:03 Dose:  Not Given


Epoetin Damir (Procrit)  10,000 unit IV MWF [facility]


   Last Admin: 01/11/19 10:47 Dose:  10,000 unit


Daptomycin 900 mg/ Sodium (Chloride)  100 mls @ 100 mls/hr IV MWF@1400 [facility]; 

Protocol


   Last Admin: 01/09/19 15:02 Dose:  100 mls/hr


Insulin Human Regular (Novolin R)  0 unit SC ACHS [facility]; Protocol


   Last Admin: 01/11/19 12:11 Dose:  Not Given


Ketorolac Tromethamine (Toradol)  10 mg PO BID PRN


   PRN Reason: 4-8


   Last Admin: 01/10/19 22:34 Dose:  10 mg


Nateglinide (Starlix)  60 mg PO ACTID [facility]


   Last Admin: 01/11/19 13:03 Dose:  Not Given


Pantoprazole Sodium (Protonix Ec Tab)  40 mg PO DAILY [facility]


   Last Admin: 01/11/19 10:33 Dose:  Not Given


Tobramycin/Dexamethasone (Tobradex Opht Susp)  0.1 ml OS BID PRN











- Labs


Labs: 


                                        





                                 01/11/19 06:36 





                                 01/11/19 06:36 





                                        











PT  22.0 SECONDS (9.7-12.2)  H  12/30/18  15:38    


 


INR  2.0   12/30/18  15:38    


 


APTT  36 SECONDS (21-34)  H  12/30/18  15:38    














- Constitutional


Appears: No Acute Distress, Chronically Ill





- Head Exam


Head Exam: ATRAUMATIC, NORMAL INSPECTION





- Eye Exam


Eye Exam: EOMI, Normal appearance





- Neck Exam


Neck Exam: Normal Inspection.  absent: Tenderness





- Respiratory Exam


Respiratory Exam: Clear to Ausculation Bilateral, NORMAL BREATHING PATTERN





- Cardiovascular Exam


Cardiovascular Exam: REGULAR RHYTHM, +S1





- GI/Abdominal Exam


GI & Abdominal Exam: Soft.  absent: Tenderness





- Extremities Exam


Extremities Exam: Normal Inspection.  absent: Tenderness





- Neurological Exam


Neurological Exam: Awake, CN II-XII Intact





- Skin


Skin Exam: Dry, Warm





Assessment and Plan


(1) Cellulitis


Status: Acute   





(2) ESRD (end stage renal disease)


Status: Acute   





(3) Fluid overload


Status: Acute   





(4) Severe anemia


Status: Acute   





(5) A-fib


Status: Chronic   





- Assessment and Plan (Free Text)


Plan: 





dialysis MWF


Adequate UF


IV ABs


EPO


Monitor BP

## 2019-01-12 RX ADMIN — Medication SCH: at 10:10

## 2019-01-12 RX ADMIN — HUMAN INSULIN SCH: 100 INJECTION, SOLUTION SUBCUTANEOUS at 12:01

## 2019-01-12 RX ADMIN — PANTOPRAZOLE SODIUM SCH MG: 40 TABLET, DELAYED RELEASE ORAL at 10:09

## 2019-01-12 RX ADMIN — HUMAN INSULIN SCH: 100 INJECTION, SOLUTION SUBCUTANEOUS at 21:22

## 2019-01-12 RX ADMIN — HUMAN INSULIN SCH: 100 INJECTION, SOLUTION SUBCUTANEOUS at 16:41

## 2019-01-12 RX ADMIN — HUMAN INSULIN SCH: 100 INJECTION, SOLUTION SUBCUTANEOUS at 08:41

## 2019-01-12 NOTE — CP.PCM.PN
Subjective





- Date & Time of Evaluation


Date of Evaluation: 01/12/19


Time of Evaluation: 09:59





- Subjective


Subjective: 





Notes reviewed


Remains in bed


NO overnight events


Family at bedside, care reviewed


Tolerated dialysis well 1/11


Fatigue and weakness


Poorly mobile in  bed


10 point ros negative other than stated above





Objective





- Vital Signs/Intake and Output


Vital Signs (last 24 hours): 


                                        











Temp Pulse Resp BP Pulse Ox


 


 97.8 F   114 H  20   134/80   100 


 


 01/12/19 08:31  01/12/19 08:31  01/12/19 08:31  01/12/19 08:31  01/12/19 08:31








Intake and Output: 


                                        











 01/12/19 01/12/19





 06:59 18:59


 


Intake Total 400 


 


Balance 400 














- Medications


Medications: 


                               Current Medications





Apixaban (Eliquis)  2.[facility]ID [facility]


   Last Admin: 01/11/19 17:58 Dose:  2.5 mg


Calcium Acetate (Phoslo)  667 mg PO [facility]CC [facility]


   Last Admin: 01/12/19 08:41 Dose:  667 mg


Epoetin Damir (Procrit)  10,000 unit IV [facility]WF [facility]


   Last Admin: 01/11/19 10:47 Dose:  10,000 unit


Daptomycin 900 mg/ Sodium (Chloride)  100 mls @ 100 mls/hr IV MWF@1400 [facility]; 

Protocol


   Last Admin: 01/11/19 13:45 Dose:  100 mls/hr


Insulin Human Regular (Novolin R)  0 unit SC ACHS [facility]; Protocol


   Last Admin: 01/12/19 08:41 Dose:  Not Given


Ketorolac Tromethamine (Toradol)  10 mg PO BID PRN


   PRN Reason: 4-8


   Last Admin: 01/10/19 22:34 Dose:  10 mg


Nateglinide (Starlix)  60 mg PO ACTID [facility]


   Last Admin: 01/12/19 08:41 Dose:  60 mg


Pantoprazole Sodium (Protonix Ec Tab)  40 mg PO DAILY [facility]


   Last Admin: 01/11/19 10:33 Dose:  Not Given


Tobramycin/Dexamethasone (Tobradex Opht Susp)  0.1 ml OS BID PRN











- Labs


Labs: 


                                        





                                 01/11/19 06:36 





                                 01/11/19 06:36 





                                        











PT  22.0 SECONDS (9.7-12.2)  H  12/30/18  15:38    


 


INR  2.0   12/30/18  15:38    


 


APTT  36 SECONDS (21-34)  H  12/30/18  15:38    














- Constitutional


Appears: Non-toxic, Chronically Ill





- Head Exam


Head Exam: ATRAUMATIC, NORMAL INSPECTION





- Eye Exam


Eye Exam: EOMI, Normal appearance





- ENT Exam


ENT Exam: Mucous Membranes Moist, Normal Oropharynx





- Neck Exam


Neck Exam: absent: Lymphadenopathy, Thyromegaly





- Respiratory Exam


Respiratory Exam: Rhonchi.  absent: Rales





- Cardiovascular Exam


Cardiovascular Exam: +S1, +S2.  absent: Rubs





- GI/Abdominal Exam


GI & Abdominal Exam: Soft, Normal Bowel Sounds





- Extremities Exam


Extremities Exam: Pedal Edema, Tenderness





- Neurological Exam


Neurological Exam: Alert, Awake





- Skin


Skin Exam: Dry, Intact





Assessment and Plan


(1) Hypokalemia


Status: Acute   





(2) Cellulitis


Status: Acute   





(3) ESRD (end stage renal disease) on dialysis


Status: Acute   





(4) PVD (peripheral vascular disease)


Status: Acute   





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


Status: Acute   





(6) Coagulopathy


Status: Chronic   





(7) Peripheral edema


Status: Chronic   





- Assessment and Plan (Free Text)


Assessment: 





Maintain dialysis schedule


Next treatment 1/14


Abx as ordered


Wound care


Continue current care

## 2019-01-12 NOTE — CP.PCM.PN
<Nyasia Weston - Last Filed: 01/12/19 15:52>





Subjective





- Date & Time of Evaluation


Date of Evaluation: 01/12/19


Time of Evaluation: 15:46





- Subjective


Subjective: 





Podiatry Progress Note - Dr. Thornton





63M seen and evaluated for right lower leg wound with attending Dr. Thornton at 

bedside.  Patient is seen resting comfortably in bed at time of visit and in 

NAD. Patient denies any acute overnight events. Denies any pain. Patient seen 

wearing multipodus boots b/l. Family at bedside. Dressing clean, and intact.  

Patient denies F/N/V/SOB/chills. 











Objective





- Vital Signs/Intake and Output


Vital Signs (last 24 hours): 


                                        











Temp Pulse Resp BP Pulse Ox


 


 97.8 F   114 H  20   134/80   100 


 


 01/12/19 08:31  01/12/19 08:31  01/12/19 08:31  01/12/19 08:31  01/12/19 08:31








Intake and Output: 


                                        











 01/12/19 01/12/19





 06:59 18:59


 


Intake Total 400 


 


Balance 400 














- Medications


Medications: 


                               Current Medications





Apixaban (Eliquis)  2.5 mg PO BID [facility]


   Last Admin: 01/12/19 10:09 Dose:  2.5 mg


Calcium Acetate (Phoslo)  667 mg PO TIDCC [facility]


   Last Admin: 01/12/19 12:46 Dose:  Not Given


Epoetin Damir (Procrit)  10,000 unit IV [facility]


   Last Admin: 01/11/19 10:47 Dose:  10,000 unit


Daptomycin 900 mg/ Sodium (Chloride)  100 mls @ 100 mls/hr IV [facility]; Protocol


   Stop: 01/19/19 14:01


Insulin Human Regular (Novolin R)  0 unit SC [facility]; Protocol


   Last Admin: 01/12/19 12:01 Dose:  Not Given


Ketorolac Tromethamine (Toradol)  10 mg PO BID PRN


   PRN Reason: 4-8


   Last Admin: 01/10/19 22:34 Dose:  10 mg


Nateglinide (Starlix)  60 mg PO ACTID [facility]


   Last Admin: 01/12/19 12:02 Dose:  Not Given


Pantoprazole Sodium (Protonix Ec Tab)  40 mg PO DAILY [facility]


   Last Admin: 01/12/19 10:09 Dose:  40 mg


Tobramycin/Dexamethasone (Tobradex Opht Susp)  0.1 ml OS BID PRN











- Labs


Labs: 


                                        





                                 01/11/19 06:36 





                                 01/11/19 06:36 





                                        











PT  22.0 SECONDS (9.7-12.2)  H  12/30/18  15:38    


 


INR  2.0   12/30/18  15:38    


 


APTT  36 SECONDS (21-34)  H  12/30/18  15:38    














- Constitutional


Appears: Well, Non-toxic, No Acute Distress





- Head Exam


Head Exam: ATRAUMATIC, NORMOCEPHALIC





- Eye Exam


Eye Exam: Normal appearance





- ENT Exam


ENT Exam: Mucous Membranes Moist





- Respiratory Exam


Respiratory Exam: Clear to Ausculation Bilateral





- Extremities Exam


Additional comments: 


Right lower extremity focused exam:


Vasc: Nonpalpable pulses to the DP and PT, Delayed CFT to the digits, 

Temperature gradient WNL, +1 pitting pedal edema


Derm: Full thickness ulceration noted to right posterior ankle at level of 

Achilles insertion measuring approximately 2 cm x 2 cm x 1cm, with mixture 

granular and fibrotic wound base. Mild serous drainage appreciated, no 

fluctuance, no streaking, mild erythema appreciated periwound. 


ulceration appreciated to medail aspect of ankle measuring approximately 2cm x 

2cm x .01cm with 100% fibrotic macerated tissue Mild purulent drainage 

appreciated,  Hyperpigmentation appreciated circumfrentially to lower extremity.

 No fluctuance noted. 


Neuro: Gross sensation diminished, protective sensation absent 


Ortho: Tenderness to palpation of R heel ulceration 








Assessment and Plan





- Assessment and Plan (Free Text)


Assessment: 





63 year old male patient with right lower extremity ulceration with purulence 





Plan: 


Patient seen and evaluated at bedside with attending Dr. Thornton


Labs, vitals reviewed- Afebrile, absent leukocytosis WBC 8.0 


New wound cultures taken and ordered


Blood cx MRSA


Foot X-ray taken: possible signs of acute OM, further imaging recommended


IV abx per ID


bone scan triple phase ordered. 


Podiatry plan: plan for incision and drainage early this week 

(tuesday/wednesday). Please provide medical clearance. 


Will provide local wound care


Cleansed wounds with saline, dressed with betadine soaked gauze  and kerlix


Will continue to follow patient while in house








<Faraz Thornton - Last Filed: 01/13/19 11:01>





Objective





- Vital Signs/Intake and Output


Vital Signs (last 24 hours): 


                                        











Temp Pulse Resp BP Pulse Ox


 


 97.4 F L  110 H  20   111/68   100 


 


 01/13/19 07:00  01/13/19 07:00  01/13/19 07:00  01/13/19 07:00  01/13/19 07:00








Intake and Output: 


                                        











 01/13/19 01/13/19





 06:59 18:59


 


Intake Total 150 


 


Balance 150 














- Medications


Medications: 


                               Current Medications





Apixaban (Eliquis)  2.5 mg PO BID [facility]


   Last Admin: 01/12/19 18:27 Dose:  2.5 mg


Calcium Acetate (Phoslo)  667 mg PO TIDCC [facility]


   Last Admin: 01/13/19 08:25 Dose:  Not Given


Epoetin Damir (Procrit)  10,000 unit IV [facility]


   Last Admin: 01/11/19 10:47 Dose:  10,000 unit


Daptomycin 900 mg/ Sodium (Chloride)  100 mls @ 100 mls/hr IV [facility]; Protocol


   Stop: 01/19/19 14:01


Insulin Human Regular (Novolin R)  0 unit SC [facility]; Protocol


   Last Admin: 01/13/19 08:25 Dose:  Not Given


Nateglinide (Starlix)  60 mg PO ACTID [facility]


   Last Admin: 01/13/19 08:25 Dose:  Not Given


Pantoprazole Sodium (Protonix Ec Tab)  40 mg PO DAILY [facility]


   Last Admin: 01/12/19 10:09 Dose:  40 mg


Tobramycin/Dexamethasone (Tobradex Opht Susp)  0.1 ml OS BID PRN











- Labs


Labs: 


                                        





                                 01/11/19 06:36 





                                 01/11/19 06:36 





                                        











PT  22.0 SECONDS (9.7-12.2)  H  12/30/18  15:38    


 


INR  2.0   12/30/18  15:38    


 


APTT  36 SECONDS (21-34)  H  12/30/18  15:38    














Assessment and Plan





- Assessment and Plan (Free Text)


Plan: 


Patient was seen at bedside with resident . foot has new anterior ankle wound 

which was not present at last eval 1/10/19 . Increased drainage noted today 

.bone scan ordered . Plan is for debridement this week . /Dr TOMAS Thornton .Eval 

performed 1/12/19

## 2019-01-13 RX ADMIN — PANTOPRAZOLE SODIUM SCH: 40 TABLET, DELAYED RELEASE ORAL at 10:00

## 2019-01-13 RX ADMIN — HUMAN INSULIN SCH: 100 INJECTION, SOLUTION SUBCUTANEOUS at 08:25

## 2019-01-13 RX ADMIN — HUMAN INSULIN SCH: 100 INJECTION, SOLUTION SUBCUTANEOUS at 17:07

## 2019-01-13 RX ADMIN — HUMAN INSULIN SCH: 100 INJECTION, SOLUTION SUBCUTANEOUS at 21:58

## 2019-01-13 RX ADMIN — Medication SCH: at 10:00

## 2019-01-13 RX ADMIN — HUMAN INSULIN SCH: 100 INJECTION, SOLUTION SUBCUTANEOUS at 12:00

## 2019-01-13 NOTE — CP.PCM.PN
Subjective





- Date & Time of Evaluation


Date of Evaluation: 01/13/19


Time of Evaluation: 13:49





- Subjective


Subjective: 





Patient is lying down comfortably.


He is having no fever.


Currently he is tolerating the Cubicin antibiotic.


Right leg ulcers are improving.


The small is less.


Tolerating the hemodialysis yesterday.


We will continue to monitor.


Speak with infectious disease for further management.


According to the current treatment the patient will need this antibiotic for 

minimum of 4 weeks.


We will follow the patient





Objective





- Vital Signs/Intake and Output


Vital Signs (last 24 hours): 


                                        











Temp Pulse Resp BP Pulse Ox


 


 97.4 F L  110 H  20   111/68   100 


 


 01/13/19 07:00  01/13/19 07:00  01/13/19 07:00  01/13/19 07:00  01/13/19 07:00








Intake and Output: 


                                        











 01/13/19 01/13/19





 06:59 18:59


 


Intake Total 150 


 


Balance 150 














- Medications


Medications: 


                               Current Medications





Apixaban (Eliquis)  2.5 mg PO BID [facility]


   Last Admin: 01/13/19 10:00 Dose:  Not Given


Calcium Acetate (Phoslo)  667 mg PO TIDCC [facility]


   Last Admin: 01/13/19 13:11 Dose:  667 mg


Epoetin Damir (Procrit)  10,000 unit IV MWF [facility]


   Last Admin: 01/11/19 10:47 Dose:  10,000 unit


Daptomycin 900 mg/ Sodium (Chloride)  100 mls @ 100 mls/hr IV F [facility]; Protocol


   Stop: 01/19/19 14:01


Insulin Human Regular (Novolin R)  0 unit SC [facility]; Protocol


   Last Admin: 01/13/19 12:00 Dose:  Not Given


Nateglinide (Starlix)  60 mg PO ACTID [facility]


   Last Admin: 01/13/19 13:10 Dose:  60 mg


Pantoprazole Sodium (Protonix Ec Tab)  40 mg PO DAILY [facility]


   Last Admin: 01/13/19 10:00 Dose:  Not Given


Tobramycin/Dexamethasone (Tobradex Opht Susp)  0.1 ml OS BID PRN











- Labs


Labs: 


                                        





                                 01/11/19 06:36 





                                 01/11/19 06:36 





                                        











PT  22.0 SECONDS (9.7-12.2)  H  12/30/18  15:38    


 


INR  2.0   12/30/18  15:38    


 


APTT  36 SECONDS (21-34)  H  12/30/18  15:38

## 2019-01-13 NOTE — CP.PCM.PN
Subjective





- Date & Time of Evaluation


Date of Evaluation: 01/13/19


Time of Evaluation: 16:00





- Subjective


Subjective: 


INFECTIOUS DISEASE PROGRESS NOTES


RAHEEM CANADA MD, FACP


1/13/2018


6T 655


CHART REVIEWED


CASE DISCUSSED








63 YR OLD MALE WAS FOUND AND seen APPARENTLY - evaluated for right lower leg 

wound with attending Dr. Thornton at bedside, ON 1/12/2019.  Patient is seen 

resting comfortably in bed at time of visit and in NAD, ON AND OFF. Patient 

denies any acute overnight events. Denies any pain. Patient seen wearing 

multipodus boots b/l. Family at bedside.  Patient denies F/N/V/SOB/chills. 











Objective





- Vital Signs/Intake and Output


Vital Signs (last 24 hours): 


                                        











Temp Pulse Resp BP Pulse Ox


 


 97.8 F   114 H  20   134/80   100 


 


 01/12/19 08:31  01/12/19 08:31  01/12/19 08:31  01/12/19 08:31  01/12/19 08:31








Intake and Output: 


                                        











 01/12/19 01/12/19





 06:59 18:59


 


Intake Total 400 


 


Balance 400 














- Medications


Medications: 


                               Current Medications





Apixaban (Eliquis)  2.5 mg PO BID [facility]


   Last Admin: 01/12/19 10:09 Dose:  2.5 mg


Calcium Acetate (Phoslo)  667 mg PO TIDCC [facility]


   Last Admin: 01/12/19 12:46 Dose:  Not Given


Epoetin Damir (Procrit)  10,000 unit IV [facility]


   Last Admin: 01/11/19 10:47 Dose:  10,000 unit


Daptomycin 900 mg/ Sodium (Chloride)  100 mls @ 100 mls/hr IV [facility]; Protocol


   Stop: 01/19/19 14:01


Insulin Human Regular (Novolin R)  0 unit SC [facility]; Protocol


   Last Admin: 01/12/19 12:01 Dose:  Not Given


Ketorolac Tromethamine (Toradol)  10 mg PO BID PRN


   PRN Reason: 4-8


   Last Admin: 01/10/19 22:34 Dose:  10 mg


Nateglinide (Starlix)  60 mg PO ACTID [facility]


   Last Admin: 01/12/19 12:02 Dose:  Not Given


Pantoprazole Sodium (Protonix Ec Tab)  40 mg PO DAILY [facility]


   Last Admin: 01/12/19 10:09 Dose:  40 mg


Tobramycin/Dexamethasone (Tobradex Opht Susp)  0.1 ml OS BID PRN











- Labs


Labs: 


                                        





                                 01/11/19 06:36 





                                 01/11/19 06:36 





                                        











PT  22.0 SECONDS (9.7-12.2)  H  12/30/18  15:38    


 


INR  2.0   12/30/18  15:38    


 


APTT  36 SECONDS (21-34)  H  12/30/18  15:38    














- Constitutional


Appears: Well, Non-toxic, No Acute Distress





- Head Exam


Head Exam: ATRAUMATIC, NORMOCEPHALIC





- Eye Exam


Eye Exam: Normal appearance





- ENT Exam


ENT Exam: Mucous Membranes Moist





- Respiratory Exam


Respiratory Exam: Clear to Ausculation Bilateral





- Extremities Exam


Additional comments: 


Right lower extremity focused exam:


Vasc: Nonpalpable pulses to the DP and PT, Delayed CFT to the digits, 

Temperature gradient WNL, +1 pitting pedal edema


Derm: Full thickness ulceration noted to right posterior ankle at level of 

Achilles insertion measuring approximately 2 cm x 2 cm x 1cm, with mixture 

granular and fibrotic wound base. Mild serous drainage appreciated, no 

fluctuance, no streaking, mild erythema appreciated periwound. 


ulceration appreciated to medail aspect of ankle measuring approximately 2cm x 

2cm x .01cm with 100% fibrotic macerated tissue Mild purulent drainage 

appreciated,  Hyperpigmentation appreciated circumfrentially to lower extremity.

 No fluctuance noted. 


Neuro: Gross sensation diminished, protective sensation absent 


Ortho: Tenderness to palpation of R heel ulceration 








Assessment and Plan





- Assessment and Plan (Free Text)


Assessment: 





63 year old male patient with right lower extremity ulceration with purulence 





Plan: 


Patient seen and evaluated at bedside with attending Dr. Thornton


Labs, vitals reviewed- Afebrile, absent leukocytosis WBC 8.0 


New wound cultures taken and ordered


Blood cx MRSA


Foot X-ray taken: possible signs of acute OM, further imaging recommended


IV abx per ID


APPARENTLY A bone scan, triple phase, ordered. 


Podiatry plan: plan for incision and drainage early this week 

(tuesday/wednesday). Please provide medical clearance. 


PT WITHOUT A DOUBT HAS A SEQUESTRUM OF INFECTION IN HIS LOWER EXTREMITIES.


THE REAL QUESTION IS SHOULD A AMPUTATION BE EVEN CONSIDERED SINCE HE IS BEDRIDD

EN AND DOESN'T FREELY AMBULATE. THESE AREAS HAVE BEEN SOURCES OF RECURRENT 

SITE/SYSTEMIC INFECTION.


THIS TREATMENT COULD NOT BE DONE AT SOME OBSURE DISTANT REMOTE SITE WITHOUT 

SIMULTANEOUS CONCURRENT EXPERTISE.


Cleansed wounds with saline, dressed with betadine soaked gauze  and kerlix


Will continue to follow patient while in house











Objective





- Vital Signs/Intake and Output


Vital Signs (last 24 hours): 


                                        











Temp Pulse Resp BP Pulse Ox


 


 97.4 F L  110 H  20   111/68   100 


 


 01/13/19 07:00  01/13/19 07:00  01/13/19 07:00  01/13/19 07:00  01/13/19 07:00








Intake and Output: 


                                        











 01/13/19 01/13/19





 06:59 18:59


 


Intake Total 150 


 


Balance 150 














- Medications


Medications: 


                               Current Medications





Apixaban (Eliquis)  2.5 mg PO BID [facility]


   Last Admin: 01/12/19 18:27 Dose:  2.5 mg


Calcium Acetate (Phoslo)  667 mg PO TIDCC [facility]


   Last Admin: 01/13/19 08:25 Dose:  Not Given


Epoetin Damir (Procrit)  10,000 unit IV [facility]


   Last Admin: 01/11/19 10:47 Dose:  10,000 unit


Daptomycin 900 mg/ Sodium (Chloride)  100 mls @ 100 mls/hr IV [facility]; Protocol


   Stop: 01/19/19 14:01


Insulin Human Regular (Novolin R)  0 unit SC [facility]; Protocol


   Last Admin: 01/13/19 08:25 Dose:  Not Given


Nateglinide (Starlix)  60 mg PO ACTID [facility]


   Last Admin: 01/13/19 08:25 Dose:  Not Given


Pantoprazole Sodium (Protonix Ec Tab)  40 mg PO DAILY [facility]


   Last Admin: 01/12/19 10:09 Dose:  40 mg


Tobramycin/Dexamethasone (Tobradex Opht Susp)  0.1 ml OS BID PRN











- Labs


Labs: 


                                        





                                 01/11/19 06:36 





                                 01/11/19 06:36 





                                        











PT  22.0 SECONDS (9.7-12.2)  H  12/30/18  15:38    


 


INR  2.0   12/30/18  15:38    


 


APTT  36 SECONDS (21-34)  H  12/30/18  15:38    
































Objective





- Vital Signs/Intake and Output


Vital Signs (last 24 hours): 


                                        











Temp Pulse Resp BP Pulse Ox


 


 97.4 F L  110 H  20   111/68   100 


 


 01/13/19 07:00  01/13/19 07:00  01/13/19 07:00  01/13/19 07:00  01/13/19 07:00








Intake and Output: 


                                        











 01/13/19 01/13/19





 06:59 18:59


 


Intake Total 150 


 


Balance 150 














- Medications


Medications: 


                               Current Medications





Apixaban (Eliquis)  2.5 mg PO BID [facility]


   Last Admin: 01/13/19 10:00 Dose:  Not Given


Calcium Acetate (Phoslo)  667 mg PO TIDCC [facility]


   Last Admin: 01/13/19 13:11 Dose:  667 mg


Epoetin Damir (Procrit)  10,000 unit IV [facility]


   Last Admin: 01/11/19 10:47 Dose:  10,000 unit


Daptomycin 900 mg/ Sodium (Chloride)  100 mls @ 100 mls/hr IV [facility]; Protocol


   Stop: 01/19/19 14:01


Insulin Human Regular (Novolin R)  0 unit SC [facility]; Protocol


   Last Admin: 01/13/19 12:00 Dose:  Not Given


Nateglinide (Starlix)  60 mg PO ACTID [facility]


   Last Admin: 01/13/19 13:10 Dose:  60 mg


Pantoprazole Sodium (Protonix Ec Tab)  40 mg PO DAILY [facility]


   Last Admin: 01/13/19 10:00 Dose:  Not Given


Tobramycin/Dexamethasone (Tobradex Opht Susp)  0.1 ml OS BID PRN











- Labs


Labs: 


                                        





                                 01/11/19 06:36 





                                 01/11/19 06:36 





                                        











PT  22.0 SECONDS (9.7-12.2)  H  12/30/18  15:38    


 


INR  2.0   12/30/18  15:38    


 


APTT  36 SECONDS (21-34)  H  12/30/18  15:38

## 2019-01-14 RX ADMIN — HUMAN INSULIN SCH: 100 INJECTION, SOLUTION SUBCUTANEOUS at 08:01

## 2019-01-14 RX ADMIN — HUMAN INSULIN SCH: 100 INJECTION, SOLUTION SUBCUTANEOUS at 17:07

## 2019-01-14 RX ADMIN — HUMAN INSULIN SCH: 100 INJECTION, SOLUTION SUBCUTANEOUS at 13:22

## 2019-01-14 RX ADMIN — ERYTHROPOIETIN SCH UNIT: 10000 INJECTION, SOLUTION INTRAVENOUS; SUBCUTANEOUS at 13:22

## 2019-01-14 RX ADMIN — HUMAN INSULIN SCH: 100 INJECTION, SOLUTION SUBCUTANEOUS at 21:14

## 2019-01-14 RX ADMIN — Medication SCH: at 09:18

## 2019-01-14 RX ADMIN — PANTOPRAZOLE SODIUM SCH: 40 TABLET, DELAYED RELEASE ORAL at 09:18

## 2019-01-14 NOTE — CP.PCM.PN
Subjective





- Date & Time of Evaluation


Date of Evaluation: 01/14/19


Time of Evaluation: 16:54





- Subjective


Subjective: 


INFECTIOUS DISEASE PROGRESS NOTES


RAHEEM CANADA MD, FACP


1/14/2019


6T 655


CHART REVIEWED


CASE DISCUSSED WITH DR COREA





HEEL IN QUESTION IS MOST PROBABLY THE DEEP SOURCE CAUSING RECURRENT LOCAL AND 

SYSTEMIC INFECTIONS


ERGO AFTER DISCUSSING WITH DR COREA, AMPUTATION OF OFFENDING SITE SHOULD BE 

CONSIDERED.


PRESENTLY REGROWING MRSA FROM SAME SITE-DESPITE OVER 2-3 WEEKS OF IV 

ANTIBIOTICS.





afebrile, feels same


remains on IV dapto for BACTEREMIA AND osteomylitis, WAS TTE DONE?


lytes were stable





Objective





- Vital Signs/Intake and Output


Vital Signs (last 24 hours): 


                                        











Temp Pulse Resp BP Pulse Ox


 


 97.5 F L  80   17   109/43 L  100 


 


 01/14/19 09:45  01/14/19 09:45  01/14/19 09:45  01/14/19 10:15  01/14/19 09:45








Intake and Output: 


                                        











 01/14/19 01/14/19





 06:59 18:59


 


Intake Total 480 


 


Balance 480 














- Medications


Medications: 


                               Current Medications





Apixaban (Eliquis)  2.5 mg PO BID [facility]


   Last Admin: 01/14/19 09:18 Dose:  Not Given


Calcium Acetate (Phoslo)  667 mg PO TIDCC [facility]


   Last Admin: 01/14/19 08:02 Dose:  Not Given


Epoetin Damir (Procrit)  10,000 unit IV [facility]


   Last Admin: 01/11/19 10:47 Dose:  10,000 unit


Daptomycin 900 mg/ Sodium (Chloride)  100 mls @ 100 mls/hr IV [facility]; Protocol


   Stop: 01/19/19 14:01


Insulin Human Regular ([facility]olin R)  [facility]


   Last Admin: 01/14/19 08:01 Dose:  Not Given


Nateglinide (Starlix)  60 mg PO ACTID [facility]


   Last Admin: 01/14/19 08:02 Dose:  Not Given


Pantoprazole Sodium (Protonix Ec Tab)  40 mg PO DAILY [facility]


   Last Admin: 01/14/19 09:18 Dose:  Not Given


Tobramycin/Dexamethasone (Tobradex Opht Susp)  0.1 ml OS BID PRN











- Labs


Labs: 


                                        





                                 01/11/19 06:36 





                                 01/11/19 06:36 





                                        











PT  22.0 SECONDS (9.7-12.2)  H  12/30/18  15:38    


 


INR  2.0   12/30/18  15:38    


 


APTT  36 SECONDS (21-34)  H  12/30/18  15:38    














- Constitutional


Appears: No Acute Distress, Chronically Ill





- Head Exam


Head Exam: ATRAUMATIC, NORMAL INSPECTION





- Eye Exam


Eye Exam: EOMI, Normal appearance





- Neck Exam


Neck Exam: Normal Inspection.  absent: Tenderness





- Respiratory Exam


Respiratory Exam: Clear to Ausculation Bilateral, NORMAL BREATHING PATTERN





- Cardiovascular Exam


Cardiovascular Exam: REGULAR RHYTHM, +S1





- GI/Abdominal Exam


GI & Abdominal Exam: Soft.  absent: Tenderness





- Extremities Exam


Extremities Exam: Normal Inspection.  absent: Tenderness





- Neurological Exam


Neurological Exam: Awake, CN II-XII Intact





- Skin


Skin Exam: Dry, Warm





Assessment and Plan


(1) Cellulitis


Status: Acute   





(2) ESRD (end stage renal disease)


Status: Acute   





(3) Fluid overload


Status: Acute   





(4) Severe anemia


Status: Acute   





(5) A-fib


Status: Chronic   





- Assessment and Plan (Free Text)


Plan: 





Dialysis MWF


IV ABs


monitor wound care as needed











Objective





- Vital Signs/Intake and Output


Vital Signs (last 24 hours): 


                                        











Temp Pulse Resp BP Pulse Ox


 


 98.1 F   110 H  18   97/59 L  100 


 


 01/14/19 15:52  01/14/19 15:52  01/14/19 15:52  01/14/19 15:52  01/14/19 15:52








Intake and Output: 


                                        











 01/14/19 01/14/19





 06:59 18:59


 


Intake Total 480 


 


Balance 480 














- Medications


Medications: 


                               Current Medications





Apixaban (Eliquis)  2.5 mg PO BID [facility]


   Last Admin: 01/14/19 09:18 Dose:  Not Given


Calcium Acetate (Phoslo)  667 mg PO TIDCC [facility]


   Last Admin: 01/14/19 13:18 Dose:  Not Given


Epoetin Damir (Procrit)  10,000 unit IV MWF [facility]


   Last Admin: 01/14/19 13:22 Dose:  10,000 unit


Daptomycin 900 mg/ Sodium (Chloride)  100 mls @ 100 mls/hr IV MWF [facility]; Protocol


   Stop: 01/19/19 14:01


   Last Admin: 01/14/19 14:31 Dose:  100 mls/hr


Insulin Human Regular (Nov[facility] R)  [facility]; Protocol


   Last Admin: 01/14/19 13:22 Dose:  Not Given


Nateglinide (Starlix)  60 mg PO ACTID [facility]


   Last Admin: 01/14/19 13:18 Dose:  Not Given


Pantoprazole Sodium (Protonix Ec Tab)  40 mg PO DAILY [facility]


   Last Admin: 01/14/19 09:18 Dose:  Not Given


Tobramycin/Dexamethasone (Tobradex Opht Susp)  0.1 ml OS BID PRN











- Labs


Labs: 


                                        





                                 01/11/19 06:36 





                                 01/11/19 06:36 





                                        











PT  22.0 SECONDS (9.7-12.2)  H  12/30/18  15:38    


 


INR  2.0   12/30/18  15:38    


 


APTT  36 SECONDS (21-34)  H  12/30/18  15:38

## 2019-01-14 NOTE — CP.PCM.PN
Subjective





- Date & Time of Evaluation


Date of Evaluation: 01/14/19


Time of Evaluation: 22:15





- Subjective


Subjective: 





Patient today this morning had a hemodialysis.


He is awake and responding.


Feeling well.


I spoke to the infectious disease specialist.


Patient is currently on daptomycin for the MRSA infection.


Suspected osteomyelitis of the heel


Patient probably need aggressive treatment, will discuss with the podiatrist to 

further plan.





Overall patient is stable.


But the patient is having recurrent episodes of infection because of the heel 

infection and possible osteomyelitis.


Patient is a 63-year-old male with a history of diabetes hypertension end-stage 

renal disease on dialysis congestive heart failure pacemaker.


Patient also had a history of leukemia in remission.





Objective





- Vital Signs/Intake and Output


Vital Signs (last 24 hours): 


                                        











Temp Pulse Resp BP Pulse Ox


 


 98.1 F   110 H  18   97/59 L  100 


 


 01/14/19 15:52  01/14/19 15:52  01/14/19 15:52  01/14/19 15:52  01/14/19 15:52











- Medications


Medications: 


                               Current Medications





Apixaban (Eliquis)  2.5 mg PO BID [facility]


   Last Admin: 01/14/19 17:47 Dose:  2.5 mg


Calcium Acetate (Phoslo)  667 mg PO TIDCC [facility]


   Last Admin: 01/14/19 17:47 Dose:  667 mg


Epoetin Damir (Procrit)  10,000 unit IV MWF [facility]


   Last Admin: 01/14/19 13:22 Dose:  10,000 unit


Daptomycin 900 mg/ Sodium (Chloride)  100 mls @ 100 mls/hr IV MWF [facility]; Protocol


   Stop: 01/19/19 14:01


   Last Admin: 01/14/19 14:31 Dose:  100 mls/hr


Insulin Human Regular (Novolin R)  0 unit SC ACHS [facility]; Protocol


   Last Admin: 01/14/19 21:14 Dose:  Not Given


Nateglinide (Starlix)  60 mg PO ACTID [facility]


   Last Admin: 01/14/19 17:08 Dose:  Not Given


Pantoprazole Sodium (Protonix Ec Tab)  40 mg PO DAILY [facility]


   Last Admin: 01/14/19 09:18 Dose:  Not Given


Tobramycin/Dexamethasone (Tobradex Opht Susp)  0.1 ml OS BID PRN











- Labs


Labs: 


                                        





                                 01/11/19 06:36 





                                 01/11/19 06:36 





                                        











PT  22.0 SECONDS (9.7-12.2)  H  12/30/18  15:38    


 


INR  2.0   12/30/18  15:38    


 


APTT  36 SECONDS (21-34)  H  12/30/18  15:38

## 2019-01-14 NOTE — CP.PCM.PN
Subjective





- Date & Time of Evaluation


Date of Evaluation: 01/15/19


Time of Evaluation: 18:00





- Subjective


Subjective: 








Podiatry Progress Note - Dr. Thornton





63M seen and evaluated for worsening right lower leg wound. Patient is seen 

resting comfortably in bed at time of visit and in NAD. Patient denies any acute

overnight events. Denies any pain. Dressing clean, and intact and multipodus 

boots intact. Patient denies F/N/V/SOB/chills. 








Objective





- Vital Signs/Intake and Output


Vital Signs (last 24 hours): 


                                        











Temp Pulse Resp BP Pulse Ox


 


 98.1 F   110 H  18   97/59 L  100 


 


 01/14/19 15:52  01/14/19 15:52  01/14/19 15:52  01/14/19 15:52  01/14/19 15:52








Intake and Output: 


                                        











 01/14/19 01/14/19





 06:59 18:59


 


Intake Total 480 


 


Balance 480 














- Medications


Medications: 


                               Current Medications





Apixaban (Eliquis)  2.5 mg PO BID [facility]


   Last Admin: 01/14/19 17:47 Dose:  2.5 mg


Calcium Acetate (Phoslo)  667 mg PO TIDCC [facility]


   Last Admin: 01/14/19 17:47 Dose:  667 mg


Epoetin Damir (Procrit)  10,000 unit IV MWF [facility]


   Last Admin: 01/14/19 13:22 Dose:  10,000 unit


Daptomycin 900 mg/ Sodium (Chloride)  100 mls @ 100 mls/hr IV MWF [facility]; Protocol


   Stop: 01/19/19 14:01


   Last Admin: 01/14/19 14:31 Dose:  100 mls/hr


Insulin Human Regular (Novolin R)  0 unit SC [facility]; Protocol


   Last Admin: 01/14/19 17:07 Dose:  Not Given


Nateglinide (Starlix)  60 mg PO ACTID [facility]


   Last Admin: 01/14/19 17:08 Dose:  Not Given


Pantoprazole Sodium (Protonix Ec Tab)  40 mg PO DAILY [facility]


   Last Admin: 01/14/19 09:18 Dose:  Not Given


Tobramycin/Dexamethasone (Tobradex Opht Susp)  0.1 ml OS BID PRN











- Labs


Labs: 


                                        





                                 01/11/19 06:36 





                                 01/11/19 06:36 





                                        











PT  22.0 SECONDS (9.7-12.2)  H  12/30/18  15:38    


 


INR  2.0   12/30/18  15:38    


 


APTT  36 SECONDS (21-34)  H  12/30/18  15:38    














- Constitutional


Appears: Well, Non-toxic, No Acute Distress





- Extremities Exam


Extremities Exam: absent: Calf Tenderness


Additional comments: 





Right lower extremity focused exam:


Vasc: Nonpalpable pulses to the DP and PT, Delayed CFT to the digits, 

Temperature gradient WNL, +1 pitting pedal edema


Derm: Full thickness ulceration noted to right posterior ankle at level of 

Achilles insertion measuring approximately 2 cm x 2 cm x 1cm, with mixture 

granular and fibrotic wound base. Mild serous drainage appreciated, no 

fluctuance, no streaking, mild erythema appreciated periwound. 


ulceration appreciated to medail aspect of ankle measuring approximately 2cm x 

2cm x .01cm with 100% fibrotic macerated tissue Mild purulent drainage appreci

ated,  Hyperpigmentation appreciated circumfrentially to lower extremity.  No 

fluctuance noted. 


Neuro: Gross sensation diminished, protective sensation absent 


Ortho: Tenderness to palpation of R heel ulceration 





- Neurological Exam


Neurological Exam: Alert, Awake





- Psychiatric Exam


Psychiatric exam: Normal Affect, Normal Mood





Assessment and Plan





- Assessment and Plan (Free Text)


Assessment: 








63 year old male patient with right lower extremity 1) right anterior ankle 

ulceration-infected and macerated 2)right  chronic nonhealing posterior ankle 

ulceration -stable





Plan: 


Patient seen and evaluated at bedside with attending Dr. Thornton


Labs, vitals reviewed- Afebrile, absent leukocytosis


Right ankle wound culture- MRSA


Foot X-ray taken: possible signs of acute OM, further imaging recommended


IV abx per ID


Bone scan triple phase ordered to r/o OM in right lower extremity


Worsening right lower extremity ulcerations. Podiatry plans to bring patient to 

the OR for incision and drainage with debridement of right lower extremity 

ulcerations this week 


Please provide medical clearance.  Thank you


Will continue to provide local wound care


Cleansed wounds with saline, dressed with betadine soaked gauze and kerlix


Will continue to follow patient while in house


Patient to wear multipodus boots at all times while in bed

## 2019-01-14 NOTE — PCM.ANES
Assessment & Plan





- Assessment and Plan (Free Text)


Assessment: 


Pt. is a 62 yo male ho esrd, dm, htn, afib, chf, and pacemaker scheduled for 

debridement of right foot ulcer. Please document medical optimization and any 

current information  regarding pacemaker (i.e. last interrogation, make/model, 

year inserted, indication for pacemaker). thank you

## 2019-01-14 NOTE — CP.PCM.PN
Subjective





- Date & Time of Evaluation


Date of Evaluation: 01/14/19


Time of Evaluation: 11:07





- Subjective


Subjective: 





seen at dialysis


afebrile, feels same


remains on IV dapto for osteomylitis


To UF 3300ml; tolerating rx


lytes were stable





Objective





- Vital Signs/Intake and Output


Vital Signs (last 24 hours): 


                                        











Temp Pulse Resp BP Pulse Ox


 


 97.5 F L  80   17   109/43 L  100 


 


 01/14/19 09:45  01/14/19 09:45  01/14/19 09:45  01/14/19 10:15  01/14/19 09:45








Intake and Output: 


                                        











 01/14/19 01/14/19





 06:59 18:59


 


Intake Total 480 


 


Balance 480 














- Medications


Medications: 


                               Current Medications





Apixaban (Eliquis)  2.5 [facility] BID [facility]


   Last Admin: 01/14/19 09:18 Dose:  Not Given


Calcium Acetate (Phoslo)  667 mg PO TIDCC [facility]


   Last Admin: 01/14/19 08:02 Dose:  Not Given


Epoetin Damir (Procrit)  10,000 unit IV MWF [facility]


   Last Admin: 01/11/19 10:47 Dose:  10,000 unit


Daptomycin 900 mg/ Sodium (Chloride)  100 mls @ 100 mls/hr IV F [facility]; Protocol


   Stop: 01/19/19 14:01


Insulin Human Regular (Novolin R)  0 unit SC [facility]; Protocol


   Last Admin: 01/14/19 08:01 Dose:  Not Given


Nateglinide (Starlix)  60 mg PO ACTID [facility]


   Last Admin: 01/14/19 08:02 Dose:  Not Given


Pantoprazole Sodium (Protonix Ec Tab)  40 mg PO DAILY [facility]


   Last Admin: 01/14/19 09:18 Dose:  Not Given


Tobramycin/Dexamethasone (Tobradex Opht Susp)  0.1 ml OS BID PRN











- Labs


Labs: 


                                        





                                 01/11/19 06:36 





                                 01/11/19 06:36 





                                        











PT  22.0 SECONDS (9.7-12.2)  H  12/30/18  15:38    


 


INR  2.0   12/30/18  15:38    


 


APTT  36 SECONDS (21-34)  H  12/30/18  15:38    














- Constitutional


Appears: No Acute Distress, Chronically Ill





- Head Exam


Head Exam: ATRAUMATIC, NORMAL INSPECTION





- Eye Exam


Eye Exam: EOMI, Normal appearance





- Neck Exam


Neck Exam: Normal Inspection.  absent: Tenderness





- Respiratory Exam


Respiratory Exam: Clear to Ausculation Bilateral, NORMAL BREATHING PATTERN





- Cardiovascular Exam


Cardiovascular Exam: REGULAR RHYTHM, +S1





- GI/Abdominal Exam


GI & Abdominal Exam: Soft.  absent: Tenderness





- Extremities Exam


Extremities Exam: Normal Inspection.  absent: Tenderness





- Neurological Exam


Neurological Exam: Awake, CN II-XII Intact





- Skin


Skin Exam: Dry, Warm





Assessment and Plan


(1) Cellulitis


Status: Acute   





(2) ESRD (end stage renal disease)


Status: Acute   





(3) Fluid overload


Status: Acute   





(4) Severe anemia


Status: Acute   





(5) A-fib


Status: Chronic   





- Assessment and Plan (Free Text)


Plan: 





Dialysis MWF


IV ABs


monitor wound care as needed

## 2019-01-15 LAB
APTT BLD: 39 SECONDS (ref 21–34)
BASOPHILS # BLD AUTO: 0.1 K/UL (ref 0–0.2)
BASOPHILS NFR BLD: 1.8 % (ref 0–2)
BUN SERPL-MCNC: 27 MG/DL (ref 9–20)
CALCIUM SERPL-MCNC: 7.8 MG/DL (ref 8.6–10.4)
EOSINOPHIL # BLD AUTO: 0.2 K/UL (ref 0–0.7)
EOSINOPHIL NFR BLD: 4.2 % (ref 0–4)
ERYTHROCYTE [DISTWIDTH] IN BLOOD BY AUTOMATED COUNT: 18.9 % (ref 11.5–14.5)
GFR NON-AFRICAN AMERICAN: 21
HGB BLD-MCNC: 8 G/DL (ref 12–18)
INR PPP: 1.6
LYMPHOCYTES # BLD AUTO: 0.7 K/UL (ref 1–4.3)
LYMPHOCYTES NFR BLD AUTO: 11.9 % (ref 20–40)
MCH RBC QN AUTO: 28.2 PG (ref 27–31)
MCHC RBC AUTO-ENTMCNC: 31.4 G/DL (ref 33–37)
MCV RBC AUTO: 90.1 FL (ref 80–94)
MONOCYTES # BLD: 0.5 K/UL (ref 0–0.8)
MONOCYTES NFR BLD: 8.1 % (ref 0–10)
NEUTROPHILS # BLD: 4.2 K/UL (ref 1.8–7)
NEUTROPHILS NFR BLD AUTO: 74 % (ref 50–75)
NRBC BLD AUTO-RTO: 0.1 % (ref 0–2)
PLATELET # BLD: 214 K/UL (ref 130–400)
PMV BLD AUTO: 7.2 FL (ref 7.2–11.7)
PROTHROMBIN TIME: 17.5 SECONDS (ref 9.7–12.2)
RBC # BLD AUTO: 2.84 MIL/UL (ref 4.4–5.9)
WBC # BLD AUTO: 5.6 K/UL (ref 4.8–10.8)

## 2019-01-15 RX ADMIN — HUMAN INSULIN SCH: 100 INJECTION, SOLUTION SUBCUTANEOUS at 21:11

## 2019-01-15 RX ADMIN — PANTOPRAZOLE SODIUM SCH MG: 40 TABLET, DELAYED RELEASE ORAL at 10:31

## 2019-01-15 RX ADMIN — HUMAN INSULIN SCH: 100 INJECTION, SOLUTION SUBCUTANEOUS at 11:41

## 2019-01-15 RX ADMIN — HUMAN INSULIN SCH: 100 INJECTION, SOLUTION SUBCUTANEOUS at 23:03

## 2019-01-15 RX ADMIN — TRAMADOL HYDROCHLORIDE PRN MG: 50 TABLET, FILM COATED ORAL at 19:13

## 2019-01-15 RX ADMIN — HUMAN INSULIN SCH: 100 INJECTION, SOLUTION SUBCUTANEOUS at 07:44

## 2019-01-15 RX ADMIN — Medication SCH: at 10:35

## 2019-01-15 NOTE — CP.PCM.PN
Subjective





- Date & Time of Evaluation


Date of Evaluation: 01/15/19


Time of Evaluation: 20:10





- Subjective


Subjective: 


INFECTIOUS DISEASE PROGRESS NOTES


RAHEEM CANADA MD, FACP


1/15/2019


6T [id number]


CHART REVIEWED


PT EXAMINED


CASE DISCUSSED WITH DR THORNTON AND PT, AGAIN TODAY.








63M seen and evaluated for worsening right lower extremity ulceration. Patient 

is seen laying in bed, in NAD. Patient reports no being in a good mood today. 

Express concerns for lower back wound. Patient denies acute overnight events. 

Denies nausea, fever, shortness of breath, chest pains or chills. No other pedal

complaints at this time. 





 Objective 





- Vital Signs/Intake and Output


Vital Signs (last 24 hours): 


                                        











Temp Pulse Resp BP Pulse Ox


 


 97.5 F L  114 H  20   90/52 L  100 


 


 01/15/19 16:00  01/15/19 16:00  01/15/19 16:00  01/15/19 16:00  01/15/19 16:00











- Medications


Medications: 


                               Current Medications





Apixaban (Eliquis)  2.5 mg PO BID [facility]


   Last Admin: 01/15/19 10:31 Dose:  2.5 mg


Calcium Acetate (Phoslo)  667 mg PO TIDCC [facility]


   Last Admin: 01/15/19 13:01 Dose:  Not Given


Epoetin Damir (Procrit)  10,000 unit IV [facility]


   Last Admin: 01/14/19 13:22 Dose:  10,000 unit


Daptomycin 900 mg/ Sodium (Chloride)  100 mls @ 100 mls/hr IV F [facility]; Protocol


   Stop: 01/19/19 14:01


   Last Admin: 01/14/19 14:31 Dose:  100 mls/hr


Insulin Human Regular ([facility] R)  [facility]; Protocol


   Last Admin: 01/15/19 11:41 Dose:  Not Given


Nateglinide (Starlix)  60 mg PO ACTID [facility]


   Last Admin: 01/15/19 13:01 Dose:  Not Given


Pantoprazole Sodium (Protonix Ec Tab)  40 mg PO DAILY [facility]


   Last Admin: 01/15/19 10:31 Dose:  40 mg


Tobramycin/Dexamethasone (Tobradex Opht Susp)  0.1 ml OS BID PRN











- Labs


Labs: 


                                        





                                 01/15/19 06:36 





                                 01/15/19 06:36 





                                        











PT  17.5 SECONDS (9.7-12.2)  H  01/15/19  06:36    


 


INR  1.6   01/15/19  06:36    


 


APTT  39 SECONDS (21-34)  H  01/15/19  06:36    














- Constitutional


Appears: Well, Non-toxic, No Acute Distress





- Extremities Exam


Extremities Exam: absent: Calf Tenderness


Additional comments: 





Right lower extremity focused exam:


Vasc: Nonpalpable pulses to the DP and PT, Delayed CFT to the digits, 

Temperature gradient WNL, +1 pitting pedal edema


Derm: Full thickness ulceration noted to right posterior ankle at level of 

Achilles insertion measuring approximately 2 cm x 2 cm x 1cm, with mixture 

granular and fibrotic wound base. Mild serous drainage appreciated, no 

fluctuance, no streaking, mild erythema appreciated periwound. 


ulceration appreciated to medail aspect of ankle measuring approximately 2cm x 

2cm x .01cm with 100% fibrotic macerated tissue Mild purulent drainage 

appreciated,  Hyperpigmentation appreciated circumfrentially to lower extremity.

 No fluctuance noted. 


Neuro: Gross sensation diminished, protective sensation absent 


Ortho: Tenderness to palpation of R heel ulceration 








- Neurological Exam


Neurological Exam: Alert, Awake, Oriented x3





- Psychiatric Exam


Psychiatric exam: Normal Affect





 Assessment and Plan 





- Assessment and Plan (Free Text)


Assessment: 








63 year old male patient with right lower extremity 1) right anterior ankle 

ulceration-infected and macerated 2)right  chronic nonhealing posterior ankle 

ulceration -stable


Plan: 





Patient seen and evaluated at bedside with attending Dr. Thornton


Labs, vitals reviewed- Afebrile, absent leukocytosis


Right ankle wound culture- MRSA


Foot X-ray taken: possible signs of acute OM, further imaging recommended


IV abx per ID


Bone scan triple phase ordered to r/o OM in right lower extremity


Worsening right lower extremity ulcerations. Podiatry plans to bring patient to 

the OR for incision and drainage with debridement of right lower extremity 

ulcerations this week 


CONSIDER AMPUTATION OF RIGHT FOOT/BKA-SINCE THIS IS THE PRIMARY SOURCE AND 

DESPITE ADEQUATE AB HE REPEATS INECTIONS WITH MRSA.


Will continue to provide local wound care


Cleansed wounds with saline, dressed with betadine soaked gauze and kerlix


Will continue to follow patient while in house




















Objective





- Vital Signs/Intake and Output


Vital Signs (last 24 hours): 


                                        











Temp Pulse Resp BP Pulse Ox


 


 97.5 F L  114 H  20   90/52 L  100 


 


 01/15/19 16:00  01/15/19 16:00  01/15/19 16:00  01/15/19 16:00  01/15/19 16:00











- Medications


Medications: 


                               Current Medications





Apixaban (Eliquis)  2.5 mg PO BID [facility]


   Last Admin: 01/15/19 10:31 Dose:  2.5 mg


Calcium Acetate (Phoslo)  667 mg PO TIDCC [facility]


   Last Admin: 01/15/19 13:01 Dose:  Not Given


Epoetin Damir (Procrit)  10,000 unit IV [facility]


   Last Admin: 01/14/19 13:22 Dose:  10,000 unit


Daptomycin 900 mg/ Sodium (Chloride)  100 mls @ 100 mls/hr IV [facility]; Protocol


   Stop: 01/19/19 14:01


   Last Admin: 01/14/19 14:31 Dose:  100 mls/hr


Insulin Human Regular (Novolin R)  0 unit SC [facility]; Protocol


   Last Admin: 01/15/19 11:41 Dose:  Not Given


Nateglinide (Starlix)  60 mg PO ACTID [facility]


   Last Admin: 01/15/19 13:01 Dose:  Not Given


Pantoprazole Sodium (Protonix Ec Tab)  40 mg PO DAILY [facility]


   Last Admin: 01/15/19 10:31 Dose:  40 mg


Tobramycin/Dexamethasone (Tobradex Opht Susp)  0.1 ml OS BID PRN


Tramadol HCl (Ultram)  25 mg PO TID PRN


   PRN Reason: Pain, moderate (4-7)


   Last Admin: 01/15/19 19:13 Dose:  25 mg











- Labs


Labs: 


                                        





                                 01/15/19 06:36 





                                 01/15/19 06:36 





                                        











PT  17.5 SECONDS (9.7-12.2)  H  01/15/19  06:36    


 


INR  1.6   01/15/19  06:36    


 


APTT  39 SECONDS (21-34)  H  01/15/19  06:36

## 2019-01-15 NOTE — CP.PCM.PN
Subjective





- Date & Time of Evaluation


Date of Evaluation: 01/15/19


Time of Evaluation: 23:02





- Subjective


Subjective: 





Patient has a right lower extremity pain.


Started on tramadol.


Seen by podiatrist.


Patient was seen by earlier infectious disease.


I also spoke to patient's wife in detail.


Overall patient's prognosis is poor.


May need a surgical intervention for the right leg ulcer chronic.


We will follow the patient.


Patient is a 63-year-old male


Admitted with cellulitis of the right leg, with possible osteomyelitis.


On Cubicin.


Right leg ulcer, most likely patient will need help probably about surgical 

intervention.





Objective





- Vital Signs/Intake and Output


Vital Signs (last 24 hours): 


                                        











Temp Pulse Resp BP Pulse Ox


 


 97.5 F L  114 H  20   90/52 L  100 


 


 01/15/19 16:00  01/15/19 16:00  01/15/19 16:00  01/15/19 16:00  01/15/19 16:00











- Medications


Medications: 


                               Current Medications





Apixaban (Eliquis)  2.5 mg PO BID [facility]


   Last Admin: 01/15/19 21:10 Dose:  2.5 mg


Calcium Acetate (Phoslo)  667 mg PO TIDCC [facility]


   Last Admin: 01/15/19 21:12 Dose:  Not Given


Epoetin Damir (Procrit)  10,000 unit IV [facility]


   Last Admin: 01/14/19 13:22 Dose:  10,000 unit


Daptomycin 900 mg/ Sodium (Chloride)  100 mls @ 100 mls/hr IV [facility]; Protocol


   Stop: 01/19/19 14:01


   Last Admin: 01/14/19 14:31 Dose:  100 mls/hr


Insulin Human Regular (Novolin R)  0 unit SC [facility]; Protocol


   Last Admin: 01/15/19 21:11 Dose:  Not Given


Nateglinide (Starlix)  60 mg PO ACTID [facility]


   Last Admin: 01/15/19 21:12 Dose:  Not Given


Pantoprazole Sodium (Protonix Ec Tab)  40 mg PO DAILY [facility]


   Last Admin: 01/15/19 10:31 Dose:  40 mg


Tobramycin/Dexamethasone (Tobradex Opht Susp)  0.1 ml OS BID PRN


Tramadol HCl (Ultram)  25 mg PO TID PRN


   PRN Reason: Pain, moderate (4-7)


   Last Admin: 01/15/19 19:13 Dose:  25 mg











- Labs


Labs: 


                                        





                                 01/15/19 06:36 





                                 01/15/19 06:36 





                                        











PT  17.5 SECONDS (9.7-12.2)  H  01/15/19  06:36    


 


INR  1.6   01/15/19  06:36    


 


APTT  39 SECONDS (21-34)  H  01/15/19  06:36

## 2019-01-15 NOTE — CP.PCM.PN
Subjective





- Date & Time of Evaluation


Date of Evaluation: 01/15/19


Time of Evaluation: 15:00





- Subjective


Subjective: 





Podiatry Progress Note- Dr. Thornton





63M seen and evaluated for worsening right lower extremity ulceration. Patient 

is seen laying in bed, in NAD. Patient reports no being in a good mood today. 

Express concerns for lower back wound. Patient denies acute overnight events. 

Denies nausea, fever, shortness of breath, chest pains or chills. No other pedal

complaints at this time. 





Objective





- Vital Signs/Intake and Output


Vital Signs (last 24 hours): 


                                        











Temp Pulse Resp BP Pulse Ox


 


 97.5 F L  114 H  20   90/52 L  100 


 


 01/15/19 16:00  01/15/19 16:00  01/15/19 16:00  01/15/19 16:00  01/15/19 16:00











- Medications


Medications: 


                               Current Medications





Apixaban (Eliquis)  2.5 mg PO BID [facility]


   Last Admin: 01/15/19 10:31 Dose:  2.5 mg


Calcium Acetate (Phoslo)  667 mg PO TIDCC [facility]


   Last Admin: 01/15/19 13:01 Dose:  Not Given


Epoetin Damir (Procrit)  10,000 unit IV MWF [facility]


   Last Admin: 01/14/19 13:22 Dose:  10,000 unit


Daptomycin 900 mg/ Sodium (Chloride)  100 mls @ 100 mls/hr IV MWF [facility]; Protocol


   Stop: 01/19/19 14:01


   Last Admin: 01/14/19 14:31 Dose:  100 mls/hr


Insulin Human Regular (Novolin R)  0 unit SC [facility]; Protocol


   Last Admin: 01/15/19 11:41 Dose:  Not Given


Nateglinide (Starlix)  60 mg PO ACTID [facility]


   Last Admin: 01/15/19 13:01 Dose:  Not Given


Pantoprazole Sodium (Protonix Ec Tab)  40 mg PO DAILY [facility]


   Last Admin: 01/15/19 10:31 Dose:  40 mg


Tobramycin/Dexamethasone (Tobradex Opht Susp)  0.1 ml OS BID PRN











- Labs


Labs: 


                                        





                                 01/15/19 06:36 





                                 01/15/19 06:36 





                                        











PT  17.5 SECONDS (9.7-12.2)  H  01/15/19  06:36    


 


INR  1.6   01/15/19  06:36    


 


APTT  39 SECONDS (21-34)  H  01/15/19  06:36    














- Constitutional


Appears: Well, Non-toxic, No Acute Distress





- Extremities Exam


Extremities Exam: absent: Calf Tenderness


Additional comments: 





Right lower extremity focused exam:


Vasc: Nonpalpable pulses to the DP and PT, Delayed CFT to the digits, 

Temperature gradient WNL, +1 pitting pedal edema


Derm: Full thickness ulceration noted to right posterior ankle at level of 

Achilles insertion measuring approximately 2 cm x 2 cm x 1cm, with mixture 

granular and fibrotic wound base. Mild serous drainage appreciated, no 

fluctuance, no streaking, mild erythema appreciated periwound. 


ulceration appreciated to medail aspect of ankle measuring approximately 2cm x 

2cm x .01cm with 100% fibrotic macerated tissue Mild purulent drainage 

appreciated,  Hyperpigmentation appreciated circumfrentially to lower extremity.

 No fluctuance noted. 


Neuro: Gross sensation diminished, protective sensation absent 


Ortho: Tenderness to palpation of R heel ulceration 








- Neurological Exam


Neurological Exam: Alert, Awake, Oriented x3





- Psychiatric Exam


Psychiatric exam: Normal Affect





Assessment and Plan





- Assessment and Plan (Free Text)


Assessment: 








63 year old male patient with right lower extremity 1) right anterior ankle 

ulceration-infected and macerated 2)right  chronic nonhealing posterior ankle 

ulceration -stable


Plan: 





Patient seen and evaluated at bedside with attending Dr. Thornton


Labs, vitals reviewed- Afebrile, absent leukocytosis


Right ankle wound culture- MRSA


Foot X-ray taken: possible signs of acute OM, further imaging recommended


IV abx per ID


Bone scan triple phase ordered to r/o OM in right lower extremity


Worsening right lower extremity ulcerations. Podiatry plans to bring patient to 

the OR for incision and drainage with debridement of right lower extremity 

ulcerations this week 


Please provide medical clearance.  Thank you


Will continue to provide local wound care


Cleansed wounds with saline, dressed with betadine soaked gauze and kerlix


Will continue to follow patient while in house


Patient to wear multipodus boots at all times while in bed

## 2019-01-15 NOTE — CP.PCM.PN
Subjective





- Date & Time of Evaluation


Date of Evaluation: 01/15/19


Time of Evaluation: 10:31





- Subjective


Subjective: 





seen and examined


comfortable. bed bound


afebrile


no n/v/d/dizziness/sob/chest pain


c/o lower back and leg pains


wound cultures noted, mrsa





Objective





- Vital Signs/Intake and Output


Vital Signs (last 24 hours): 


                                        











Temp Pulse Resp BP Pulse Ox


 


 97.5 F L  107 H  18   91/56 L  96 


 


 01/15/19 07:00  01/15/19 07:00  01/15/19 07:00  01/15/19 07:00  01/15/19 07:00











- Medications


Medications: 


                               Current Medications





Apixaban (Eliquis)  2.5 mg PO BID [facility]


   Last Admin: 01/14/19 17:47 Dose:  2.5 mg


Calcium Acetate (Phoslo)  667 mg PO TIDCC [facility]


   Last Admin: 01/15/19 08:18 Dose:  667 mg


Epoetin Damir (Procrit)  10,000 unit IV F [facility]


   Last Admin: 01/14/19 13:22 Dose:  10,000 unit


Daptomycin 900 mg/ Sodium (Chloride)  100 mls @ 100 mls/hr IV F [facility]; Protocol


   Stop: 01/19/19 14:01


   Last Admin: 01/14/19 14:31 Dose:  100 mls/hr


Insulin Human Regular (Novolin R)  0 unit SC [facility]; Protocol


   Last Admin: 01/15/19 07:44 Dose:  Not Given


Nateglinide (Starlix)  60 mg PO ACTID [facility]


   Last Admin: 01/15/19 08:18 Dose:  60 mg


Pantoprazole Sodium (Protonix Ec Tab)  40 mg PO DAILY [facility]


   Last Admin: 01/14/19 09:18 Dose:  Not Given


Tobramycin/Dexamethasone (Tobradex Opht Susp)  0.1 ml OS BID PRN











- Labs


Labs: 


                                        





                                 01/15/19 06:36 





                                 01/15/19 06:36 





                                        











PT  17.5 SECONDS (9.7-12.2)  H  01/15/19  06:36    


 


INR  1.6   01/15/19  06:36    


 


APTT  39 SECONDS (21-34)  H  01/15/19  06:36    














- Constitutional


Appears: No Acute Distress, Chronically Ill (obese)





- Head Exam


Head Exam: NORMAL INSPECTION, NORMOCEPHALIC





- Eye Exam


Eye Exam: Normal appearance, PERRL





- ENT Exam


ENT Exam: Mucous Membranes Moist, Normal Exam





- Neck Exam


Neck Exam: Full ROM, Normal Inspection (permcath. )





- Respiratory Exam


Respiratory Exam: Clear to Ausculation Bilateral, NORMAL BREATHING PATTERN





- Cardiovascular Exam


Cardiovascular Exam: Irregular Rhythm





- GI/Abdominal Exam


GI & Abdominal Exam: Distended, Soft





- Extremities Exam


Extremities Exam: Pedal Edema (b/l chronic stasis. b/l ue fistula no thrill)





- Neurological Exam


Neurological Exam: Alert, Awake, Oriented x3





- Skin


Skin Exam: Intact





Assessment and Plan


(1) Foot ulcer


Status: Acute   





(2) Cellulitis


Status: Acute   





(3) ESRD (end stage renal disease)


Status: Acute   





(4) Non-ischemic cardiomyopathy


Status: Acute   





(5) A-fib


Status: Chronic   





- Assessment and Plan (Free Text)


Assessment: 





maintain hd mwf


on iv daptomycin


?amputation


chronic hypotension, at baseline

## 2019-01-16 RX ADMIN — Medication SCH: at 10:30

## 2019-01-16 RX ADMIN — HUMAN INSULIN SCH: 100 INJECTION, SOLUTION SUBCUTANEOUS at 08:14

## 2019-01-16 RX ADMIN — HUMAN INSULIN SCH: 100 INJECTION, SOLUTION SUBCUTANEOUS at 22:19

## 2019-01-16 RX ADMIN — HUMAN INSULIN SCH: 100 INJECTION, SOLUTION SUBCUTANEOUS at 12:30

## 2019-01-16 RX ADMIN — ERYTHROPOIETIN SCH: 10000 INJECTION, SOLUTION INTRAVENOUS; SUBCUTANEOUS at 09:00

## 2019-01-16 RX ADMIN — TRAMADOL HYDROCHLORIDE PRN MG: 50 TABLET, FILM COATED ORAL at 10:10

## 2019-01-16 RX ADMIN — HUMAN INSULIN SCH: 100 INJECTION, SOLUTION SUBCUTANEOUS at 17:25

## 2019-01-16 RX ADMIN — PANTOPRAZOLE SODIUM SCH MG: 40 TABLET, DELAYED RELEASE ORAL at 10:13

## 2019-01-16 RX ADMIN — TRAMADOL HYDROCHLORIDE PRN MG: 50 TABLET, FILM COATED ORAL at 19:30

## 2019-01-16 NOTE — CP.PCM.PN
Subjective





- Date & Time of Evaluation


Date of Evaluation: 01/16/19


Time of Evaluation: 19:39





- Subjective


Subjective: 


INFECTIOUS DISEASE PROGRESS NOTES


RAHEEM CANADA MD, FACP


6T [id number]


1/16/2019


CHART REVIEWED


PT EXAMINED


CASE DISCUSSED





EVENTS NOTED


THEY MAY WANT TO STOP DIALYSIS?!


PROGNOSIS IS EXTREMELY GUARDED








Spoke at length with patient 


They do not want patient to undergo amputation LE- they believe risk is too high


Refuses dialysis today-


They will like to stop further dialysis after 1/18; they understand that patient

will not survive without further dialysis


Will schedule dialysis next 2 days; will follow up closely- will proceed as per 

family wishes





Objective





- Vital Signs/Intake and Output


Vital Signs (last 24 hours): 


                                        











Temp Pulse Resp BP Pulse Ox


 


 97.8 F   100 H  20   136/76   100 


 


 01/16/19 07:00  01/16/19 07:00  01/16/19 07:00  01/16/19 07:00  01/16/19 07:00











- Medications


Medications: 


                               Current Medications





Apixaban (Eliquis)  2.5 mg PO BID [facility]


   Last Admin: 01/16/19 10:12 Dose:  2.5 mg


Calcium Acetate (Phoslo)  667 mg PO TIDCC [facility]


   Last Admin: 01/16/19 08:13 Dose:  Not Given


Epoetin Damir (Procrit)  10,000 unit IV [facility]


   Last Admin: 01/14/19 13:22 Dose:  10,000 unit


Daptomycin 900 mg/ Sodium (Chloride)  100 mls @ 100 mls/hr IV F [facility]; Protocol


   Stop: 01/19/19 14:01


   Last Admin: 01/16/19 10:12 Dose:  100 mls/hr


Insulin Human Regular (Novolin R)  0 unit SC [facility]; Protocol


   Last Admin: 01/16/19 08:14 Dose:  Not Given


Mupirocin (Bactroban Ointment)  1 gm TOP DAILY PRN


Nateglinide (Starlix)  60 mg PO ACTID [facility]


   Last Admin: 01/16/19 07:15 Dose:  Not Given


Pantoprazole Sodium (Protonix Ec Tab)  40 mg PO DAILY [facility]


   Last Admin: 01/16/19 10:13 Dose:  40 mg


Tobramycin/Dexamethasone (Tobradex Opht Susp)  0.1 ml OS BID PRN


Tramadol HCl (Ultram)  25 mg PO TID PRN


   PRN Reason: Pain, moderate (4-7)


   Last Admin: 01/16/19 10:10 Dose:  25 mg











- Labs


Labs: 


                                        





                                 01/15/19 06:36 





                                 01/15/19 06:36 





                                        











PT  17.5 SECONDS (9.7-12.2)  H  01/15/19  06:36    


 


INR  1.6   01/15/19  06:36    


 


APTT  39 SECONDS (21-34)  H  01/15/19  06:36    














- Constitutional


Appears: No Acute Distress, Chronically Ill





- Head Exam


Head Exam: ATRAUMATIC, NORMAL INSPECTION





- Eye Exam


Eye Exam: EOMI, Normal appearance





- Neck Exam


Neck Exam: Normal Inspection.  absent: Tenderness





- Respiratory Exam


Respiratory Exam: Clear to Ausculation Bilateral, NORMAL BREATHING PATTERN





- Cardiovascular Exam


Cardiovascular Exam: Irregular Rhythm, +S1





- GI/Abdominal Exam


GI & Abdominal Exam: Distended, Soft





- Extremities Exam


Extremities Exam: Pedal Edema, Tenderness





- Neurological Exam


Neurological Exam: Awake, CN II-XII Intact





- Skin


Skin Exam: Dry, Warm





Assessment and Plan


(1) Cellulitis


Status: Acute   





(2) ESRD (end stage renal disease)


Status: Acute   





(3) Fluid overload


Status: Acute   





(4) Severe anemia


Status: Acute   





(5) A-fib


Status: Chronic   





- Assessment and Plan (Free Text)


Plan: 





dialysis in AM, then 1/18


no further dialysis planned after 1/18 as per family wishes











Objective





- Vital Signs/Intake and Output


Vital Signs (last 24 hours): 


                                        











Temp Pulse Resp BP Pulse Ox


 


 97.8 F   100 H  20   136/76   100 


 


 01/16/19 07:00  01/16/19 07:00  01/16/19 07:00  01/16/19 07:00  01/16/19 07:00











- Medications


Medications: 


                               Current Medications





Apixaban (Eliquis)  2.5 mg PO BID [facility]


   Last Admin: 01/16/19 19:30 Dose:  2.5 mg


Calcium Acetate (Phoslo)  667 mg PO TIDCC [facility]


   Last Admin: 01/16/19 18:00 Dose:  Not Given


Epoetin Damir (Procrit)  10,000 unit IV MWF [facility]


   Last Admin: 01/16/19 09:00 Dose:  Not Given


Daptomycin 900 mg/ Sodium (Chloride)  100 mls @ 100 mls/hr IV MWF [facility]; Protocol


   Stop: 01/19/19 14:01


   Last Admin: 01/16/19 10:12 Dose:  100 mls/hr


Insulin Human Regular (Novolin R)  0 unit SC [facility]; Protocol


   Last Admin: 01/16/19 17:25 Dose:  Not Given


Mupirocin (Bactroban Ointment)  1 gm TOP DAILY PRN


Nateglinide (Starlix)  60 mg PO ACTID [facility]


   Last Admin: 01/16/19 17:25 Dose:  Not Given


Pantoprazole Sodium (Protonix Ec Tab)  40 mg PO DAILY [facility]


   Last Admin: 01/16/19 10:13 Dose:  40 mg


Tobramycin/Dexamethasone (Tobradex Opht Susp)  0.1 ml OS BID PRN


Tramadol HCl (Ultram)  25 mg PO TID PRN


   PRN Reason: Pain, moderate (4-7)


   Last Admin: 01/16/19 19:30 Dose:  25 mg











- Labs


Labs: 


                                        





                                 01/15/19 06:36 





                                 01/15/19 06:36 





                                        











PT  17.5 SECONDS (9.7-12.2)  H  01/15/19  06:36    


 


INR  1.6   01/15/19  06:36    


 


APTT  39 SECONDS (21-34)  H  01/15/19  06:36

## 2019-01-16 NOTE — CP.PCM.PN
Subjective





- Date & Time of Evaluation


Date of Evaluation: 01/16/19


Time of Evaluation: 13:39





- Subjective


Subjective: 





Spoke at length with patient and wife


They do not want patient to undergo amputation LE- they believe risk is too high


Refuses dialysis today- will reschedule in AM


They will like to stop further dialysis after 1/18; they understand that patient

will not survive without further dialysis


Will schedule dialysis next 2 days; will follow up closely- will proceed as per 

family wishes





Objective





- Vital Signs/Intake and Output


Vital Signs (last 24 hours): 


                                        











Temp Pulse Resp BP Pulse Ox


 


 97.8 F   100 H  20   136/76   100 


 


 01/16/19 07:00  01/16/19 07:00  01/16/19 07:00  01/16/19 07:00  01/16/19 07:00











- Medications


Medications: 


                               Current Medications





Apixaban (Eliquis)  2.5 mg PO BID [facility]


   Last Admin: 01/16/19 10:12 Dose:  2.5 mg


Calcium Acetate (Phoslo)  667 mg PO TIDCC [facility]


   Last Admin: 01/16/19 08:13 Dose:  Not Given


Epoetin Damir (Procrit)  10,000 unit IV [facility]


   Last Admin: 01/14/19 13:22 Dose:  10,000 unit


Daptomycin 900 mg/ Sodium (Chloride)  100 mls @ 100 mls/hr IV F [facility]; Protocol


   Stop: 01/19/19 14:01


   Last Admin: 01/16/19 10:12 Dose:  100 mls/hr


Insulin Human Regular (Novolin R)  0 unit SC [facility]; Protocol


   Last Admin: 01/16/19 08:14 Dose:  Not Given


Mupirocin (Bactroban Ointment)  1 gm TOP DAILY PRN


Nateglinide (Starlix)  60 mg PO ACTID [facility]


   Last Admin: 01/16/19 07:15 Dose:  Not Given


Pantoprazole Sodium (Protonix Ec Tab)  40 mg PO DAILY [facility]


   Last Admin: 01/16/19 10:13 Dose:  40 mg


Tobramycin/Dexamethasone (Tobradex Opht Susp)  0.1 ml OS BID PRN


Tramadol HCl (Ultram)  25 mg PO TID PRN


   PRN Reason: Pain, moderate (4-7)


   Last Admin: 01/16/19 10:10 Dose:  25 mg











- Labs


Labs: 


                                        





                                 01/15/19 06:36 





                                 01/15/19 06:36 





                                        











PT  17.5 SECONDS (9.7-12.2)  H  01/15/19  06:36    


 


INR  1.6   01/15/19  06:36    


 


APTT  39 SECONDS (21-34)  H  01/15/19  06:36    














- Constitutional


Appears: No Acute Distress, Chronically Ill





- Head Exam


Head Exam: ATRAUMATIC, NORMAL INSPECTION





- Eye Exam


Eye Exam: EOMI, Normal appearance





- Neck Exam


Neck Exam: Normal Inspection.  absent: Tenderness





- Respiratory Exam


Respiratory Exam: Clear to Ausculation Bilateral, NORMAL BREATHING PATTERN





- Cardiovascular Exam


Cardiovascular Exam: Irregular Rhythm, +S1





- GI/Abdominal Exam


GI & Abdominal Exam: Distended, Soft





- Extremities Exam


Extremities Exam: Pedal Edema, Tenderness





- Neurological Exam


Neurological Exam: Awake, CN II-XII Intact





- Skin


Skin Exam: Dry, Warm





Assessment and Plan


(1) Cellulitis


Status: Acute   





(2) ESRD (end stage renal disease)


Status: Acute   





(3) Fluid overload


Status: Acute   





(4) Severe anemia


Status: Acute   





(5) A-fib


Status: Chronic   





- Assessment and Plan (Free Text)


Plan: 





dialysis in AM, then 1/18


no further dialysis planned after 1/18 as per family wishes

## 2019-01-16 NOTE — NM
Date of service: c



01/15/2019



PROCEDURE:  THREE-PHASE NUCLEAR BONE SCAN RIGHT ANKLE 



HISTORY:

right posterior ankle possible deep abscess







COMPARISON:

Right foot radiographs 12/30/2018.



TECHNIQUE:

Following administration of 23.6 mCu of Tc MDP multiplanar whole body 

images were obtained.



FINDINGS:

Dynamic phase images demonstrate hyperactivity of the visualize lower 

right leg segment in the right ankle/foot. Immediate static uptake 

continues similar distribution with particular hyper activity 

identified at the posterior margins of the right foot likely in the 

upper calcaneus. Delayed static images concentrated this activity 

further in the posterior right calcaneus, suggestive of 

osteomyelitis. This confirms radiographic suspicion of osteomyelitis 

in the same locale at the posterior right calcaneus in right foot 

radiographs 12/30/2018. 



Lesser amount of uptake is appreciated at the posterior midfoot and 

hindfoot soft tissues which is felt to correspond to advanced 

osteoarthritis. 



IMPRESSION:

Nuclear findings compatible with osteomyelitis at the posterior 

calcaneus confirming suspicion of same in right foot radiograph 

series 12/30/2018. Right lower extremity cellulitis identified as 

well.

## 2019-01-17 RX ADMIN — PANTOPRAZOLE SODIUM SCH: 40 TABLET, DELAYED RELEASE ORAL at 09:57

## 2019-01-17 RX ADMIN — HUMAN INSULIN SCH: 100 INJECTION, SOLUTION SUBCUTANEOUS at 11:55

## 2019-01-17 RX ADMIN — HUMAN INSULIN SCH: 100 INJECTION, SOLUTION SUBCUTANEOUS at 16:30

## 2019-01-17 RX ADMIN — TRAMADOL HYDROCHLORIDE PRN MG: 50 TABLET, FILM COATED ORAL at 22:42

## 2019-01-17 RX ADMIN — TRAMADOL HYDROCHLORIDE PRN MG: 50 TABLET, FILM COATED ORAL at 15:04

## 2019-01-17 RX ADMIN — TRAMADOL HYDROCHLORIDE PRN MG: 50 TABLET, FILM COATED ORAL at 07:57

## 2019-01-17 RX ADMIN — HUMAN INSULIN SCH: 100 INJECTION, SOLUTION SUBCUTANEOUS at 07:35

## 2019-01-17 RX ADMIN — HUMAN INSULIN SCH: 100 INJECTION, SOLUTION SUBCUTANEOUS at 22:43

## 2019-01-17 NOTE — CP.PCM.PN
Subjective





- Date & Time of Evaluation


Date of Evaluation: 01/16/19


Time of Evaluation: 19:29





- Subjective


Subjective: 





I had a discussion with the patient, patient's wife as well as relative early 

morning today.


At that time patient was telling me that he does not want to continue the 

treatment because of the chronic nature.


And also he is feeling extremely sick for a long time, not feeling well, no 

improvement.


He wants to stop.


I called evaluation by palliative care nurse.


Multiple discussion was made.


Family agreed for DNR/DNI.


 I agree with the patient's decision.


Supportive treatment at this time.


Hemodialysis last will be done on Friday.


Palliative care.


Hospice care may be needed will follow the patient








Objective





- Vital Signs/Intake and Output


Vital Signs (last 24 hours): 


                                        











Temp Pulse Resp BP Pulse Ox


 


 97.5 F L  98 H  18   101/74   100 


 


 01/17/19 16:00  01/17/19 16:00  01/17/19 16:00  01/17/19 16:00  01/17/19 16:00











- Medications


Medications: 


                               Current Medications





Apixaban (Eliquis)  2.5 mg PO BID [facility]


   Last Admin: 01/17/19 10:00 Dose:  Not Given


Calcium Acetate (Phoslo)  667 mg PO TIDCC [facility]


   Last Admin: 01/17/19 17:00 Dose:  Not Given


Epoetin Damir (Procrit)  10,000 unit IV [facility]


   Last Admin: 01/16/19 09:00 Dose:  Not Given


Daptomycin 900 mg/ Sodium (Chloride)  100 mls @ 100 mls/hr IV [facility]; Protocol


   Stop: 01/19/19 14:01


   Last Admin: 01/16/19 10:12 Dose:  100 mls/hr


Insulin Human Regular (Novolin R)  0 unit SC [facility]; Protocol


   Last Admin: 01/17/19 16:30 Dose:  Not Given


Mupirocin (Bactroban Ointment)  1 gm TOP DAILY PRN


Nateglinide (Starlix)  60 mg PO ACTID [facility]


   Last Admin: 01/17/19 17:30 Dose:  Not Given


Pantoprazole Sodium (Protonix Ec Tab)  40 mg PO DAILY [facility]


   Last Admin: 01/17/19 09:57 Dose:  Not Given


Tramadol HCl (Ultram)  25 mg PO TID PRN


   PRN Reason: Pain, moderate (4-7)


   Last Admin: 01/17/19 15:04 Dose:  25 mg











- Labs


Labs: 


                                        





                                 01/15/19 06:36 





                                 01/15/19 06:36 





                                        











PT  17.5 SECONDS (9.7-12.2)  H  01/15/19  06:36    


 


INR  1.6   01/15/19  06:36    


 


APTT  39 SECONDS (21-34)  H  01/15/19  06:36

## 2019-01-17 NOTE — CP.PCM.CON
History of Present Illness





- History of Present Illness


History of Present Illness: 





palliative consult requested by Doctor tio for goals of care discussion, 

family and patient wish to discontinue life support measures








Patient is a 64 yo male admitted from home with right foot draining ulcer. 

Patient had multiple debridements of B/L feet ulcers in the past. Bone scan of 

right foot was suggesting osteomyelitis. Doctor Shaan Hopkins discussed the 

possibility of amputation. patient and his wife Stephany, who works at this 

hospital, have decided to discontinue all current treatment including HD and 

allow natural death. Patient felt too tired from being sick for a long time and 

wanted to be allowed to die naturally. Patient placed on Cubicin IV in meantime.








PMH: DM, COPD, CHF, A Fib, AML, CKD with HD





Soc. Hx: , lives at home, son lives in [place]





Fam. Hx: Denied











Review of Systems





- Review of Systems


All systems: reviewed and no additional remarkable complaints except


Review of Systems: 





ROS unobtainable from patient due to lethargy. ROS obtained from nursing. Per 

nursing, patient complains of right foot pain and is medicated for it.





Past Patient History





- Infectious Disease


Hx of Infectious Diseases: None





- Tetanus Immunizations


Tetanus Immunization: Unknown





- Past Medical History & Family History


Past Medical History?: Yes





- Past Social History


Smoking Status: Never Smoked





- CARDIAC


Hx Cardiac Disorders: Yes


Hx Congestive Heart Failure: Yes


Hx Hypertension: Yes





- PULMONARY


Hx Chronic Obstructive Pulmonary Disease (COPD): Yes (uses CPAP for sleep apnea)





- NEUROLOGICAL


Hx Neurological Disorder: Yes (PERIPHERAL NEUROPATHY)


Hx Dizziness: Yes





- HEENT


Hx HEENT Problems: No





- RENAL


Hx Chronic Kidney Disease: Yes


Hx Dialysis: Yes


Type of Dialysis Access: R arm av shunt- MWF





- ENDOCRINE/METABOLIC


Hx Diabetes Mellitus Type 2: Yes





- HEMATOLOGICAL/ONCOLOGICAL


Hx Blood Disorders: Yes


Hx Blood Transfusions: Yes


Hx Cancer: Yes


Hx Chemotherapy: Yes


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





- INTEGUMENTARY


Hx Dermatological Problems: Yes


Hx Cellulitis: Yes





- MUSCULOSKELETAL/RHEUMATOLOGICAL


Hx Arthritis: Yes





- GASTROINTESTINAL


Hx Gastrointestinal Disorders: Yes


Hx Constipation: Yes





- GENITOURINARY/GYNECOLOGICAL


Hx Genitourinary Disorders: Yes


Other/Comment: On HD-M-W-F- R arm AV shunt





- PSYCHIATRIC


Hx Psychophysiologic Disorder: Yes


Hx Anxiety: Yes


Hx Depression: Yes


Hx Substance Use: No





- SURGICAL HISTORY


Hx Surgeries: Yes


Hx Angiogram: Yes


Hx Arteriovenous Shunt: Yes


Hx Cardiac Catheterization: Yes


Hx Orthopedic Surgery: Yes (KNEE)


Hx Vascular Access Device: Yes


Other/Comment: hx back surgery T5.  defibrillator/pacemaker placement.  

bilateral heel surgery





- ANESTHESIA


Hx Anesthesia: Yes


Hx Anesthesia Reactions: No


Hx Malignant Hyperthermia: No





Meds


Allergies/Adverse Reactions: 


                                    Allergies











Allergy/AdvReac Type Severity Reaction Status Date / Time


 


No Known Allergies Allergy   Verified 12/30/18 14:49














- Medications


Medications: 


                               Current Medications





Apixaban (Eliquis)  2.5 mg PO BID [facility]


   Last Admin: 01/16/19 19:30 Dose:  2.5 mg


Calcium Acetate (Phoslo)  667 mg PO TIDCC [facility]


   Last Admin: 01/17/19 07:36 Dose:  Not Given


Epoetin Damir (Procrit)  10,000 unit IV [facility]


   Last Admin: 01/16/19 09:00 Dose:  Not Given


Daptomycin 900 mg/ Sodium (Chloride)  100 mls @ 100 mls/hr IV [facility]; Protocol


   Stop: 01/19/19 14:01


   Last Admin: 01/16/19 10:12 Dose:  100 mls/hr


Insulin Human Regular (Novolin R)  0 unit SC [facility]; Protocol


   Last Admin: 01/17/19 07:35 Dose:  Not Given


Mupirocin (Bactroban Ointment)  1 gm TOP DAILY PRN


Nateglinide (Starlix)  60 mg PO ACTID [facility]


   Last Admin: 01/16/19 17:25 Dose:  Not Given


Pantoprazole Sodium (Protonix Ec Tab)  40 mg PO DAILY [facility]


   Last Admin: 01/16/19 10:13 Dose:  40 mg


Tobramycin/Dexamethasone (Tobradex Opht Susp)  0.1 ml OS BID PRN


Tramadol HCl (Ultram)  25 mg PO TID PRN


   PRN Reason: Pain, moderate (4-7)


   Last Admin: 01/17/19 07:57 Dose:  25 mg











Physical Exam





- Constitutional


Appears: Chronically Ill





- Head Exam


Head Exam: ATRAUMATIC, NORMAL INSPECTION





- Eye Exam


Eye Exam: EOMI, Normal appearance, PERRL


Pupil Exam: NORMAL ACCOMODATION, PERRL





- ENT Exam


ENT Exam: Mucous Membranes Dry





- Neck Exam


Neck exam: Positive for: Normal Inspection





- Respiratory Exam


Respiratory Exam: Decreased Breath Sounds, NORMAL BREATHING PATTERN





- Cardiovascular Exam


Cardiovascular Exam: Tachycardia, Irregular Rhythm





- GI/Abdominal Exam


GI & Abdominal Exam: Hypoactive Bowel Sounds, Soft





- Rectal Exam


Rectal Exam: Deferred





-  Exam


 Exam: NORMAL INSPECTION





- Extremities Exam


Additional comments: 





right foot dressing





- Back Exam


Back exam: NORMAL INSPECTION





- Neurological Exam


Neurological exam: Alert





- Psychiatric Exam


Psychiatric exam: Normal Affect, Normal Mood





- Skin


Skin Exam: Mottled, Urticaria, Warm





Results





- Vital Signs


Recent Vital Signs: 


                                Last Vital Signs











Temp  97.4 F L  01/17/19 07:00


 


Pulse  102 H  01/17/19 07:00


 


Resp  20   01/17/19 07:00


 


BP  128/81   01/17/19 07:00


 


Pulse Ox  100   01/17/19 07:00














- Labs


Result Diagrams: 


                                 01/15/19 06:36





                                 01/15/19 06:36


Labs: 


                         Laboratory Results - last 24 hr











  01/16/19





  12:15


 


POC Glucose (mg/dL)  93














Assessment & Plan





- Assessment and Plan (Free Text)


Assessment: 





Palliative consult


Full Code,prior to consult, PPS 30%


I reviewed all medical records and diagnostic studies and examined patient in 

the bed














Patient examined in bed, alert, looking chronically ill and tired. patient keeps

his attention on and off and often falls a sleep. Wife Stephany at bed side. Skin 

is pale, right foot dressing on. Patient is seen by wound nurse. Right foot 

severe pain is elicited by touch or reposition. Ultram 25 mg PO PRN on board. 

There is excoriation to peritoneal area due to incontinence. 





Breathing regular, shallow, there is no cough. Abdomen soft. patient will have 

HD today.





/76, , afebrile


WBC 5.6, Hb 8.0


+ MRSA Right foot wound, blood cultures + MRSA








I spoke to Stephany, patient's wife, who asked me to let patient sleep, and have 

discussion away from the bed. She understands patient's condition very well. She

said, that her  and her were in Peace with the decision about 

discontinuing HD and promoting natural death. They made a plan to have last HD 

on Friday and eat dinner together with their son on Saturday . The son should be

arriving from [place] on Saturday.





Stephany said that her and her  understand that symptoms as a respiratory 

distress and AMS could appear once HD was stopped and that he may need to start 

Morphine drip to control respiratory distress. Stephany stated that her  was

completely fine with the decision. Stephany also said that her  talked to 

her about his wishes after death. He wanted to be cremated.





Doctor Sebastian spoke to the wife and agreed to arrange HD as per wife's and 

patient's wishes. This was shared with Piper DOMINGUEZ.





Code status discussed. Wife was very familiar with DNR DNI status and signed 

POLST.





This morning I spoke to Sol,  about hospice evaluation as next 

step in care of this patient.








Impression





* Chronically ill male


* Lethargy and weakness


* Right foot draining wound


* Right foot pain


* Patient feels tired from being sick for a long time and wishes to stop all 

  life supporting treatment. Patient understands death will come as a result of 

  that action. Patient's wife is supportive of his decision


* Patient and wife agreed to stop HD as of Friday


* POLST signed, DNR DNI








Suggestion





* Promote comfort and control pain


* Last HD on Friday as per patient's wishes


* DNR/DNI


* Hospice eval for in house hospice





Palliative care will sign off at this time





Advance care planing 50 min

## 2019-01-17 NOTE — CP.PCM.PN
Subjective





- Date & Time of Evaluation


Date of Evaluation: 01/16/19


Time of Evaluation: 16:00





- Subjective


Subjective: 





Podiatry Progress Note- Dr. Thornton





63M seen and evaluated for worsening right lower extremity ulceration with 

attending Dr. Thornton. Patient is seen laying in bed, in NAD. Patient denies acute

overnight events. Denies nausea, fever, shortness of breath, chest pains or 

chills. Patient states he does not want his dressing change. Reports will stop 

dialysis after Friday








Objective





- Vital Signs/Intake and Output


Vital Signs (last 24 hours): 


                                        











Temp Pulse Resp BP Pulse Ox


 


 97.8 F   100 H  20   136/76   100 


 


 01/16/19 07:00  01/16/19 07:00  01/16/19 07:00  01/16/19 07:00  01/16/19 07:00











- Medications


Medications: 


                               Current Medications





Apixaban (Eliquis)  2.5 mg PO BID [facility]


   Last Admin: 01/16/19 19:30 Dose:  2.5 mg


Calcium Acetate (Phoslo)  667 mg PO TIDCC [facility]


   Last Admin: 01/16/19 18:00 Dose:  Not Given


Epoetin Damir (Procrit)  10,000 unit IV F [facility]


   Last Admin: 01/16/19 09:00 Dose:  Not Given


Daptomycin 900 mg/ Sodium (Chloride)  100 mls @ 100 mls/hr IV F [facility]; Protocol


   Stop: 01/19/19 14:01


   Last Admin: 01/16/19 10:12 Dose:  100 mls/hr


Insulin Human Regular (Novolin R)  0 unit SC [facility]; Protocol


   Last Admin: 01/16/19 22:19 Dose:  Not Given


Mupirocin (Bactroban Ointment)  1 gm TOP DAILY PRN


Nateglinide (Starlix)  60 mg PO ACTID [facility]


   Last Admin: 01/16/19 17:25 Dose:  Not Given


Pantoprazole Sodium (Protonix Ec Tab)  40 mg PO DAILY [facility]


   Last Admin: 01/16/19 10:13 Dose:  40 mg


Tobramycin/Dexamethasone (Tobradex Opht Susp)  0.1 ml OS BID PRN


Tramadol HCl (Ultram)  25 mg PO TID PRN


   PRN Reason: Pain, moderate (4-7)


   Last Admin: 01/16/19 19:30 Dose:  25 mg











- Labs


Labs: 


                                        





                                 01/15/19 06:36 





                                 01/15/19 06:36 





                                        











PT  17.5 SECONDS (9.7-12.2)  H  01/15/19  06:36    


 


INR  1.6   01/15/19  06:36    


 


APTT  39 SECONDS (21-34)  H  01/15/19  06:36    














- Constitutional


Appears: Well, Non-toxic, No Acute Distress





- Extremities Exam


Extremities Exam: absent: Calf Tenderness


Additional comments: 





Dressing intact


Multipodus on





- Neurological Exam


Neurological Exam: Alert, Awake, Oriented x3





Assessment and Plan





- Assessment and Plan (Free Text)


Assessment: 





63 year old male patient with right lower extremity 1) right anterior ankle 

ulceration-infected and macerated 2)right  chronic nonhealing posterior ankle 

ulceration -stable


Plan: 





Patient seen and evaluated at bedside with attending Dr. Thornton


Labs, vitals reviewed- Afebrile, absent leukocytosis


Right ankle wound culture- MRSA


Foot X-ray taken: possible signs of acute OM, further imaging recommended


IV abx per ID


Bone scan triple phase positive OM


Patient and family wishes to discontinue podiatry care.

## 2019-01-17 NOTE — CP.PCM.PN
Subjective





- Date & Time of Evaluation


Date of Evaluation: 01/17/19


Time of Evaluation: 19:44





- Subjective


Subjective: 


INFECTIOUS DISEASE PROGRESS NOTES


RAHEEM CANADA MD, FACP


1/17/2019


6T 655


CHART REVIEWED


PT EXAMINED


CASE DISCUSSED WITH RN STAFF FÁTIMA





PT APPEARS TO BE GIVING UP


DISCUSSED THE NEED FOR RIGHT BKA AS A WAY OF REMOVING THE MRSA SOURCE-RECURRENT,

NON HEALING OSTEOMYLITIS/ULCERS.


HE HAS BECOME DEPRESSED AND WITHDRAWN


REFUSING DIALYSIS AND IV AB GIVEN THEN





Patient is somewhat sleepy.


But arousable.


DNR/DNI today.


Will be getting the last hemodialysis tomorrow.


Following that the patient will be placed on possibly hospice


HE will get a hospice evaluation and management.


Continue the current supportive treatment











Objective





- Vital Signs/Intake and Output


Vital Signs (last 24 hours): 


                                        











Temp Pulse Resp BP Pulse Ox


 


 97.5 F L  98 H  18   101/74   100 


 


 01/17/19 16:00  01/17/19 16:00  01/17/19 16:00  01/17/19 16:00  01/17/19 16:00











- Medications


Medications: 


                               Current Medications





Apixaban (Eliquis)  2.5 mg PO BID [facility]


   Last Admin: 01/17/19 19:00 Dose:  2.5 mg


Calcium Acetate (Phoslo)  667 mg PO TIDCC [facility]


   Last Admin: 01/17/19 17:00 Dose:  Not Given


Epoetin Damir (Procrit)  10,000 unit IV [facility]


   Last Admin: 01/16/19 09:00 Dose:  Not Given


Daptomycin 900 mg/ Sodium (Chloride)  100 mls @ 100 mls/hr IV [facility]; Protocol


   Stop: 01/19/19 14:01


   Last Admin: 01/16/19 10:12 Dose:  100 mls/hr


Insulin Human Regular (Novolin R)  0 unit SC [facility]; Protocol


   Last Admin: 01/17/19 16:30 Dose:  Not Given


Mupirocin (Bactroban Ointment)  1 gm TOP DAILY PRN


Nateglinide (Starlix)  60 mg PO ACTID [facility]


   Last Admin: 01/17/19 17:30 Dose:  Not Given


Pantoprazole Sodium (Protonix Ec Tab)  40 mg PO DAILY [facility]


   Last Admin: 01/17/19 09:57 Dose:  Not Given


Tramadol HCl (Ultram)  25 mg PO TID PRN


   PRN Reason: Pain, moderate (4-7)


   Last Admin: 01/17/19 15:04 Dose:  25 mg











- Labs


Labs: 


                                        





                                 01/15/19 06:36 





                                 01/15/19 06:36 





                                        











PT  17.5 SECONDS (9.7-12.2)  H  01/15/19  06:36    


 


INR  1.6   01/15/19  06:36    


 


APTT  39 SECONDS (21-34)  H  01/15/19  06:36

## 2019-01-17 NOTE — CP.PCM.PN
Subjective





- Date & Time of Evaluation


Date of Evaluation: 01/17/19


Time of Evaluation: 09:53





- Subjective


Subjective: 





Seen pre dialysis today


Will shorten dialysis time as per patient wishes


BP stable, afebrile still


Patient still  wants just 2 more days of dialysis





Objective





- Vital Signs/Intake and Output


Vital Signs (last 24 hours): 


                                        











Temp Pulse Resp BP Pulse Ox


 


 97.4 F L  102 H  20   128/81   100 


 


 01/17/19 07:00  01/17/19 07:00  01/17/19 07:00  01/17/19 07:00  01/17/19 07:00











- Medications


Medications: 


                               Current Medications





Apixaban (Eliquis)  2.5 mg PO BID [facility]


   Last Admin: 01/16/19 19:30 Dose:  2.5 mg


Calcium Acetate (Phoslo)  667 mg PO TIDCC [facility]


   Last Admin: 01/17/19 07:36 Dose:  Not Given


Epoetin Damir (Procrit)  10,000 unit IV [facility]


   Last Admin: 01/16/19 09:00 Dose:  Not Given


Daptomycin 900 mg/ Sodium (Chloride)  100 mls @ 100 mls/hr IV F [facility]; Protocol


   Stop: 01/19/19 14:01


   Last Admin: 01/16/19 10:12 Dose:  100 mls/hr


Insulin Human Regular (Novolin R)  0 unit SC [facility]; Protocol


   Last Admin: 01/17/19 07:35 Dose:  Not Given


Mupirocin (Bactroban Ointment)  1 gm TOP DAILY PRN


Nateglinide (Starlix)  60 mg PO ACTID [facility]


   Last Admin: 01/16/19 17:25 Dose:  Not Given


Pantoprazole Sodium (Protonix Ec Tab)  40 mg PO DAILY [facility]


   Last Admin: 01/16/19 10:13 Dose:  40 mg


Tobramycin/Dexamethasone (Tobradex Opht Susp)  0.1 ml OS BID PRN


Tramadol HCl (Ultram)  25 mg PO TID PRN


   PRN Reason: Pain, moderate (4-7)


   Last Admin: 01/17/19 07:57 Dose:  25 mg











- Labs


Labs: 


                                        





                                 01/15/19 06:36 





                                 01/15/19 06:36 





                                        











PT  17.5 SECONDS (9.7-12.2)  H  01/15/19  06:36    


 


INR  1.6   01/15/19  06:36    


 


APTT  39 SECONDS (21-34)  H  01/15/19  06:36    














- Constitutional


Appears: No Acute Distress, Chronically Ill





- Head Exam


Head Exam: ATRAUMATIC, NORMAL INSPECTION





- Eye Exam


Eye Exam: EOMI, Normal appearance





- Neck Exam


Neck Exam: Normal Inspection.  absent: Tenderness





- Respiratory Exam


Respiratory Exam: Clear to Ausculation Bilateral, NORMAL BREATHING PATTERN





- Cardiovascular Exam


Cardiovascular Exam: REGULAR RHYTHM, +S1





- GI/Abdominal Exam


GI & Abdominal Exam: Soft.  absent: Tenderness





- Extremities Exam


Extremities Exam: Normal Inspection.  absent: Tenderness





- Neurological Exam


Neurological Exam: Awake, CN II-XII Intact





- Skin


Skin Exam: Dry, Warm





Assessment and Plan


(1) Cellulitis


Status: Acute   





(2) ESRD (end stage renal disease)


Status: Acute   





(3) Fluid overload


Status: Acute   





(4) Severe anemia


Status: Acute   





(5) A-fib


Status: Chronic   





- Assessment and Plan (Free Text)


Plan: 





dialysis today and in AM


UF as tolerated


no more plans for dialysis after tomorrow as per family wishes

## 2019-01-17 NOTE — CP.PCM.PN
Subjective





- Date & Time of Evaluation


Date of Evaluation: 01/17/19


Time of Evaluation: 19:30





- Subjective


Subjective: 





Patient is somewhat sleepy.


But arousable.


DNR/DNI today.


Will be getting the last hemodialysis tomorrow.


Following that the patient will be placed on possibly hospice we will get a 

hospice evaluation and management.


Continue the current supportive treatment





Objective





- Vital Signs/Intake and Output


Vital Signs (last 24 hours): 


                                        











Temp Pulse Resp BP Pulse Ox


 


 97.5 F L  98 H  18   101/74   100 


 


 01/17/19 16:00  01/17/19 16:00  01/17/19 16:00  01/17/19 16:00  01/17/19 16:00











- Medications


Medications: 


                               Current Medications





Apixaban (Eliquis)  2.5 mg PO BID [facility]


   Last Admin: 01/17/19 10:00 Dose:  Not Given


Calcium Acetate (Phoslo)  667 mg PO TIDCC [facility]


   Last Admin: 01/17/19 17:00 Dose:  Not Given


Epoetin Damir (Procrit)  10,000 unit IV [facility]


   Last Admin: 01/16/19 09:00 Dose:  Not Given


Daptomycin 900 mg/ Sodium (Chloride)  100 mls @ 100 mls/hr IV [facility]; Protocol


   Stop: 01/19/19 14:01


   Last Admin: 01/16/19 10:12 Dose:  100 mls/hr


Insulin Human Regular (Novolin R)  0 unit SC [facility]; Protocol


   Last Admin: 01/17/19 16:30 Dose:  Not Given


Mupirocin (Bactroban Ointment)  1 gm TOP DAILY PRN


Nateglinide (Starlix)  60 mg PO ACTID [facility]


   Last Admin: 01/17/19 17:30 Dose:  Not Given


Pantoprazole Sodium (Protonix Ec Tab)  40 mg PO DAILY [facility]


   Last Admin: 01/17/19 09:57 Dose:  Not Given


Tramadol HCl (Ultram)  25 mg PO TID PRN


   PRN Reason: Pain, moderate (4-7)


   Last Admin: 01/17/19 15:04 Dose:  25 mg











- Labs


Labs: 


                                        





                                 01/15/19 06:36 





                                 01/15/19 06:36 





                                        











PT  17.5 SECONDS (9.7-12.2)  H  01/15/19  06:36    


 


INR  1.6   01/15/19  06:36    


 


APTT  39 SECONDS (21-34)  H  01/15/19  06:36

## 2019-01-18 RX ADMIN — ERYTHROPOIETIN SCH UNIT: 10000 INJECTION, SOLUTION INTRAVENOUS; SUBCUTANEOUS at 15:28

## 2019-01-18 RX ADMIN — HUMAN INSULIN SCH: 100 INJECTION, SOLUTION SUBCUTANEOUS at 17:58

## 2019-01-18 RX ADMIN — HUMAN INSULIN SCH: 100 INJECTION, SOLUTION SUBCUTANEOUS at 11:48

## 2019-01-18 RX ADMIN — HUMAN INSULIN SCH: 100 INJECTION, SOLUTION SUBCUTANEOUS at 08:28

## 2019-01-18 RX ADMIN — TRAMADOL HYDROCHLORIDE PRN MG: 50 TABLET, FILM COATED ORAL at 07:23

## 2019-01-18 RX ADMIN — TRAMADOL HYDROCHLORIDE PRN MG: 50 TABLET, FILM COATED ORAL at 16:20

## 2019-01-18 RX ADMIN — PANTOPRAZOLE SODIUM SCH: 40 TABLET, DELAYED RELEASE ORAL at 09:35

## 2019-01-18 RX ADMIN — HUMAN INSULIN SCH: 100 INJECTION, SOLUTION SUBCUTANEOUS at 22:22

## 2019-01-18 NOTE — CP.PCM.PN
Subjective





- Date & Time of Evaluation


Date of Evaluation: 01/18/19


Time of Evaluation: 13:14





- Subjective


Subjective: 





Seen at dialysis


Same depressed state, withdrawn


Insisting on this being last dialysis


To UF 2500ml with dialysis now


No new labs





Objective





- Vital Signs/Intake and Output


Vital Signs (last 24 hours): 


                                        











Temp Pulse Resp BP Pulse Ox


 


 97.7 F   112 H  18   95/51 L  100 


 


 01/18/19 07:00  01/18/19 07:00  01/18/19 07:00  01/18/19 07:00  01/18/19 07:00








Intake and Output: 


                                        











 01/18/19 01/18/19





 06:59 18:59


 


Intake Total 240 


 


Balance 240 














- Medications


Medications: 


                               Current Medications





Apixaban (Eliquis)  2.5 mg PO BID [facility]


   Last Admin: 01/18/19 09:58 Dose:  2.5 mg


Calcium Acetate (Phoslo)  667 mg PO TIDCC [facility]


   Last Admin: 01/18/19 11:49 Dose:  Not Given


Epoetin Damir (Procrit)  10,000 unit IV [facility]


   Last Admin: 01/16/19 09:00 Dose:  Not Given


Daptomycin 900 mg/ Sodium (Chloride)  100 mls @ 100 mls/hr IV MWF [facility]; Protocol


   Stop: 01/19/19 14:01


   Last Admin: 01/18/19 09:57 Dose:  100 mls/hr


Insulin Human Regular (Novolin R)  0 unit SC [facility]; Protocol


   Last Admin: 01/18/19 11:48 Dose:  Not Given


Mupirocin (Bactroban Ointment)  1 gm TOP DAILY PRN


Nateglinide (Starlix)  60 mg PO ACTID [facility]


   Last Admin: 01/18/19 11:49 Dose:  Not Given


Pantoprazole Sodium (Protonix Ec Tab)  40 mg PO DAILY [facility]


   Last Admin: 01/18/19 09:35 Dose:  Not Given


Tramadol HCl (Ultram)  25 mg PO TID PRN


   PRN Reason: Pain, moderate (4-7)


   Last Admin: 01/18/19 07:23 Dose:  25 mg











- Labs


Labs: 


                                        





                                 01/15/19 06:36 





                                 01/15/19 06:36 





                                        











PT  17.5 SECONDS (9.7-12.2)  H  01/15/19  06:36    


 


INR  1.6   01/15/19  06:36    


 


APTT  39 SECONDS (21-34)  H  01/15/19  06:36    














- Constitutional


Appears: No Acute Distress, Chronically Ill





- Head Exam


Head Exam: ATRAUMATIC, NORMAL INSPECTION





- Eye Exam


Eye Exam: EOMI, Normal appearance





- Neck Exam


Neck Exam: Normal Inspection.  absent: Tenderness





- Respiratory Exam


Respiratory Exam: Decreased Breath Sounds, NORMAL BREATHING PATTERN





- Cardiovascular Exam


Cardiovascular Exam: REGULAR RHYTHM, +S1





- GI/Abdominal Exam


GI & Abdominal Exam: Soft.  absent: Tenderness





- Extremities Exam


Extremities Exam: Pedal Edema, Tenderness





- Neurological Exam


Neurological Exam: Awake, CN II-XII Intact





- Skin


Skin Exam: Dry, Warm





Assessment and Plan


(1) Cellulitis


Status: Acute   





(2) ESRD (end stage renal disease)


Status: Acute   





(3) Fluid overload


Status: Acute   





(4) Severe anemia


Status: Acute   





(5) A-fib


Status: Chronic   





- Assessment and Plan (Free Text)


Plan: 





Dialysis now; UF 2500ml


Supportive care


Will follow up for now

## 2019-01-18 NOTE — CP.PCM.PN
Subjective





- Date & Time of Evaluation


Date of Evaluation: 01/18/19


Time of Evaluation: 17:59





- Subjective


Subjective: 


INFECTIOUS DISEASE PROGRESS DERIC CANADA MD, FACP


6T 655


1/18/2019


CHART REVIEWED


PT EXAMINED


CASE DISCUSSED WITH HIS WIFE PERSONALLY


PT AND HIS WIFE ARE REFUSING FURTHER ANTIBIOTIC/MEDICALLY MANAGEMENT, EVEN 

ACCESS LINES ARE BECOMING A PROBLEM.











Same depressed state, withdrawn


Insisting on this being last dialysis





No new labs





Objective





- Vital Signs/Intake and Output


Vital Signs (last 24 hours): 


                                        











Temp Pulse Resp BP Pulse Ox


 


 97.7 F   112 H  18   95/51 L  100 


 


 01/18/19 07:00  01/18/19 07:00  01/18/19 07:00  01/18/19 07:00  01/18/19 07:00








Intake and Output: 


                                        











 01/18/19 01/18/19





 06:59 18:59


 


Intake Total 240 


 


Balance 240 














- Medications


Medications: 


                               Current Medications





Apixaban (Eliquis)  2.5 mg PO BID [facility]


   Last Admin: 01/18/19 09:58 Dose:  2.5 mg


Calcium Acetate (Phoslo)  667 mg PO TIDCC [facility]


   Last Admin: 01/18/19 11:49 Dose:  Not Given


Epoetin Damir (Procrit)  10,000 unit IV [facility]


   Last Admin: 01/16/19 09:00 Dose:  Not Given


Daptomycin 900 mg/ Sodium (Chloride)  100 mls @ 100 mls/hr IV F [facility]; Protocol


   Stop: 01/19/19 14:01


   Last Admin: 01/18/19 09:57 Dose:  100 mls/hr


Insulin Human Regular (Novolin R)  0 unit SC [facility]; Protocol


   Last Admin: 01/18/19 11:48 Dose:  Not Given


Mupirocin (Bactroban Ointment)  1 gm TOP DAILY PRN


Nateglinide (Starlix)  60 mg PO ACTID [facility]


   Last Admin: 01/18/19 11:49 Dose:  Not Given


Pantoprazole Sodium (Protonix Ec Tab)  40 mg PO DAILY [facility]


   Last Admin: 01/18/19 09:35 Dose:  Not Given


Tramadol HCl (Ultram)  25 mg PO TID PRN


   PRN Reason: Pain, moderate (4-7)


   Last Admin: 01/18/19 07:23 Dose:  25 mg











- Labs


Labs: 


                                        





                                 01/15/19 06:36 





                                 01/15/19 06:36 





                                        











PT  17.5 SECONDS (9.7-12.2)  H  01/15/19  06:36    


 


INR  1.6   01/15/19  06:36    


 


APTT  39 SECONDS (21-34)  H  01/15/19  06:36    














- Constitutional


Appears: No Acute Distress, Chronically Ill





- Head Exam


Head Exam: ATRAUMATIC, NORMAL INSPECTION





- Eye Exam


Eye Exam: EOMI, Normal appearance





- Neck Exam


Neck Exam: Normal Inspection.  absent: Tenderness





- Respiratory Exam


Respiratory Exam: Decreased Breath Sounds, NORMAL BREATHING PATTERN





- Cardiovascular Exam


Cardiovascular Exam: REGULAR RHYTHM, +S1





- GI/Abdominal Exam


GI & Abdominal Exam: Soft.  absent: Tenderness





- Extremities Exam


Extremities Exam: Pedal Edema, Tenderness





- Neurological Exam


Neurological Exam: Awake, CN II-XII Intact





- Skin


Skin Exam: Dry, Warm





Assessment and Plan


(1) Cellulitis


Status: Acute   





(2) ESRD (end stage renal disease)


Status: Acute   





(3) Fluid overload


Status: Acute   





(4) Severe anemia


Status: Acute   





(5) A-fib


Status: Chronic   





- Assessment and Plan (Free Text)


Plan: 





Dialysis now; UF 2500ml


Supportive care


Will follow up for now











Objective





- Vital Signs/Intake and Output


Vital Signs (last 24 hours): 


                                        











Temp Pulse Resp BP Pulse Ox


 


 97.5 F L  112 H  20   125/81   97 


 


 01/18/19 15:10  01/18/19 15:10  01/18/19 15:10  01/18/19 15:10  01/18/19 15:10








Intake and Output: 


                                        











 01/18/19 01/18/19





 06:59 18:59


 


Intake Total 240 500


 


Balance 240 500














- Medications


Medications: 


                               Current Medications





Apixaban (Eliquis)  2.5 mg PO BID [facility]


   Last Admin: 01/18/19 17:09 Dose:  2.5 mg


Calcium Acetate (Phoslo)  667 mg PO TIDCC [facility]


   Last Admin: 01/18/19 16:21 Dose:  Not Given


Epoetin Damir (Procrit)  10,000 unit IV MWF [facility]


   Last Admin: 01/18/19 15:28 Dose:  10,000 unit


Daptomycin 900 mg/ Sodium (Chloride)  100 mls @ 100 mls/hr IV F [facility]; Protocol


   Stop: 01/19/19 14:01


   Last Admin: 01/18/19 09:57 Dose:  100 mls/hr


Insulin Human Regular (Novolin R)  0 unit SC [facility]; Protocol


   Last Admin: 01/18/19 17:58 Dose:  Not Given


Mupirocin (Bactroban Ointment)  1 gm TOP DAILY PRN


Nateglinide (Starlix)  60 mg PO ACTID [facility]


   Last Admin: 01/18/19 16:07 Dose:  Not Given


Pantoprazole Sodium (Protonix Ec Tab)  40 mg PO DAILY [facility]


   Last Admin: 01/18/19 09:35 Dose:  Not Given


Tramadol HCl (Ultram)  25 mg PO TID PRN


   PRN Reason: Pain, moderate (4-7)


   Last Admin: 01/18/19 16:20 Dose:  25 mg











- Labs


Labs: 


                                        





                                 01/15/19 06:36 





                                 01/15/19 06:36 





                                        











PT  17.5 SECONDS (9.7-12.2)  H  01/15/19  06:36    


 


INR  1.6   01/15/19  06:36    


 


APTT  39 SECONDS (21-34)  H  01/15/19  06:36

## 2019-01-19 VITALS — RESPIRATION RATE: 20 BRPM | OXYGEN SATURATION: 100 %

## 2019-01-19 RX ADMIN — OXYCODONE HYDROCHLORIDE AND ACETAMINOPHEN PRN TAB: 5; 325 TABLET ORAL at 14:19

## 2019-01-19 RX ADMIN — HUMAN INSULIN SCH: 100 INJECTION, SOLUTION SUBCUTANEOUS at 12:01

## 2019-01-19 RX ADMIN — HUMAN INSULIN SCH: 100 INJECTION, SOLUTION SUBCUTANEOUS at 08:30

## 2019-01-19 RX ADMIN — TRAMADOL HYDROCHLORIDE PRN MG: 50 TABLET, FILM COATED ORAL at 00:36

## 2019-01-19 RX ADMIN — OXYCODONE HYDROCHLORIDE AND ACETAMINOPHEN PRN TAB: 5; 325 TABLET ORAL at 20:57

## 2019-01-19 RX ADMIN — HUMAN INSULIN SCH: 100 INJECTION, SOLUTION SUBCUTANEOUS at 16:32

## 2019-01-19 RX ADMIN — HUMAN INSULIN SCH: 100 INJECTION, SOLUTION SUBCUTANEOUS at 21:43

## 2019-01-19 RX ADMIN — TRAMADOL HYDROCHLORIDE PRN MG: 50 TABLET, FILM COATED ORAL at 10:16

## 2019-01-19 RX ADMIN — PANTOPRAZOLE SODIUM SCH: 40 TABLET, DELAYED RELEASE ORAL at 12:01

## 2019-01-20 VITALS — DIASTOLIC BLOOD PRESSURE: 76 MMHG | SYSTOLIC BLOOD PRESSURE: 105 MMHG | HEART RATE: 95 BPM | TEMPERATURE: 98 F

## 2019-01-20 RX ADMIN — OXYCODONE HYDROCHLORIDE AND ACETAMINOPHEN PRN TAB: 5; 325 TABLET ORAL at 21:21

## 2019-01-20 RX ADMIN — HUMAN INSULIN SCH: 100 INJECTION, SOLUTION SUBCUTANEOUS at 08:16

## 2019-01-20 RX ADMIN — HUMAN INSULIN SCH: 100 INJECTION, SOLUTION SUBCUTANEOUS at 16:20

## 2019-01-20 RX ADMIN — HUMAN INSULIN SCH: 100 INJECTION, SOLUTION SUBCUTANEOUS at 21:24

## 2019-01-20 RX ADMIN — HUMAN INSULIN SCH: 100 INJECTION, SOLUTION SUBCUTANEOUS at 13:13

## 2019-01-20 RX ADMIN — PANTOPRAZOLE SODIUM SCH: 40 TABLET, DELAYED RELEASE ORAL at 10:38

## 2019-01-20 RX ADMIN — OXYCODONE HYDROCHLORIDE AND ACETAMINOPHEN PRN TAB: 5; 325 TABLET ORAL at 13:16

## 2019-01-21 RX ADMIN — HUMAN INSULIN SCH: 100 INJECTION, SOLUTION SUBCUTANEOUS at 13:07

## 2019-01-21 RX ADMIN — OXYCODONE HYDROCHLORIDE AND ACETAMINOPHEN PRN TAB: 5; 325 TABLET ORAL at 19:13

## 2019-01-21 RX ADMIN — HUMAN INSULIN SCH: 100 INJECTION, SOLUTION SUBCUTANEOUS at 21:05

## 2019-01-21 RX ADMIN — OXYCODONE HYDROCHLORIDE AND ACETAMINOPHEN PRN TAB: 5; 325 TABLET ORAL at 06:13

## 2019-01-21 RX ADMIN — HUMAN INSULIN SCH: 100 INJECTION, SOLUTION SUBCUTANEOUS at 08:09

## 2019-01-21 RX ADMIN — HUMAN INSULIN SCH: 100 INJECTION, SOLUTION SUBCUTANEOUS at 16:19

## 2019-01-21 RX ADMIN — OXYCODONE HYDROCHLORIDE AND ACETAMINOPHEN PRN TAB: 5; 325 TABLET ORAL at 13:01

## 2019-01-21 RX ADMIN — PANTOPRAZOLE SODIUM SCH: 40 TABLET, DELAYED RELEASE ORAL at 10:19

## 2019-01-21 NOTE — CP.PCM.PN
Subjective





- Date & Time of Evaluation


Date of Evaluation: 01/20/19


Time of Evaluation: 08:33





- Subjective


Subjective: 





Patient family at bedside.


Spoke to the patient family in detail.


We will continue the  supportive treatment at this time.


Patient is a 63-year-old male with the end-stage renal disease, congestive heart

failure, atrial flutter fibrillation, anticoagulation.


Currently patient is refusing further treatment.


Plan for hospice on Monday





Objective





- Vital Signs/Intake and Output


Vital Signs (last 24 hours): 


                                        











Temp Pulse Resp BP Pulse Ox


 


 98 F   95 H  20   105/76   100 


 


 01/20/19 10:00  01/20/19 10:00  01/20/19 10:00  01/20/19 10:00  01/20/19 10:00











- Medications


Medications: 


                               Current Medications





Apixaban (Eliquis)  [facility]ID [facility]


   Last Admin: 01/20/19 17:35 Dose:  2.5 mg


Calcium Acetate (Phoslo)  667 mg PO [facility]CC [facility]


   Last Admin: 01/21/19 08:10 Dose:  Not Given


Epoetin Damir (Procrit)  10,000 unit IV [facility]


   Last Admin: 01/18/19 15:28 Dose:  10,000 unit


Insulin Human Regular (Novolin R)  0 unit SC [facility]; Protocol


   Last Admin: 01/21/19 08:09 Dose:  Not Given


Morphine Sulfate (Morphine)  1 mg IVP Q4 PRN


   PRN Reason: Pain, severe (8-10)


   Last Admin: 01/21/19 07:46 Dose:  1 mg


Mupirocin (Bactroban Ointment)  1 gm TOP DAILY PRN


Nateglinide (Starlix)  60 mg PO ACTID [facility]


   Last Admin: 01/21/19 08:11 Dose:  Not Given


Oxycodone/Acetaminophen (Percocet 5/325 Mg Tab)  1 tab PO Q6H PRN


   PRN Reason: Pain, severe (8-10)


   Stop: 01/22/19 14:12


   Last Admin: 01/21/19 06:13 Dose:  1 tab


Pantoprazole Sodium (Protonix Ec Tab)  40 mg PO DAILY [facility]


   Last Admin: 01/20/19 10:38 Dose:  Not Given











- Labs


Labs: 


                                        





                                 01/15/19 06:36 





                                 01/15/19 06:36 





                                        











PT  17.5 SECONDS (9.7-12.2)  H  01/15/19  06:36    


 


INR  1.6   01/15/19  06:36    


 


APTT  39 SECONDS (21-34)  H  01/15/19  06:36

## 2019-01-21 NOTE — CP.PCM.PN
Subjective





- Date & Time of Evaluation


Date of Evaluation: 01/21/19


Time of Evaluation: 08:33





- Subjective


Subjective: 





Patient is now comfortably lying down.


Patient's brother at bedside.


Patient received the pain medicine this morning, including morphine.


Patient refusing any further treatment.


He is refusing hemodialysis.


He is also refusing any blood draws, or testing.








Vital signs otherwise stable.


I discussed with the family.


Will possibly arrange for hospice evaluation today.


Continue the supportive treatment.


Pain management.


We will follow the patient.





Objective





- Vital Signs/Intake and Output


Vital Signs (last 24 hours): 


                                        











Temp Pulse Resp BP Pulse Ox


 


 98 F   95 H  20   105/76   100 


 


 01/20/19 10:00  01/20/19 10:00  01/20/19 10:00  01/20/19 10:00  01/20/19 10:00











- Medications


Medications: 


                               Current Medications





Apixaban (Eliquis)  2.5 mg PO BID [facility]


   Last Admin: 01/20/19 17:35 Dose:  2.5 mg


Calcium Acetate (Phoslo)  667 mg PO TIDCC [facility]


   Last Admin: 01/21/19 08:10 Dose:  Not Given


Epoetin Damir (Procrit)  10,000 unit IV [facility] [place]


   Last Admin: 01/18/19 15:28 Dose:  10,000 unit


Insulin Human Regular (Novolin R)  0 unit SC [facility]; Protocol


   Last Admin: 01/21/19 08:09 Dose:  Not Given


Morphine Sulfate (Morphine)  1 mg IVP Q4 PRN


   PRN Reason: Pain, severe (8-10)


   Last Admin: 01/21/19 07:46 Dose:  1 mg


Mupirocin (Bactroban Ointment)  1 gm TOP DAILY PRN


Nateglinide (Starlix)  60 mg PO ACTID [facility]


   Last Admin: 01/21/19 08:11 Dose:  Not Given


Oxycodone/Acetaminophen (Percocet 5/325 Mg Tab)  1 tab PO Q6H PRN


   PRN Reason: Pain, severe (8-10)


   Stop: 01/22/19 14:12


   Last Admin: 01/21/19 06:13 Dose:  1 tab


Pantoprazole Sodium (Protonix Ec Tab)  40 mg PO DAILY [facility]


   Last Admin: 01/20/19 10:38 Dose:  Not Given











- Labs


Labs: 


                                        





                                 01/15/19 06:36 





                                 01/15/19 06:36 





                                        











PT  17.5 SECONDS (9.7-12.2)  H  01/15/19  06:36    


 


INR  1.6   01/15/19  06:36    


 


APTT  39 SECONDS (21-34)  H  01/15/19  06:36

## 2019-01-21 NOTE — CP.PCM.PN
Subjective





- Date & Time of Evaluation


Date of Evaluation: 01/18/19


Time of Evaluation: 08:32





- Subjective


Subjective: 





I discussed with the patient as well as patient's family in detail.


Patient does not want to go any aggressive treatment.


Family also decided for comfort care.


Patient will be getting the hemodialysis on Friday.


We will continue the supportive treatment.


Possible evaluation for hospice on Monday.





Objective





- Vital Signs/Intake and Output


Vital Signs (last 24 hours): 


                                        











Temp Pulse Resp BP Pulse Ox


 


 98 F   95 H  20   105/76   100 


 


 01/20/19 10:00  01/20/19 10:00  01/20/19 10:00  01/20/19 10:00  01/20/19 10:00











- Medications


Medications: 


                               Current Medications





Apixaban (Eliquis)  2.5 mg PO BID [facility]


   Last Admin: 01/20/19 17:35 Dose:  2.5 mg


Calcium Acetate (Phoslo)  667 mg PO TIDCC [facility]


   Last Admin: 01/21/19 08:10 Dose:  Not Given


Epoetin Damir (Procrit)  10,000 unit IV MWF [facility]


   Last Admin: 01/18/19 15:28 Dose:  10,000 unit


Insulin Human Regular (Novolin R)  0 unit SC [facility]; Protocol


   Last Admin: 01/21/19 08:09 Dose:  Not Given


Morphine Sulfate (Morphine)  1 mg IVP Q4 PRN


   PRN Reason: Pain, severe (8-10)


   Last Admin: 01/21/19 07:46 Dose:  1 mg


Mupirocin (Bactroban Ointment)  1 gm TOP DAILY PRN


Nateglinide (Starlix)  60 mg PO ACTID [facility]


   Last Admin: 01/21/19 08:11 Dose:  Not Given


Oxycodone/Acetaminophen (Percocet 5/325 Mg Tab)  1 tab PO Q6H PRN


   PRN Reason: Pain, severe (8-10)


   Stop: 01/22/19 14:12


   Last Admin: 01/21/19 06:13 Dose:  1 tab


Pantoprazole Sodium (Protonix Ec Tab)  40 mg PO DAILY [facility]


   Last Admin: 01/20/19 10:38 Dose:  Not Given











- Labs


Labs: 


                                        





                                 01/15/19 06:36 





                                 01/15/19 06:36 





                                        











PT  17.5 SECONDS (9.7-12.2)  H  01/15/19  06:36    


 


INR  1.6   01/15/19  06:36    


 


APTT  39 SECONDS (21-34)  H  01/15/19  06:36

## 2019-01-21 NOTE — CP.PCM.PN
Subjective





- Date & Time of Evaluation


Date of Evaluation: 01/19/19


Time of Evaluation: 08:33





- Subjective


Subjective: 





Patient received hemodialysis yesterday.


He is complaining of pain in the right lower leg.


No chest pain.


No nausea no vomiting.


Patient seems to be comfortable at this time.


We will continue the current treatment.


And will follow the patient.





Objective





- Vital Signs/Intake and Output


Vital Signs (last 24 hours): 


                                        











Temp Pulse Resp BP Pulse Ox


 


 98 F   95 H  20   105/76   100 


 


 01/20/19 10:00  01/20/19 10:00  01/20/19 10:00  01/20/19 10:00  01/20/19 10:00











- Medications


Medications: 


                               Current Medications





Apixaban (Eliquis)  2.5 mg PO BID [facility]


   Last Admin: 01/20/19 17:35 Dose:  2.5 mg


Calcium Acetate (Phoslo)  667 mg PO TIDCC [facility]


   Last Admin: 01/21/19 08:10 Dose:  Not Given


Epoetin Damir (Procrit)  10,000 unit IV MWF [facility]


   Last Admin: 01/18/19 15:28 Dose:  10,000 unit


Insulin Human Regular (Novolin R)  0 unit SC [facility]; Protocol


   Last Admin: 01/21/19 08:09 Dose:  Not Given


Morphine Sulfate (Morphine)  1 mg IVP Q4 PRN


   PRN Reason: Pain, severe (8-10)


   Last Admin: 01/21/19 07:46 Dose:  1 mg


Mupirocin (Bactroban Ointment)  1 gm TOP DAILY PRN


Nateglinide (Starlix)  60 mg PO ACTID [facility]


   Last Admin: 01/21/19 08:11 Dose:  Not Given


Oxycodone/Acetaminophen (Percocet 5/325 Mg Tab)  1 tab PO Q6H PRN


   PRN Reason: Pain, severe (8-10)


   Stop: 01/22/19 14:12


   Last Admin: 01/21/19 06:13 Dose:  1 tab


Pantoprazole Sodium (Protonix Ec Tab)  40 mg PO DAILY [facility]


   Last Admin: 01/20/19 10:38 Dose:  Not Given











- Labs


Labs: 


                                        





                                 01/15/19 06:36 





                                 01/15/19 06:36 





                                        











PT  17.5 SECONDS (9.7-12.2)  H  01/15/19  06:36    


 


INR  1.6   01/15/19  06:36    


 


APTT  39 SECONDS (21-34)  H  01/15/19  06:36

## 2019-01-22 ENCOUNTER — HOSPITAL ENCOUNTER (INPATIENT)
Dept: HOSPITAL 31 - C.6T | Age: 64
LOS: 5 days | DRG: 871 | End: 2019-01-27
Attending: INTERNAL MEDICINE | Admitting: INTERNAL MEDICINE
Payer: COMMERCIAL

## 2019-01-22 VITALS — BODY MASS INDEX: 33.7 KG/M2

## 2019-01-22 DIAGNOSIS — I13.2: ICD-10-CM

## 2019-01-22 DIAGNOSIS — I48.92: ICD-10-CM

## 2019-01-22 DIAGNOSIS — Z95.810: ICD-10-CM

## 2019-01-22 DIAGNOSIS — A41.9: Primary | ICD-10-CM

## 2019-01-22 DIAGNOSIS — Z51.5: ICD-10-CM

## 2019-01-22 DIAGNOSIS — I50.9: ICD-10-CM

## 2019-01-22 DIAGNOSIS — I25.5: ICD-10-CM

## 2019-01-22 DIAGNOSIS — I48.91: ICD-10-CM

## 2019-01-22 DIAGNOSIS — E11.622: ICD-10-CM

## 2019-01-22 DIAGNOSIS — N18.6: ICD-10-CM

## 2019-01-22 DIAGNOSIS — E78.00: ICD-10-CM

## 2019-01-22 DIAGNOSIS — C95.91: ICD-10-CM

## 2019-01-22 DIAGNOSIS — L97.909: ICD-10-CM

## 2019-01-22 DIAGNOSIS — E11.22: ICD-10-CM

## 2019-01-22 DIAGNOSIS — Z99.2: ICD-10-CM

## 2019-01-22 DIAGNOSIS — R65.21: ICD-10-CM

## 2019-01-22 DIAGNOSIS — J44.9: ICD-10-CM

## 2019-01-22 RX ADMIN — OXYCODONE HYDROCHLORIDE AND ACETAMINOPHEN PRN TAB: 5; 325 TABLET ORAL at 09:41

## 2019-01-22 RX ADMIN — OXYCODONE HYDROCHLORIDE AND ACETAMINOPHEN PRN TAB: 5; 325 TABLET ORAL at 03:21

## 2019-01-22 RX ADMIN — PANTOPRAZOLE SODIUM SCH: 40 TABLET, DELAYED RELEASE ORAL at 09:45

## 2019-01-22 NOTE — CP.PCM.PN
Subjective





- Date & Time of Evaluation


Date of Evaluation: 01/21/19


Time of Evaluation: 20:05





- Subjective


Subjective: 





INFECTIOUS DISEASE PROGRESS NOTES


RAHEEM CANADA MD, FACP


1/21/2019


CHART REVIEWED


PT EXAMINED


CASE DISCUSSED WITH PT, SON, BROTHERS AND SISTERS-PRESENT





PT  GIVING UP REFUSING ANY FURTHER TREATMENT


CALL AS NEEDED








Patient is now comfortably lying down.


Patient's brother at bedside.


Patient received the pain medicine this morning, including morphine.


Patient refusing any further treatment.


He is refusing hemodialysis.


He is also refusing any blood draws, or testing.








Vital signs otherwise stable.


I discussed with the family.


Will possibly arrange for hospice evaluation today.


Continue the supportive treatment.


Pain management.


I will follow the patient.











Objective





- Vital Signs/Intake and Output


Vital Signs (last 24 hours): 


                                        











Temp Pulse Resp BP Pulse Ox


 


 98 F   95 H  20   105/76   100 


 


 01/20/19 10:00  01/20/19 10:00  01/20/19 10:00  01/20/19 10:00  01/20/19 10:00











- Labs


Labs: 


                                        





                                 01/15/19 06:36 





                                 01/15/19 06:36 





                                        











PT  17.5 SECONDS (9.7-12.2)  H  01/15/19  06:36    


 


INR  1.6   01/15/19  06:36    


 


APTT  39 SECONDS (21-34)  H  01/15/19  06:36

## 2019-01-22 NOTE — CP.PCM.PN
Subjective





- Date & Time of Evaluation


Date of Evaluation: 01/22/19


Time of Evaluation: 11:00





- Subjective


Subjective: 





confused, awake, lethargic





Objective





- Vital Signs/Intake and Output


Vital Signs (last 24 hours): 


                                        











Temp Pulse Resp BP Pulse Ox


 


 98 F   95 H  20   105/76   100 


 


 01/20/19 10:00  01/20/19 10:00  01/20/19 10:00  01/20/19 10:00  01/20/19 10:00








Intake and Output: 


                                        











 01/22/19 01/22/19





 06:59 18:59


 


Intake Total 120 


 


Balance 120 














- Labs


Labs: 


                                        





                                 01/15/19 06:36 





                                 01/15/19 06:36 





                                        











PT  17.5 SECONDS (9.7-12.2)  H  01/15/19  06:36    


 


INR  1.6   01/15/19  06:36    


 


APTT  39 SECONDS (21-34)  H  01/15/19  06:36    














Assessment and Plan





- Assessment and Plan (Free Text)


Assessment: 





Patient is placed under [facility] hospice care as per family request. Comfort care 

and morphine sedation will be given as per protocol. Discussed with DR Trinh.

## 2019-01-23 RX ADMIN — SODIUM CHLORIDE SCH MLS/HR: 0.9 INJECTION, SOLUTION INTRAVENOUS at 10:34

## 2019-01-24 RX ADMIN — SODIUM CHLORIDE SCH MLS/HR: 0.9 INJECTION, SOLUTION INTRAVENOUS at 10:12

## 2019-01-24 RX ADMIN — SODIUM CHLORIDE SCH MLS/HR: 0.9 INJECTION, SOLUTION INTRAVENOUS at 21:40

## 2019-01-24 NOTE — CP.PCM.PN
Subjective





- Date & Time of Evaluation


Date of Evaluation: 01/24/19


Time of Evaluation: 19:18





- Subjective


Subjective: 





pt is currently in hospice


on iv morphine drip


discussed with family


comfort care to continue





Objective





- Medications


Medications: 


                               Current Medications





Acetaminophen (Tylenol 650 Mg Supp)  650 mg CO Q6 PRN


   PRN Reason: Fever >100.4 F


Morphine Sulfate 250 mg/ (Dextrose)  250 mls @ 2 mls/hr IV .Q24H [facility]


   Last Admin: 01/24/19 10:12 Dose:  2 mls/hr


Lorazepam (Ativan)  1 mg IVP Q6H PRN


   PRN Reason: Anxiety


Morphine Sulfate (Morphine)  2 mg IVP Q1H PRN


   PRN Reason: Pain, severe (8-10)


Ondansetron HCl (Zofran Inj)  4 mg IVP Q6H PRN


   PRN Reason: Nausea/Vomiting

## 2019-01-25 RX ADMIN — SODIUM CHLORIDE SCH: 0.9 INJECTION, SOLUTION INTRAVENOUS at 21:43

## 2019-01-26 RX ADMIN — SODIUM CHLORIDE SCH: 0.9 INJECTION, SOLUTION INTRAVENOUS at 22:48

## 2019-01-27 NOTE — CP.PCM.PN
Subjective





- Date & Time of Evaluation


Date of Evaluation: 19


Time of Evaluation: 12:45





- Subjective


Subjective: 





The patient was in hospice.


He was also receiving pain medications and comfort care.


Family was at bedside.


Around 12:30 PM on 2019 patient become apneic and asystolic.


Pronounced .


Patient son in the bedside.








Objective





- Vital Signs/Intake and Output


Intake and Output: 


                                        











 19





 06:59 18:59


 


Intake Total 32 


 


Balance 32 














- Medications


Medications: 


                               Current Medications





Acetaminophen (Tylenol 650 Mg Supp)  650 mg OH Q6 PRN


   PRN Reason: Fever >100.4 F


Diphenhydramine HCl (Benadryl)  25 mg PO Q6 PRN


   PRN Reason: Itching / Pruritus


   Last Admin: 19 21:13 Dose:  25 mg


Morphine Sulfate 250 mg/ (Dextrose)  250 mls @ 4 mls/hr IV .Q24H [facility]


   Last Admin: 19 22:48 Dose:  Not Given


Lorazepam (Ativan)  1 mg IVP Q6H PRN


   PRN Reason: Anxiety


   Last Admin: 19 04:22 Dose:  1 mg


Morphine Sulfate (Morphine)  2 mg IVP Q1H PRN


   PRN Reason: Pain, severe (8-10)


   Last Admin: 19 16:23 Dose:  2 mg


Ondansetron HCl (Zofran Inj)  4 mg IVP Q6H PRN


   PRN Reason: Nausea/Vomiting

## 2019-02-26 NOTE — PN
DATE:  01/11/2019





SUBJECTIVE:  The patient earlier was seen by nephrologist to receive

dialysis, tolerating the dialysis well.  Blood pressure is stable otherwise

but latest blood pressure is 71/37 noted.  The patient is somewhat weak,

tired, and he was not eating well.  Afebrile.



ASSESSMENT AND PLAN:  We will continue the current treatment.  Currently,

the patient is on daptomycin, receiving on Mondays, Wednesdays, and Fridays

and we will followup the patient.







__________________________________________

Cora Trinh MD





DD:  02/26/2019 9:00:06

DT:  02/26/2019 9:40:52

Job # [id number]

## 2019-02-26 NOTE — PN
DATE:  01/12/2019





SUBJECTIVE:  The patient is doing okay.  He is feeling somewhat week,

tired, and fatigued.  He has also had multiple discussions with the family

member about the overall prognosis.  The patient is somewhat very upset

with not improving.



PHYSICAL EXAMINATION:

VITAL SIGNS:  Stable otherwise.

CHEST:  Good air entry.

HEART:  Regular heart sound.

EXTREMITIES:  Draining ulcers noted in the leg.



ASSESSMENT AND PLAN:  The patient does not want to do any further

treatments.  We will discuss with the family about the further treatment

and we will followup the patient.







__________________________________________

Cora Trinh MD





DD:  02/26/2019 9:00:59

DT:  02/26/2019 9:50:44

Job # [id number]

## 2019-02-26 NOTE — PN
DATE:  01/18/2019





SUBJECTIVE:  The patient is feeling well, he has received dialysis

yesterday on _____.



PHYSICAL EXAMINATION:

VITAL SIGNS:  Stable otherwise.



MEDICATIONS:  Currently on multiple medications including antibiotic

through daptomycin Mondays, Wednesdays,and Fridays he is getting.



LABORATORY DATA:  INR is 2.  Rest of the labs are reviewed.



ASSESSMENT AND PLAN:  Clinically stable otherwise.  We will continue the

current treatment and we will follow up the patient.







__________________________________________

Cora Trinh MD





DD:  02/26/2019 8:57:27

DT:  02/26/2019 9:52:23

Job # [id number]

## 2019-02-26 NOTE — PN
DATE:  01/01/2019



SUBJECTIVE:  The patient today underwent PermCath tightening because of the

clotted fistula.  Recanalization was done.  The patient tolerated the

procedure well.



PHYSICAL EXAMINATION:

VITAL SIGNS:  Temperature 97.5, pulse 74, respirations 20, blood pressure

96/56, saturation is 99%.



ASSESSMENT AND PLAN:  The patient is otherwise doing well.  He is somewhat

very anxious and worried about the procedure, but he did well after the

procedure.  There was significant malfunctioning of the arteriovenous

fistula noted on the other side.  The patient is currently on Eliquis 2.5

mg twice a day.  He is also taking PhosLo, Coreg, Starlix, Percocet, and

pantoprazole.  We will continue the current treatment.  We will follow up.







__________________________________________

Cora Trinh MD





DD:  02/26/2019 8:55:28

DT:  02/26/2019 9:17:02

Job # [id number]

## 2019-02-26 NOTE — PN
DATE:  01/09/2019





SUBJECTIVE:  The patient was seen by me around 6:30 p.m.  The patient has

received hemodialysis.  He tolerated the dialysis well.  Currently, he is

on daptomycin for ongoing gram-positive bacteremia.  Clinically stable

otherwise.  Concerned about overall condition.  We will continue the

current treatment.



DIAGNOSES:  Severe recurrent bacteremia, cellulitis of the leg, end-stage

renal disease, heart failure.







__________________________________________

Cora Trinh MD





DD:  02/26/2019 8:58:28

DT:  02/26/2019 10:02:51

Job # [id number]

## 2019-02-26 NOTE — PN
DATE:  01/10/2019





SUBJECTIVE:  The patient's vital signs are stable.  Blood pressure is

115/68.  On antibiotic as per ID.  She received echocardiogram.  Also

dialysis done yesterday.



ASSESSMENT AND PLAN:  Vital signs are otherwise stable.  We will continue

the current treatment and we will follow up the patient.







__________________________________________

Cora Trinh MD





DD:  02/26/2019 8:59:13

DT:  02/26/2019 10:05:55

Job # [id number]

## 2019-02-27 NOTE — DS
DATE OF VISIT:  01/22/2019



HISTORY:  This is a 63-year-old male with a history of end-stage renal

disease, on dialysis; hypertension; hypercholesterolemia; congestive heart

failure; atrial flutter/fibrillation; history of leukemia; COPD; sleep

apnea; on hemodialysis with chronic leg ulcers, draining ulcers associated

with recurrent cellulitis and bacteremia.  The patient again hospitalized

at this time with similar problems.  The patient is having increasing

symptoms of weakness, tiredness, fever, and chills.  The patient admitted

to the hospital with acute bacteremia, acute sepsis, and also hypertension.

The patient started on antibiotics as per ID.  Seen by podiatrist.  Also

seen by nephrologist.  The patient started showing some improvement.  His

bacteremia was started improving.  Because of the recurrent infections, the

patient will need continuous antibiotics.  After multiple discussions with

the patient as well as the patient's family, they decided the family does

not want further aggressive treatment.  He wants to stop the dialysis, and

he wants to take hospice treatment because of the ongoing chronic illness

and not improving.  I discussed with the patient in detail, and he agreed

to continue with the hospice treatment.  We will discharge the patient from

medical service to hospice, and he will follow up in the hospice treatment.







__________________________________________

Cora Trinh MD





DD:  02/26/2019 9:02:47

DT:  02/26/2019 18:25:25

Job # [id number]

## 2019-02-27 NOTE — PN
DATE:  01/04/2019



SUBJECTIVE:  The patient received hemodialysis, also received transfusion. 

Hemoglobin right now is 8.6.  Currently, he is on antibiotic for ongoing

bacteremia, seen by Infectious Disease specialist.



PHYSICAL EXAMINATION:

VITAL SIGNS:  Otherwise stable.



ASSESSMENT AND PLAN:  We will continue the current treatment and we will

follow up the patient.







__________________________________________

Cora Trinh MD





DD:  02/26/2019 8:56:22

DT:  02/26/2019 9:22:09

Job # [id number]

## 2019-03-13 NOTE — PN
DATE:  01/25/2019



The patient is currently under hospice care and hospice treatment.  The

patient is currently receiving intravenous morphine drip, increasing the

dose of Ativan also given.  Family was at bedside.  Family support and

emotional support was given to the patient and family.  The patient is

still more awake and responding, but some weakness and lethargy noted _____

but he is being kept very comfortable.  We will continue the current

supportive treatment.





__________________________________________

Cora Trinh MD





DD:  03/12/2019 22:10:47

DT:  03/13/2019 0:47:05

Job # [id number]

## 2019-03-13 NOTE — DS
HISTORY:  This is a 63-year-old male with a history of end-stage renal

disease on dialysis, ischemic cardiomyopathy, AICD placement, history of

lymphoma remission, history of spinal surgery for spinal abscess in the

past, and also frequent hospitalizations secondary to recurrent infection

in the leg with significant lymphedema and recurrent septicemia, septic

shock, multiorgan failure, admitted to the hospice on 2019.  The

patient was placed on full hospice treatment as per the patient's request

as well as the family's request because of the failed further management

and death was imminent.  The patient understood the situation, and he was

placed on morphine drip continuously as well as pain management after the

hospice management.  The patient slowly became unconscious, and he became

asystole on 2019 at 12:30 p.m.  The patient was pronounced . 

Family was at bedside.  Explained to the family.  Support was given.



FINAL DIAGNOSES:  Multi-organ failure, sepsis, and end-stage renal disease,

and _____ cardiomyopathy.





__________________________________________

Cora Trinh MD





DD:  2019 22:12:47

DT:  2019 7:34:25

Job # [id number]

## 2019-03-13 NOTE — HP
ADMISSION NOTE



HISTORY:  The patient was actually discharged from medical service into

hospice on the day.  The patient is seeing them on 01/23/2019.  The patient

is a 63-year-old male with a history of multiple medical problems including

end-stage renal disease, on dialysis, hypertension, diabetes,

hypercholesterolemia, congestive heart failure, atrial

fibrillation/flutter, history of leukemia, on remission, COPD, sleep apnea,

recurrent leg ulcers, increasing fever, lethargy, draining and swelling in

the legs, and foul smelling ulcer.  The patient was initially hospitalized

to the medical service.  At that time patient was treated aggressively with

antibiotic, frequent dialysis, but as the treatment was not showing any

improvement, the patient got frustrated.  The patient was actually very

increasingly depressed.  He does not want any further treatment.  He

decided to go for a hospice treatment, and the patient was placed on

inpatient medical hospice treatment.  The patient is now receiving morphine

drip, pain management.  Hospice management is on board.  Rest of the

medications as per hospice.



The patient's family is supportive of the patient's decision and the family

is being supported by the hospital staff.  Further management will be

continued with Hospice.





__________________________________________

Cora Trinh MD





DD:  03/12/2019 22:09:33

DT:  03/13/2019 3:47:20

Job # [id number]

## 2024-11-25 NOTE — PCM.SURG1
Surgeon's Initial Post Op Note





- Surgeon's Notes


Surgeon: ehsan


Assistant: alejandra


Type of Anesthesia: IV Sedation


Anesthesia Administered By: nate


Pre-Operative Diagnosis: renal failure /clotted fistula


Operative Findings: cath to svc ra via right jugular


Post-Operative Diagnosis: same


Operation Performed: permacath tight jugular with us guidance


Specimen/Specimens Removed: 0


Estimated Blood Loss: EBL {In ML}: 15


Blood Products Given: N/A


Drains Used: No Drains


Post-Op Condition: Good


Date of Surgery/Procedure: 01/01/19


Time of Surgery/Procedure: 13:19
No

## 2025-07-30 NOTE — CP.PCM.PN
Subjective





- Date & Time of Evaluation


Date of Evaluation: 10/30/18


Time of Evaluation: 09:22





- Subjective


Subjective: 





PGY-1 Progress Note for Dr. Dickinson





Patient seen and examined at bedside.  No acute events overnight per nursing. 

Patient continues to state "I'm fine" in response to ROS questions.  Yesterday 

the patient stated that he would not be staying in the hospital past today, 

however medically his condition will require him to remain hospitalized until 

vancomycin dose is at a therapeutic level.  Patient has no other complaints at 

this time.





Objective





- Vital Signs/Intake and Output


Vital Signs (last 24 hours): 


                                        











Temp Pulse Resp BP Pulse Ox


 


 97.6 F   97 H  18   116/74   95 


 


 10/30/18 00:29  10/30/18 00:29  10/30/18 00:29  10/30/18 00:29  10/30/18 00:29











- Medications


Medications: 


                               Current Medications





Acetaminophen (Tylenol 325mg Tab)  650 mg PO Q6 PRN


   PRN Reason: Fever >100.4 F


Apixaban (Eliquis)  2.5 mg PO BID [facility]


   Last Admin: 10/30/18 09:07 Dose:  2.5 mg


Calcium Acetate (Phoslo)  667 mg PO TIDCC [facility]


   Last Admin: 10/30/18 09:04 Dose:  667 mg


Dextrose (Dextrose 50% Inj)  0 ml IV STAT PRN; Protocol


   PRN Reason: Hypoglycemia Protocol


Dextrose (Glutose 15)  0 gm PO ONCE PRN; Protocol


   PRN Reason: Hypoglycemia Protocol


Glucagon (Glucagen Diagnostic Kit)  0 mg IM STAT PRN; Protocol


   PRN Reason: Hypoglycemia Protocol


Vancomycin HCl 1.5 gm/ Sodium (Chloride)  500 mls @ 166.7 mls/hr IVPB MWF [facility]; 

Protocol


   Last Admin: 10/29/18 18:03 Dose:  Not Given


Insulin Human Regular (Novolin R)  0 unit SC ACHS [facility]; Protocol


   Last Admin: 10/30/18 08:59 Dose:  Not Given


Ondansetron HCl (Zofran Inj)  4 mg IVP Q6 PRN


   PRN Reason: Nausea/Vomiting











- Labs


Labs: 


                                        





                                 10/30/18 06:36 





                                 10/30/18 06:36 





                                        











PT  20.5 SECONDS (9.7-12.2)  H  10/30/18  06:36    


 


INR  1.9   10/30/18  06:36    


 


APTT  38 SECONDS (21-34)  H  10/30/18  06:36    














- Head Exam


Head Exam: ATRAUMATIC, NORMAL INSPECTION





- Eye Exam


Eye Exam: EOMI, Normal appearance





- ENT Exam


ENT Exam: Mucous Membranes Moist





- Respiratory Exam


Respiratory Exam: Clear to Ausculation Bilateral, NORMAL BREATHING PATTERN





- Cardiovascular Exam


Cardiovascular Exam: REGULAR RHYTHM, +S1, +S2





- GI/Abdominal Exam


GI & Abdominal Exam: Soft, Normal Bowel Sounds.  absent: Tenderness





- Extremities Exam


Extremities Exam: Pedal Edema (Chronic venous stasis changes b/l extremities)


Additional comments: 





R AVF with palpable thrill





- Neurological Exam


Neurological Exam: Alert, Awake, Oriented x3





- Psychiatric Exam


Psychiatric exam: Anxious, Normal Mood





- Skin


Skin Exam: Dry, Intact


Additional comments: 





Chronic venous stasis changes b/l extremities





Assessment and Plan





- Assessment and Plan (Free Text)


Assessment: 





63M with PMH ESRD, DM, CHF, A FIB W/RVR, AML, HTN, COPD, sleep apnea who 

presented with portacath infection, now s/p removal of portacath. INR therap

eutic on low-dose eliquis.  Vancomycin levels sub-therapeutic.





Abscess/Infection of Portacath


- Pt with portacath, draining white purulent fluid on R chest on admission; s/p 

removal by Surgery


- Tmax 100.3F on admission. Patient continue to be afebrile.


- Leukocytosis resolved


- ID consulted, Dr. Fink, help appreciated.


- Cefepime discontinued 


- Vancomycin increased to 1.5 gm MWF with HD as per ID recs


   random vanc level 10/26 18:00 9.4; Random vanc level today 9.2, continues to 

be subtherapeutic.  Will recheck a vanc level Wednesday before dialysis.  Per 

Dr. Fink, patient may be discharged to receive IV vanc MWF at dialysis once 

he is at a therapeutic dose (ideally 15-20).  


- BCx: NG - FINAL


- Wound Cx: S. aureus, penicillin resistent


- 2D echo ordered to r/o endocarditis: LVEF 53%, LV function is mild to 

moderately reduced with anteroseptal hypokinesis, and septal flattening c/w 

elevated RV pressure. The aortic root is calcified and demonstrates mildly to 

moderately reduced opening. Peak gradient was 12mmHg, valve area was not 

performed. There is mildly increased left ventricular wall thickness. The right 

ventricle is severely dilated. Systolic function is severely reduced. There is 

likely severe pulmonary HTN, underestimated by doppler. 





ESRD


- Nephro consulted, Dr. Cortes, help appreciated. Pt to continue HD MWF.


- BUN/Cr today 55/4.7





Atrial Fibrillation


- INR 1.9


- recommended for INR check twice a week as outpatient


- Patient now amenable to low-dose eliquis - receiving 2.5 mg PO BID


- Coumadin discontinued


- Heme/onc following





DM


- accuchecks q6h


- low dose sliding scale


- hypoglycemia protocol





HTN


- BP 97/52 on admission, BP stable today. Patient states his normal bp at home 

is 90s/50s.


- Home metoprolol held for asymptomatic hypotension


- Avoid IV fluid boluses if possible





JEB


- Using home CPAP





B/l LE Ulcers


- On exam noted in L heel superior aspect, R achilles tendon area


- Podiatry consulted (Dr. Thornton) for wound care, recs appreciated


- Podiatry c/w wound care.  Wounds stable, no signs of infection, no surgical 

intervention indicated.


- Multipodus boots





Hx Pacemaker, CHF


- Per Pt's wife pt has not followed with cardiology outpatient in a while


- Cardiology (Dr. Kaplan) consulted, recs appreciated


- Metoprolol held for low bps, continue to monitor





PPX


Eliquis 2.5 mg PO BID


Renal diet





Dispo: Blood cultures negative (final).  INR is now therapeutic.  Vancomycin 

levels remain sub-therapeutic.  Recheck vanc levels Wednesday prior to HD -can 

be discharged once levels are therapeutic.





Case discussed with Dr. Alok Dumas, PGY-1 [Well Developed] : well developed [Well Nourished] : well nourished [No Acute Distress] : no acute distress [Normal Conjunctiva] : normal conjunctiva [Normal Venous Pressure] : normal venous pressure [No Carotid Bruit] : no carotid bruit [Normal S1, S2] : normal S1, S2 [No Rub] : no rub [No Gallop] : no gallop [Clear Lung Fields] : clear lung fields [Good Air Entry] : good air entry [No Respiratory Distress] : no respiratory distress  [Soft] : abdomen soft [Non Tender] : non-tender [No Masses/organomegaly] : no masses/organomegaly [Normal Bowel Sounds] : normal bowel sounds [Normal Gait] : normal gait [No Edema] : no edema [No Cyanosis] : no cyanosis [No Clubbing] : no clubbing [No Varicosities] : no varicosities [No Rash] : no rash [No Skin Lesions] : no skin lesions [Moves all extremities] : moves all extremities [No Focal Deficits] : no focal deficits [Normal Speech] : normal speech [Alert and Oriented] : alert and oriented [Normal memory] : normal memory [de-identified] : 1-2/6 systolic murmur apex